# Patient Record
Sex: MALE | Race: WHITE | URBAN - METROPOLITAN AREA
[De-identification: names, ages, dates, MRNs, and addresses within clinical notes are randomized per-mention and may not be internally consistent; named-entity substitution may affect disease eponyms.]

---

## 2017-11-18 ENCOUNTER — OUTPATIENT (OUTPATIENT)
Dept: OUTPATIENT SERVICES | Facility: HOSPITAL | Age: 67
LOS: 1 days | Discharge: HOME | End: 2017-11-18

## 2017-11-18 DIAGNOSIS — R91.1 SOLITARY PULMONARY NODULE: ICD-10-CM

## 2017-12-20 ENCOUNTER — OUTPATIENT (OUTPATIENT)
Dept: OUTPATIENT SERVICES | Facility: HOSPITAL | Age: 67
LOS: 1 days | Discharge: HOME | End: 2017-12-20

## 2017-12-20 DIAGNOSIS — R05 COUGH: ICD-10-CM

## 2018-02-01 ENCOUNTER — OUTPATIENT (OUTPATIENT)
Dept: OUTPATIENT SERVICES | Facility: HOSPITAL | Age: 68
LOS: 1 days | Discharge: HOME | End: 2018-02-01

## 2018-02-01 DIAGNOSIS — R07.9 CHEST PAIN, UNSPECIFIED: ICD-10-CM

## 2018-02-27 ENCOUNTER — EMERGENCY (EMERGENCY)
Facility: HOSPITAL | Age: 68
LOS: 0 days | Discharge: HOME | End: 2018-02-28
Attending: EMERGENCY MEDICINE

## 2018-02-27 VITALS
DIASTOLIC BLOOD PRESSURE: 91 MMHG | SYSTOLIC BLOOD PRESSURE: 192 MMHG | TEMPERATURE: 98 F | RESPIRATION RATE: 20 BRPM | HEART RATE: 57 BPM | OXYGEN SATURATION: 100 %

## 2018-02-27 VITALS
DIASTOLIC BLOOD PRESSURE: 86 MMHG | RESPIRATION RATE: 18 BRPM | HEART RATE: 62 BPM | OXYGEN SATURATION: 98 % | SYSTOLIC BLOOD PRESSURE: 187 MMHG | TEMPERATURE: 99 F

## 2018-02-27 DIAGNOSIS — I10 ESSENTIAL (PRIMARY) HYPERTENSION: ICD-10-CM

## 2018-02-27 DIAGNOSIS — R10.9 UNSPECIFIED ABDOMINAL PAIN: ICD-10-CM

## 2018-02-27 DIAGNOSIS — N20.1 CALCULUS OF URETER: ICD-10-CM

## 2018-02-27 DIAGNOSIS — N13.2 HYDRONEPHROSIS WITH RENAL AND URETERAL CALCULOUS OBSTRUCTION: ICD-10-CM

## 2018-02-27 DIAGNOSIS — N20.0 CALCULUS OF KIDNEY: ICD-10-CM

## 2018-02-27 LAB
ALBUMIN SERPL ELPH-MCNC: 4.8 G/DL — SIGNIFICANT CHANGE UP (ref 3–5.5)
ALP SERPL-CCNC: 163 U/L — HIGH (ref 30–115)
ALT FLD-CCNC: 57 U/L — HIGH (ref 0–41)
ANION GAP SERPL CALC-SCNC: 12 MMOL/L — SIGNIFICANT CHANGE UP (ref 7–14)
APPEARANCE UR: CLEAR — SIGNIFICANT CHANGE UP
AST SERPL-CCNC: 79 U/L — HIGH (ref 0–41)
BASOPHILS # BLD AUTO: 0.02 K/UL — SIGNIFICANT CHANGE UP (ref 0–0.2)
BASOPHILS NFR BLD AUTO: 0.2 % — SIGNIFICANT CHANGE UP (ref 0–1)
BILIRUB SERPL-MCNC: 1.8 MG/DL — HIGH (ref 0.2–1.2)
BILIRUB UR-MCNC: NEGATIVE — SIGNIFICANT CHANGE UP
BUN SERPL-MCNC: 18 MG/DL — SIGNIFICANT CHANGE UP (ref 10–20)
CALCIUM SERPL-MCNC: 9.4 MG/DL — SIGNIFICANT CHANGE UP (ref 8.5–10.1)
CHLORIDE SERPL-SCNC: 100 MMOL/L — SIGNIFICANT CHANGE UP (ref 98–110)
CO2 SERPL-SCNC: 22 MMOL/L — SIGNIFICANT CHANGE UP (ref 17–32)
COLOR SPEC: YELLOW — SIGNIFICANT CHANGE UP
CREAT SERPL-MCNC: 1.4 MG/DL — SIGNIFICANT CHANGE UP (ref 0.7–1.5)
DIFF PNL FLD: (no result)
EOSINOPHIL # BLD AUTO: 0.01 K/UL — SIGNIFICANT CHANGE UP (ref 0–0.7)
EOSINOPHIL NFR BLD AUTO: 0.1 % — SIGNIFICANT CHANGE UP (ref 0–8)
GLUCOSE SERPL-MCNC: 175 MG/DL — HIGH (ref 70–110)
GLUCOSE UR QL: 250 MG/DL
HCT VFR BLD CALC: 47 % — SIGNIFICANT CHANGE UP (ref 42–52)
HGB BLD-MCNC: 15.3 G/DL — SIGNIFICANT CHANGE UP (ref 14–18)
IMM GRANULOCYTES NFR BLD AUTO: 0.2 % — SIGNIFICANT CHANGE UP (ref 0.1–0.3)
KETONES UR-MCNC: (no result)
LACTATE SERPL-SCNC: 2.7 MMOL/L — HIGH (ref 0.5–2.2)
LEUKOCYTE ESTERASE UR-ACNC: NEGATIVE — SIGNIFICANT CHANGE UP
LIDOCAIN IGE QN: <10 U/L — LOW (ref 7–60)
LYMPHOCYTES # BLD AUTO: 0.48 K/UL — LOW (ref 1.2–3.4)
LYMPHOCYTES # BLD AUTO: 5.5 % — LOW (ref 20.5–51.1)
MCHC RBC-ENTMCNC: 28.3 PG — SIGNIFICANT CHANGE UP (ref 27–31)
MCHC RBC-ENTMCNC: 32.6 G/DL — SIGNIFICANT CHANGE UP (ref 32–37)
MCV RBC AUTO: 86.9 FL — SIGNIFICANT CHANGE UP (ref 80–94)
MONOCYTES # BLD AUTO: 0.36 K/UL — SIGNIFICANT CHANGE UP (ref 0.1–0.6)
MONOCYTES NFR BLD AUTO: 4.1 % — SIGNIFICANT CHANGE UP (ref 1.7–9.3)
NEUTROPHILS # BLD AUTO: 7.84 K/UL — HIGH (ref 1.4–6.5)
NEUTROPHILS NFR BLD AUTO: 89.9 % — HIGH (ref 42.2–75.2)
NITRITE UR-MCNC: NEGATIVE — SIGNIFICANT CHANGE UP
NRBC # BLD: 0 /100 WBCS — SIGNIFICANT CHANGE UP (ref 0–0)
PH UR: 7.5 — SIGNIFICANT CHANGE UP (ref 5–8)
PLATELET # BLD AUTO: 164 K/UL — SIGNIFICANT CHANGE UP (ref 130–400)
POTASSIUM SERPL-MCNC: 4.5 MMOL/L — SIGNIFICANT CHANGE UP (ref 3.5–5)
POTASSIUM SERPL-SCNC: 4.5 MMOL/L — SIGNIFICANT CHANGE UP (ref 3.5–5)
PROT SERPL-MCNC: 8 G/DL — SIGNIFICANT CHANGE UP (ref 6–8)
PROT UR-MCNC: (no result) MG/DL
RBC # BLD: 5.41 M/UL — SIGNIFICANT CHANGE UP (ref 4.7–6.1)
RBC # FLD: 13.8 % — SIGNIFICANT CHANGE UP (ref 11.5–14.5)
SODIUM SERPL-SCNC: 134 MMOL/L — LOW (ref 135–146)
SP GR SPEC: 1.02 — SIGNIFICANT CHANGE UP (ref 1.01–1.03)
UROBILINOGEN FLD QL: 0.2 MG/DL — SIGNIFICANT CHANGE UP (ref 0.2–0.2)
WBC # BLD: 8.73 K/UL — SIGNIFICANT CHANGE UP (ref 4.8–10.8)
WBC # FLD AUTO: 8.73 K/UL — SIGNIFICANT CHANGE UP (ref 4.8–10.8)
WBC UR QL: SIGNIFICANT CHANGE UP /HPF

## 2018-02-27 RX ORDER — TAMSULOSIN HYDROCHLORIDE 0.4 MG/1
0.4 CAPSULE ORAL AT BEDTIME
Qty: 0 | Refills: 0 | Status: DISCONTINUED | OUTPATIENT
Start: 2018-02-27 | End: 2018-02-28

## 2018-02-27 RX ORDER — TAMSULOSIN HYDROCHLORIDE 0.4 MG/1
1 CAPSULE ORAL
Qty: 7 | Refills: 0 | OUTPATIENT
Start: 2018-02-27 | End: 2018-03-05

## 2018-02-27 RX ORDER — KETOROLAC TROMETHAMINE 30 MG/ML
30 SYRINGE (ML) INJECTION ONCE
Qty: 0 | Refills: 0 | Status: DISCONTINUED | OUTPATIENT
Start: 2018-02-27 | End: 2018-02-27

## 2018-02-27 RX ORDER — ONDANSETRON 8 MG/1
4 TABLET, FILM COATED ORAL ONCE
Qty: 0 | Refills: 0 | Status: COMPLETED | OUTPATIENT
Start: 2018-02-27 | End: 2018-02-27

## 2018-02-27 RX ORDER — MORPHINE SULFATE 50 MG/1
4 CAPSULE, EXTENDED RELEASE ORAL ONCE
Qty: 0 | Refills: 0 | Status: DISCONTINUED | OUTPATIENT
Start: 2018-02-27 | End: 2018-02-27

## 2018-02-27 RX ORDER — SODIUM CHLORIDE 9 MG/ML
1000 INJECTION INTRAMUSCULAR; INTRAVENOUS; SUBCUTANEOUS ONCE
Qty: 0 | Refills: 0 | Status: COMPLETED | OUTPATIENT
Start: 2018-02-27 | End: 2018-02-27

## 2018-02-27 RX ADMIN — ONDANSETRON 4 MILLIGRAM(S): 8 TABLET, FILM COATED ORAL at 20:41

## 2018-02-27 RX ADMIN — SODIUM CHLORIDE 1000 MILLILITER(S): 9 INJECTION INTRAMUSCULAR; INTRAVENOUS; SUBCUTANEOUS at 20:42

## 2018-02-27 RX ADMIN — Medication 30 MILLIGRAM(S): at 23:57

## 2018-02-27 RX ADMIN — TAMSULOSIN HYDROCHLORIDE 0.4 MILLIGRAM(S): 0.4 CAPSULE ORAL at 23:57

## 2018-02-27 RX ADMIN — MORPHINE SULFATE 4 MILLIGRAM(S): 50 CAPSULE, EXTENDED RELEASE ORAL at 20:41

## 2018-02-27 NOTE — ED PROVIDER NOTE - MEDICAL DECISION MAKING DETAILS
Pt with kidney stone.  pt was seen by urology team including Dr. Bullard urology attending.  pt cleared for dc by urology.  pt given rx for flomax.  pt given rx for percocet (my erx is not currently working for narcotics so pt given paper rx).  pt understands importance of urology follow up.  Patient feeling better.  Pt dc with outpatient follow up.  Pt understands importance of outpatient follow up.  Pt given strict return precautions.   Chart finished.

## 2018-02-27 NOTE — ED PROVIDER NOTE - PHYSICAL EXAMINATION
CONSTITUTIONAL: Well-developed; well-nourished; in no acute distress, nontoxic appearing  SKIN: skin exam is warm and dry,  HEAD: Normocephalic; atraumatic.  EYES: conjunctiva and sclera clear.  ENT: MMM, no nasal congestion  NECK: Supple; non tender.  ROM intact.  CARD: S1, S2 normal, no murmur  RESP: No wheezes, rales or rhonchi. Good air movement bilaterally  ABD: soft; right sided tenderness with guarding, no rebound  EXT: Normal ROM. No cyanosis   NEURO: awake, alert, following commands, oriented, grossly unremarkable. No Focal deficits. GCS 15.   PSYCH: Cooperative, appropriate.

## 2018-02-27 NOTE — ED PROVIDER NOTE - OBJECTIVE STATEMENT
66 yo m with pmh of esophapgeal CA s/p surgery presents with right sided abd pain, nausea, vomiting, diarrhe afor one day.  no fevers, no chills, no back pain, no urinary complaints.  + sick contact.  no headache, no back pain, no dizziness, no other complaints.

## 2018-02-27 NOTE — CONSULT NOTE ADULT - ATTENDING COMMENTS
pt seen labs reviewed cat scan reviewed and issues discussed with pt PA and ER doctor. We need to see his UA if negative given no further pain D/C home strain urine and f/u as OPD

## 2018-02-27 NOTE — ED PROVIDER NOTE - NS ED ROS FT
Constitutional:  no fevers, no chills, no malaise  ENMT: No neck pain or stiffness, no nasal congestion, no ear pain, no throat pain  Cardiac:  No chest pain  Respiratory:  No cough or sob  GI:  see hpi  :  No dysuria, frequency or burning.  MS:  No back pain, no joint pain.  Neuro:  No headache, no dizziness, no change in mental status  Except as documented in the HPI,  all other systems are negative

## 2018-02-27 NOTE — CONSULT NOTE ADULT - SUBJECTIVE AND OBJECTIVE BOX
Patient is a 67y old  Male who presents with a chief complaint of Right sided abdominal pain.    HPI:  Pt states he began having pain approximately 24 hrs ago and thought it may be related to his esophageal/gastric surgery 6 months ago. Pt states the pain started quickly yesterday, is sharp in nature and intermittent. Pt states he was nauseous and vomited once but feels better now. Pain is still present but not as intense. Pt denies any fever, chills or dysuria      PAST MEDICAL & SURGICAL HISTORY:  Hypertension, unspecified type  Esophageal CA s/p esophagogastrectomy with chemotherapy 6 months ago      REVIEW OF SYSTEMS:    CONSTITUTIONAL:  fevers or chills  HEENT: No visual changes  ENDO: No sweating  NECK: No pain or stiffness  MUSCULOSKELETAL: No back pain, no joint pain  RESPIRATORY: No shortness of breath  CARDIOVASCULAR: No chest pain  GASTROINTESTINAL: No abdominal or epigastric pain. No nausea, vomiting,  No diarrhea or constipation.   NEUROLOGICAL: No mental status changes  PSYCH: No depression, no mood changes  SKIN: No itching      MEDICATIONS  (STANDING):  ketorolac   Injectable 30 milliGRAM(s) IV Push Once    MEDICATIONS  (PRN):      Allergies    No Known Allergies    Intolerances        SOCIAL HISTORY: No illicit drug use    FAMILY HISTORY:      Vital Signs Last 24 Hrs  T(C): 36.9 (27 Feb 2018 18:09), Max: 36.9 (27 Feb 2018 18:09)  T(F): 98.5 (27 Feb 2018 18:09), Max: 98.5 (27 Feb 2018 18:09)  HR: 57 (27 Feb 2018 18:09) (57 - 57)  BP: 192/91 (27 Feb 2018 18:09) (192/91 - 192/91)  BP(mean): --  RR: 20 (27 Feb 2018 18:09) (20 - 20)  SpO2: 100% (27 Feb 2018 18:09) (100% - 100%)    PHYSICAL EXAM:    Constitutional: NAD, well-developed  HEENT: EOMI  Neck: no pain  Back: No CVA tenderness  Respiratory: No accessory respiratory muscle use  Abd: Soft, NT/ND  no organomegally  no hernia  : no testicular tenderness, no suprapubic fullness or tenderness. No scrotal edema, no hernias  Extremities: no edema  Neurological: A/O x 3  Psychiatric: Normal mood, normal affect  Skin: No rashes    I&O's Summary      LABS:                        15.3   8.73  )-----------( 164      ( 27 Feb 2018 20:41 )             47.0     02-27    134<L>  |  100  |  18  ----------------------------<  175<H>  4.5   |  22  |  1.4    Ca    9.4      27< from: CT Abdomen and Pelvis w/ IV Cont (02.27.18 @ 22:01) >      < end of copied text >   Feb 2018 20:41    TPro  8.0  /  Alb  4.8  /  TBili  1.8<H>  /  DBili  x   /  AST  79<H>  /  ALT  57<H>  /  AlkPhos  163<H>  02-27        Urine Culture:         RADIOLOGY & ADDITIONAL STUDIES:  < from: CT Abdomen and Pelvis w/ IV Cont (02.27.18 @ 22:01) >    Mild right hydroureteronephrosis with a 4 mm stone at the right   ureterovesicular junction, too small to measure Hounsfield units. There   is extensive right perinephric fluid extending down the retroperitoneum,   suspicious for calyceal rupture.    < end of copied text > Patient is a 67y old  Male who presents with a chief complaint of Right sided abdominal pain.    HPI:  Pt states he began having pain approximately 24 hrs ago and thought it may be related to his esophageal/gastric surgery 6 months ago. Pt states the pain started quickly yesterday, is sharp in nature and intermittent. Pt states he was nauseous and vomited once but feels better now. Pain is still present but not as intense. Pt denies any fever, chills or dysuria      PAST MEDICAL & SURGICAL HISTORY:  Hypertension, unspecified type  Esophageal CA s/p esophagogastrectomy with chemotherapy 6 months ago      REVIEW OF SYSTEMS:    CONSTITUTIONAL:  fevers or chills  HEENT: No visual changes  ENDO: No sweating  NECK: No pain or stiffness  MUSCULOSKELETAL: No back pain, no joint pain  RESPIRATORY: No shortness of breath  CARDIOVASCULAR: No chest pain  GASTROINTESTINAL: No abdominal or epigastric pain. No nausea, vomiting,  No diarrhea or constipation.   NEUROLOGICAL: No mental status changes  PSYCH: No depression, no mood changes  SKIN: No itching      MEDICATIONS  (STANDING):  ketorolac   Injectable 30 milliGRAM(s) IV Push Once    MEDICATIONS  (PRN):      Allergies    No Known Allergies    Intolerances        SOCIAL HISTORY: No illicit drug use    FAMILY HISTORY:      Vital Signs Last 24 Hrs  T(C): 36.9 (27 Feb 2018 18:09), Max: 36.9 (27 Feb 2018 18:09)  T(F): 98.5 (27 Feb 2018 18:09), Max: 98.5 (27 Feb 2018 18:09)  HR: 57 (27 Feb 2018 18:09) (57 - 57)  BP: 192/91 (27 Feb 2018 18:09) (192/91 - 192/91)  BP(mean): --  RR: 20 (27 Feb 2018 18:09) (20 - 20)  SpO2: 100% (27 Feb 2018 18:09) (100% - 100%)    PHYSICAL EXAM:    Constitutional: NAD, well-developed  HEENT: EOMI  Neck: no pain  Back: No CVA tenderness  Respiratory: No accessory respiratory muscle use  Abd: Soft, NT/ND  no organomegally  no hernia  : no testicular tenderness, no suprapubic fullness or tenderness. No scrotal edema, no hernias  Extremities: no edema  Neurological: A/O x 3  Psychiatric: Normal mood, normal affect  Skin: No rashes    I&O's Summary      LABS:                        15.3   8.73  )-----------( 164      ( 27 Feb 2018 20:41 )             47.0     02-27    134<L>  |  100  |  18  ----------------------------<  175<H>  4.5   |  22  |  1.4    Ca    9.4      27< from: CT Abdomen and Pelvis w/ IV Cont (02.27.18 @ 22:01) >      < end of copied text >   Feb 2018 20:41    TPro  8.0  /  Alb  4.8  /  TBili  1.8<H>  /  DBili  x   /  AST  79<H>  /  ALT  57<H>  /  AlkPhos  163<H>  02-27        Urinalysis negative for leuks and nitrite        RADIOLOGY & ADDITIONAL STUDIES:  < from: CT Abdomen and Pelvis w/ IV Cont (02.27.18 @ 22:01) >    Mild right hydroureteronephrosis with a 4 mm stone at the right   ureterovesicular junction, too small to measure Hounsfield units. There   is extensive right perinephric fluid extending down the retroperitoneum,   suspicious for calyceal rupture.    < end of copied text >

## 2018-02-27 NOTE — CONSULT NOTE ADULT - PROBLEM SELECTOR RECOMMENDATION 2
Drink plenty of fluids and strain all urine for stone. If stone is obtained save it for f/u with Urologist. Take Flomax 0.4mg daily along with pain medication as needed. F/U with Dr Bullard as outpt, call for appointment.

## 2018-02-27 NOTE — ED PROVIDER NOTE - PROGRESS NOTE DETAILS
a/p:  abd pain, nausea, vomiting.  pt with histoyr of surgery to distal eso[phagus.  pt unable to drink contrast.  p: ivf, pain control, ct, labs, reassess. urology consulted.  they will come to see pt. pt seen by urology at bedside.  pt seen by Dr. Bullard.  IF UA negstive for infection will dc with flomax and outpatient urology follow up as per Dr. Bullard.  pt nontoxic, well appearing.

## 2018-04-04 ENCOUNTER — APPOINTMENT (OUTPATIENT)
Dept: UROLOGY | Facility: CLINIC | Age: 68
End: 2018-04-04
Payer: MEDICARE

## 2018-04-04 DIAGNOSIS — N20.0 CALCULUS OF KIDNEY: ICD-10-CM

## 2018-04-04 DIAGNOSIS — F43.9 REACTION TO SEVERE STRESS, UNSPECIFIED: ICD-10-CM

## 2018-04-04 DIAGNOSIS — K21.9 GASTRO-ESOPHAGEAL REFLUX DISEASE W/OUT ESOPHAGITIS: ICD-10-CM

## 2018-04-04 DIAGNOSIS — I10 ESSENTIAL (PRIMARY) HYPERTENSION: ICD-10-CM

## 2018-04-04 DIAGNOSIS — Z85.01 PERSONAL HISTORY OF MALIGNANT NEOPLASM OF ESOPHAGUS: ICD-10-CM

## 2018-04-04 PROCEDURE — 99213 OFFICE O/P EST LOW 20 MIN: CPT

## 2018-04-04 NOTE — LETTER HEADER
[Sudheer Bullard MD] : Sudheer Bullard MD [Director of Urology] : Director of Urology [[address]] : [address] [[address]] : [address] [[place] [address]] : [place] [address] [Tel [phone]] : Tel [phone] [Fax [phone]] : Fax [phone]

## 2018-04-04 NOTE — REASON FOR VISIT
[Initial Visit ___] : [unfilled] is here today for an initial visit  for [unfilled] [Follow-up Visit ___] : a follow-up visit  for [unfilled]

## 2018-04-04 NOTE — LETTER HEADER
[Sudheer Bullrad MD] : Sudheer Bullard MD [Director of Urology] : Director of Urology [[address]] : [address] [[address]] : [address] [[place] [address]] : [place] [address] [Tel [phone]] : Tel [phone] [Fax [phone]] : Fax [phone]

## 2018-04-09 NOTE — LETTER BODY
[Consult Letter:] : I had the pleasure of evaluating your patient, [unfilled]. [Consult Closing:] : Thank you very much for allowing me to participate in the care of this patient.  If you have any questions, please do not hesitate to contact me. [Dear  ___] : Dear  [unfilled], [Please see my note below.] : Please see my note below. [Sincerely,] : Sincerely, [Courtesy Letter:] : I had the pleasure of seeing your patient, [unfilled], in my office today. [FreeTextEntry2] : Dr. Julianne Eisenberg\par [address] [place] [address]\[place] [address]

## 2018-04-09 NOTE — REVIEW OF SYSTEMS
[see HPI] : see HPI [Negative] : Heme/Lymph [Testicular Pain] : no testicular pain [Dysuria] : no dysuria [Incontinence] : no incontinence [Nocturia] : no nocturia

## 2018-04-09 NOTE — HISTORY OF PRESENT ILLNESS
[Currently Experiencing ___] :  [unfilled] [None] : None [FreeTextEntry1] : SERINA CORREA is a 67 year old male with a past medical history of esophageal cancer (Diagnosed 3/2017) and treatments of chemotherapy along with radiation (last treatment 6/2017) . Presents to the office today after been seen by Dr Bullard in ER for severe right flank pain radiating to lower back and diagnosed with right kidney stone.Patient had CT 2/27/2018 and revealed a 4 mm stone at the right ureterovesicular junction, too small to measure HU.Patient was discharged on Flomax 0.4 mg daily and patient has finished medication. Patient states that pain resolved on its own the same day of visit to ER. Patient states he saw small granules in his urine while he was straining his urine that same week. \par Denies urological issues, including dysuria, frequency, hematuria,and flank pain.  [Urinary Urgency] : no urinary urgency [Urinary Frequency] : no urinary frequency [Nocturia] : no nocturia [Straining] : no straining [Dysuria] : no dysuria [Hematuria - Gross] : no gross hematuria [Flank Pain] : no flank pain

## 2018-04-09 NOTE — ASSESSMENT
[FreeTextEntry1] : Upon assessment, patient has no tenderness upon percussion of bilateral flanks. No fever or chills. Patient states that he was given a strainer and was able to see small granules in his urine. Patient did not bring in stone with him, states he will bring next visit. Patient was instructed to increase his fluid intake as much as his body allows to decrease risk of kidney stone. Patient given pamphlets on kidney stone prevention and increasing fluid, to reinforce teaching. \par Patient states that he was placed on Pro[employer] and [place] Fulvic and Humic Complex Vitamins approx 4 months ago by his Nutritionist after peg tube removal.\par As per PSA, patient states Dr. Julianne Culp checks yearly, have been normal up to now. does not recall lab values, will bring or have PSA faxed to office.

## 2018-04-09 NOTE — ASSESSMENT
[FreeTextEntry1] : Upon assessment, patient has no tenderness upon percussion of bilateral flanks. No fever or chills. Patient states that he was given a strainer and was able to see small granules in his urine. Patient did not bring in stone with him, states he will bring next visit. Patient was instructed to increase his fluid intake as much as his body allows to decrease risk of kidney stone. Patient given pamphlets on kidney stone prevention and increasing fluid, to reinforce teaching. \par Patient states that he was placed on Pro[employer]otic [employer] and [place] Fulvic and Humic Complex Vitamins approx 4 months ago by his Nutritionist after peg tube removal.\par As per PSA, patient states Dr. Julianne Culp checks yearly, have been normal up to now. does not recall lab values, will bring or have PSA faxed to office.

## 2018-04-09 NOTE — LETTER BODY
[Consult Letter:] : I had the pleasure of evaluating your patient, [unfilled]. [Consult Closing:] : Thank you very much for allowing me to participate in the care of this patient.  If you have any questions, please do not hesitate to contact me. [Dear  ___] : Dear  [unfilled], [Please see my note below.] : Please see my note below. [Sincerely,] : Sincerely, [Courtesy Letter:] : I had the pleasure of seeing your patient, [unfilled], in my office today. [FreeTextEntry2] : Dr. Julianne Eisenberg\par [address] [place] [address]\[place] [address]

## 2018-04-18 ENCOUNTER — OUTPATIENT (OUTPATIENT)
Dept: OUTPATIENT SERVICES | Facility: HOSPITAL | Age: 68
LOS: 1 days | Discharge: HOME | End: 2018-04-18

## 2018-04-18 DIAGNOSIS — N20.0 CALCULUS OF KIDNEY: ICD-10-CM

## 2018-05-22 ENCOUNTER — OUTPATIENT (OUTPATIENT)
Dept: OUTPATIENT SERVICES | Facility: HOSPITAL | Age: 68
LOS: 1 days | Discharge: HOME | End: 2018-05-22

## 2018-05-22 VITALS
WEIGHT: 159.84 LBS | RESPIRATION RATE: 20 BRPM | DIASTOLIC BLOOD PRESSURE: 78 MMHG | OXYGEN SATURATION: 100 % | TEMPERATURE: 98 F | HEART RATE: 50 BPM | HEIGHT: 70 IN | SYSTOLIC BLOOD PRESSURE: 147 MMHG

## 2018-05-22 DIAGNOSIS — Z01.818 ENCOUNTER FOR OTHER PREPROCEDURAL EXAMINATION: ICD-10-CM

## 2018-05-22 DIAGNOSIS — C15.9 MALIGNANT NEOPLASM OF ESOPHAGUS, UNSPECIFIED: Chronic | ICD-10-CM

## 2018-05-22 LAB
ALBUMIN SERPL ELPH-MCNC: 4.3 G/DL — SIGNIFICANT CHANGE UP (ref 3.5–5.2)
ALP SERPL-CCNC: 134 U/L — HIGH (ref 30–115)
ALT FLD-CCNC: 28 U/L — SIGNIFICANT CHANGE UP (ref 0–41)
ANION GAP SERPL CALC-SCNC: 12 MMOL/L — SIGNIFICANT CHANGE UP (ref 7–14)
APTT BLD: 26.8 SEC — LOW (ref 27–39.2)
AST SERPL-CCNC: 27 U/L — SIGNIFICANT CHANGE UP (ref 0–41)
BASOPHILS # BLD AUTO: 0.02 K/UL — SIGNIFICANT CHANGE UP (ref 0–0.2)
BASOPHILS NFR BLD AUTO: 0.7 % — SIGNIFICANT CHANGE UP (ref 0–1)
BILIRUB SERPL-MCNC: 0.6 MG/DL — SIGNIFICANT CHANGE UP (ref 0.2–1.2)
BUN SERPL-MCNC: 15 MG/DL — SIGNIFICANT CHANGE UP (ref 10–20)
CALCIUM SERPL-MCNC: 9.5 MG/DL — SIGNIFICANT CHANGE UP (ref 8.5–10.1)
CHLORIDE SERPL-SCNC: 104 MMOL/L — SIGNIFICANT CHANGE UP (ref 98–110)
CO2 SERPL-SCNC: 29 MMOL/L — SIGNIFICANT CHANGE UP (ref 17–32)
CREAT SERPL-MCNC: 0.9 MG/DL — SIGNIFICANT CHANGE UP (ref 0.7–1.5)
EOSINOPHIL # BLD AUTO: 0.1 K/UL — SIGNIFICANT CHANGE UP (ref 0–0.7)
EOSINOPHIL NFR BLD AUTO: 3.4 % — SIGNIFICANT CHANGE UP (ref 0–8)
GLUCOSE SERPL-MCNC: 75 MG/DL — SIGNIFICANT CHANGE UP (ref 70–99)
HCT VFR BLD CALC: 42.9 % — SIGNIFICANT CHANGE UP (ref 42–52)
HGB BLD-MCNC: 14 G/DL — SIGNIFICANT CHANGE UP (ref 14–18)
IMM GRANULOCYTES NFR BLD AUTO: 0.3 % — SIGNIFICANT CHANGE UP (ref 0.1–0.3)
INR BLD: 1.02 RATIO — SIGNIFICANT CHANGE UP (ref 0.65–1.3)
LYMPHOCYTES # BLD AUTO: 0.76 K/UL — LOW (ref 1.2–3.4)
LYMPHOCYTES # BLD AUTO: 25.7 % — SIGNIFICANT CHANGE UP (ref 20.5–51.1)
MCHC RBC-ENTMCNC: 29 PG — SIGNIFICANT CHANGE UP (ref 27–31)
MCHC RBC-ENTMCNC: 32.6 G/DL — SIGNIFICANT CHANGE UP (ref 32–37)
MCV RBC AUTO: 89 FL — SIGNIFICANT CHANGE UP (ref 80–94)
MONOCYTES # BLD AUTO: 0.43 K/UL — SIGNIFICANT CHANGE UP (ref 0.1–0.6)
MONOCYTES NFR BLD AUTO: 14.5 % — HIGH (ref 1.7–9.3)
NEUTROPHILS # BLD AUTO: 1.64 K/UL — SIGNIFICANT CHANGE UP (ref 1.4–6.5)
NEUTROPHILS NFR BLD AUTO: 55.4 % — SIGNIFICANT CHANGE UP (ref 42.2–75.2)
NRBC # BLD: 0 /100 WBCS — SIGNIFICANT CHANGE UP (ref 0–0)
PLATELET # BLD AUTO: 102 K/UL — LOW (ref 130–400)
POTASSIUM SERPL-MCNC: 5.2 MMOL/L — HIGH (ref 3.5–5)
POTASSIUM SERPL-SCNC: 5.2 MMOL/L — HIGH (ref 3.5–5)
PROT SERPL-MCNC: 6.9 G/DL — SIGNIFICANT CHANGE UP (ref 6–8)
PROTHROM AB SERPL-ACNC: 11 SEC — SIGNIFICANT CHANGE UP (ref 9.95–12.87)
RBC # BLD: 4.82 M/UL — SIGNIFICANT CHANGE UP (ref 4.7–6.1)
RBC # FLD: 14.8 % — HIGH (ref 11.5–14.5)
SODIUM SERPL-SCNC: 145 MMOL/L — SIGNIFICANT CHANGE UP (ref 135–146)
WBC # BLD: 2.96 K/UL — LOW (ref 4.8–10.8)
WBC # FLD AUTO: 2.96 K/UL — LOW (ref 4.8–10.8)

## 2018-05-22 NOTE — H&P PST ADULT - HISTORY OF PRESENT ILLNESS
68 Y/O MALE SCHEDULED FOR LUNG BIOPSY REPORTS H/O ESOPHAGEAL CANCER. PT WAS SCHEDULED FOR ROUTINE F/U AND THERE WAS AN INCIDENTAL FINDING ON THE PET SCAN ADVISED TO HAVE LUNG BX  REPORTS NO CP,SOB,PALPITATIONS,COUGH OR DYSURIA  1-2 FOS WITHOUT SOB

## 2018-05-22 NOTE — H&P PST ADULT - PMH
Cancer  ESOPHAGEAL CANCER 2017 RECEIVED CHEMO AND RADIATION  Hypertension, unspecified type    Renal calculi

## 2018-05-23 ENCOUNTER — APPOINTMENT (OUTPATIENT)
Dept: UROLOGY | Facility: CLINIC | Age: 68
End: 2018-05-23
Payer: MEDICARE

## 2018-05-23 VITALS
WEIGHT: 153 LBS | HEIGHT: 70 IN | DIASTOLIC BLOOD PRESSURE: 62 MMHG | HEART RATE: 60 BPM | BODY MASS INDEX: 21.9 KG/M2 | SYSTOLIC BLOOD PRESSURE: 119 MMHG

## 2018-05-23 DIAGNOSIS — N20.0 CALCULUS OF KIDNEY: ICD-10-CM

## 2018-05-23 PROCEDURE — 99214 OFFICE O/P EST MOD 30 MIN: CPT

## 2018-05-23 NOTE — LETTER HEADER
[Sudheer Bullard MD] : Sudheer Bullard MD [Director of Urology] : Director of Urology [[address]] : [address]] : [address] [[place] [address]] : [place] [address] [Tel [phone]] : Tel [phone] [Fax [phone]] : Fax [phone]

## 2018-05-23 NOTE — LETTER BODY
[Dear  ___] : Dear  [unfilled], [Please see my note below.] : Please see my note below. [Referral Closing:] : Thank you very much for seeing this patient.  If you have any questions, please do not hesitate to contact me. [Sincerely,] : Sincerely, [FreeTextEntry2] : Dr. Julianne Eisenberg\par [address]\[place] [address] \par \par

## 2018-05-23 NOTE — ASSESSMENT
[FreeTextEntry1] : Patient currently has no complaints and  states that he has decreased his vitamin intake that was prescribed by nutritionist. Patient states that his fluid intake is improving as well as his eating habits from 9/2017 when he had partial of part of the stomach removed as well as the lower part of the esophagus. Day by day, able to tolerate more food and fluids.\par Patient states that he was just recently diagnosed 3 weeks ago with left lung cancer and is being followed in [facility]. Patient has a biopsy scheduled for 5/31/2018. \par \par Right now is primary issue is the lung cancer whether it’s metastatic or primary and when I look at the CAT scan from February and the ultrasound from now I can definitely see where the radiologist thought that there are some stones that were present then but it be a relatively short period for him to make two new stones. It could be because of his low fluid intake which is because of his inability to both eat and drink to the small size of his stomach but if we don’t get his volume up he’s going to make more stones and he makes more stones he is not to be happy.\par \par I’m recommending he see a nephrologist, to see who he or she is recommending. He will discuss that at with the nutritionist whether he has to take all those vitamins as if that is the proximate cause there must be something else they can give him, we hope, that would keep his nutrition up without giving them kidney stones.\par

## 2018-05-23 NOTE — HISTORY OF PRESENT ILLNESS
[None] : None [FreeTextEntry1] : SERINA CORREA is a 67 year old male with a past medical history of esophageal cancer (Diagnosed 3/2017) and treatments of chemotherapy along with radiation (last treatment 6/2017) . Presents to the office today for a follow up, last seen on 4/4/2018. Patient had CT 2/27/2018 and revealed a 4 mm stone at the right ureterovesicular junction, too small to measure HU.Patient currently denies any urinary symtoms , no flank pain. Patient states he is not straining urine but at the same time has not seen anything in the toilet after he urinates. Patient has had an US and 24 hour urine and is here to review results.

## 2018-05-30 DIAGNOSIS — R18.0 MALIGNANT ASCITES: ICD-10-CM

## 2018-08-13 ENCOUNTER — APPOINTMENT (OUTPATIENT)
Dept: UROLOGY | Facility: CLINIC | Age: 68
End: 2018-08-13

## 2018-09-04 NOTE — ED PROVIDER NOTE - DATE/TIME 3
27-Feb-2018 22:58 Repair Performed By Another Provider Text (Leave Blank If You Do Not Want): After obtaining clear surgical margins the defect was repaired by another provider.

## 2018-11-21 PROBLEM — I10 ESSENTIAL (PRIMARY) HYPERTENSION: Chronic | Status: ACTIVE | Noted: 2018-02-27

## 2018-11-29 ENCOUNTER — OUTPATIENT (OUTPATIENT)
Dept: OUTPATIENT SERVICES | Facility: HOSPITAL | Age: 68
LOS: 1 days | Discharge: HOME | End: 2018-11-29

## 2018-11-29 VITALS
RESPIRATION RATE: 16 BRPM | OXYGEN SATURATION: 100 % | WEIGHT: 130.07 LBS | TEMPERATURE: 98 F | HEART RATE: 60 BPM | DIASTOLIC BLOOD PRESSURE: 84 MMHG | SYSTOLIC BLOOD PRESSURE: 140 MMHG | HEIGHT: 70 IN

## 2018-11-29 DIAGNOSIS — Z01.818 ENCOUNTER FOR OTHER PREPROCEDURAL EXAMINATION: ICD-10-CM

## 2018-11-29 DIAGNOSIS — N13.2 HYDRONEPHROSIS WITH RENAL AND URETERAL CALCULOUS OBSTRUCTION: ICD-10-CM

## 2018-11-29 DIAGNOSIS — Z90.2 ACQUIRED ABSENCE OF LUNG [PART OF]: Chronic | ICD-10-CM

## 2018-11-29 DIAGNOSIS — C15.9 MALIGNANT NEOPLASM OF ESOPHAGUS, UNSPECIFIED: Chronic | ICD-10-CM

## 2018-11-29 LAB
APPEARANCE UR: ABNORMAL
BILIRUB UR-MCNC: NEGATIVE — SIGNIFICANT CHANGE UP
COLOR SPEC: YELLOW — SIGNIFICANT CHANGE UP
DIFF PNL FLD: NEGATIVE — SIGNIFICANT CHANGE UP
GLUCOSE UR QL: NEGATIVE MG/DL — SIGNIFICANT CHANGE UP
KETONES UR-MCNC: NEGATIVE — SIGNIFICANT CHANGE UP
LEUKOCYTE ESTERASE UR-ACNC: ABNORMAL
NITRITE UR-MCNC: NEGATIVE — SIGNIFICANT CHANGE UP
PH UR: 6 — SIGNIFICANT CHANGE UP (ref 5–8)
PROT UR-MCNC: NEGATIVE MG/DL — SIGNIFICANT CHANGE UP
SP GR SPEC: 1.01 — SIGNIFICANT CHANGE UP (ref 1.01–1.03)
UROBILINOGEN FLD QL: 1 MG/DL (ref 0.2–0.2)
WBC UR QL: SIGNIFICANT CHANGE UP /HPF

## 2018-11-29 RX ORDER — FOSINOPRIL SODIUM 10 MG/1
30 TABLET ORAL
Qty: 0 | Refills: 0 | COMMUNITY

## 2018-11-29 RX ORDER — PREGABALIN 225 MG/1
1 CAPSULE ORAL
Qty: 0 | Refills: 0 | COMMUNITY

## 2018-11-29 RX ORDER — CHOLECALCIFEROL (VITAMIN D3) 125 MCG
1 CAPSULE ORAL
Qty: 0 | Refills: 0 | COMMUNITY

## 2018-11-29 RX ORDER — OMEPRAZOLE 10 MG/1
1 CAPSULE, DELAYED RELEASE ORAL
Qty: 0 | Refills: 0 | COMMUNITY

## 2018-11-29 RX ORDER — PYRIDOXINE HCL (VITAMIN B6) 100 MG
16 TABLET ORAL
Qty: 0 | Refills: 0 | COMMUNITY

## 2018-11-29 NOTE — H&P PST ADULT - PSH
Cancer of esophagus  2017 RESECTION OF LOWER PORTION OF ESOPHAGUS  S/P lobectomy of lung  left ?JUNE 2018

## 2018-11-29 NOTE — H&P PST ADULT - HISTORY OF PRESENT ILLNESS
68yr old male states was found to have right kidney stone and hydronephrosis -presents for RIGHT ESWL. Denies c/o CP, PALP, SOB, URI, FEVER, RASH OR UTI SYMPTOMS. Exercise [place] 2 FOS AND ABLE TO WALK 8-10 CITY BLOCKS. LOST 70LBS IN 2018 AFTER OESOPHAGO -GASRECTOMY" GETTING BETTER, STARTING  TO EAT NOW. 68yr old male states was found to have right kidney stone and hydronephrosis -presents for RIGHT ESWL. Denies c/o CP, PALP, SOB, URI, FEVER, RASH OR UTI SYMPTOMS. Exercise [place] 2 FOS AND ABLE TO WALK 8-10 CITY BLOCKS. LOST 70LBS IN 2018 AFTER OESOPHAGO -GASRECTOMY" GETTING BETTER, STARTING  TO EAT NOW."

## 2018-11-29 NOTE — H&P PST ADULT - PMH
Cancer  left lung 2018, no chemo or rad rx  Cancer  ESOPHAGEAL CANCER 2017 RECEIVED CHEMO AND RADIATION  Hypertension, unspecified type    Renal calculi

## 2018-11-29 NOTE — H&P PST ADULT - BMI (KG/M2)
Was the patient seen in the last year in this department? Yes     Does patient have an active prescription for medications requested? No     Received Request Via: Pharmacy   Last seen 8/7/17  Last labs 4/26/17   18.7

## 2018-11-30 LAB
CULTURE RESULTS: NO GROWTH — SIGNIFICANT CHANGE UP
SPECIMEN SOURCE: SIGNIFICANT CHANGE UP

## 2018-12-03 NOTE — ASU PATIENT PROFILE, ADULT - PMH
Abnormal cholesterol test    Cancer  left lung 2018, no chemo or rad rx  Cancer  ESOPHAGEAL CANCER 2017 RECEIVED CHEMO AND RADIATION  Hypertension, unspecified type    Renal calculi

## 2018-12-04 ENCOUNTER — OUTPATIENT (OUTPATIENT)
Dept: OUTPATIENT SERVICES | Facility: HOSPITAL | Age: 68
LOS: 1 days | Discharge: HOME | End: 2018-12-04

## 2018-12-04 VITALS
OXYGEN SATURATION: 100 % | DIASTOLIC BLOOD PRESSURE: 79 MMHG | TEMPERATURE: 96 F | SYSTOLIC BLOOD PRESSURE: 134 MMHG | RESPIRATION RATE: 17 BRPM | HEIGHT: 70 IN | WEIGHT: 130.07 LBS | HEART RATE: 48 BPM

## 2018-12-04 VITALS — RESPIRATION RATE: 16 BRPM | DIASTOLIC BLOOD PRESSURE: 92 MMHG | HEART RATE: 60 BPM | SYSTOLIC BLOOD PRESSURE: 154 MMHG

## 2018-12-04 DIAGNOSIS — C15.9 MALIGNANT NEOPLASM OF ESOPHAGUS, UNSPECIFIED: Chronic | ICD-10-CM

## 2018-12-04 DIAGNOSIS — Z90.2 ACQUIRED ABSENCE OF LUNG [PART OF]: Chronic | ICD-10-CM

## 2018-12-04 RX ORDER — ONDANSETRON 8 MG/1
4 TABLET, FILM COATED ORAL ONCE
Qty: 0 | Refills: 0 | Status: DISCONTINUED | OUTPATIENT
Start: 2018-12-04 | End: 2018-12-05

## 2018-12-04 RX ORDER — DEXAMETHASONE 0.5 MG/5ML
8 ELIXIR ORAL ONCE
Qty: 0 | Refills: 0 | Status: DISCONTINUED | OUTPATIENT
Start: 2018-12-04 | End: 2018-12-05

## 2018-12-04 RX ORDER — SODIUM CHLORIDE 9 MG/ML
1000 INJECTION, SOLUTION INTRAVENOUS
Qty: 0 | Refills: 0 | Status: DISCONTINUED | OUTPATIENT
Start: 2018-12-04 | End: 2018-12-05

## 2018-12-04 RX ORDER — OXYCODONE AND ACETAMINOPHEN 5; 325 MG/1; MG/1
2 TABLET ORAL ONCE
Qty: 0 | Refills: 0 | Status: DISCONTINUED | OUTPATIENT
Start: 2018-12-04 | End: 2018-12-05

## 2018-12-04 NOTE — ASU DISCHARGE PLAN (ADULT/PEDIATRIC). - NOTIFY
Fever greater than 101/Persistent Nausea and Vomiting/Pain not relieved by Medications/Unable to Urinate

## 2018-12-10 DIAGNOSIS — N13.2 HYDRONEPHROSIS WITH RENAL AND URETERAL CALCULOUS OBSTRUCTION: ICD-10-CM

## 2018-12-10 DIAGNOSIS — E78.5 HYPERLIPIDEMIA, UNSPECIFIED: ICD-10-CM

## 2018-12-10 DIAGNOSIS — Z98.49 CATARACT EXTRACTION STATUS, UNSPECIFIED EYE: ICD-10-CM

## 2018-12-10 DIAGNOSIS — Z85.118 PERSONAL HISTORY OF OTHER MALIGNANT NEOPLASM OF BRONCHUS AND LUNG: ICD-10-CM

## 2018-12-10 DIAGNOSIS — I07.1 RHEUMATIC TRICUSPID INSUFFICIENCY: ICD-10-CM

## 2018-12-10 DIAGNOSIS — Z82.61 FAMILY HISTORY OF ARTHRITIS: ICD-10-CM

## 2018-12-10 DIAGNOSIS — J30.2 OTHER SEASONAL ALLERGIC RHINITIS: ICD-10-CM

## 2018-12-10 DIAGNOSIS — N20.0 CALCULUS OF KIDNEY: ICD-10-CM

## 2018-12-10 DIAGNOSIS — I10 ESSENTIAL (PRIMARY) HYPERTENSION: ICD-10-CM

## 2018-12-10 DIAGNOSIS — Z92.3 PERSONAL HISTORY OF IRRADIATION: ICD-10-CM

## 2018-12-10 DIAGNOSIS — Z92.21 PERSONAL HISTORY OF ANTINEOPLASTIC CHEMOTHERAPY: ICD-10-CM

## 2018-12-10 DIAGNOSIS — Z83.3 FAMILY HISTORY OF DIABETES MELLITUS: ICD-10-CM

## 2018-12-10 DIAGNOSIS — Z87.891 PERSONAL HISTORY OF NICOTINE DEPENDENCE: ICD-10-CM

## 2018-12-10 DIAGNOSIS — Z90.49 ACQUIRED ABSENCE OF OTHER SPECIFIED PARTS OF DIGESTIVE TRACT: ICD-10-CM

## 2018-12-10 DIAGNOSIS — Z85.01 PERSONAL HISTORY OF MALIGNANT NEOPLASM OF ESOPHAGUS: ICD-10-CM

## 2018-12-10 DIAGNOSIS — I49.3 VENTRICULAR PREMATURE DEPOLARIZATION: ICD-10-CM

## 2019-01-30 ENCOUNTER — APPOINTMENT (OUTPATIENT)
Dept: UROLOGY | Facility: CLINIC | Age: 69
End: 2019-01-30

## 2020-01-20 ENCOUNTER — EMERGENCY (EMERGENCY)
Facility: HOSPITAL | Age: 70
LOS: 0 days | Discharge: HOME | End: 2020-01-20
Attending: EMERGENCY MEDICINE | Admitting: EMERGENCY MEDICINE
Payer: MEDICARE

## 2020-01-20 VITALS
OXYGEN SATURATION: 98 % | TEMPERATURE: 97 F | SYSTOLIC BLOOD PRESSURE: 128 MMHG | DIASTOLIC BLOOD PRESSURE: 80 MMHG | HEART RATE: 66 BPM | RESPIRATION RATE: 18 BRPM

## 2020-01-20 VITALS
TEMPERATURE: 98 F | HEART RATE: 78 BPM | SYSTOLIC BLOOD PRESSURE: 138 MMHG | OXYGEN SATURATION: 100 % | RESPIRATION RATE: 19 BRPM | DIASTOLIC BLOOD PRESSURE: 84 MMHG | WEIGHT: 130.07 LBS

## 2020-01-20 DIAGNOSIS — N20.0 CALCULUS OF KIDNEY: ICD-10-CM

## 2020-01-20 DIAGNOSIS — Z98.890 OTHER SPECIFIED POSTPROCEDURAL STATES: ICD-10-CM

## 2020-01-20 DIAGNOSIS — C15.9 MALIGNANT NEOPLASM OF ESOPHAGUS, UNSPECIFIED: Chronic | ICD-10-CM

## 2020-01-20 DIAGNOSIS — Z90.2 ACQUIRED ABSENCE OF LUNG [PART OF]: Chronic | ICD-10-CM

## 2020-01-20 DIAGNOSIS — R10.9 UNSPECIFIED ABDOMINAL PAIN: ICD-10-CM

## 2020-01-20 DIAGNOSIS — Z87.891 PERSONAL HISTORY OF NICOTINE DEPENDENCE: ICD-10-CM

## 2020-01-20 PROBLEM — E78.9 DISORDER OF LIPOPROTEIN METABOLISM, UNSPECIFIED: Chronic | Status: ACTIVE | Noted: 2018-12-04

## 2020-01-20 LAB
ALBUMIN SERPL ELPH-MCNC: 4.6 G/DL — SIGNIFICANT CHANGE UP (ref 3.5–5.2)
ALP SERPL-CCNC: 148 U/L — HIGH (ref 30–115)
ALT FLD-CCNC: 18 U/L — SIGNIFICANT CHANGE UP (ref 0–41)
ANION GAP SERPL CALC-SCNC: 16 MMOL/L — HIGH (ref 7–14)
APPEARANCE UR: CLEAR — SIGNIFICANT CHANGE UP
AST SERPL-CCNC: 29 U/L — SIGNIFICANT CHANGE UP (ref 0–41)
BACTERIA # UR AUTO: ABNORMAL
BASOPHILS # BLD AUTO: 0.03 K/UL — SIGNIFICANT CHANGE UP (ref 0–0.2)
BASOPHILS NFR BLD AUTO: 0.5 % — SIGNIFICANT CHANGE UP (ref 0–1)
BILIRUB SERPL-MCNC: 0.7 MG/DL — SIGNIFICANT CHANGE UP (ref 0.2–1.2)
BILIRUB UR-MCNC: NEGATIVE — SIGNIFICANT CHANGE UP
BUN SERPL-MCNC: 18 MG/DL — SIGNIFICANT CHANGE UP (ref 10–20)
CALCIUM SERPL-MCNC: 9.9 MG/DL — SIGNIFICANT CHANGE UP (ref 8.5–10.1)
CHLORIDE SERPL-SCNC: 99 MMOL/L — SIGNIFICANT CHANGE UP (ref 98–110)
CO2 SERPL-SCNC: 26 MMOL/L — SIGNIFICANT CHANGE UP (ref 17–32)
COLOR SPEC: YELLOW — SIGNIFICANT CHANGE UP
CREAT SERPL-MCNC: 1 MG/DL — SIGNIFICANT CHANGE UP (ref 0.7–1.5)
DIFF PNL FLD: ABNORMAL
EOSINOPHIL # BLD AUTO: 0.04 K/UL — SIGNIFICANT CHANGE UP (ref 0–0.7)
EOSINOPHIL NFR BLD AUTO: 0.6 % — SIGNIFICANT CHANGE UP (ref 0–8)
EPI CELLS # UR: ABNORMAL /HPF
GLUCOSE SERPL-MCNC: 99 MG/DL — SIGNIFICANT CHANGE UP (ref 70–99)
GLUCOSE UR QL: NEGATIVE MG/DL — SIGNIFICANT CHANGE UP
HCT VFR BLD CALC: 45.2 % — SIGNIFICANT CHANGE UP (ref 42–52)
HGB BLD-MCNC: 13.9 G/DL — LOW (ref 14–18)
IMM GRANULOCYTES NFR BLD AUTO: 0.3 % — SIGNIFICANT CHANGE UP (ref 0.1–0.3)
KETONES UR-MCNC: NEGATIVE — SIGNIFICANT CHANGE UP
LEUKOCYTE ESTERASE UR-ACNC: ABNORMAL
LIDOCAIN IGE QN: 24 U/L — SIGNIFICANT CHANGE UP (ref 7–60)
LYMPHOCYTES # BLD AUTO: 0.65 K/UL — LOW (ref 1.2–3.4)
LYMPHOCYTES # BLD AUTO: 10.1 % — LOW (ref 20.5–51.1)
MCHC RBC-ENTMCNC: 25.9 PG — LOW (ref 27–31)
MCHC RBC-ENTMCNC: 30.8 G/DL — LOW (ref 32–37)
MCV RBC AUTO: 84.2 FL — SIGNIFICANT CHANGE UP (ref 80–94)
MONOCYTES # BLD AUTO: 0.77 K/UL — HIGH (ref 0.1–0.6)
MONOCYTES NFR BLD AUTO: 12 % — HIGH (ref 1.7–9.3)
NEUTROPHILS # BLD AUTO: 4.93 K/UL — SIGNIFICANT CHANGE UP (ref 1.4–6.5)
NEUTROPHILS NFR BLD AUTO: 76.5 % — HIGH (ref 42.2–75.2)
NITRITE UR-MCNC: NEGATIVE — SIGNIFICANT CHANGE UP
NRBC # BLD: 0 /100 WBCS — SIGNIFICANT CHANGE UP (ref 0–0)
PH UR: 7 — SIGNIFICANT CHANGE UP (ref 5–8)
PLATELET # BLD AUTO: 186 K/UL — SIGNIFICANT CHANGE UP (ref 130–400)
POTASSIUM SERPL-MCNC: 3.6 MMOL/L — SIGNIFICANT CHANGE UP (ref 3.5–5)
POTASSIUM SERPL-SCNC: 3.6 MMOL/L — SIGNIFICANT CHANGE UP (ref 3.5–5)
PROT SERPL-MCNC: 8.5 G/DL — HIGH (ref 6–8)
PROT UR-MCNC: NEGATIVE MG/DL — SIGNIFICANT CHANGE UP
RBC # BLD: 5.37 M/UL — SIGNIFICANT CHANGE UP (ref 4.7–6.1)
RBC # FLD: 15.9 % — HIGH (ref 11.5–14.5)
RBC CASTS # UR COMP ASSIST: ABNORMAL /HPF
SODIUM SERPL-SCNC: 141 MMOL/L — SIGNIFICANT CHANGE UP (ref 135–146)
SP GR SPEC: 1.01 — SIGNIFICANT CHANGE UP (ref 1.01–1.03)
UROBILINOGEN FLD QL: 0.2 MG/DL — SIGNIFICANT CHANGE UP (ref 0.2–0.2)
WBC # BLD: 6.44 K/UL — SIGNIFICANT CHANGE UP (ref 4.8–10.8)
WBC # FLD AUTO: 6.44 K/UL — SIGNIFICANT CHANGE UP (ref 4.8–10.8)
WBC UR QL: SIGNIFICANT CHANGE UP /HPF

## 2020-01-20 PROCEDURE — 99284 EMERGENCY DEPT VISIT MOD MDM: CPT

## 2020-01-20 PROCEDURE — 74177 CT ABD & PELVIS W/CONTRAST: CPT | Mod: 26

## 2020-01-20 RX ORDER — CIPROFLOXACIN LACTATE 400MG/40ML
1 VIAL (ML) INTRAVENOUS
Qty: 10 | Refills: 0
Start: 2020-01-20 | End: 2020-01-24

## 2020-01-20 RX ORDER — CIPROFLOXACIN LACTATE 400MG/40ML
1 VIAL (ML) INTRAVENOUS
Qty: 0 | Refills: 0 | DISCHARGE

## 2020-01-20 RX ORDER — TAMSULOSIN HYDROCHLORIDE 0.4 MG/1
1 CAPSULE ORAL
Qty: 5 | Refills: 0
Start: 2020-01-20 | End: 2020-01-24

## 2020-01-20 NOTE — ED PROVIDER NOTE - ATTENDING CONTRIBUTION TO CARE
I was present for and supervised the key and critical aspects of the procedures performed during the care of the patient. ATTENDING NOTE: I personally evaluated the patient. I reviewed the Resident’s or Physician Assistant’s note (as assigned above), and agree with the findings and plan except as documented in my note. 70 y/o M PMH esophageal CA on Carafate, Lung CA and Kidney stones with lithotripsy done 1 year ago p/w flank pain radiating to the ABD. Pt states that the pain was present PTA and has mostly resolved in the ED. Initially Pt thought that the pain was just gas, but it has been constant in nature. Pt's daughter notes Pt has not had a BM in about three days. Also per daughter, Pt has a irregular eating routine that she thinks is contributing to the pain. Pt has no other symptoms such as fevers, chills, vomiting, diarrhea, cough, CP or SOB. Pt notes no urinary symptoms. Plan: Imaging, pain control, nausea control.

## 2020-01-20 NOTE — ED PROVIDER NOTE - OBJECTIVE STATEMENT
69 year old male w hx of lung and esophageal CA s/p resection/chemo/radiation 3 years ago, htn, right nephrolithiasis s/p lithotripsy 2 years ago presents to the ED for evaluation of a 5 hour episode of constant, left-sided flank pain with radiation into his left abdomen this morning. Pain began around 3 AM, woke him up from sleep, and resolved w/o intervention prior to arrival. Pt now asymptomatic. Denies injury/trauma, fevers/chills, nausea, vomiting, diarrhea, black/bloody stools, constipation. Denies recent travel or sick contacts. No prior abd surgeries.

## 2020-01-20 NOTE — ED PROVIDER NOTE - CARE PROVIDER_API CALL
Emil Knight)  Urology  [address] [place] [address]  Phone: [phone]  Fax: [phone]  Follow Up Time: 1-3 Days

## 2020-01-20 NOTE — ED PROVIDER NOTE - PHYSICAL EXAMINATION
VITALS:  I have reviewed the initial vital signs.  GENERAL: Well-developed, thin male, in no acute distress. Nontoxic. Daughter at bedside.  HEENT: Sclera clear. EOMI, PERRLA. Mucous membranes moist.  CARDIO: RRR, nl S1 and S2. No murmurs, rubs, or gallops.  PULM: Normal effort. CTA b/l without wheezes, rales, or rhonchi.  MSK: No paraspinal muscle ttp. No midline thoracic or lumbar spinous tenderness, step offs, or deformity. Normal, steady gait.  GI: Normal bowel sounds. Abdomen soft and non-distended. Nontender in all four quadrants without rebound or guarding.  : +L CVA ttp. No R CVA ttp.  SKIN: Warm, dry.   NEURO: A&Ox3. Speech clear. No gross motor/sensory deficits.

## 2020-01-20 NOTE — ED PROVIDER NOTE - PATIENT PORTAL LINK FT
You can access the FollowMyHealth Patient Portal offered by [facility] by registering at the following website: [email or web address]. By joining [employer]’s FollowMyHealth portal, you will also be able to view your health information using other applications (apps) compatible with our system.

## 2020-01-20 NOTE — CONSULT NOTE ADULT - SUBJECTIVE AND OBJECTIVE BOX
HPI:  68 yo male presents to ER with c/o L flank pain that woke him from sleep at 3AM. Pt denies any n/v, fever/chills, dysuria or hematuria. Pt has h/o kidney stones on right s/p lithotripsy.     PAST MEDICAL & SURGICAL HISTORY:  Abnormal cholesterol test  Cancer: left lung 2018, no chemo or rad rx  Renal calculi  Cancer: ESOPHAGEAL CANCER 2017 RECEIVED CHEMO AND RADIATION  Hypertension, unspecified type  S/P lobectomy of lung: left ?2018  Cancer of esophagus: 2017 RESECTION OF LOWER PORTION OF ESOPHAGUS      MEDICATIONS  (STANDING):    MEDICATIONS  (PRN):      Allergies    No Known Allergies    Intolerances        SOCIAL HISTORY: No illicit drug use    FAMILY HISTORY:  Dementia (Mother)      REVIEW OF SYSTEMS:    CONSTITUTIONAL: No weakness, fevers or chills  EYES/ENT: No visual changes;  No vertigo or throat pain   NECK: No pain or stiffness  RESPIRATORY: No cough, wheezing, hemoptysis; No shortness of breath  CARDIOVASCULAR: No chest pain or palpitations  GASTROINTESTINAL: No abdominal or epigastric pain. No nausea, vomiting, or hematemesis; No diarrhea or constipation. No melena or hematochezia.  GENITOURINARY: No dysuria, frequency or hematuria +flank pain  NEUROLOGICAL: No numbness or weakness  SKIN: No itching, burning, rashes, or lesions   All other review of systems is negative unless indicated above.    Vital Signs Last 24 Hrs  T(C): 36.3 (2020 11:24), Max: 36.6 (2020 09:07)  T(F): 97.3 (2020 11:24), Max: 97.8 (2020 09:07)  HR: 66 (2020 11:24) (66 - 78)  BP: 128/80 (2020 11:24) (128/80 - 138/84)  BP(mean): --  RR: 18 (2020 11:24) (18 - 19)  SpO2: 98% (2020 11:24) (98% - 100%)    PHYSICAL EXAM:    Constitutional: NAD, well-developed  HEENT: EOMI  Neck: no pain  Back: No CVA tenderness  Respiratory: No accessory respiratory muscle use  Abd: Soft, NT/ND  no organomegally  no hernia, +mild LCVAT  Extremities: no edema  Neurological: A/O x 3  Psychiatric: Normal mood, normal affect  Skin: No rashes    I&O's Summary      LABS:                        13.9   6.44  )-----------( 186      ( 2020 09:57 )             45.2         141  |  99  |  18  ----------------------------<  99  3.6   |  26  |  1.0    Ca    9.9      2020 09:57    TPro  8.5<H>  /  Alb  4.6  /  TBili  0.7  /  DBili  x   /  AST  29  /  ALT  18  /  AlkPhos  148<H>        Urinalysis Basic - ( 2020 09:57 )    Color: Yellow / Appearance: Clear / S.015 / pH: x  Gluc: x / Ketone: Negative  / Bili: Negative / Urobili: 0.2 mg/dL   Blood: x / Protein: Negative mg/dL / Nitrite: Negative   Leuk Esterase: Small / RBC: 6-10 /HPF / WBC 3-5 /HPF   Sq Epi: x / Non Sq Epi: Occasional /HPF / Bacteria: Few      Urine Culture:         RADIOLOGY & ADDITIONAL STUDIES:  < from: CT Abdomen and Pelvis w/ IV Cont (20 @ 11:13) >    IMPRESSION:   1. 0.6 cm obstructive calculus in the distal left ureter with mild to moderate hydroureteronephrosis.  2. Additional nonobstructive calculus in the left renal pelvis.  3. Mild inflammation surrounding the bladder and diffuse left urothelial enhancement suspicious for superimposed ascending urinary tract infection, correlate with urinalysis.  4. Patchy groundglass opacity at the left base, likely infectious/inflammatory in etiology.                  KAROL WALLACE M.D., ATTENDING RADIOLOGIST  This document has been electronically signed. 2020 11:31AM                < end of copied text >

## 2020-01-20 NOTE — ED PROVIDER NOTE - CLINICAL SUMMARY MEDICAL DECISION MAKING FREE TEXT BOX
Patient evaluated for abdominal pain that is moderate in nature worse over the past several days he has a history of kidneys tones in the past and notes that this is similar, his pain is improved we obtained labs as well as ct of the abd which demonstrates a kidneys to we consulted urology who evaluated the pateint in the ed and advised starting po cipro, I will discharge patient with follow up to urology

## 2020-01-20 NOTE — ED PROVIDER NOTE - PROGRESS NOTE DETAILS
CHRISTIE: patient denies pain medication at this time. ATTENDING NOTE: I personally evaluated the patient. I reviewed the Resident’s or Physician Assistant’s note (as assigned above), and agree with the findings and plan except as documented in my note. 68 y/o M PMH esophageal CA on Carafate, Lung CA and Kidney stones with lithotripsy done 1 year ago p/w flank pain radiating to the ABD. Pt states that the pain was present PTA and has mostly resolved in the ED. Initially Pt thought that the pain was just gas, but it has been constant in nature. Pt's daughter notes Pt has not had a BM in about three days. Also per daughter, Pt has a irregular eating routine that she thinks is contributing to the pain. Pt has no other symptoms such as fevers, chills, vomiting, diarrhea, cough, CP or SOB. Pt notes no urinary symptoms. Plan: Imaging, pain control, nausea control. CHRISTIE: surgical PA aware of ct read showing obstructing stone with possible superimposed infection. will come evaluate patient in the ED. CHRISTIE: patient seen and evaluated by urology, recommending outpatient f/u with addi snyder and flomax. patient and family agreeable with plan. patient verbalizes understanding of return precautions.

## 2020-01-20 NOTE — ED PROVIDER NOTE - NS ED ROS FT
CONSTITUTIONAL: (-) fevers, (-) chills, (-) decreased appetite  CARDIO: (-) chest pain, (-) palpitations  PULM: (-) cough, (-) sputum, (-) shortness of breath  GI: see HPI  : see HPI, (-) dysuria, (-) hematuria, (-) frequency, (-) urgency, (-) incontinence, (-) difficulty urinating, (-) urinary retention  MSK: (-) back pain, (-) myalgias  SKIN: (-) rashes, (-) pallor  NEURO: (-) headache, (-) dizziness, (-) lightheadedness, (-) syncope, (-) weakness    *all other systems negative except as documented above and in the HPI*

## 2020-01-20 NOTE — CONSULT NOTE ADULT - ASSESSMENT
70yo male presents with L flank pain found to have 6mm L distal ureteral stone.  Pt currently asymptomatic.        Plan:  - Can DC home with Flomax, Cipro x 5 d and pain meds  - Copious PO fluids  - Obtain KUB prior to office visit 1/27  - FUP Dr Knight 1/27 in office.      All above d/w Dr Knight

## 2020-01-21 LAB
CULTURE RESULTS: NO GROWTH — SIGNIFICANT CHANGE UP
SPECIMEN SOURCE: SIGNIFICANT CHANGE UP

## 2020-01-24 ENCOUNTER — INPATIENT (INPATIENT)
Facility: HOSPITAL | Age: 70
LOS: 1 days | Discharge: HOME | End: 2020-01-26
Attending: INTERNAL MEDICINE | Admitting: INTERNAL MEDICINE
Payer: MEDICARE

## 2020-01-24 VITALS
RESPIRATION RATE: 20 BRPM | TEMPERATURE: 99 F | OXYGEN SATURATION: 99 % | SYSTOLIC BLOOD PRESSURE: 142 MMHG | HEART RATE: 57 BPM | DIASTOLIC BLOOD PRESSURE: 86 MMHG

## 2020-01-24 DIAGNOSIS — Z85.01 PERSONAL HISTORY OF MALIGNANT NEOPLASM OF ESOPHAGUS: ICD-10-CM

## 2020-01-24 DIAGNOSIS — I10 ESSENTIAL (PRIMARY) HYPERTENSION: ICD-10-CM

## 2020-01-24 DIAGNOSIS — C15.9 MALIGNANT NEOPLASM OF ESOPHAGUS, UNSPECIFIED: Chronic | ICD-10-CM

## 2020-01-24 DIAGNOSIS — Z90.2 ACQUIRED ABSENCE OF LUNG [PART OF]: Chronic | ICD-10-CM

## 2020-01-24 DIAGNOSIS — N20.1 CALCULUS OF URETER: ICD-10-CM

## 2020-01-24 LAB
ALBUMIN SERPL ELPH-MCNC: 3.7 G/DL — SIGNIFICANT CHANGE UP (ref 3.5–5.2)
ALP SERPL-CCNC: 112 U/L — SIGNIFICANT CHANGE UP (ref 30–115)
ALT FLD-CCNC: 22 U/L — SIGNIFICANT CHANGE UP (ref 0–41)
ANION GAP SERPL CALC-SCNC: 10 MMOL/L — SIGNIFICANT CHANGE UP (ref 7–14)
APPEARANCE UR: CLEAR — SIGNIFICANT CHANGE UP
AST SERPL-CCNC: 29 U/L — SIGNIFICANT CHANGE UP (ref 0–41)
BACTERIA # UR AUTO: ABNORMAL
BASOPHILS # BLD AUTO: 0.05 K/UL — SIGNIFICANT CHANGE UP (ref 0–0.2)
BASOPHILS NFR BLD AUTO: 0.9 % — SIGNIFICANT CHANGE UP (ref 0–1)
BILIRUB SERPL-MCNC: 0.4 MG/DL — SIGNIFICANT CHANGE UP (ref 0.2–1.2)
BILIRUB UR-MCNC: NEGATIVE — SIGNIFICANT CHANGE UP
BUN SERPL-MCNC: 20 MG/DL — SIGNIFICANT CHANGE UP (ref 10–20)
CALCIUM SERPL-MCNC: 9 MG/DL — SIGNIFICANT CHANGE UP (ref 8.5–10.1)
CHLORIDE SERPL-SCNC: 106 MMOL/L — SIGNIFICANT CHANGE UP (ref 98–110)
CO2 SERPL-SCNC: 26 MMOL/L — SIGNIFICANT CHANGE UP (ref 17–32)
COLOR SPEC: YELLOW — SIGNIFICANT CHANGE UP
CREAT SERPL-MCNC: 1.1 MG/DL — SIGNIFICANT CHANGE UP (ref 0.7–1.5)
DIFF PNL FLD: ABNORMAL
EOSINOPHIL # BLD AUTO: 0.07 K/UL — SIGNIFICANT CHANGE UP (ref 0–0.7)
EOSINOPHIL NFR BLD AUTO: 1.2 % — SIGNIFICANT CHANGE UP (ref 0–8)
GLUCOSE SERPL-MCNC: 106 MG/DL — HIGH (ref 70–99)
GLUCOSE UR QL: NEGATIVE MG/DL — SIGNIFICANT CHANGE UP
HCT VFR BLD CALC: 36.4 % — LOW (ref 42–52)
HGB BLD-MCNC: 11.2 G/DL — LOW (ref 14–18)
IMM GRANULOCYTES NFR BLD AUTO: 0.4 % — HIGH (ref 0.1–0.3)
KETONES UR-MCNC: NEGATIVE — SIGNIFICANT CHANGE UP
LACTATE SERPL-SCNC: 1.3 MMOL/L — SIGNIFICANT CHANGE UP (ref 0.7–2)
LEUKOCYTE ESTERASE UR-ACNC: NEGATIVE — SIGNIFICANT CHANGE UP
LIDOCAIN IGE QN: 17 U/L — SIGNIFICANT CHANGE UP (ref 7–60)
LYMPHOCYTES # BLD AUTO: 0.47 K/UL — LOW (ref 1.2–3.4)
LYMPHOCYTES # BLD AUTO: 8.4 % — LOW (ref 20.5–51.1)
MCHC RBC-ENTMCNC: 26 PG — LOW (ref 27–31)
MCHC RBC-ENTMCNC: 30.8 G/DL — LOW (ref 32–37)
MCV RBC AUTO: 84.5 FL — SIGNIFICANT CHANGE UP (ref 80–94)
MONOCYTES # BLD AUTO: 0.64 K/UL — HIGH (ref 0.1–0.6)
MONOCYTES NFR BLD AUTO: 11.4 % — HIGH (ref 1.7–9.3)
NEUTROPHILS # BLD AUTO: 4.37 K/UL — SIGNIFICANT CHANGE UP (ref 1.4–6.5)
NEUTROPHILS NFR BLD AUTO: 77.7 % — HIGH (ref 42.2–75.2)
NITRITE UR-MCNC: NEGATIVE — SIGNIFICANT CHANGE UP
NRBC # BLD: 0 /100 WBCS — SIGNIFICANT CHANGE UP (ref 0–0)
PH UR: 7.5 — SIGNIFICANT CHANGE UP (ref 5–8)
PLATELET # BLD AUTO: 140 K/UL — SIGNIFICANT CHANGE UP (ref 130–400)
POTASSIUM SERPL-MCNC: 3.7 MMOL/L — SIGNIFICANT CHANGE UP (ref 3.5–5)
POTASSIUM SERPL-SCNC: 3.7 MMOL/L — SIGNIFICANT CHANGE UP (ref 3.5–5)
PROT SERPL-MCNC: 6.8 G/DL — SIGNIFICANT CHANGE UP (ref 6–8)
PROT UR-MCNC: NEGATIVE MG/DL — SIGNIFICANT CHANGE UP
RBC # BLD: 4.31 M/UL — LOW (ref 4.7–6.1)
RBC # FLD: 16.3 % — HIGH (ref 11.5–14.5)
RBC CASTS # UR COMP ASSIST: ABNORMAL /HPF
SODIUM SERPL-SCNC: 142 MMOL/L — SIGNIFICANT CHANGE UP (ref 135–146)
SP GR SPEC: 1.02 — SIGNIFICANT CHANGE UP (ref 1.01–1.03)
UROBILINOGEN FLD QL: 0.2 MG/DL — SIGNIFICANT CHANGE UP (ref 0.2–0.2)
WBC # BLD: 5.62 K/UL — SIGNIFICANT CHANGE UP (ref 4.8–10.8)
WBC # FLD AUTO: 5.62 K/UL — SIGNIFICANT CHANGE UP (ref 4.8–10.8)
WBC UR QL: SIGNIFICANT CHANGE UP /HPF

## 2020-01-24 PROCEDURE — 99223 1ST HOSP IP/OBS HIGH 75: CPT | Mod: AI

## 2020-01-24 PROCEDURE — 99231 SBSQ HOSP IP/OBS SF/LOW 25: CPT

## 2020-01-24 PROCEDURE — 74018 RADEX ABDOMEN 1 VIEW: CPT | Mod: 26

## 2020-01-24 PROCEDURE — 99221 1ST HOSP IP/OBS SF/LOW 40: CPT

## 2020-01-24 PROCEDURE — 99285 EMERGENCY DEPT VISIT HI MDM: CPT

## 2020-01-24 RX ORDER — FAMOTIDINE 10 MG/ML
40 INJECTION INTRAVENOUS AT BEDTIME
Refills: 0 | Status: DISCONTINUED | OUTPATIENT
Start: 2020-01-24 | End: 2020-01-26

## 2020-01-24 RX ORDER — KETOROLAC TROMETHAMINE 30 MG/ML
15 SYRINGE (ML) INJECTION
Refills: 0 | Status: DISCONTINUED | OUTPATIENT
Start: 2020-01-24 | End: 2020-01-26

## 2020-01-24 RX ORDER — LISINOPRIL 2.5 MG/1
10 TABLET ORAL DAILY
Refills: 0 | Status: DISCONTINUED | OUTPATIENT
Start: 2020-01-24 | End: 2020-01-26

## 2020-01-24 RX ORDER — SODIUM CHLORIDE 9 MG/ML
1000 INJECTION INTRAMUSCULAR; INTRAVENOUS; SUBCUTANEOUS ONCE
Refills: 0 | Status: COMPLETED | OUTPATIENT
Start: 2020-01-24 | End: 2020-01-24

## 2020-01-24 RX ORDER — KETOROLAC TROMETHAMINE 30 MG/ML
15 SYRINGE (ML) INJECTION ONCE
Refills: 0 | Status: DISCONTINUED | OUTPATIENT
Start: 2020-01-24 | End: 2020-01-24

## 2020-01-24 RX ORDER — TAMSULOSIN HYDROCHLORIDE 0.4 MG/1
0.4 CAPSULE ORAL AT BEDTIME
Refills: 0 | Status: DISCONTINUED | OUTPATIENT
Start: 2020-01-24 | End: 2020-01-26

## 2020-01-24 RX ORDER — CIPROFLOXACIN LACTATE 400MG/40ML
500 VIAL (ML) INTRAVENOUS EVERY 12 HOURS
Refills: 0 | Status: DISCONTINUED | OUTPATIENT
Start: 2020-01-25 | End: 2020-01-26

## 2020-01-24 RX ORDER — SODIUM CHLORIDE 9 MG/ML
1000 INJECTION INTRAMUSCULAR; INTRAVENOUS; SUBCUTANEOUS
Refills: 0 | Status: DISCONTINUED | OUTPATIENT
Start: 2020-01-24 | End: 2020-01-25

## 2020-01-24 RX ORDER — SUCRALFATE 1 G
1 TABLET ORAL
Refills: 0 | Status: DISCONTINUED | OUTPATIENT
Start: 2020-01-24 | End: 2020-01-26

## 2020-01-24 RX ORDER — FLUTICASONE PROPIONATE 50 MCG
1 SPRAY, SUSPENSION NASAL
Refills: 0 | Status: DISCONTINUED | OUTPATIENT
Start: 2020-01-24 | End: 2020-01-26

## 2020-01-24 RX ORDER — OXYCODONE AND ACETAMINOPHEN 5; 325 MG/1; MG/1
1 TABLET ORAL EVERY 4 HOURS
Refills: 0 | Status: DISCONTINUED | OUTPATIENT
Start: 2020-01-24 | End: 2020-01-26

## 2020-01-24 RX ORDER — PANTOPRAZOLE SODIUM 20 MG/1
40 TABLET, DELAYED RELEASE ORAL
Refills: 0 | Status: DISCONTINUED | OUTPATIENT
Start: 2020-01-24 | End: 2020-01-26

## 2020-01-24 RX ADMIN — SODIUM CHLORIDE 1000 MILLILITER(S): 9 INJECTION INTRAMUSCULAR; INTRAVENOUS; SUBCUTANEOUS at 18:47

## 2020-01-24 RX ADMIN — Medication 15 MILLIGRAM(S): at 21:07

## 2020-01-24 NOTE — CONSULT NOTE ADULT - ASSESSMENT
69y M pmh as listed presents for eval of L flank pain. Pt was recently dx with 0.6cm L kidney stone x4days ago, presents now with intermittent severe sharp L side flank pain, relieved with percocet, no aggravating factors

## 2020-01-24 NOTE — CONSULT NOTE ADULT - ATTENDING COMMENTS
pt seen and examined at bedside on Jan 25 2020  agree with above  CT images visualized by me  has left ureteral and renal stones  will need decompression if stone not passing and pain not improving enough for discharge  agrees with plan  plan for likely surgery - stent placement

## 2020-01-24 NOTE — H&P ADULT - NSHPREVIEWOFSYSTEMS_GEN_ALL_CORE
At least 10 ROS discussed with patient and except for use of glasses and pertinent positives in HPI and PMH, are generally negative

## 2020-01-24 NOTE — H&P ADULT - NSICDXPASTMEDICALHX_GEN_ALL_CORE_FT
PAST MEDICAL HISTORY:  Abnormal cholesterol test     Cancer left lung 2018, no chemo or rad rx    Cancer ESOPHAGEAL CANCER 2017 RECEIVED CHEMO AND RADIATION    Hypertension, unspecified type     Renal calculi

## 2020-01-24 NOTE — ED PROVIDER NOTE - PHYSICAL EXAMINATION
CONST: Pt appears uncomfortable   EYES: Sclera and conjunctiva clear.   ENT: No nasal discharge. Oropharynx normal appearing, no erythema or exudates. No abscess or swelling. Uvula midline.   NECK: Non-tender, no meningeal signs. normal ROM. supple   CARD: S1 S2; No jvd  RESP: Equal BS B/L, No wheezes, rhonchi or rales. No distress  GI: L CVA tenderness. Soft, non-tender, non-distended. normal BS  MS: Normal ROM in all extremities. pulses 2 +. no calf tenderness or swelling  SKIN: Warm, dry, no acute rashes. Good turgor  NEURO: A&Ox3

## 2020-01-24 NOTE — ED PROVIDER NOTE - PROGRESS NOTE DETAILS
pt feeling better, seen by surgery pa report admission to medicine, will follow, hydration pain control, medical admitting team aware of pt, family and pt aware of plan and agree.

## 2020-01-24 NOTE — ED ADULT NURSE NOTE - NSIMPLEMENTINTERV_GEN_ALL_ED
Implemented All Universal Safety Interventions:  [place] to call system. Call bell, personal items and telephone within reach. Instruct patient to call for assistance. Room bathroom lighting operational. Non-slip footwear when patient is off stretcher. Physically safe environment: no spills, clutter or unnecessary equipment. Stretcher in lowest position, wheels locked, appropriate side rails in place.

## 2020-01-24 NOTE — CONSULT NOTE ADULT - SUBJECTIVE AND OBJECTIVE BOX
· HPI Objective Statement: 69y M pmh as listed presents for eval of L flank pain. Pt was recently dx with 0.6cm L kidney stone x4days ago, presents now with intermittent severe sharp L side flank pain, relieved with percocet, no aggravating factors. Denies fever, ha, cp, sob, weakness, numbness, hematuria, dysuria	    PAST MEDICAL/SURGICAL/FAMILY/SOCIAL HISTORY:    Past Medical History:  Abnormal cholesterol test    Cancer  left lung 2018, no chemo or rad rx  Cancer  ESOPHAGEAL CANCER 2017 RECEIVED CHEMO AND RADIATION  Hypertension, unspecified type    Renal calculi.     Past Surgical History:  Cancer of esophagus  2017 RESECTION OF LOWER PORTION OF ESOPHAGUS  S/P lobectomy of lung  left ?JUNE 2018.     Tobacco Usage:  · Tobacco Usage	Former smoker	    ALLERGIES AND HOME MEDICATIONS:   Allergies:        Allergies:  	No Known Allergies:     Home Medications:   * Patient Currently Takes Medications as of 20-Jan-2020 13:23 documented in Structured Notes  · 	Flomax 0.4 mg oral capsule: 1 cap(s) orally once a day   · 	ciprofloxacin 500 mg oral tablet: 1 tab(s) orally 2 times a day   · 	fluticasone nasal:   · 	famotidine 40 mg oral tablet: 1 tab(s) orally once a day (at bedtime)  · 	PARoxetine 20 mg oral tablet: 1 tab(s) orally once a day  · 	fosinopril 10 mg oral tablet: 1 tab(s) orally once a day  · 	omeprazole 40 mg oral delayed release capsule: 1 cap(s) orally once a day  · 	Carafate 1 g oral tab	let:      This is a 69y patient admitted for   Surgery called to evaluate pt ureter stone        Pmhx:as above  Psurghx  Allergy: No Known Allergies    Meds: ketorolac   Injectable 15 milliGRAM(s) IV Push four times a day PRN  oxycodone    5 mG/acetaminophen 325 mG 1 Tablet(s) Oral every 4 hours PRN  sodium chloride 0.9%. 1000 milliLiter(s) IV Continuous <Continuous>    SH:       Vital Signs Last 24 Hrs  T(C): 36.9 (24 Jan 2020 18:58), Max: 37.3 (24 Jan 2020 17:54)  T(F): 98.5 (24 Jan 2020 18:58), Max: 99.1 (24 Jan 2020 17:54)  HR: 57 (24 Jan 2020 17:54) (57 - 57)  BP: 142/86 (24 Jan 2020 17:54) (142/86 - 142/86)  BP(mean): --  RR: 20 (24 Jan 2020 17:54) (20 - 20)  SpO2: 99% (24 Jan 2020 17:54) (99% - 99%  )Ct shows distal left ureter  stone [id number] with mild hydronephrosis        PE:  GEN: pt in no acute distress  CV: [id number]  Lungs: + air entry bilat  abd: soft no rebound tendernesss  no guarding.    + cvat on leftside  ext: no calf tenderness  neuro: alert and oriented                            11.2   5.62  )-----------( 140      ( 24 Jan 2020 18:48 )             36.4       A/P :

## 2020-01-24 NOTE — CONSULT NOTE ADULT - PROBLEM SELECTOR RECOMMENDATION 9
distal left ureter  stone [id number]  ivf at 150ml /h  if no contraindication  flomax  strain urine  Pt may pass the stone .  Will reevaluate tomorrow with  team case D/W Dr Luna

## 2020-01-24 NOTE — H&P ADULT - NSICDXPASTSURGICALHX_GEN_ALL_CORE_FT
PAST SURGICAL HISTORY:  Cancer of esophagus 2017 RESECTION OF LOWER PORTION OF ESOPHAGUS    S/P lobectomy of lung left ?JUNE 2018

## 2020-01-24 NOTE — ED PROVIDER NOTE - CARE PLAN
Assessment and plan of treatment:	Plan: EKG, labs, urine, ivf, kub, urology consult, reassess. Principal Discharge DX:	Kidney stone  Assessment and plan of treatment:	Plan: EKG, labs, urine, ivf, kub, urology consult, reassess.

## 2020-01-24 NOTE — ED PROVIDER NOTE - OBJECTIVE STATEMENT
69y M pmh as listed presents for eval of L flank pain. Pt was recently dx with 0.6cm L kidney stone x4days ago, presents now with intermittent severe sharp L side flank pain, relieved with percocet, no aggravating factors. Denies fever, ha, cp, sob, weakness, numbness, hematuria, dysuria

## 2020-01-24 NOTE — H&P ADULT - NSHPLABSRESULTS_GEN_ALL_CORE
11.2   5.62  )-----------( 140      ( 2020 18:48 )             36.4         142  |  106  |  20  ----------------------------<  106<H>  3.7   |  26  |  1.1    Ca    9.0      2020 18:48    TPro  6.8  /  Alb  3.7  /  TBili  0.4  /  DBili  x   /  AST  29  /  ALT  22  /  AlkPhos  112            Urinalysis Basic - ( 2020 20:14 )    Color: Yellow / Appearance: Clear / S.025 / pH: x  Gluc: x / Ketone: Negative  / Bili: Negative / Urobili: 0.2 mg/dL   Blood: x / Protein: Negative mg/dL / Nitrite: Negative   Leuk Esterase: Negative / RBC: 6-10 /HPF / WBC 1-2 /HPF   Sq Epi: x / Non Sq Epi: x / Bacteria: Few        Lactate Trend   @ 18:48 Lactate:1.3         CAPILLARY BLOOD GLUCOSE        Culture Results:   No growth ( @ 09:57)

## 2020-01-24 NOTE — ED PROVIDER NOTE - ATTENDING CONTRIBUTION TO CARE
68 y/o m w/ pmhx of gerd, bph, htn, kidney stones, follows with dr. Knight, seen in ed on 1/20 for  0.6 cm obstructive calculus distal left ureter with mild to moderate hydroureteronephrosis with superimposed uti, seen by urology who reported pt can be discharged with outpt follow on Monday on cipro which pt has been taking, had ultrasound done the next day after being seen in ed to see if any advancement of stone, presents today for L flank pain similar to when he was here on 1/20 , took his Flomax and family called for pain medication as report he was not prescribed any, oxy was called in and pt took one today with improvement in pain, but still present so came back to ed.   denies fever, chills, n/v, cp, sob, pleuritic chest pain, palpitations, diaphoresis, cough, diarrhea, constipation, melena/brbpr, urinary symptoms, b penile pain/discharge, testicular pain/swelling/erythema, sick contacts, recent travel or rash. took oxy pta.      on exam: WDWN appearing male sitting on stretcher in nad, no rash, mmm, regular rate, radial pulses 2/4 b/l, no jvd, ctabl w/ breath sounds present b/l, no wheezing or crackles, good air exchange, good respiratory effort, no accessory muscle use, no tachypnea, no stridor, bs present throughout all 4 quadrants, abd soft, nd, nt , no rebound tenderness or guarding, (+) L cvat,  AAOx3. No focal deficits.

## 2020-01-24 NOTE — ED PROVIDER NOTE - NS ED ROS FT
Constitutional: (-) fever  Eyes/ENT: (-) blurry vision, (-) epistaxis  Cardiovascular: (-) chest pain, (-) syncope  Respiratory: (-) cough, (-) shortness of breath  Gastrointestinal: (-) vomiting, (-) diarrhea  : (+) flank pain, (-) dysuria, (-) hematuria  Musculoskeletal: (-) neck pain, (-) back pain, (-) joint pain  Integumentary: (-) rash, (-) edema  Neurological: (-) headache, (-) altered mental status  Allergic/Immunologic: (-) pruritus

## 2020-01-24 NOTE — H&P ADULT - PROBLEM SELECTOR PLAN 1
(CAT scan done 2 days ago shows left obstructing ureteral stone with hydroureteronephrosis and suggestive of UTI) For now continue oral Cipro, IV fluids, analgesics,  strain urine, flomax and  consult. Will make patient NPO at 0400 in case procedure has to be done tomorrow

## 2020-01-24 NOTE — ED PROVIDER NOTE - CLINICAL SUMMARY MEDICAL DECISION MAKING FREE TEXT BOX
pt and family aware of all labs and imaging, pain controlled at this time, surgery /urology pa reports medical admission, will follow, medical admitting team aware of pt and admission.

## 2020-01-24 NOTE — ED PROVIDER NOTE - CADM POA PRESS ULCER
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 I reviewed his PFT and I will be mailing his results.   Thank you    No

## 2020-01-24 NOTE — H&P ADULT - HISTORY OF PRESENT ILLNESS
[age]yo male with both history of renal stones on right but diagnosed with stone disease on left 4 days ago, presents to the ER due to left sided flank pain. 70yo male with both history of renal stones on right (last year) but diagnosed with stone disease on left 4 days ago, presents to the ER due to left sided flank pain. Rates up to 8/10 on pain scale and did respond to various analgesics (Toradol, oxycodone). Denies fevers but has had decreased urination over last few weeks. Patient notes that when he was in the ER 4 days ago, he was discharged on oral Cipro and Flomax. Today an oxycodone prescription was called in for him

## 2020-01-24 NOTE — PATIENT PROFILE ADULT - NSPROEDAREADYLEARN_GEN_A_NUR
A Catheter Bln Angiosculpt 3mm 15mm 137cm Radopq Rx was inflated in the right coronary artery.     The balloon was inflated at 18 jennifer for 22 seconds at 8/13/2017 1:00 PM.   none

## 2020-01-25 LAB
ALBUMIN SERPL ELPH-MCNC: 3.3 G/DL — LOW (ref 3.5–5.2)
ALBUMIN SERPL ELPH-MCNC: 3.3 G/DL — LOW (ref 3.5–5.2)
ALP SERPL-CCNC: 120 U/L — HIGH (ref 30–115)
ALP SERPL-CCNC: 121 U/L — HIGH (ref 30–115)
ALT FLD-CCNC: 19 U/L — SIGNIFICANT CHANGE UP (ref 0–41)
ALT FLD-CCNC: 22 U/L — SIGNIFICANT CHANGE UP (ref 0–41)
ANION GAP SERPL CALC-SCNC: 13 MMOL/L — SIGNIFICANT CHANGE UP (ref 7–14)
ANION GAP SERPL CALC-SCNC: 13 MMOL/L — SIGNIFICANT CHANGE UP (ref 7–14)
AST SERPL-CCNC: 27 U/L — SIGNIFICANT CHANGE UP (ref 0–41)
AST SERPL-CCNC: 30 U/L — SIGNIFICANT CHANGE UP (ref 0–41)
BILIRUB SERPL-MCNC: 0.6 MG/DL — SIGNIFICANT CHANGE UP (ref 0.2–1.2)
BILIRUB SERPL-MCNC: 0.6 MG/DL — SIGNIFICANT CHANGE UP (ref 0.2–1.2)
BUN SERPL-MCNC: 16 MG/DL — SIGNIFICANT CHANGE UP (ref 10–20)
BUN SERPL-MCNC: 16 MG/DL — SIGNIFICANT CHANGE UP (ref 10–20)
CALCIUM SERPL-MCNC: 8.3 MG/DL — LOW (ref 8.5–10.1)
CALCIUM SERPL-MCNC: 8.5 MG/DL — SIGNIFICANT CHANGE UP (ref 8.5–10.1)
CHLORIDE SERPL-SCNC: 105 MMOL/L — SIGNIFICANT CHANGE UP (ref 98–110)
CHLORIDE SERPL-SCNC: 106 MMOL/L — SIGNIFICANT CHANGE UP (ref 98–110)
CO2 SERPL-SCNC: 23 MMOL/L — SIGNIFICANT CHANGE UP (ref 17–32)
CO2 SERPL-SCNC: 23 MMOL/L — SIGNIFICANT CHANGE UP (ref 17–32)
CREAT SERPL-MCNC: 1.3 MG/DL — SIGNIFICANT CHANGE UP (ref 0.7–1.5)
CREAT SERPL-MCNC: 1.3 MG/DL — SIGNIFICANT CHANGE UP (ref 0.7–1.5)
GLUCOSE SERPL-MCNC: 103 MG/DL — HIGH (ref 70–99)
GLUCOSE SERPL-MCNC: 85 MG/DL — SIGNIFICANT CHANGE UP (ref 70–99)
HCT VFR BLD CALC: 37.3 % — LOW (ref 42–52)
HCT VFR BLD CALC: 38.4 % — LOW (ref 42–52)
HCV AB S/CO SERPL IA: 0.13 S/CO — SIGNIFICANT CHANGE UP (ref 0–0.99)
HCV AB SERPL-IMP: SIGNIFICANT CHANGE UP
HGB BLD-MCNC: 11.5 G/DL — LOW (ref 14–18)
HGB BLD-MCNC: 12 G/DL — LOW (ref 14–18)
MCHC RBC-ENTMCNC: 26.3 PG — LOW (ref 27–31)
MCHC RBC-ENTMCNC: 26.4 PG — LOW (ref 27–31)
MCHC RBC-ENTMCNC: 30.8 G/DL — LOW (ref 32–37)
MCHC RBC-ENTMCNC: 31.3 G/DL — LOW (ref 32–37)
MCV RBC AUTO: 84.4 FL — SIGNIFICANT CHANGE UP (ref 80–94)
MCV RBC AUTO: 85.4 FL — SIGNIFICANT CHANGE UP (ref 80–94)
NRBC # BLD: 0 /100 WBCS — SIGNIFICANT CHANGE UP (ref 0–0)
NRBC # BLD: 0 /100 WBCS — SIGNIFICANT CHANGE UP (ref 0–0)
PLATELET # BLD AUTO: 129 K/UL — LOW (ref 130–400)
PLATELET # BLD AUTO: 142 K/UL — SIGNIFICANT CHANGE UP (ref 130–400)
POTASSIUM SERPL-MCNC: 3.9 MMOL/L — SIGNIFICANT CHANGE UP (ref 3.5–5)
POTASSIUM SERPL-MCNC: 4.3 MMOL/L — SIGNIFICANT CHANGE UP (ref 3.5–5)
POTASSIUM SERPL-SCNC: 3.9 MMOL/L — SIGNIFICANT CHANGE UP (ref 3.5–5)
POTASSIUM SERPL-SCNC: 4.3 MMOL/L — SIGNIFICANT CHANGE UP (ref 3.5–5)
PROT SERPL-MCNC: 6 G/DL — SIGNIFICANT CHANGE UP (ref 6–8)
PROT SERPL-MCNC: 6 G/DL — SIGNIFICANT CHANGE UP (ref 6–8)
RBC # BLD: 4.37 M/UL — LOW (ref 4.7–6.1)
RBC # BLD: 4.55 M/UL — LOW (ref 4.7–6.1)
RBC # FLD: 16.4 % — HIGH (ref 11.5–14.5)
RBC # FLD: 16.4 % — HIGH (ref 11.5–14.5)
SODIUM SERPL-SCNC: 141 MMOL/L — SIGNIFICANT CHANGE UP (ref 135–146)
SODIUM SERPL-SCNC: 142 MMOL/L — SIGNIFICANT CHANGE UP (ref 135–146)
WBC # BLD: 4.27 K/UL — LOW (ref 4.8–10.8)
WBC # BLD: 5.11 K/UL — SIGNIFICANT CHANGE UP (ref 4.8–10.8)
WBC # FLD AUTO: 4.27 K/UL — LOW (ref 4.8–10.8)
WBC # FLD AUTO: 5.11 K/UL — SIGNIFICANT CHANGE UP (ref 4.8–10.8)

## 2020-01-25 PROCEDURE — 99233 SBSQ HOSP IP/OBS HIGH 50: CPT

## 2020-01-25 PROCEDURE — 99223 1ST HOSP IP/OBS HIGH 75: CPT

## 2020-01-25 RX ORDER — SODIUM CHLORIDE 9 MG/ML
1000 INJECTION, SOLUTION INTRAVENOUS
Refills: 0 | Status: DISCONTINUED | OUTPATIENT
Start: 2020-01-25 | End: 2020-01-26

## 2020-01-25 RX ADMIN — Medication 1 GRAM(S): at 11:35

## 2020-01-25 RX ADMIN — PANTOPRAZOLE SODIUM 40 MILLIGRAM(S): 20 TABLET, DELAYED RELEASE ORAL at 07:19

## 2020-01-25 RX ADMIN — Medication 1 GRAM(S): at 16:12

## 2020-01-25 RX ADMIN — TAMSULOSIN HYDROCHLORIDE 0.4 MILLIGRAM(S): 0.4 CAPSULE ORAL at 01:35

## 2020-01-25 RX ADMIN — OXYCODONE AND ACETAMINOPHEN 1 TABLET(S): 5; 325 TABLET ORAL at 16:39

## 2020-01-25 RX ADMIN — LISINOPRIL 10 MILLIGRAM(S): 2.5 TABLET ORAL at 05:41

## 2020-01-25 RX ADMIN — Medication 500 MILLIGRAM(S): at 18:09

## 2020-01-25 RX ADMIN — OXYCODONE AND ACETAMINOPHEN 1 TABLET(S): 5; 325 TABLET ORAL at 06:11

## 2020-01-25 RX ADMIN — Medication 20 MILLIGRAM(S): at 11:35

## 2020-01-25 RX ADMIN — OXYCODONE AND ACETAMINOPHEN 1 TABLET(S): 5; 325 TABLET ORAL at 16:09

## 2020-01-25 RX ADMIN — OXYCODONE AND ACETAMINOPHEN 1 TABLET(S): 5; 325 TABLET ORAL at 05:41

## 2020-01-25 RX ADMIN — FAMOTIDINE 40 MILLIGRAM(S): 10 INJECTION INTRAVENOUS at 01:35

## 2020-01-25 RX ADMIN — SODIUM CHLORIDE 75 MILLILITER(S): 9 INJECTION INTRAMUSCULAR; INTRAVENOUS; SUBCUTANEOUS at 05:41

## 2020-01-25 RX ADMIN — OXYCODONE AND ACETAMINOPHEN 1 TABLET(S): 5; 325 TABLET ORAL at 21:47

## 2020-01-25 RX ADMIN — Medication 1 GRAM(S): at 07:19

## 2020-01-25 RX ADMIN — FAMOTIDINE 40 MILLIGRAM(S): 10 INJECTION INTRAVENOUS at 21:47

## 2020-01-25 RX ADMIN — Medication 500 MILLIGRAM(S): at 05:41

## 2020-01-25 RX ADMIN — TAMSULOSIN HYDROCHLORIDE 0.4 MILLIGRAM(S): 0.4 CAPSULE ORAL at 21:47

## 2020-01-25 RX ADMIN — SODIUM CHLORIDE 100 MILLILITER(S): 9 INJECTION, SOLUTION INTRAVENOUS at 21:47

## 2020-01-25 NOTE — PROGRESS NOTE ADULT - SUBJECTIVE AND OBJECTIVE BOX
DAVYSERINA PHAN  69y  Male      Patient is a 69y old  Male who presents with a chief complaint of · 69y Male complaining of pain, flank. (2020 21:32)      INTERVAL HPI/OVERNIGHT EVENTS:  Patient seen and examined earlier this morning. Lying comfortably in bed.  Pain at left flank.  on IVF      REVIEW OF SYSTEMS:  CONSTITUTIONAL: No fever, weight loss, or fatigue  EYES: No eye pain, visual disturbances, or discharge  ENMT:  No difficulty hearing, tinnitus, vertigo; No sinus or throat pain  NECK: No pain or stiffness  RESPIRATORY: No cough, wheezing, chills or hemoptysis; No shortness of breath  CARDIOVASCULAR: No chest pain, palpitations, dizziness, or leg swelling  GASTROINTESTINAL: No abdominal or epigastric pain. No nausea, vomiting, or hematemesis; No diarrhea or constipation. No melena or hematochezia.  GENITOURINARY: No dysuria, frequency, hematuria, or incontinence  NEUROLOGICAL: No headaches, memory loss, loss of strength, numbness, or tremors  SKIN: No itching, burning, rashes, or lesions   LYMPH NODES: No enlarged glands  ENDOCRINE: No heat or cold intolerance; No hair loss  MUSCULOSKELETAL: No joint pain or swelling; No muscle, back, or extremity pain  PSYCHIATRIC: No depression, anxiety, mood swings, or difficulty sleeping  HEME/LYMPH: No easy bruising, or bleeding gums  ALLERY AND IMMUNOLOGIC: No hives or eczema    T(C): 36.8 (20 @ 13:45), Max: 37.3 (20 @ 17:54)  HR: 64 (20 @ 13:45) (54 - 66)  BP: 105/73 (20 @ 13:45) (105/73 - 156/84)  RR: 18 (20 @ 13:45) (18 - 20)  SpO2: 100% (20 @ 22:00) (99% - 100%)    PHYSICAL EXAM:  GENERAL: NAD, well-groomed, well-developed  HEAD:  Atraumatic, Normocephalic  EYES: EOMI, PERRLA, conjunctiva and sclera clear  ENMT: No tonsillar erythema, exudates, or enlargement; Moist mucous membranes, Good dentition, No lesions  NECK: Supple, No JVD, Normal thyroid  NERVOUS SYSTEM:  Alert & Oriented X3, Good concentration; Motor Strength 5/5 B/L upper and lower extremities; DTRs 2+ intact and symmetric  CHEST/LUNG: Clear to percussion bilaterally; No rales, rhonchi, wheezing, or rubs  HEART: Regular rate and rhythm; No murmurs, rubs, or gallops  ABDOMEN: Soft, Nontender, Nondistended; Bowel sounds present; left cva tenderness  EXTREMITIES:  2+ Peripheral Pulses, No clubbing, cyanosis, or edema  LYMPH: No lymphadenopathy noted  SKIN: No rashes or lesions    Consultant(s) Notes Reviewed:  [x ] YES  [ ] NO  Care Discussed with Consultants/Other Providers [ x] YES  [ ] NO    LAB:                        11.5   5.11  )-----------( 129      ( 2020 06:15 )             37.3           141  |  105  |  16  ----------------------------<  103<H>  3.9   |  23  |  1.3    Ca    8.3<L>      2020 06:15    TPro  6.0  /  Alb  3.3<L>  /  TBili  0.6  /  DBili  x   /  AST  30  /  ALT  22  /  AlkPhos  120<H>      LIVER FUNCTIONS - ( 2020 06:15 )  Alb: 3.3 g/dL / Pro: 6.0 g/dL / ALK PHOS: 120 U/L / ALT: 22 U/L / AST: 30 U/L / GGT: x               Drug Dosing Weight  Height (cm): 177.8 (2020 23:49)  Weight (kg): 61.4 (2020 23:49)  BMI (kg/m2): 19.4 (2020 23:49)  BSA (m2): 1.77 (2020 23:49)        Urinalysis Basic - ( 2020 20:14 )    Color: Yellow / Appearance: Clear / S.025 / pH: x  Gluc: x / Ketone: Negative  / Bili: Negative / Urobili: 0.2 mg/dL   Blood: x / Protein: Negative mg/dL / Nitrite: Negative   Leuk Esterase: Negative / RBC: 6-10 /HPF / WBC 1-2 /HPF   Sq Epi: x / Non Sq Epi: x / Bacteria: Few        RADIOLOGY & ADDITIONAL TESTS:  Imaging Personally Reviewed:  [x] YES  [ ] NO    HEALTH ISSUES - PROBLEM Dx:  History of esophageal cancer: History of esophageal cancer  Hypertension, unspecified type: Hypertension, unspecified type  Ureter, calculus: Ureter, calculus          MEDS:  ciprofloxacin     Tablet 500 milliGRAM(s) Oral every 12 hours  famotidine    Tablet 40 milliGRAM(s) Oral at bedtime  fluticasone propionate 50 MICROgram(s)/spray Nasal Spray 1 Spray(s) Both Nostrils <User Schedule>  ketorolac   Injectable 15 milliGRAM(s) IV Push four times a day PRN  lisinopril 10 milliGRAM(s) Oral daily  oxycodone    5 mG/acetaminophen 325 mG 1 Tablet(s) Oral every 4 hours PRN  pantoprazole    Tablet 40 milliGRAM(s) Oral before breakfast  PARoxetine 20 milliGRAM(s) Oral daily  sodium chloride 0.9%. 1000 milliLiter(s) IV Continuous <Continuous>  sucralfate suspension 1 Gram(s) Oral <User Schedule>  tamsulosin 0.4 milliGRAM(s) Oral at bedtime

## 2020-01-25 NOTE — PROGRESS NOTE ADULT - SUBJECTIVE AND OBJECTIVE BOX
The patient is a Patient is a 69y old  Male who presents with a chief complaint of · 69y Male complaining of pain, flank. (2020 21:32)    Patient seen & examined.  No acute events noted overnight.   Tmax=99.1 overnight.   Patient reports Left flank pain mild to moderate at times but well controlled with pain medications.  Patient denies dysuria, pyuria, hematuria, decreased flow, hesitancy, urgency, frequency, incontinence, fever, chills, tremors, CP or SOB.           Vital Signs Last 24 Hrs  T(C): 36.7 (2020 05:37), Max: 37.3 (2020 17:54)  T(F): 98 (2020 05:37), Max: 99.1 (2020 17:54)  HR: 60 (2020 05:37) (54 - 66)  BP: 156/84 (2020 05:37) (115/74 - 156/84)  RR: 18 (2020 05:37) (18 - 20)  SpO2: 100% (2020 22:00) (99% - 100%)          Physical exam  General: Wd, Wn, conversant in NAD.  Lungs:  Clear to auscultation, No wheezes, rale or rhonchi.  Cor:  S1 & S2, No murmurs, rubs or gallops.  Abdomen:  + BS, soft, no distention, non-tender, No rebound, guarding or peritoneal signs.  Genitourinary:  No suprapubic tenderness, No current flank tenderness.            LABS:                        11.5   5.11  )-----------( 129      ( 2020 06:15 )             37.3         141  |  105  |  16  ----------------------------<  103<H>  3.9   |  23  |  1.3    Ca    8.3<L>      2020 06:15    TPro  6.0  /  Alb  3.3<L>  /  TBili  0.6  /  DBili  x   /  AST  30  /  ALT  22  /  AlkPhos  120<H>  -25        Urinalysis Basic - ( 2020 20:14 )    Color: Yellow / Appearance: Clear / S.025 / pH: x  Gluc: x / Ketone: Negative  / Bili: Negative / Urobili: 0.2 mg/dL   Blood: x / Protein: Negative mg/dL / Nitrite: Negative   Leuk Esterase: Negative / RBC: 6-10 /HPF / WBC 1-2 /HPF   Sq Epi: x / Non Sq Epi: x / Bacteria: Few        Urine Culture:   Culture - Urine (20 @ 09:57)    Specimen Source: .Urine Clean Catch (Midstream)    Culture Results:   No growth The patient is a Patient is a 69y old  Male who presents with a chief complaint of · 69y Male complaining of pain, flank. (2020 21:32)    Patient seen & examined.  No acute events noted overnight.   Tmax=99.1 overnight.   Reports voiding freely.    Patient reports Left flank pain mild to moderate at times but well controlled with pain medications.  Patient denies dysuria, pyuria, hematuria, decreased flow, hesitancy, urgency, frequency, incontinence, fever, chills, tremors, CP or SOB.           Vital Signs Last 24 Hrs  T(C): 36.7 (2020 05:37), Max: 37.3 (2020 17:54)  T(F): 98 (2020 05:37), Max: 99.1 (2020 17:54)  HR: 60 (2020 05:37) (54 - 66)  BP: 156/84 (2020 05:37) (115/74 - 156/84)  RR: 18 (2020 05:37) (18 - 20)  SpO2: 100% (2020 22:00) (99% - 100%)          Physical exam  General: Wd, Wn, conversant in NAD.  Lungs:  Clear to auscultation, No wheezes, rale or rhonchi.  Cor:  S1 & S2, No murmurs, rubs or gallops.  Abdomen:  + BS, soft, no distention, non-tender, No rebound, guarding or peritoneal signs.  Genitourinary:  No suprapubic tenderness, No current flank tenderness.            LABS:                        11.5   5.11  )-----------( 129      ( 2020 06:15 )             37.3         141  |  105  |  16  ----------------------------<  103<H>  3.9   |  23  |  1.3    Ca    8.3<L>      2020 06:15    TPro  6.0  /  Alb  3.3<L>  /  TBili  0.6  /  DBili  x   /  AST  30  /  ALT  22  /  AlkPhos  120<H>  -25        Urinalysis Basic - ( 2020 20:14 )    Color: Yellow / Appearance: Clear / S.025 / pH: x  Gluc: x / Ketone: Negative  / Bili: Negative / Urobili: 0.2 mg/dL   Blood: x / Protein: Negative mg/dL / Nitrite: Negative   Leuk Esterase: Negative / RBC: 6-10 /HPF / WBC 1-2 /HPF   Sq Epi: x / Non Sq Epi: x / Bacteria: Few        Urine Culture:   Culture - Urine (20 @ 09:57)    Specimen Source: .Urine Clean Catch (Midstream)    Culture Results:   No growth

## 2020-01-25 NOTE — PROGRESS NOTE ADULT - ATTENDING COMMENTS
pt seen and examined at bedside this morning   ready for surgery today -- left ureteral stent placement  will need definitive management as outpatient with Dr Knight/Papa

## 2020-01-25 NOTE — PROGRESS NOTE ADULT - ASSESSMENT
Impression:  68 y/o male with Distal Left Ureteral Calculus.      Plan:  - Regular DASH diet today since no urological procedure planned for today.    - IV Hydration -- Continue IVF's and oral fluid intake.   - Encourged patient to be OOB and ambulate.    - Continue to strain urine per stone protocol and show RN any possible stone collected.    - ABX -- Currently on PO Cipro.   - Pain medications as needed for flank pain.   - Case discussed with Dr. Luna and states will continue conservative management today and monitor for trial of passage.  Will follow and if symptoms worsen or develops fever / Leukocytosis then he will discuss with patient about possible Cystoscopy with stent placement.    - Case also d/w Dr. Guerrero.  - Patient verbalizes understanding of plan & instructions and all questions answered to patient's satisfaction.

## 2020-01-25 NOTE — PROGRESS NOTE ADULT - ASSESSMENT
Ureter, calculus  -CTscan done 2 days ago shows left obstructing ureteral stone with hydroureteronephrosis and suggestive of UTI  -continue oral Cipro, IV fluids, analgesics, strain urine, flomax   - following    Hypertension  -monitor on current meds.     History of esophageal cancer  -along with ulcer disease. Continue current medication regimen.     Depression  -on paroxetine    Progress Note Handoff  Pending Consults: urology follow up  Pending Tests: blood work  Pending Results: cr  Family Discussion: discussed urology's plan with pt - in agreement  Disposition: Home__x___/SNF______/Other_____/Unknown at this time_____    Please call me with any questions at extension [phone]

## 2020-01-26 ENCOUNTER — TRANSCRIPTION ENCOUNTER (OUTPATIENT)
Age: 70
End: 2020-01-26

## 2020-01-26 VITALS
DIASTOLIC BLOOD PRESSURE: 65 MMHG | SYSTOLIC BLOOD PRESSURE: 115 MMHG | HEART RATE: 74 BPM | TEMPERATURE: 96 F | RESPIRATION RATE: 16 BRPM

## 2020-01-26 LAB
CULTURE RESULTS: NO GROWTH — SIGNIFICANT CHANGE UP
SPECIMEN SOURCE: SIGNIFICANT CHANGE UP

## 2020-01-26 PROCEDURE — 99239 HOSP IP/OBS DSCHRG MGMT >30: CPT

## 2020-01-26 PROCEDURE — 52332 CYSTOSCOPY AND TREATMENT: CPT | Mod: LT

## 2020-01-26 PROCEDURE — 99232 SBSQ HOSP IP/OBS MODERATE 35: CPT | Mod: 25

## 2020-01-26 RX ORDER — FLUTICASONE PROPIONATE 50 MCG
1 SPRAY, SUSPENSION NASAL
Refills: 0 | Status: DISCONTINUED | OUTPATIENT
Start: 2020-01-26 | End: 2020-01-26

## 2020-01-26 RX ORDER — FAMOTIDINE 10 MG/ML
40 INJECTION INTRAVENOUS AT BEDTIME
Refills: 0 | Status: DISCONTINUED | OUTPATIENT
Start: 2020-01-26 | End: 2020-01-26

## 2020-01-26 RX ORDER — HYDROMORPHONE HYDROCHLORIDE 2 MG/ML
1 INJECTION INTRAMUSCULAR; INTRAVENOUS; SUBCUTANEOUS
Refills: 0 | Status: DISCONTINUED | OUTPATIENT
Start: 2020-01-26 | End: 2020-01-26

## 2020-01-26 RX ORDER — CIPROFLOXACIN LACTATE 400MG/40ML
500 VIAL (ML) INTRAVENOUS EVERY 12 HOURS
Refills: 0 | Status: DISCONTINUED | OUTPATIENT
Start: 2020-01-26 | End: 2020-01-26

## 2020-01-26 RX ORDER — TAMSULOSIN HYDROCHLORIDE 0.4 MG/1
1 CAPSULE ORAL
Qty: 30 | Refills: 0
Start: 2020-01-26 | End: 2020-02-24

## 2020-01-26 RX ORDER — MOXIFLOXACIN HYDROCHLORIDE TABLETS, 400 MG 400 MG/1
1 TABLET, FILM COATED ORAL
Qty: 14 | Refills: 0
Start: 2020-01-26 | End: 2020-02-01

## 2020-01-26 RX ORDER — TAMSULOSIN HYDROCHLORIDE 0.4 MG/1
0.4 CAPSULE ORAL AT BEDTIME
Refills: 0 | Status: DISCONTINUED | OUTPATIENT
Start: 2020-01-26 | End: 2020-01-26

## 2020-01-26 RX ORDER — ONDANSETRON 8 MG/1
4 TABLET, FILM COATED ORAL ONCE
Refills: 0 | Status: DISCONTINUED | OUTPATIENT
Start: 2020-01-26 | End: 2020-01-26

## 2020-01-26 RX ORDER — PANTOPRAZOLE SODIUM 20 MG/1
40 TABLET, DELAYED RELEASE ORAL
Refills: 0 | Status: DISCONTINUED | OUTPATIENT
Start: 2020-01-26 | End: 2020-01-26

## 2020-01-26 RX ORDER — KETOROLAC TROMETHAMINE 30 MG/ML
15 SYRINGE (ML) INJECTION
Refills: 0 | Status: DISCONTINUED | OUTPATIENT
Start: 2020-01-26 | End: 2020-01-26

## 2020-01-26 RX ORDER — OXYCODONE AND ACETAMINOPHEN 5; 325 MG/1; MG/1
1 TABLET ORAL EVERY 4 HOURS
Refills: 0 | Status: DISCONTINUED | OUTPATIENT
Start: 2020-01-26 | End: 2020-01-26

## 2020-01-26 RX ORDER — HYDROMORPHONE HYDROCHLORIDE 2 MG/ML
0.5 INJECTION INTRAMUSCULAR; INTRAVENOUS; SUBCUTANEOUS
Refills: 0 | Status: DISCONTINUED | OUTPATIENT
Start: 2020-01-26 | End: 2020-01-26

## 2020-01-26 RX ORDER — SUCRALFATE 1 G
1 TABLET ORAL
Refills: 0 | Status: DISCONTINUED | OUTPATIENT
Start: 2020-01-26 | End: 2020-01-26

## 2020-01-26 RX ORDER — LISINOPRIL 2.5 MG/1
10 TABLET ORAL DAILY
Refills: 0 | Status: DISCONTINUED | OUTPATIENT
Start: 2020-01-26 | End: 2020-01-26

## 2020-01-26 RX ORDER — SODIUM CHLORIDE 9 MG/ML
1000 INJECTION, SOLUTION INTRAVENOUS
Refills: 0 | Status: DISCONTINUED | OUTPATIENT
Start: 2020-01-26 | End: 2020-01-26

## 2020-01-26 RX ADMIN — Medication 500 MILLIGRAM(S): at 05:44

## 2020-01-26 RX ADMIN — OXYCODONE AND ACETAMINOPHEN 1 TABLET(S): 5; 325 TABLET ORAL at 05:45

## 2020-01-26 RX ADMIN — SODIUM CHLORIDE 100 MILLILITER(S): 9 INJECTION, SOLUTION INTRAVENOUS at 10:33

## 2020-01-26 RX ADMIN — LISINOPRIL 10 MILLIGRAM(S): 2.5 TABLET ORAL at 05:45

## 2020-01-26 RX ADMIN — PANTOPRAZOLE SODIUM 40 MILLIGRAM(S): 20 TABLET, DELAYED RELEASE ORAL at 05:45

## 2020-01-26 RX ADMIN — Medication 20 MILLIGRAM(S): at 13:04

## 2020-01-26 RX ADMIN — SODIUM CHLORIDE 100 MILLILITER(S): 9 INJECTION, SOLUTION INTRAVENOUS at 05:45

## 2020-01-26 RX ADMIN — Medication 1 SPRAY(S): at 13:04

## 2020-01-26 RX ADMIN — TAMSULOSIN HYDROCHLORIDE 0.4 MILLIGRAM(S): 0.4 CAPSULE ORAL at 13:04

## 2020-01-26 RX ADMIN — Medication 1 GRAM(S): at 13:04

## 2020-01-26 NOTE — PROGRESS NOTE ADULT - SUBJECTIVE AND OBJECTIVE BOX
.  POST-OP NOTE:    s/p Cystoscopy with placement of Left Ureteral Stent today by Dr. Luna.       Patient reports feels well and urinated without difficulty.  Patient reports mild dysuria and reports hematuria.    Patient denies pyuria, decreased flow, hesitancy, urgency, frequency, incontinence, fever, chills, tremors, CP or SOB.         Vital Signs Last 24 Hrs  T(C): 35.4 (26 Jan 2020 11:10), Max: 36.5 (26 Jan 2020 05:40)  T(F): 95.7 (26 Jan 2020 11:10), Max: 97.7 (26 Jan 2020 05:40)  HR: 74 (26 Jan 2020 11:10) (48 - 89)  BP: 115/65 (26 Jan 2020 11:10) (111/74 - 129/69)  BP(mean): 85 (26 Jan 2020 10:23) (85 - 85)  RR: 16 (26 Jan 2020 11:10) (16 - 18)  SpO2: 98% (26 Jan 2020 10:53) (97% - 98%)          PHYSICAL EXAM:    General: Conversant in NAD.    Eyes: PERRLA, EOM intact.    Neck: No tenderness, no JVD.    Back:  No CVAT, no spinal tenderness.     Respiratory: CTA B/L.    Cardiovascular:  S1 & S2, RRR.    Gastrointestinal: + BS, soft, no distention, non-tender, no rebound or guarding or peritoneal signs.    Genitourinary:  No penile D/C or bleeding,  No suprapubic tenderness,  No CVAT.    Extremities: Free painless ROM, no C/C/E, no calf tenderness.     Neurological: No focal deficits.

## 2020-01-26 NOTE — PROGRESS NOTE ADULT - ASSESSMENT
Ureter, calculus  -CTscan done 2 days ago shows left obstructing ureteral stone with hydroureteronephrosis and suggestive of UTI  -continue oral Cipro, IV fluids, analgesics, strain urine, flomax   -scheduled for cysto and stent placement this morning    Hypertension  -monitor on current meds.     History of esophageal cancer  -along with ulcer disease. Continue current medication regimen.     Depression  -on paroxetine    Progress Note Handoff  Pending Consults: none  Pending Tests: none  Pending Results: none  Family Discussion: discussed urology's plan with pt - in agreement - possible d/c after cysto today  Disposition: Home__x___/SNF______/Other_____/Unknown at this time_____    Please call me with any questions at extension [phone]

## 2020-01-26 NOTE — BRIEF OPERATIVE NOTE - NSICDXBRIEFPREOP_GEN_ALL_CORE_FT
PRE-OP DIAGNOSIS:  Hydronephrosis of left kidney 26-Jan-2020 10:11:10  Eddie Luna  Renal stones 26-Jan-2020 10:10:43  Eddie Luna  Renal colic 26-Jan-2020 10:10:36  Eddie Luna  Left ureteral stone 26-Jan-2020 10:10:28  Eddie Luna

## 2020-01-26 NOTE — PROGRESS NOTE ADULT - SUBJECTIVE AND OBJECTIVE BOX
MADELINE SERINA  69y  Male      Patient is a 69y old  Male who presents complaining of pain flank. (2020 21:32)      INTERVAL HPI/OVERNIGHT EVENTS:  Patient seen and examined earlier this morning in pre-op.  Lying comfortably in bed.  Pain at left flank.  on IVF  Await cystoscopy with stent placement this morning with Dr. Luna      REVIEW OF SYSTEMS:  CONSTITUTIONAL: No fever, weight loss, or fatigue  EYES: No eye pain, visual disturbances, or discharge  ENMT:  No difficulty hearing, tinnitus, vertigo; No sinus or throat pain  NECK: No pain or stiffness  RESPIRATORY: No cough, wheezing, chills or hemoptysis; No shortness of breath  CARDIOVASCULAR: No chest pain, palpitations, dizziness, or leg swelling  GASTROINTESTINAL: No abdominal or epigastric pain. No nausea, vomiting, or hematemesis; No diarrhea or constipation. No melena or hematochezia.  GENITOURINARY: No dysuria, frequency, hematuria, or incontinence  NEUROLOGICAL: No headaches, memory loss, loss of strength, numbness, or tremors  SKIN: No itching, burning, rashes, or lesions   LYMPH NODES: No enlarged glands  ENDOCRINE: No heat or cold intolerance; No hair loss  MUSCULOSKELETAL: No joint pain or swelling; No muscle, back, or extremity pain  PSYCHIATRIC: No depression, anxiety, mood swings, or difficulty sleeping  HEME/LYMPH: No easy bruising, or bleeding gums  ALLERY AND IMMUNOLOGIC: No hives or eczema      Vital Signs Last 24 Hrs  T(C): 36.5 (2020 08:13), Max: 36.8 (2020 13:45)  T(F): 97.7 (2020 05:40), Max: 98.3 (2020 13:45)  HR: 52 (2020 08:13) (48 - 64)  BP: 115/71 (2020 08:13) (105/73 - 129/69)  BP(mean): --  RR: 17 (2020 08:13) (16 - 18)  SpO2: --        PHYSICAL EXAM:  GENERAL: NAD, well-groomed, well-developed  HEAD:  Atraumatic, Normocephalic  EYES: EOMI, PERRLA, conjunctiva and sclera clear  ENMT: No tonsillar erythema, exudates, or enlargement; Moist mucous membranes, Good dentition, No lesions  NECK: Supple, No JVD, Normal thyroid  NERVOUS SYSTEM:  Alert & Oriented X3, Good concentration; Motor Strength 5/5 B/L upper and lower extremities; DTRs 2+ intact and symmetric  CHEST/LUNG: Clear to percussion bilaterally; No rales, rhonchi, wheezing, or rubs  HEART: Regular rate and rhythm; No murmurs, rubs, or gallops  ABDOMEN: Soft, Nontender, Nondistended; Bowel sounds present; left cva tenderness  EXTREMITIES:  2+ Peripheral Pulses, No clubbing, cyanosis, or edema  LYMPH: No lymphadenopathy noted  SKIN: No rashes or lesions    Consultant(s) Notes Reviewed:  [x ] YES  [ ] NO  Care Discussed with Consultants/Other Providers [ x] YES  [ ] NO    LAB:                           12.0   4.27  )-----------( 142      ( 2020 21:30 )             38.4           142  |  106  |  16  ----------------------------<  85  4.3   |  23  |  1.3    Ca    8.5      2020 21:30    TPro  6.0  /  Alb  3.3<L>  /  TBili  0.6  /  DBili  x   /  AST  27  /  ALT  19  /  AlkPhos  121<H>          Drug Dosing Weight  Height (cm): 177.8 (2020 23:49)  Weight (kg): 61.4 (2020 23:49)  BMI (kg/m2): 19.4 (2020 23:49)  BSA (m2): 1.77 (2020 23:49)        Urinalysis Basic - ( 2020 20:14 )    Color: Yellow / Appearance: Clear / S.025 / pH: x  Gluc: x / Ketone: Negative  / Bili: Negative / Urobili: 0.2 mg/dL   Blood: x / Protein: Negative mg/dL / Nitrite: Negative   Leuk Esterase: Negative / RBC: 6-10 /HPF / WBC 1-2 /HPF   Sq Epi: x / Non Sq Epi: x / Bacteria: Few        RADIOLOGY & ADDITIONAL TESTS:  Imaging Personally Reviewed:  [x] YES  [ ] NO    HEALTH ISSUES - PROBLEM Dx:  History of esophageal cancer: History of esophageal cancer  Hypertension, unspecified type: Hypertension, unspecified type  Ureter, calculus: Ureter, calculus          MEDS:  ciprofloxacin     Tablet 500 milliGRAM(s) Oral every 12 hours  famotidine    Tablet 40 milliGRAM(s) Oral at bedtime  fluticasone propionate 50 MICROgram(s)/spray Nasal Spray 1 Spray(s) Both Nostrils <User Schedule>  ketorolac   Injectable 15 milliGRAM(s) IV Push four times a day PRN  lisinopril 10 milliGRAM(s) Oral daily  oxycodone    5 mG/acetaminophen 325 mG 1 Tablet(s) Oral every 4 hours PRN  pantoprazole    Tablet 40 milliGRAM(s) Oral before breakfast  PARoxetine 20 milliGRAM(s) Oral daily  sodium chloride 0.9%. 1000 milliLiter(s) IV Continuous <Continuous>  sucralfate suspension 1 Gram(s) Oral <User Schedule>  tamsulosin 0.4 milliGRAM(s) Oral at bedtime

## 2020-01-26 NOTE — BRIEF OPERATIVE NOTE - NSICDXBRIEFPOSTOP_GEN_ALL_CORE_FT
POST-OP DIAGNOSIS:  Renal colic on left side 26-Jan-2020 10:11:50  Eddie Luna A  Hydronephrosis with ureteral calculus 26-Jan-2020 10:11:42  Eddie Luna  Left ureteral stone 26-Jan-2020 10:11:32  Eddie Luna

## 2020-01-26 NOTE — DISCHARGE NOTE PROVIDER - NSDCCPCAREPLAN_GEN_ALL_CORE_FT
PRINCIPAL DISCHARGE DIAGNOSIS  Diagnosis: Kidney stone  Assessment and Plan of Treatment: you underwent a cystoscopy with Dr. Luna  please drink plenty of water  strain your urine and follow up with urology

## 2020-01-26 NOTE — DISCHARGE NOTE PROVIDER - NSDCFUADDINST_GEN_ALL_CORE_FT
- Return to Emergency room if develop any persistent fever > 101, chills, tremors, persistent nausea/vomiting, severe pain not relieved by pain medication, inability to urinate, chest pains, difficulty breathing or any bleeding.

## 2020-01-26 NOTE — DISCHARGE NOTE PROVIDER - PROVIDER TOKENS
FREE:[LAST:[Dr. Julianne Eisenberg - PMD],PHONE:[(   )    -],FAX:[(   )    -],FOLLOWUP:[1 week]],PROVIDER:[TOKEN:[[id number]],FOLLOWUP:[1 week]] PROVIDER:[TOKEN:[[id number]],FOLLOWUP:[1 week]],FREE:[LAST:[Dr. Julianne Eisenberg - PMD],PHONE:[(   )    -],FAX:[(   )    -],FOLLOWUP:[1 week]],PROVIDER:[TOKEN:[[id number]]]

## 2020-01-26 NOTE — DISCHARGE NOTE PROVIDER - NSDCMRMEDTOKEN_GEN_ALL_CORE_FT
Carafate 1 g oral tablet: 1 tab(s) orally 3 times a day (with meals)  ciprofloxacin 500 mg oral tablet: 1 tab(s) orally 2 times a day   famotidine 40 mg oral tablet: 1 tab(s) orally once a day (at bedtime)  Flomax 0.4 mg oral capsule: 1 cap(s) orally once a day   fluticasone nasal:   fosinopril 10 mg oral tablet: 1 tab(s) orally once a day  omeprazole 40 mg oral delayed release capsule: 1 cap(s) orally once a day  PARoxetine 20 mg oral tablet: 1 tab(s) orally once a day

## 2020-01-26 NOTE — DISCHARGE NOTE PROVIDER - CARE PROVIDERS DIRECT ADDRESSES
,DirectAddress_Unknown,[email or web address],DirectAddress_Unknown,DirectAddress_Unknown

## 2020-01-26 NOTE — PROGRESS NOTE ADULT - PROBLEM SELECTOR PLAN 1
.  * s/p Cystoscopy with placement of Left Ureteral Stent today by Dr. Luna.     1. Continue Cipro 500 mg PO BID x 7 days. (Rx sent to his pharmacy).  2. Continue Flomax 0.4 mg PO daily. (Rx sent to his pharmacy).  3. Patient already has an appointment scheduled with Dr. Knight for tomorrow and Dr. Luna told patient to keep the appointment and Dr. Knight will tell him when he wants to remove the stent.   4. Dr. Luna states will let Dr. Knight know about the procedure he did today.   5. Case also d/w Dr. Guerrero and states Okay to D/C patient today with above Rx's.   6. Discharge home today and F/U with Dr. Knight tomorrow as scheduled.

## 2020-01-26 NOTE — DISCHARGE NOTE NURSING/CASE MANAGEMENT/SOCIAL WORK - PATIENT PORTAL LINK FT
yes
You can access the FollowMyHealth Patient Portal offered by [facility] by registering at the following website: [email or web address]. By joining [facility]’s FollowMyHealth portal, you will also be able to view your health information using other applications (apps) compatible with our system.

## 2020-01-26 NOTE — DISCHARGE NOTE PROVIDER - HOSPITAL COURSE
70yo male with both history of renal stones on right (last year) but diagnosed with stone disease on left 4 days ago, presents to the ER due to left sided flank pain. Rates up to 8/10 on pain scale and did respond to various analgesics (Toradol, oxycodone). Denies fevers but has had decreased urination over last few weeks. Patient notes that when he was in the ER 4 days ago, he was discharged on oral Cipro and Flomax. Today an oxycodone prescription was called in for him.        Patient given pain control, IVF and cipro po.    He underwent cystoscopy with stent placement with Dr. Luna.        Medically stable for d/c today    d/c planning took over 55 min    d/c papers done by me

## 2020-01-26 NOTE — DISCHARGE NOTE PROVIDER - NSDCACTIVITY_GEN_ALL_CORE
No heavy lifting/straining/Bathing allowed/Stairs allowed/Walking - Indoors allowed/Showering allowed/Walking - Outdoors allowed/Driving allowed

## 2020-01-26 NOTE — DISCHARGE NOTE PROVIDER - CARE PROVIDER_API CALL
Dr. Julianne Eisenberg - PMD,   Phone: (   )    -  Fax: (   )    -  Follow Up Time: 1 week    Eddie Luna)  Urology  [address] [place] [address], [place] [address]  Phone: [phone]  Fax: [phone]  Follow Up Time: 1 week Eddie Luna)  Urology  [address], [address]  [place] [address]  Phone: [phone]  Fax: [phone]  Follow Up Time: 1 week    Dr. Julianne Eisenberg - PMD,   Phone: (   )    -  Fax: (   )    -  Follow Up Time: 1 week    Emil Knight)  Urology  [address] [place] [id number]  Phone: [phone]  Fax: [phone]  Follow Up Time:

## 2020-01-27 ENCOUNTER — OUTPATIENT (OUTPATIENT)
Dept: OUTPATIENT SERVICES | Facility: HOSPITAL | Age: 70
LOS: 1 days | Discharge: HOME | End: 2020-01-27
Payer: MEDICARE

## 2020-01-27 DIAGNOSIS — N13.2 HYDRONEPHROSIS WITH RENAL AND URETERAL CALCULOUS OBSTRUCTION: ICD-10-CM

## 2020-01-27 DIAGNOSIS — Z90.2 ACQUIRED ABSENCE OF LUNG [PART OF]: Chronic | ICD-10-CM

## 2020-01-27 DIAGNOSIS — C15.9 MALIGNANT NEOPLASM OF ESOPHAGUS, UNSPECIFIED: Chronic | ICD-10-CM

## 2020-01-27 PROCEDURE — 74019 RADEX ABDOMEN 2 VIEWS: CPT | Mod: 26

## 2020-01-30 DIAGNOSIS — N20.0 CALCULUS OF KIDNEY: ICD-10-CM

## 2020-01-30 DIAGNOSIS — N20.2 CALCULUS OF KIDNEY WITH CALCULUS OF URETER: ICD-10-CM

## 2020-01-30 DIAGNOSIS — Z85.01 PERSONAL HISTORY OF MALIGNANT NEOPLASM OF ESOPHAGUS: ICD-10-CM

## 2020-01-30 DIAGNOSIS — Z87.891 PERSONAL HISTORY OF NICOTINE DEPENDENCE: ICD-10-CM

## 2020-01-30 DIAGNOSIS — I10 ESSENTIAL (PRIMARY) HYPERTENSION: ICD-10-CM

## 2020-01-30 DIAGNOSIS — N13.30 UNSPECIFIED HYDRONEPHROSIS: ICD-10-CM

## 2020-01-30 DIAGNOSIS — F32.9 MAJOR DEPRESSIVE DISORDER, SINGLE EPISODE, UNSPECIFIED: ICD-10-CM

## 2020-02-06 ENCOUNTER — APPOINTMENT (OUTPATIENT)
Dept: UROLOGY | Facility: CLINIC | Age: 70
End: 2020-02-06

## 2020-02-11 ENCOUNTER — OUTPATIENT (OUTPATIENT)
Dept: OUTPATIENT SERVICES | Facility: HOSPITAL | Age: 70
LOS: 1 days | Discharge: HOME | End: 2020-02-11

## 2020-02-11 VITALS
HEIGHT: 70 IN | TEMPERATURE: 97 F | OXYGEN SATURATION: 98 % | HEART RATE: 55 BPM | WEIGHT: 130.07 LBS | SYSTOLIC BLOOD PRESSURE: 133 MMHG | DIASTOLIC BLOOD PRESSURE: 68 MMHG | RESPIRATION RATE: 17 BRPM

## 2020-02-11 VITALS — DIASTOLIC BLOOD PRESSURE: 78 MMHG | RESPIRATION RATE: 18 BRPM | SYSTOLIC BLOOD PRESSURE: 128 MMHG

## 2020-02-11 DIAGNOSIS — C15.9 MALIGNANT NEOPLASM OF ESOPHAGUS, UNSPECIFIED: Chronic | ICD-10-CM

## 2020-02-11 DIAGNOSIS — N20.1 CALCULUS OF URETER: ICD-10-CM

## 2020-02-11 DIAGNOSIS — Z90.2 ACQUIRED ABSENCE OF LUNG [PART OF]: Chronic | ICD-10-CM

## 2020-02-11 RX ORDER — SODIUM CHLORIDE 9 MG/ML
1000 INJECTION, SOLUTION INTRAVENOUS
Refills: 0 | Status: DISCONTINUED | OUTPATIENT
Start: 2020-02-11 | End: 2020-03-03

## 2020-02-11 RX ORDER — HYDROMORPHONE HYDROCHLORIDE 2 MG/ML
0.5 INJECTION INTRAMUSCULAR; INTRAVENOUS; SUBCUTANEOUS
Refills: 0 | Status: DISCONTINUED | OUTPATIENT
Start: 2020-02-11 | End: 2020-02-11

## 2020-02-11 RX ORDER — OXYCODONE AND ACETAMINOPHEN 5; 325 MG/1; MG/1
1 TABLET ORAL EVERY 4 HOURS
Refills: 0 | Status: DISCONTINUED | OUTPATIENT
Start: 2020-02-11 | End: 2020-02-11

## 2020-02-11 RX ORDER — ONDANSETRON 8 MG/1
4 TABLET, FILM COATED ORAL ONCE
Refills: 0 | Status: DISCONTINUED | OUTPATIENT
Start: 2020-02-11 | End: 2020-03-03

## 2020-02-11 RX ORDER — ACETAMINOPHEN 500 MG
650 TABLET ORAL ONCE
Refills: 0 | Status: DISCONTINUED | OUTPATIENT
Start: 2020-02-11 | End: 2020-03-03

## 2020-02-11 RX ADMIN — SODIUM CHLORIDE 100 MILLILITER(S): 9 INJECTION, SOLUTION INTRAVENOUS at 15:36

## 2020-02-11 NOTE — BRIEF OPERATIVE NOTE - NSICDXBRIEFPROCEDURE_GEN_ALL_CORE_FT
PROCEDURES:  Cystoscopy with ureteroscopy and laser treatment 11-Feb-2020 13:32:45 5-6 mm Left Obstructing ureteral stone with stent in place Emil Knight

## 2020-02-14 DIAGNOSIS — N20.1 CALCULUS OF URETER: ICD-10-CM

## 2020-02-14 NOTE — ASU PATIENT PROFILE, ADULT - PSH
Cancer of esophagus  2017 RESECTION OF LOWER PORTION OF ESOPHAGUS  H/O colonoscopy  2018  History of esophagogastroduodenoscopy (EGD)  2019  Inguinal hernia  AS AN INFANT, RIGHT SIDE  S/P cystoscopy with ureteral stent placement  LEFT  S/P lobectomy of lung  left ?JUNE 2018

## 2020-02-18 ENCOUNTER — OUTPATIENT (OUTPATIENT)
Dept: OUTPATIENT SERVICES | Facility: HOSPITAL | Age: 70
LOS: 1 days | Discharge: HOME | End: 2020-02-18
Payer: MEDICARE

## 2020-02-18 VITALS
SYSTOLIC BLOOD PRESSURE: 133 MMHG | OXYGEN SATURATION: 100 % | HEART RATE: 52 BPM | WEIGHT: 130.07 LBS | RESPIRATION RATE: 17 BRPM | HEIGHT: 70 IN | DIASTOLIC BLOOD PRESSURE: 81 MMHG | TEMPERATURE: 97 F

## 2020-02-18 VITALS — RESPIRATION RATE: 17 BRPM | DIASTOLIC BLOOD PRESSURE: 80 MMHG | SYSTOLIC BLOOD PRESSURE: 157 MMHG | HEART RATE: 64 BPM

## 2020-02-18 DIAGNOSIS — N20.0 CALCULUS OF KIDNEY: ICD-10-CM

## 2020-02-18 DIAGNOSIS — Z98.890 OTHER SPECIFIED POSTPROCEDURAL STATES: Chronic | ICD-10-CM

## 2020-02-18 DIAGNOSIS — Z96.0 PRESENCE OF UROGENITAL IMPLANTS: Chronic | ICD-10-CM

## 2020-02-18 DIAGNOSIS — C15.9 MALIGNANT NEOPLASM OF ESOPHAGUS, UNSPECIFIED: Chronic | ICD-10-CM

## 2020-02-18 DIAGNOSIS — Z90.2 ACQUIRED ABSENCE OF LUNG [PART OF]: Chronic | ICD-10-CM

## 2020-02-18 DIAGNOSIS — K40.90 UNILATERAL INGUINAL HERNIA, WITHOUT OBSTRUCTION OR GANGRENE, NOT SPECIFIED AS RECURRENT: Chronic | ICD-10-CM

## 2020-02-18 PROCEDURE — 74018 RADEX ABDOMEN 1 VIEW: CPT | Mod: 26

## 2020-02-18 RX ORDER — FAMOTIDINE 10 MG/ML
1 INJECTION INTRAVENOUS
Qty: 0 | Refills: 0 | DISCHARGE

## 2020-02-18 RX ORDER — ONDANSETRON 8 MG/1
4 TABLET, FILM COATED ORAL ONCE
Refills: 0 | Status: DISCONTINUED | OUTPATIENT
Start: 2020-02-18 | End: 2020-03-04

## 2020-02-18 RX ORDER — SODIUM CHLORIDE 9 MG/ML
1000 INJECTION, SOLUTION INTRAVENOUS
Refills: 0 | Status: DISCONTINUED | OUTPATIENT
Start: 2020-02-18 | End: 2020-03-04

## 2020-02-18 RX ORDER — SUCRALFATE 1 G
1 TABLET ORAL
Qty: 0 | Refills: 0 | DISCHARGE

## 2020-02-18 RX ORDER — MORPHINE SULFATE 50 MG/1
2 CAPSULE, EXTENDED RELEASE ORAL ONCE
Refills: 0 | Status: DISCONTINUED | OUTPATIENT
Start: 2020-02-18 | End: 2020-02-18

## 2020-02-18 RX ADMIN — SODIUM CHLORIDE 100 MILLILITER(S): 9 INJECTION, SOLUTION INTRAVENOUS at 15:34

## 2020-02-18 NOTE — PACU DISCHARGE NOTE - COMMENTS
69 y o male S/P Left Extracorporeal Shock Wave Lithotripsy, GA/LMA without complications. VS /83 HR 70 RR 16 T 97.6 SaO2 98%.

## 2020-02-21 DIAGNOSIS — N20.0 CALCULUS OF KIDNEY: ICD-10-CM

## 2020-05-19 NOTE — ED ADULT NURSE NOTE - HARM RISK FACTORS
Last visit- 2/17/2020  Next visit- 7/6/2020WITH DR MEDRANO    Requested Prescriptions     Pending Prescriptions Disp Refills    atorvastatin (LIPITOR) 20 MG tablet [Pharmacy Med Name: ATORVASTATIN TABS 20MG] 90 tablet 3     Sig: TAKE 1 TABLET DAILY       PLEASE APPROVE OR DENY
no

## 2020-10-09 NOTE — INPATIENT CERTIFICATION FOR MEDICARE PATIENTS - THE STATUS OF COMORBIDITIES.
Care Management Follow Up Note    Length of Stay (days) 0    Patient plan of care discussed at Interdisciplinary Rounds: Yes.  Expected Discharge Date: 10/10/20  Concerns to be Addressed:  COVID test resulted, COVID+. SW spoke with daughter Camille via phone to discuss SNF placement for 10 days until pt can return to [facility]. Dtr agreeable and has been in contact with ROSANNA Dominguez at . [facility] a [facility]    Anticipated Discharge Disposition:  [facility], Monday. PAS needed.      Plan:  [facility], Monday. Facility not able to accommodate weekend admissions.     Reviewed out of pocket cost for [facility] stretcher transport, $1042.75 for base rate and $25 per mile to the destination. Pt/family expressed understanding and are agreeable to this.      Patient requires stretcher transportation due to COVID+ status.    Stretcher transportation has been arranged for Monday, 10/12 at 10am . Patient and bedside nurse notified of transportation time. Updated daughter Camille via phone.       LISA Serrano         2. The status of comorbities. (See ED/admit documents)

## 2020-12-27 ENCOUNTER — EMERGENCY (EMERGENCY)
Facility: HOSPITAL | Age: 70
LOS: 0 days | Discharge: HOME | End: 2020-12-27
Attending: EMERGENCY MEDICINE
Payer: MEDICARE

## 2020-12-27 VITALS
RESPIRATION RATE: 20 BRPM | HEART RATE: 84 BPM | OXYGEN SATURATION: 100 % | DIASTOLIC BLOOD PRESSURE: 102 MMHG | SYSTOLIC BLOOD PRESSURE: 171 MMHG | TEMPERATURE: 98 F

## 2020-12-27 VITALS
RESPIRATION RATE: 18 BRPM | OXYGEN SATURATION: 99 % | DIASTOLIC BLOOD PRESSURE: 72 MMHG | SYSTOLIC BLOOD PRESSURE: 154 MMHG | HEART RATE: 62 BPM

## 2020-12-27 DIAGNOSIS — Z98.890 OTHER SPECIFIED POSTPROCEDURAL STATES: Chronic | ICD-10-CM

## 2020-12-27 DIAGNOSIS — Z90.2 ACQUIRED ABSENCE OF LUNG [PART OF]: Chronic | ICD-10-CM

## 2020-12-27 DIAGNOSIS — Z02.9 ENCOUNTER FOR ADMINISTRATIVE EXAMINATIONS, UNSPECIFIED: ICD-10-CM

## 2020-12-27 DIAGNOSIS — C15.9 MALIGNANT NEOPLASM OF ESOPHAGUS, UNSPECIFIED: Chronic | ICD-10-CM

## 2020-12-27 DIAGNOSIS — K40.90 UNILATERAL INGUINAL HERNIA, WITHOUT OBSTRUCTION OR GANGRENE, NOT SPECIFIED AS RECURRENT: Chronic | ICD-10-CM

## 2020-12-27 DIAGNOSIS — Z96.0 PRESENCE OF UROGENITAL IMPLANTS: Chronic | ICD-10-CM

## 2020-12-27 LAB
ALBUMIN SERPL ELPH-MCNC: 4.2 G/DL — SIGNIFICANT CHANGE UP (ref 3.5–5.2)
ALP SERPL-CCNC: 113 U/L — SIGNIFICANT CHANGE UP (ref 30–115)
ALT FLD-CCNC: 12 U/L — SIGNIFICANT CHANGE UP (ref 0–41)
ANION GAP SERPL CALC-SCNC: 12 MMOL/L — SIGNIFICANT CHANGE UP (ref 7–14)
APPEARANCE UR: CLEAR — SIGNIFICANT CHANGE UP
AST SERPL-CCNC: 22 U/L — SIGNIFICANT CHANGE UP (ref 0–41)
BACTERIA # UR AUTO: NEGATIVE — SIGNIFICANT CHANGE UP
BASOPHILS # BLD AUTO: 0.02 K/UL — SIGNIFICANT CHANGE UP (ref 0–0.2)
BASOPHILS NFR BLD AUTO: 0.4 % — SIGNIFICANT CHANGE UP (ref 0–1)
BILIRUB DIRECT SERPL-MCNC: <0.2 MG/DL — SIGNIFICANT CHANGE UP (ref 0–0.2)
BILIRUB INDIRECT FLD-MCNC: >0.5 MG/DL — SIGNIFICANT CHANGE UP (ref 0.2–1.2)
BILIRUB SERPL-MCNC: 0.7 MG/DL — SIGNIFICANT CHANGE UP (ref 0.2–1.2)
BILIRUB UR-MCNC: NEGATIVE — SIGNIFICANT CHANGE UP
BUN SERPL-MCNC: 16 MG/DL — SIGNIFICANT CHANGE UP (ref 10–20)
CALCIUM SERPL-MCNC: 9.9 MG/DL — SIGNIFICANT CHANGE UP (ref 8.5–10.1)
CHLORIDE SERPL-SCNC: 102 MMOL/L — SIGNIFICANT CHANGE UP (ref 98–110)
CO2 SERPL-SCNC: 26 MMOL/L — SIGNIFICANT CHANGE UP (ref 17–32)
COLOR SPEC: SIGNIFICANT CHANGE UP
CREAT SERPL-MCNC: 1.1 MG/DL — SIGNIFICANT CHANGE UP (ref 0.7–1.5)
DIFF PNL FLD: ABNORMAL
EOSINOPHIL # BLD AUTO: 0.04 K/UL — SIGNIFICANT CHANGE UP (ref 0–0.7)
EOSINOPHIL NFR BLD AUTO: 0.9 % — SIGNIFICANT CHANGE UP (ref 0–8)
EPI CELLS # UR: 0 /HPF — SIGNIFICANT CHANGE UP (ref 0–5)
GLUCOSE SERPL-MCNC: 114 MG/DL — HIGH (ref 70–99)
GLUCOSE UR QL: NEGATIVE — SIGNIFICANT CHANGE UP
HCT VFR BLD CALC: 49.4 % — SIGNIFICANT CHANGE UP (ref 42–52)
HGB BLD-MCNC: 15.6 G/DL — SIGNIFICANT CHANGE UP (ref 14–18)
HYALINE CASTS # UR AUTO: 3 /LPF — SIGNIFICANT CHANGE UP (ref 0–7)
IMM GRANULOCYTES NFR BLD AUTO: 0.2 % — SIGNIFICANT CHANGE UP (ref 0.1–0.3)
KETONES UR-MCNC: NEGATIVE — SIGNIFICANT CHANGE UP
LACTATE SERPL-SCNC: 1.6 MMOL/L — SIGNIFICANT CHANGE UP (ref 0.7–2)
LEUKOCYTE ESTERASE UR-ACNC: NEGATIVE — SIGNIFICANT CHANGE UP
LIDOCAIN IGE QN: 32 U/L — SIGNIFICANT CHANGE UP (ref 7–60)
LYMPHOCYTES # BLD AUTO: 0.68 K/UL — LOW (ref 1.2–3.4)
LYMPHOCYTES # BLD AUTO: 14.6 % — LOW (ref 20.5–51.1)
MCHC RBC-ENTMCNC: 26.9 PG — LOW (ref 27–31)
MCHC RBC-ENTMCNC: 31.6 G/DL — LOW (ref 32–37)
MCV RBC AUTO: 85 FL — SIGNIFICANT CHANGE UP (ref 80–94)
MONOCYTES # BLD AUTO: 0.46 K/UL — SIGNIFICANT CHANGE UP (ref 0.1–0.6)
MONOCYTES NFR BLD AUTO: 9.9 % — HIGH (ref 1.7–9.3)
NEUTROPHILS # BLD AUTO: 3.45 K/UL — SIGNIFICANT CHANGE UP (ref 1.4–6.5)
NEUTROPHILS NFR BLD AUTO: 74 % — SIGNIFICANT CHANGE UP (ref 42.2–75.2)
NITRITE UR-MCNC: NEGATIVE — SIGNIFICANT CHANGE UP
NRBC # BLD: 0 /100 WBCS — SIGNIFICANT CHANGE UP (ref 0–0)
PH UR: 7.5 — SIGNIFICANT CHANGE UP (ref 5–8)
PLATELET # BLD AUTO: 137 K/UL — SIGNIFICANT CHANGE UP (ref 130–400)
POTASSIUM SERPL-MCNC: 4.4 MMOL/L — SIGNIFICANT CHANGE UP (ref 3.5–5)
POTASSIUM SERPL-SCNC: 4.4 MMOL/L — SIGNIFICANT CHANGE UP (ref 3.5–5)
PROT SERPL-MCNC: 7.9 G/DL — SIGNIFICANT CHANGE UP (ref 6–8)
PROT UR-MCNC: SIGNIFICANT CHANGE UP
RBC # BLD: 5.81 M/UL — SIGNIFICANT CHANGE UP (ref 4.7–6.1)
RBC # FLD: 16.8 % — HIGH (ref 11.5–14.5)
RBC CASTS # UR COMP ASSIST: 1 /HPF — SIGNIFICANT CHANGE UP (ref 0–4)
SARS-COV-2 RNA SPEC QL NAA+PROBE: SIGNIFICANT CHANGE UP
SODIUM SERPL-SCNC: 140 MMOL/L — SIGNIFICANT CHANGE UP (ref 135–146)
SP GR SPEC: 1.01 — SIGNIFICANT CHANGE UP (ref 1.01–1.03)
UROBILINOGEN FLD QL: SIGNIFICANT CHANGE UP
WBC # BLD: 4.66 K/UL — LOW (ref 4.8–10.8)
WBC # FLD AUTO: 4.66 K/UL — LOW (ref 4.8–10.8)
WBC UR QL: 1 /HPF — SIGNIFICANT CHANGE UP (ref 0–5)

## 2020-12-27 PROCEDURE — 99284 EMERGENCY DEPT VISIT MOD MDM: CPT | Mod: CS

## 2020-12-27 PROCEDURE — 93010 ELECTROCARDIOGRAM REPORT: CPT

## 2020-12-27 PROCEDURE — 71045 X-RAY EXAM CHEST 1 VIEW: CPT | Mod: 26

## 2020-12-27 RX ORDER — SODIUM CHLORIDE 9 MG/ML
1000 INJECTION INTRAMUSCULAR; INTRAVENOUS; SUBCUTANEOUS ONCE
Refills: 0 | Status: COMPLETED | OUTPATIENT
Start: 2020-12-27 | End: 2020-12-27

## 2020-12-27 RX ORDER — METOCLOPRAMIDE HCL 10 MG
10 TABLET ORAL ONCE
Refills: 0 | Status: COMPLETED | OUTPATIENT
Start: 2020-12-27 | End: 2020-12-27

## 2020-12-27 RX ORDER — ONDANSETRON 8 MG/1
4 TABLET, FILM COATED ORAL ONCE
Refills: 0 | Status: COMPLETED | OUTPATIENT
Start: 2020-12-27 | End: 2020-12-27

## 2020-12-27 RX ORDER — BACLOFEN 100 %
10 POWDER (GRAM) MISCELLANEOUS ONCE
Refills: 0 | Status: COMPLETED | OUTPATIENT
Start: 2020-12-27 | End: 2020-12-27

## 2020-12-27 RX ADMIN — Medication 10 MILLIGRAM(S): at 16:44

## 2020-12-27 RX ADMIN — SODIUM CHLORIDE 1000 MILLILITER(S): 9 INJECTION INTRAMUSCULAR; INTRAVENOUS; SUBCUTANEOUS at 13:28

## 2020-12-27 RX ADMIN — ONDANSETRON 4 MILLIGRAM(S): 8 TABLET, FILM COATED ORAL at 13:28

## 2020-12-27 RX ADMIN — SODIUM CHLORIDE 1000 MILLILITER(S): 9 INJECTION INTRAMUSCULAR; INTRAVENOUS; SUBCUTANEOUS at 13:27

## 2020-12-27 NOTE — ED PROVIDER NOTE - CLINICAL SUMMARY MEDICAL DECISION MAKING FREE TEXT BOX
Patient presented with N/V, generalized weakness s/p prostate seed procedure. Otherwise afebrile, HD stable, abd completely non-tender, and patient very well appearing. Obtained labs which were grossly unremarkable including no significant leukocytosis, anemia, signs of dehydration/MARITZA, transaminitis or significant electrolyte abnormalities. UA negative for infection. Given IVF and medications in ED with significant improvement of symptoms after which time patient able to tolerate PO, serial abdominal exams benign. Given the above, will discharge home with outpatient follow up and return precautions. Patient agreeable with plan. Agrees to return to ED for any new or worsening symptoms.

## 2020-12-27 NOTE — ED ADULT NURSE NOTE - CHIEF COMPLAINT QUOTE
pt has prostate CA, c/o nausea, weakness, no appetite x 4 days. tested negative 7 days ago,    daughter ( lance julio) [phone].

## 2020-12-27 NOTE — ED PROVIDER NOTE - NS ED ROS FT
Constitutional: + decreased po intake. no fever, chills  Eyes: no redness/discharge/pain/vision changes  ENT: + sore throat. no rhinorrhea/ear pain  Cardiac: No chest pain, SOB or edema.  Respiratory: No cough or respiratory distress  GI: + n/v.  No diarrhea or abdominal pain.  : No dysuria, frequency, urgency or hematuria  MS: no pain to back or extremities, no loss of ROM, no weakness  Neuro: No headache or weakness. No LOC.  Skin: No skin rash.  Endocrine: No history of thyroid disease or diabetes.  Except as documented in the HPI, all other systems are negative.

## 2020-12-27 NOTE — ED PROVIDER NOTE - CARE PLAN
Principal Discharge DX:	Loss of appetite  Secondary Diagnosis:	Nausea  Secondary Diagnosis:	Vomiting

## 2020-12-27 NOTE — ED PROVIDER NOTE - NSFOLLOWUPINSTRUCTIONS_ED_ALL_ED_FT
**Follow up with your Primary Care Doctor within 1 week**        Nausea / Vomiting    Nausea is the feeling that you have to vomit. As nausea gets worse, it can lead to vomiting. Vomiting puts you at an increased risk for dehydration. Older adults and people with other diseases or a weak immune system are at higher risk for dehydration. Drink clear fluids in small but frequent amounts as tolerated. Eat bland, easy-to-digest foods in small amounts as tolerated.    SEEK IMMEDIATE MEDICAL CARE IF YOU HAVE ANY OF THE FOLLOWING SYMPTOMS: fever, inability to keep sufficient fluids down, black or bloody vomitus, black or bloody stools, lightheadedness/dizziness, chest pain, severe headache, rash, shortness of breath, cold or clammy skin, confusion, pain with urination, or any signs of dehydration.

## 2020-12-27 NOTE — ED PROVIDER NOTE - ATTENDING CONTRIBUTION TO CARE
70 year old male, pmhx as documented, presenting with generalized weakness, N/V x 2-3 days. Patient recently had prostate seed implantation done last week which he believes is the likely cause of his symptoms. States he has been able to tolerate PO despite symptoms but has been eating less than usual. Endorses LUQ abdominal pain described as burning, non-radiating, worse with vomiting, relieved with rest, mild severity. Otherwise denies fevers, chest pain, dyspnea, palpitations, diarrhea, blood in stool, urinary symptoms or leg swelling.    Vital Signs: I have reviewed the initial vital signs.  Constitutional: NAD, well-nourished, appears stated age, no acute distress.  HEENT: Airway patent, moist MM, no erythema/swelling/deformity of oral structures. EOMI, PERRLA.  CV: regular rate, regular rhythm, well-perfused extremities, 2+ b/l DP and radial pulses equal.  Lungs: BCTA, no increased WOB.  ABD: NTND, no guarding or rebound, no pulsatile mass, no hernias.   MSK: Neck supple, nontender, nl ROM, no stepoff. Chest nontender. Back nontender in TLS spine or to b/l bony structures or flanks. Ext nontender, nl rom, no deformity.   INTEG: Skin warm, dry, no rash.  NEURO: A&Ox3, normal strength, nl sensation throughout, normal speech.   PSYCH: Calm, cooperative, normal affect and interaction.    Abd completely non-tender. Will obtain labs, symptomatic control, re-eval.

## 2020-12-27 NOTE — ED ADULT TRIAGE NOTE - CHIEF COMPLAINT QUOTE
pt has prostate CA, c/o nausea, weakness, no appetite x 4 days. tested negative 7 days ago,    daughter ( lance julio) [phone].

## 2020-12-27 NOTE — ED PROVIDER NOTE - OBJECTIVE STATEMENT
70 year old M with hx of lung ca s/p lobectomy, esophageal ca s/p radiation, HTN, nephrolithiasis, prostate ca c/o weakness/n/v x last few days. Pt sts recently had prostate seed implantation done x last week. Sts for the past few days has had sore throat, post nasal drip and decreased appetite. Pt now having nausea and several episodes of vomiting. + decreased po intake. Pt otherwise denies any fever/chills, chest pain, sob, cough, urinary symptoms, abdominal pain, sick contacts, hematemesis.

## 2020-12-27 NOTE — ED PROVIDER NOTE - PATIENT PORTAL LINK FT
You can access the FollowMyHealth Patient Portal offered by [facility] by registering at the following website: [email or web address]. By joining [employer]’s FollowMyHealth portal, you will also be able to view your health information using other applications (apps) compatible with our system.

## 2020-12-27 NOTE — ED ADULT NURSE NOTE - OBJECTIVE STATEMENT
Pt presented to ED c/o med eval. Pt c/o weakness and nausea x4 days. Pt also c/o lack of appetite. Airway intact. Denies fevers/chills. Pt A&Ox4, ambulatory.

## 2020-12-28 ENCOUNTER — INPATIENT (INPATIENT)
Facility: HOSPITAL | Age: 70
LOS: 1 days | Discharge: HOME | End: 2020-12-30
Attending: HOSPITALIST | Admitting: HOSPITALIST
Payer: MEDICARE

## 2020-12-28 VITALS
DIASTOLIC BLOOD PRESSURE: 103 MMHG | HEIGHT: 70 IN | HEART RATE: 82 BPM | SYSTOLIC BLOOD PRESSURE: 154 MMHG | RESPIRATION RATE: 20 BRPM | OXYGEN SATURATION: 98 % | TEMPERATURE: 98 F

## 2020-12-28 DIAGNOSIS — K40.90 UNILATERAL INGUINAL HERNIA, WITHOUT OBSTRUCTION OR GANGRENE, NOT SPECIFIED AS RECURRENT: Chronic | ICD-10-CM

## 2020-12-28 DIAGNOSIS — C15.9 MALIGNANT NEOPLASM OF ESOPHAGUS, UNSPECIFIED: Chronic | ICD-10-CM

## 2020-12-28 DIAGNOSIS — Z90.2 ACQUIRED ABSENCE OF LUNG [PART OF]: Chronic | ICD-10-CM

## 2020-12-28 DIAGNOSIS — Z98.890 OTHER SPECIFIED POSTPROCEDURAL STATES: Chronic | ICD-10-CM

## 2020-12-28 DIAGNOSIS — Z96.0 PRESENCE OF UROGENITAL IMPLANTS: Chronic | ICD-10-CM

## 2020-12-28 LAB
ALBUMIN SERPL ELPH-MCNC: 4.2 G/DL — SIGNIFICANT CHANGE UP (ref 3.5–5.2)
ALP SERPL-CCNC: 108 U/L — SIGNIFICANT CHANGE UP (ref 30–115)
ALT FLD-CCNC: 13 U/L — SIGNIFICANT CHANGE UP (ref 0–41)
ANION GAP SERPL CALC-SCNC: 12 MMOL/L — SIGNIFICANT CHANGE UP (ref 7–14)
APPEARANCE UR: CLEAR — SIGNIFICANT CHANGE UP
APTT BLD: 25.6 SEC — LOW (ref 27–39.2)
AST SERPL-CCNC: 32 U/L — SIGNIFICANT CHANGE UP (ref 0–41)
BASOPHILS # BLD AUTO: 0.02 K/UL — SIGNIFICANT CHANGE UP (ref 0–0.2)
BASOPHILS NFR BLD AUTO: 0.4 % — SIGNIFICANT CHANGE UP (ref 0–1)
BILIRUB SERPL-MCNC: 0.8 MG/DL — SIGNIFICANT CHANGE UP (ref 0.2–1.2)
BILIRUB UR-MCNC: NEGATIVE — SIGNIFICANT CHANGE UP
BUN SERPL-MCNC: 21 MG/DL — HIGH (ref 10–20)
CALCIUM SERPL-MCNC: 9.8 MG/DL — SIGNIFICANT CHANGE UP (ref 8.5–10.1)
CHLORIDE SERPL-SCNC: 100 MMOL/L — SIGNIFICANT CHANGE UP (ref 98–110)
CO2 SERPL-SCNC: 25 MMOL/L — SIGNIFICANT CHANGE UP (ref 17–32)
COLOR SPEC: SIGNIFICANT CHANGE UP
CREAT SERPL-MCNC: 1.2 MG/DL — SIGNIFICANT CHANGE UP (ref 0.7–1.5)
DIFF PNL FLD: SIGNIFICANT CHANGE UP
EOSINOPHIL # BLD AUTO: 0.03 K/UL — SIGNIFICANT CHANGE UP (ref 0–0.7)
EOSINOPHIL NFR BLD AUTO: 0.6 % — SIGNIFICANT CHANGE UP (ref 0–8)
GLUCOSE SERPL-MCNC: 131 MG/DL — HIGH (ref 70–99)
GLUCOSE UR QL: NEGATIVE — SIGNIFICANT CHANGE UP
HCT VFR BLD CALC: 49 % — SIGNIFICANT CHANGE UP (ref 42–52)
HGB BLD-MCNC: 15.7 G/DL — SIGNIFICANT CHANGE UP (ref 14–18)
IMM GRANULOCYTES NFR BLD AUTO: 0.4 % — HIGH (ref 0.1–0.3)
INR BLD: 1.07 RATIO — SIGNIFICANT CHANGE UP (ref 0.65–1.3)
KETONES UR-MCNC: NEGATIVE — SIGNIFICANT CHANGE UP
LACTATE SERPL-SCNC: 1.8 MMOL/L — SIGNIFICANT CHANGE UP (ref 0.7–2)
LEUKOCYTE ESTERASE UR-ACNC: NEGATIVE — SIGNIFICANT CHANGE UP
LYMPHOCYTES # BLD AUTO: 0.58 K/UL — LOW (ref 1.2–3.4)
LYMPHOCYTES # BLD AUTO: 11.3 % — LOW (ref 20.5–51.1)
MCHC RBC-ENTMCNC: 27.4 PG — SIGNIFICANT CHANGE UP (ref 27–31)
MCHC RBC-ENTMCNC: 32 G/DL — SIGNIFICANT CHANGE UP (ref 32–37)
MCV RBC AUTO: 85.4 FL — SIGNIFICANT CHANGE UP (ref 80–94)
MONOCYTES # BLD AUTO: 0.49 K/UL — SIGNIFICANT CHANGE UP (ref 0.1–0.6)
MONOCYTES NFR BLD AUTO: 9.5 % — HIGH (ref 1.7–9.3)
NEUTROPHILS # BLD AUTO: 4 K/UL — SIGNIFICANT CHANGE UP (ref 1.4–6.5)
NEUTROPHILS NFR BLD AUTO: 77.8 % — HIGH (ref 42.2–75.2)
NITRITE UR-MCNC: NEGATIVE — SIGNIFICANT CHANGE UP
NRBC # BLD: 0 /100 WBCS — SIGNIFICANT CHANGE UP (ref 0–0)
PH UR: 7 — SIGNIFICANT CHANGE UP (ref 5–8)
PLATELET # BLD AUTO: 181 K/UL — SIGNIFICANT CHANGE UP (ref 130–400)
POTASSIUM SERPL-MCNC: 5.7 MMOL/L — HIGH (ref 3.5–5)
POTASSIUM SERPL-SCNC: 5.7 MMOL/L — HIGH (ref 3.5–5)
PROT SERPL-MCNC: 7.6 G/DL — SIGNIFICANT CHANGE UP (ref 6–8)
PROT UR-MCNC: NEGATIVE — SIGNIFICANT CHANGE UP
PROTHROM AB SERPL-ACNC: 12.3 SEC — SIGNIFICANT CHANGE UP (ref 9.95–12.87)
RBC # BLD: 5.74 M/UL — SIGNIFICANT CHANGE UP (ref 4.7–6.1)
RBC # FLD: 17.3 % — HIGH (ref 11.5–14.5)
SODIUM SERPL-SCNC: 137 MMOL/L — SIGNIFICANT CHANGE UP (ref 135–146)
SP GR SPEC: 1.01 — SIGNIFICANT CHANGE UP (ref 1.01–1.03)
UROBILINOGEN FLD QL: SIGNIFICANT CHANGE UP
WBC # BLD: 5.14 K/UL — SIGNIFICANT CHANGE UP (ref 4.8–10.8)
WBC # FLD AUTO: 5.14 K/UL — SIGNIFICANT CHANGE UP (ref 4.8–10.8)

## 2020-12-28 PROCEDURE — 71045 X-RAY EXAM CHEST 1 VIEW: CPT | Mod: 26

## 2020-12-28 PROCEDURE — 99285 EMERGENCY DEPT VISIT HI MDM: CPT | Mod: CS,GC

## 2020-12-28 PROCEDURE — 93010 ELECTROCARDIOGRAM REPORT: CPT

## 2020-12-28 RX ORDER — SODIUM CHLORIDE 9 MG/ML
2300 INJECTION, SOLUTION INTRAVENOUS ONCE
Refills: 0 | Status: COMPLETED | OUTPATIENT
Start: 2020-12-28 | End: 2020-12-28

## 2020-12-28 RX ORDER — ONDANSETRON 8 MG/1
4 TABLET, FILM COATED ORAL ONCE
Refills: 0 | Status: COMPLETED | OUTPATIENT
Start: 2020-12-28 | End: 2020-12-28

## 2020-12-28 RX ADMIN — SODIUM CHLORIDE 2300 MILLILITER(S): 9 INJECTION, SOLUTION INTRAVENOUS at 20:50

## 2020-12-28 RX ADMIN — ONDANSETRON 4 MILLIGRAM(S): 8 TABLET, FILM COATED ORAL at 21:29

## 2020-12-28 NOTE — ED PROVIDER NOTE - ATTENDING CONTRIBUTION TO CARE
69 yo M pmh of lung ca sp lobectomy and chemo in remission, htn, esophageal ca in remission and prostate ca with recent seeds placd 5 days ago by Dr. Knight. Patent presents with n/v and inability to tolerate po for the last five days. no abdominal pain. no diarrhea but states he has not had a BM in 4 days. no fevers. no chest pain, no shortness of breath, no palpitations. Was in the ED for similar complaint yesterday. Testing was normal at that time. CXR was read today and found to have apical pneumothorax, patient received call for follow up ct scan. Patient reports it is a known finding due to his lobectomy does not want a ct scan at this time. no shortness of breath, no chest pain. Returning today for difficulty tolerating po.     CONSTITUTIONAL: Well-developed; well-nourished; in no acute distress.   SKIN: warm, dry  HEAD: Normocephalic; atraumatic.  EYES: PERRL, EOMI, no conjunctival erythema  ENT: No nasal discharge; airway clear.  NECK: Supple; non tender.  CARD: S1, S2 normal;  Regular rate and rhythm.   RESP: No wheezes, rales or rhonchi.  ABD: soft non tender, non distended, no rebound or guarding  EXT: Normal ROM.  5/5 strength in all 4 extremities   LYMPH: No acute cervical adenopathy.  NEURO: Alert, oriented, grossly unremarkable. neurovascularly intact  PSYCH: Cooperative, appropriate.

## 2020-12-28 NOTE — ED PROVIDER NOTE - NS ED ROS FT
Constitutional: See HPI.  Eyes: No visual changes, eye pain or discharge. No Photophobia  ENMT:  No neck pain or stiffness. No limited ROM  Cardiac: No SOB or edema. No chest pain with exertion.  Respiratory: No cough or respiratory distress.   GI: No nausea, vomiting, diarrhea or abdominal pain.  : No dysuria, frequency or burning. No Discharge  MS: No myalgia, muscle weakness, joint pain or back pain.  Neuro: No headache or weakness. No LOC.  Skin: No skin rash.  Except as documented in the HPI, all other systems are negative.

## 2020-12-28 NOTE — ED PROVIDER NOTE - PHYSICAL EXAMINATION
VITAL SIGNS: I have reviewed nursing notes and confirm.  CONSTITUTIONAL: well-appearing, non-toxic,  SKIN: Warm dry, normal skin turgor  HEAD: NCAT  EYES: EOMI, PERRLA, no scleral icterus  ENT: Moist mucous membranes, normal pharynx   NECK: Supple; non tender. Full ROM.   CARD: RRR, no murmurs, rubs or gallops  RESP: clear to ausculation b/l.  No rales, rhonchi, or wheezing.  ABD: soft, + BS, non-tender, non-distended, no rebound or guarding. No CVA tenderness  EXT: Full ROM, no bony tenderness, no pedal edema, no calf tenderness  NEURO: normal motor. normal sensory. Normal gait.  PSYCH: Cooperative, appropriate.

## 2020-12-28 NOTE — ED POST DISCHARGE NOTE - RESULT SUMMARY
CXR- CANNOT R/O LEFT APICAL PNEUMOTHORAX. PATIENT IS RETURNING FOR CHEST CT ASAP TODAY. DR ROBBY BARTLETT IN OBS NOTIFIIED.

## 2020-12-28 NOTE — ED PROVIDER NOTE - CLINICAL SUMMARY MEDICAL DECISION MAKING FREE TEXT BOX
Patient presents with N/V for 5 days and inability to tolerate po. labs, ekg, cxr, ua done. no acute findings. Feeling better with fluids and meds. Patient admitted for further management.

## 2020-12-28 NOTE — ED ADULT NURSE NOTE - NS ED NURSE LEVEL OF CONSCIOUSNESS MENTAL STATUS
Celena Schilder is calling for a new glucose meter One touch ultra. Pt check the sugar 3 times. Needs the glucose meter, lancets and test strips. [facility] [address] # [phone]    Celena Schilder [phone]    Pt is been using [employer] glucose machine and test strips. Awake/Alert/Cooperative

## 2020-12-28 NOTE — ED ADULT NURSE NOTE - OBJECTIVE STATEMENT
pt is here with c/o hiccough and nausea S/P prostate surgery 5days ago. Pt said he not being able to drink due to the n/v. He was put on clear diet before and after the surgery, but has noticed increasing of nausea when he try to eat.. Fever at ER of 101. Pt said he feels dehydrated.

## 2020-12-29 LAB
BASE EXCESS BLDV CALC-SCNC: 4.3 MMOL/L — HIGH (ref -2–2)
CA-I SERPL-SCNC: 1.19 MMOL/L — SIGNIFICANT CHANGE UP (ref 1.12–1.3)
CULTURE RESULTS: SIGNIFICANT CHANGE UP
GAS PNL BLDV: 139 MMOL/L — SIGNIFICANT CHANGE UP (ref 136–145)
GAS PNL BLDV: SIGNIFICANT CHANGE UP
HCO3 BLDV-SCNC: 30 MMOL/L — HIGH (ref 22–29)
HCT VFR BLDA CALC: 44.9 % — HIGH (ref 34–44)
HGB BLD CALC-MCNC: 14.6 G/DL — SIGNIFICANT CHANGE UP (ref 14–18)
LACTATE BLDV-MCNC: 2.7 MMOL/L — HIGH (ref 0.5–1.6)
PCO2 BLDV: 47 MMHG — SIGNIFICANT CHANGE UP (ref 41–51)
PH BLDV: 7.41 — SIGNIFICANT CHANGE UP (ref 7.26–7.43)
PO2 BLDV: 42 MMHG — HIGH (ref 20–40)
POTASSIUM BLDV-SCNC: 4.9 MMOL/L — SIGNIFICANT CHANGE UP (ref 3.3–5.6)
SAO2 % BLDV: 76 % — SIGNIFICANT CHANGE UP
SARS-COV-2 RNA SPEC QL NAA+PROBE: SIGNIFICANT CHANGE UP
SPECIMEN SOURCE: SIGNIFICANT CHANGE UP

## 2020-12-29 PROCEDURE — 99222 1ST HOSP IP/OBS MODERATE 55: CPT | Mod: AI

## 2020-12-29 RX ORDER — FLUTICASONE PROPIONATE 50 MCG
0 SPRAY, SUSPENSION NASAL
Qty: 0 | Refills: 0 | DISCHARGE

## 2020-12-29 RX ORDER — PANTOPRAZOLE SODIUM 20 MG/1
40 TABLET, DELAYED RELEASE ORAL
Refills: 0 | Status: DISCONTINUED | OUTPATIENT
Start: 2020-12-29 | End: 2020-12-30

## 2020-12-29 RX ORDER — LISINOPRIL 2.5 MG/1
10 TABLET ORAL DAILY
Refills: 0 | Status: DISCONTINUED | OUTPATIENT
Start: 2020-12-29 | End: 2020-12-30

## 2020-12-29 RX ORDER — FAMOTIDINE 10 MG/ML
1 INJECTION INTRAVENOUS
Qty: 0 | Refills: 0 | DISCHARGE

## 2020-12-29 RX ORDER — TAMSULOSIN HYDROCHLORIDE 0.4 MG/1
0.4 CAPSULE ORAL AT BEDTIME
Refills: 0 | Status: DISCONTINUED | OUTPATIENT
Start: 2020-12-29 | End: 2020-12-30

## 2020-12-29 RX ORDER — ENOXAPARIN SODIUM 100 MG/ML
40 INJECTION SUBCUTANEOUS DAILY
Refills: 0 | Status: DISCONTINUED | OUTPATIENT
Start: 2020-12-29 | End: 2020-12-30

## 2020-12-29 RX ORDER — CHLORHEXIDINE GLUCONATE 213 G/1000ML
1 SOLUTION TOPICAL
Refills: 0 | Status: DISCONTINUED | OUTPATIENT
Start: 2020-12-29 | End: 2020-12-30

## 2020-12-29 RX ORDER — FAMOTIDINE 10 MG/ML
40 INJECTION INTRAVENOUS AT BEDTIME
Refills: 0 | Status: DISCONTINUED | OUTPATIENT
Start: 2020-12-29 | End: 2020-12-30

## 2020-12-29 RX ADMIN — TAMSULOSIN HYDROCHLORIDE 0.4 MILLIGRAM(S): 0.4 CAPSULE ORAL at 22:08

## 2020-12-29 RX ADMIN — LISINOPRIL 10 MILLIGRAM(S): 2.5 TABLET ORAL at 06:05

## 2020-12-29 RX ADMIN — PANTOPRAZOLE SODIUM 40 MILLIGRAM(S): 20 TABLET, DELAYED RELEASE ORAL at 06:05

## 2020-12-29 RX ADMIN — FAMOTIDINE 40 MILLIGRAM(S): 10 INJECTION INTRAVENOUS at 22:08

## 2020-12-29 RX ADMIN — Medication 30 MILLILITER(S): at 22:06

## 2020-12-29 RX ADMIN — ENOXAPARIN SODIUM 40 MILLIGRAM(S): 100 INJECTION SUBCUTANEOUS at 11:51

## 2020-12-29 RX ADMIN — Medication 20 MILLIGRAM(S): at 12:00

## 2020-12-29 NOTE — H&P ADULT - NSHPPHYSICALEXAM_GEN_ALL_CORE
GENERAL: NAD, well-developed, AAOx3  HEENT:  Atraumatic, Normocephalic. EOMI, PERRLA, conjunctiva and sclera clear, No JVD  PULMONARY: Clear to auscultation bilaterally; No wheeze  CARDIOVASCULAR: Regular rate and rhythm; No murmurs, rubs, or gallops  GASTROINTESTINAL: Soft, Nontender, Nondistended; Bowel sounds present  MUSCULOSKELETAL:  2+ Peripheral Pulses, No clubbing, cyanosis, or edema  NEUROLOGY: non-focal  SKIN: No rashes or lesions GENERAL: NAD, well-developed, AAOx3  HEENT:  Atraumatic, Normocephalic. EOMI, conjunctiva and sclera clear, No JVD  PULMONARY: Clear to auscultation bilaterally; No wheeze  CARDIOVASCULAR: Regular rate and rhythm; No murmurs, rubs, or gallops  GASTROINTESTINAL: Soft, Nontender, Nondistended; Bowel sounds present  MUSCULOSKELETAL:  no cyanosis or edema  NEUROLOGY: non-focal  SKIN: No rashes or lesions

## 2020-12-29 NOTE — H&P ADULT - ATTENDING COMMENTS
71 yo male with PMH of HTN, lung cancer s/p left lower lobectomy, esophageal ca s/p chemo and surgery in remission, and prostate ca with recent seeds placed ~10 days ago came to ED for nausea, vomiting and hiccups. Symptoms started 6 days ago, unable to tolerate oral intake, he also reports sore throat, no abdominal pain, he has constipation, no urinary symptoms. In the ED today, pt endorsed as febrile ~ 100.5. CBC, CMP, UA wnl. Covid pending, CXR showed left pleural effusion, left >right apical opacities.     PHYSICAL EXAM:  GENERAL: cathectic, well-developed.  HEAD:  Atraumatic, Normocephalic.  EYES: EOMI, PERRLA, conjunctiva and sclera clear.  NECK: Supple, No JVD.  CHEST/LUNG: Clear to auscultation bilaterally; No wheeze.  HEART: Regular rate and rhythm; S1 S2.   ABDOMEN: Soft, Nontender, Nondistended; Bowel sounds present.  EXTREMITIES:  2+ Peripheral Pulses, No clubbing, cyanosis, or edema.  PSYCH: AAOx3.  NEUROLOGY: non-focal.  SKIN: No rashes or lesions.    A/P:   Nausea and vomiting: likely from chemo/radiation therapy side effect.   History of esophageal cancer s/p chemo and resection.   Advance diet as tolerated.   Zofran prn. If symptoms persist will call GI for possible EGD.     Fever: per ED, no fever documented in the chart.   COVID-19 negative. UA is negative.   Monitor off antibiotics.     Lung cancer s/p chemotherapy and lobectomy  CXR showed small left pleural effusion stable, biapical opacities.   Outpatient follow up

## 2020-12-29 NOTE — H&P ADULT - HISTORY OF PRESENT ILLNESS
Pt is a 69 yo male with PMHx of HTN, lung ca sp lobectomy in remission, esophageal ca s/p chemo and surgery in remission, and prostate ca with recent seeds placed ~10 days ago by Dr. Knight presenting for 5-6 days of nausea and vomiting, sometimes associated with episodes of hiccups and belching, and inability to tolerate po diet. Pt reports some chills at home but no fever recorded. Pt also reports rhinorrhea in the morning with sore throat and no BM for past 4 days which he attributes to lack of PO intake. Pt denies fever, abdominal pain, diarrhea, urinary complaints, chest pain, shortness of breath, or palpitations. He was seen in the ED yesterday for similar complaint and was discharged when symptoms resolved. CBC, CMP, lactate, lipase, hepatic panel, and UA were wnl at that time. COVID neg. CXR was read earlier today and found to have apical pneumothorax andpatient received call for follow up ct scan. Patient reports it is a known finding due to his lobectomy and does not want a ct scan at this time. Denies any shortness of breath, chest pain, or palpitations. Pt came back to ED again today because the symptoms have still not resolved.    In the ED today, pt endorsed as febrile ~ 100.5. CBC, CMP, UA wnl. Covid pending, CXR appears stable.

## 2020-12-29 NOTE — H&P ADULT - NSICDXPASTSURGICALHX_GEN_ALL_CORE_FT
PAST SURGICAL HISTORY:  Cancer of esophagus 2017 RESECTION OF LOWER PORTION OF ESOPHAGUS    H/O colonoscopy 2018    History of esophagogastroduodenoscopy (EGD) 2019    Inguinal hernia AS AN INFANT, RIGHT SIDE    S/P cystoscopy with ureteral stent placement LEFT    S/P lobectomy of lung left ?JUNE 2018

## 2020-12-29 NOTE — H&P ADULT - ASSESSMENT
Pt is a 69 yo male with PMHx of HTN, lung ca sp lobectomy in remission, esophageal ca s/p chemo and surgery in remission, and prostate ca with recent seeds placed ~10 days ago by Dr. Knight presenting for 5-6 days of nausea and vomiting, sometimes associated with episodes of hiccups and belching, and inability to tolerate po diet.    # Fever x1, suspected recent prostate seed placement as possible source of infection  - WBC, UA wnl  - f/u blood and urine Cx  - will hold off abx for now    # Nausea/vomiting and decreased PO intake past five days- possibly related to constipation vs upper GI in origin (Hx of lower esophageal and stomach surgery) vs viral URI?  - started on senna + miralax, f/u response  - if symptoms dont resolve, may benefit from EGD to assess esophagus  - already on PPI BID from home    # Hx Lung ca sp lobectomy in remission  # Hx esophageal ca s/p chemo and surgery in remission  - stable known small pneumo on CXR      DVT ppx: lovenox  GI ppx: on PPI from home  Diet: DASH, clear liquid for now  Code Status: Full Code

## 2020-12-29 NOTE — H&P ADULT - NSHPSOCIALHISTORY_GEN_ALL_CORE
Lives at home with daughter and son in law in separate apartment.   Former smoker.  Denies alcohol or illicit drug use.

## 2020-12-29 NOTE — H&P ADULT - NSHPREVIEWOFSYSTEMS_GEN_ALL_CORE
CONSTITUTIONAL: No weakness, fevers or chills, no weight loss   EYES/ENT: No visual changes;  No vertigo or throat pain   NECK: No pain or stiffness  RESPIRATORY: No cough, wheezing, hemoptysis; No shortness of breath  CARDIOVASCULAR: No chest pain or palpitations  GASTROINTESTINAL: No abdominal or epigastric pain. No nausea, vomiting, or hematemesis; No diarrhea or constipation. No melena or hematochezia.  GENITOURINARY: No dysuria, frequency or hematuria  NEUROLOGICAL: No numbness or weakness  All other review of systems is negative unless indicated above. CONSTITUTIONAL: + chills. No weakness or fevers. no weight loss   EYES/ENT: No visual changes;  No vertigo or throat pain   NECK: No pain or stiffness  RESPIRATORY: No cough, wheezing, hemoptysis; No shortness of breath  CARDIOVASCULAR: No chest pain or palpitations  GASTROINTESTINAL: + nausea, vomiting. + constipation. No abdominal or epigastric pain. No hematemesis; No diarrhea. No melena or hematochezia.  GENITOURINARY: No dysuria, frequency or hematuria  NEUROLOGICAL: No numbness or weakness  All other review of systems is negative unless indicated above.

## 2020-12-29 NOTE — H&P ADULT - NSHPLABSRESULTS_GEN_ALL_CORE
Complete Blood Count + Automated Diff (12.28.20 @ 21:19)   WBC Count: 5.14 K/uL   RBC Count: 5.74 M/uL   Hemoglobin: 15.7 g/dL   Hematocrit: 49.0 %   Mean Cell Volume: 85.4 fL   Mean Cell Hemoglobin: 27.4 pg   Mean Cell Hemoglobin Conc: 32.0 g/dL   Red Cell Distrib Width: 17.3 %   Platelet Count - Automated: 181 K/uL   Auto Neutrophil #: 4.00 K/uL   Auto Lymphocyte #: 0.58 K/uL   Auto Monocyte #: 0.49 K/uL   Auto Eosinophil #: 0.03 K/uL   Auto Basophil #: 0.02 K/uL   Auto Neutrophil %: 77.8: Differential percentages must be correlated with absolute numbers for   clinical significance. %   Auto Lymphocyte %: 11.3 %   Auto Monocyte %: 9.5 %   Auto Eosinophil %: 0.6 %   Auto Basophil %: 0.4 %   Auto Immature Granulocyte %: 0.4 %   Nucleated RBC: 0 /100 WBCs     Comprehensive Metabolic Panel (12.28.20 @ 21:19)   Sodium, Serum: 137 mmol/L   Potassium, Serum: 5.7: Hemolyzed. Interpret with caution mmol/L   Chloride, Serum: 100 mmol/L   Carbon Dioxide, Serum: 25 mmol/L   Anion Gap, Serum: 12 mmol/L   Blood Urea Nitrogen, Serum: 21 mg/dL   Creatinine, Serum: 1.2 mg/dL   Glucose, Serum: 131 mg/dL   Calcium, Total Serum: 9.8 mg/dL   Protein Total, Serum: 7.6 g/dL   Albumin, Serum: 4.2 g/dL   Bilirubin Total, Serum: 0.8 mg/dL   Alkaline Phosphatase, Serum: 108 U/L   Aspartate Aminotransferase (AST/SGOT): 32: Hemolyzed. Interpret with caution U/L   Alanine Aminotransferase (ALT/SGPT): 13: Hemolyzed. Interpret with caution U/L   eGFR if Non : 61: Interpretative comment   The units for eGFR are mL/min/1.73M2 (normalized body surface area). The   eGFR is calculated from a serum creatinine using the CKD-EPI equation.   Other variables required for calculation are race, age and sex. Among   patients with chronic kidney disease (CKD), the eGFR is useful in   determining the stage of disease according to KDOQI CKD classification.   All eGFR results are reported numerically with the following   interpretation.   GFR With Without   (ml/min/1.73 m2) Kidney Damage Kidney Damage   >= 90 Stage 1 Normal   60-89 Stage 2 Decreased GFR   30-59 Stage 3 Stage 3   15-29 Stage 4 Stage 4   < 15 Stage 5 Stage 5   Each stage of CKD assumes that the associated GFR level has been in   effect for at least 3 months. Determination of stages one and two (with   eGFR > 59 ml/min/m2) requires estimation of kidney damage for at least 3   months as defined by structural or functional abnormalities.   Limitations: All estimates of GFR will be less accurate for patients at   extremes of muscle mass (including but not limited to frail elderly,   critically ill, or cancer patients), those with unusual diets, and those   with conditions associated with reduced secretion or extrarenal   elimination of creatinine. The eGFR equation is not recommended for use   in patients with unstable creatinine levels. mL/min/1.73M2   eGFR if African American: 71 mL/min/1.73M2     Blood Gas Venous - Potassium (12.29.20 @ 00:00)   Blood Gas Venous - Potassium: 4.9 mmoL/L     Urinalysis (12.28.20 @ 22:27)   Blood, Urine: Trace   pH Urine: 7.0   Glucose Qualitative, Urine: Negative   Color: Light Yellow   Urine Appearance: Clear   Bilirubin: Negative   Ketone - Urine: Negative   Specific Gravity: 1.012   Protein, Urine: Negative   Urobilinogen: <2 mg/dL   Nitrite: Negative   Leukocyte Esterase Concentration: Negative

## 2020-12-30 ENCOUNTER — TRANSCRIPTION ENCOUNTER (OUTPATIENT)
Age: 70
End: 2020-12-30

## 2020-12-30 VITALS
HEART RATE: 63 BPM | TEMPERATURE: 98 F | DIASTOLIC BLOOD PRESSURE: 65 MMHG | RESPIRATION RATE: 18 BRPM | SYSTOLIC BLOOD PRESSURE: 112 MMHG

## 2020-12-30 PROCEDURE — 99239 HOSP IP/OBS DSCHRG MGMT >30: CPT

## 2020-12-30 RX ORDER — POLYETHYLENE GLYCOL 3350 17 G/17G
1 POWDER, FOR SOLUTION ORAL
Qty: 30 | Refills: 0
Start: 2020-12-30 | End: 2021-01-28

## 2020-12-30 RX ORDER — PANTOPRAZOLE SODIUM 20 MG/1
40 TABLET, DELAYED RELEASE ORAL
Refills: 0 | Status: DISCONTINUED | OUTPATIENT
Start: 2020-12-30 | End: 2020-12-30

## 2020-12-30 RX ORDER — OMEPRAZOLE 10 MG/1
1 CAPSULE, DELAYED RELEASE ORAL
Qty: 0 | Refills: 0 | DISCHARGE

## 2020-12-30 RX ORDER — PANTOPRAZOLE SODIUM 20 MG/1
1 TABLET, DELAYED RELEASE ORAL
Qty: 60 | Refills: 0
Start: 2020-12-30 | End: 2021-01-28

## 2020-12-30 RX ADMIN — ENOXAPARIN SODIUM 40 MILLIGRAM(S): 100 INJECTION SUBCUTANEOUS at 11:28

## 2020-12-30 RX ADMIN — Medication 20 MILLIGRAM(S): at 11:28

## 2020-12-30 RX ADMIN — PANTOPRAZOLE SODIUM 40 MILLIGRAM(S): 20 TABLET, DELAYED RELEASE ORAL at 05:36

## 2020-12-30 NOTE — DISCHARGE NOTE PROVIDER - CARE PROVIDER_API CALL
Julianne Eisenberg  [id number]  [address] [place] [address]  Phone: [phone]  Fax: ()-  Follow Up Time: 1 week

## 2020-12-30 NOTE — DISCHARGE NOTE PROVIDER - NSDCMRMEDTOKEN_GEN_ALL_CORE_FT
famotidine 40 mg oral tablet: 1 tab(s) orally once a day (at bedtime)  ferrous sulfate (as elemental iron) 45 mg oral tablet, extended release: 1 tab(s) orally once a day  Flomax 0.4 mg oral capsule: 1 cap(s) orally once a day   fosinopril 10 mg oral tablet: 1 tab(s) orally once a day  PARoxetine 20 mg oral tablet: 1 tab(s) orally once a day   famotidine 40 mg oral tablet: 1 tab(s) orally once a day (at bedtime)  ferrous sulfate (as elemental iron) 45 mg oral tablet, extended release: 1 tab(s) orally once a day  Flomax 0.4 mg oral capsule: 1 cap(s) orally once a day   fosinopril 10 mg oral tablet: 1 tab(s) orally once a day  MiraLax oral powder for reconstitution: 1 packet(s) orally once a day, As Needed -for constipation   MiraLax oral powder for reconstitution: 1 packet(s) orally once a day, As Needed -for constipation   pantoprazole 40 mg oral delayed release tablet: 1 tab(s) orally 2 times a day  PARoxetine 20 mg oral tablet: 1 tab(s) orally once a day

## 2020-12-30 NOTE — DISCHARGE NOTE NURSING/CASE MANAGEMENT/SOCIAL WORK - PATIENT PORTAL LINK FT
You can access the FollowMyHealth Patient Portal offered by [facility] by registering at the following website: [email or web address]. By joining [employer]’s FollowMyHealth portal, you will also be able to view your health information using other applications (apps) compatible with our system.

## 2020-12-30 NOTE — DISCHARGE NOTE PROVIDER - HOSPITAL COURSE
71 yo male, CC: 5-6 days of nausea and vomiting  PMHx of HTN, lung ca sp lobectomy in remission, esophageal ca s/p chemo and surgery in remission, and prostate ca with recent seeds placed ~10 days ago by Dr. Knight presenting    HPI: Additionally has inability to tolerate po diet for 5 days 2/2 dysphagia. Also reports chills, rhinorrhea, sore throat, constipation x4d. Denies fever, abdominal pain, diarrhea, urinary complaints, chest pain, shortness of breath, or palpitations.   Recent ED visit for similar complaint w/ resolution of symptoms. Labs at the time unremarkable. CXR on this admission shows apical pneumothorax but patient has had this since lobectomy and is refusing f/u CT scan. Denies SOB, CP, or palps.   Returned to the ED b/c symptoms are happening again. In ED found to be febrile to 100.5 rectally. Labs again unremarkable. COVID negative, CXR stable.     12/30 patient again has resolution of symptoms currently stating he had a BM on bowel regimen, nausea improved, no vomiting since admission, appetite is improved. States he occasionally has throat pain which may be 2/2 acid reflux (hx of esophageal cancer) currently on protonix qd and famotidine qd as inpatient. At home he only takes famotidine qd. Today he has resolution/improvement of all symptoms and wants to go home.  We will change his regimen to protonix BID and he will follow up with his PCP. He is medically stable for discharge today.    69 yo male, CC: 5-6 days of nausea and vomiting  PMHx of HTN, lung ca sp lobectomy in remission, esophageal ca s/p chemo and surgery in remission, and prostate ca with recent seeds placed ~10 days ago by Dr. Knight presenting    HPI: Additionally has inability to tolerate po diet for 5 days 2/2 dysphagia. Also reports chills, rhinorrhea, sore throat, constipation x4d. Denies fever, abdominal pain, diarrhea, urinary complaints, chest pain, shortness of breath, or palpitations.   Recent ED visit for similar complaint w/ resolution of symptoms. Labs at the time unremarkable. CXR on this admission shows apical pneumothorax but patient has had this since lobectomy and is refusing f/u CT scan. Denies SOB, CP, or palps.   Returned to the ED b/c symptoms are happening again. In ED found to be febrile to 100.5 rectally. Labs again unremarkable. COVID negative, CXR stable.     12/30 patient again has resolution of symptoms currently stating he had a BM on bowel regimen, nausea improved, no vomiting since admission, appetite is improved. States he occasionally has throat pain which may be 2/2 acid reflux (hx of esophageal cancer) currently on protonix qd and famotidine qd as inpatient. At home he only takes famotidine qd. Today he has resolution/improvement of all symptoms and wants to go home.  We will change his regimen to protonix BID and he will follow up with his PCP. He is medically stable for discharge today.       Attending addendum: Patient seen and examined. Patient feels better, tolerated regular diet, had BM yesterday with relief in symptoms. Abd is soft, nondistended. CXR findings are chronic as per patient.   Stable for discharge. Outpatient f/u with medical oncology and radiation oncology. Med rec reviewed with resident.

## 2020-12-30 NOTE — DISCHARGE NOTE PROVIDER - NSDCCPCAREPLAN_GEN_ALL_CORE_FT
PRINCIPAL DISCHARGE DIAGNOSIS  Diagnosis: Nausea & vomiting  Assessment and Plan of Treatment: Possibly 2/2 dyspepsia/gastritis.   A/w decreased PO intake and constipation.  Sypmtoms improved on famotodine and protonix.   Your nausea may be due to dyspepsia (indigestion)  Indigestion, or dyspepsia, is stomach discomfort, feeling full quickly, or pain or burning in your esophagus or stomach. The cause may not be known.       PRINCIPAL DISCHARGE DIAGNOSIS  Diagnosis: Nausea & vomiting  Assessment and Plan of Treatment: Possibly 2/2 dyspepsia/gastritis.   A/w decreased PO intake and constipation.  Sypmtoms improved on famotodine and protonix.   Your nausea may be due to dyspepsia (indigestion)  Indigestion, or dyspepsia, is stomach discomfort, feeling full quickly, or pain or burning in your esophagus or stomach. The cause may not be known.         PRINCIPAL DISCHARGE DIAGNOSIS  Diagnosis: Nausea & vomiting  Assessment and Plan of Treatment: Possibly 2/2 dyspepsia/gastritis.   A/w decreased PO intake and constipation.  Sypmtoms improved on famotodine and protonix.   Your nausea may be due to dyspepsia (indigestion)  Indigestion, or dyspepsia, is stomach discomfort, feeling full quickly, or pain or burning in your esophagus or stomach.         SECONDARY DISCHARGE DIAGNOSES  Diagnosis: Constipation  Assessment and Plan of Treatment: Continue taking medications as prescribed.

## 2021-01-03 LAB
CULTURE RESULTS: SIGNIFICANT CHANGE UP
CULTURE RESULTS: SIGNIFICANT CHANGE UP
SPECIMEN SOURCE: SIGNIFICANT CHANGE UP
SPECIMEN SOURCE: SIGNIFICANT CHANGE UP

## 2021-01-04 DIAGNOSIS — Z90.49 ACQUIRED ABSENCE OF OTHER SPECIFIED PARTS OF DIGESTIVE TRACT: ICD-10-CM

## 2021-01-04 DIAGNOSIS — K29.70 GASTRITIS, UNSPECIFIED, WITHOUT BLEEDING: ICD-10-CM

## 2021-01-04 DIAGNOSIS — C61 MALIGNANT NEOPLASM OF PROSTATE: ICD-10-CM

## 2021-01-04 DIAGNOSIS — J95.811 POSTPROCEDURAL PNEUMOTHORAX: ICD-10-CM

## 2021-01-04 DIAGNOSIS — Z87.442 PERSONAL HISTORY OF URINARY CALCULI: ICD-10-CM

## 2021-01-04 DIAGNOSIS — I10 ESSENTIAL (PRIMARY) HYPERTENSION: ICD-10-CM

## 2021-01-04 DIAGNOSIS — K30 FUNCTIONAL DYSPEPSIA: ICD-10-CM

## 2021-01-04 DIAGNOSIS — Z87.891 PERSONAL HISTORY OF NICOTINE DEPENDENCE: ICD-10-CM

## 2021-01-04 DIAGNOSIS — D50.9 IRON DEFICIENCY ANEMIA, UNSPECIFIED: ICD-10-CM

## 2021-01-04 DIAGNOSIS — Z90.2 ACQUIRED ABSENCE OF LUNG [PART OF]: ICD-10-CM

## 2021-01-04 DIAGNOSIS — E78.00 PURE HYPERCHOLESTEROLEMIA, UNSPECIFIED: ICD-10-CM

## 2021-01-04 DIAGNOSIS — K29.00 ACUTE GASTRITIS WITHOUT BLEEDING: ICD-10-CM

## 2021-01-04 DIAGNOSIS — R13.10 DYSPHAGIA, UNSPECIFIED: ICD-10-CM
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
## 2021-01-04 DIAGNOSIS — Z85.118 PERSONAL HISTORY OF OTHER MALIGNANT NEOPLASM OF BRONCHUS AND LUNG: ICD-10-CM

## 2021-01-04 DIAGNOSIS — Z85.01 PERSONAL HISTORY OF MALIGNANT NEOPLASM OF ESOPHAGUS: ICD-10-CM

## 2021-01-04 DIAGNOSIS — F32.9 MAJOR DEPRESSIVE DISORDER, SINGLE EPISODE, UNSPECIFIED: ICD-10-CM

## 2021-01-04 DIAGNOSIS — J90 PLEURAL EFFUSION, NOT ELSEWHERE CLASSIFIED: ICD-10-CM

## 2021-01-04 DIAGNOSIS — R06.6 HICCOUGH: ICD-10-CM

## 2021-01-04 DIAGNOSIS — K21.9 GASTRO-ESOPHAGEAL REFLUX DISEASE WITHOUT ESOPHAGITIS: ICD-10-CM

## 2021-01-04 DIAGNOSIS — K59.00 CONSTIPATION, UNSPECIFIED: ICD-10-CM

## 2021-01-04 DIAGNOSIS — Z92.21 PERSONAL HISTORY OF ANTINEOPLASTIC CHEMOTHERAPY: ICD-10-CM

## 2021-01-04 DIAGNOSIS — R11.2 NAUSEA WITH VOMITING, UNSPECIFIED: ICD-10-CM

## 2021-01-04 DIAGNOSIS — Z92.3 PERSONAL HISTORY OF IRRADIATION: ICD-10-CM

## 2021-01-05 ENCOUNTER — APPOINTMENT (OUTPATIENT)
Dept: CARDIOLOGY | Facility: CLINIC | Age: 71
End: 2021-01-05
Payer: MEDICARE

## 2021-04-20 ENCOUNTER — RESULT CHARGE (OUTPATIENT)
Age: 71
End: 2021-04-20

## 2021-04-20 ENCOUNTER — APPOINTMENT (OUTPATIENT)
Dept: CARDIOLOGY | Facility: CLINIC | Age: 71
End: 2021-04-20
Payer: MEDICARE

## 2021-04-20 VITALS
DIASTOLIC BLOOD PRESSURE: 82 MMHG | SYSTOLIC BLOOD PRESSURE: 110 MMHG | WEIGHT: 147 LBS | HEART RATE: 59 BPM | HEIGHT: 70 IN | TEMPERATURE: 98 F | BODY MASS INDEX: 21.05 KG/M2

## 2021-04-20 DIAGNOSIS — R94.31 ABNORMAL ELECTROCARDIOGRAM [ECG] [EKG]: ICD-10-CM

## 2021-04-20 DIAGNOSIS — Z87.891 PERSONAL HISTORY OF NICOTINE DEPENDENCE: ICD-10-CM

## 2021-04-20 DIAGNOSIS — Z78.9 OTHER SPECIFIED HEALTH STATUS: ICD-10-CM

## 2021-04-20 DIAGNOSIS — R06.02 SHORTNESS OF BREATH: ICD-10-CM

## 2021-04-20 DIAGNOSIS — Z00.00 ENCOUNTER FOR GENERAL ADULT MEDICAL EXAMINATION W/OUT ABNORMAL FINDINGS: ICD-10-CM

## 2021-04-20 PROCEDURE — 99214 OFFICE O/P EST MOD 30 MIN: CPT

## 2021-04-20 PROCEDURE — 93000 ELECTROCARDIOGRAM COMPLETE: CPT

## 2021-04-20 RX ORDER — OMEPRAZOLE 40 MG/1
40 CAPSULE, DELAYED RELEASE ORAL
Qty: 90 | Refills: 0 | Status: DISCONTINUED | COMMUNITY
Start: 2017-12-27 | End: 2021-04-20

## 2021-04-21 PROBLEM — R06.02 SHORTNESS OF BREATH ON EXERTION: Status: ACTIVE | Noted: 2021-04-21

## 2021-04-21 PROBLEM — Z78.9 NON-SMOKER: Status: RESOLVED | Noted: 2018-04-04 | Resolved: 2021-04-21

## 2021-04-21 PROBLEM — R94.31 ABNORMAL ECG: Status: ACTIVE | Noted: 2021-04-21

## 2021-04-21 PROBLEM — Z87.891 FORMER SMOKER: Status: ACTIVE | Noted: 2021-04-21

## 2021-04-21 RX ORDER — IRON/IRON ASP GLY/FA/MV-MIN 38 125-25-1MG
TABLET ORAL DAILY
Refills: 0 | Status: ACTIVE | COMMUNITY

## 2021-04-21 RX ORDER — PANTOPRAZOLE 40 MG/1
40 TABLET, DELAYED RELEASE ORAL TWICE DAILY
Refills: 0 | Status: ACTIVE | COMMUNITY

## 2021-04-21 RX ORDER — FAMOTIDINE 40 MG/1
40 TABLET, FILM COATED ORAL DAILY
Refills: 0 | Status: ACTIVE | COMMUNITY

## 2021-04-21 NOTE — PHYSICAL EXAM
[General Appearance - Well Developed] : well developed [Normal Appearance] : normal appearance [Well Groomed] : well groomed [General Appearance - Well Nourished] : well nourished [No Deformities] : no deformities [General Appearance - In No Acute Distress] : no acute distress [Normal Conjunctiva] : the conjunctiva exhibited no abnormalities [Respiration, Rhythm And Depth] : normal respiratory rhythm and effort [Exaggerated Use Of Accessory Muscles For Inspiration] : no accessory muscle use [Auscultation Breath Sounds / Voice Sounds] : lungs were clear to auscultation bilaterally [Heart Rate And Rhythm] : heart rate and rhythm were normal [Heart Sounds] : normal S1 and S2 [Murmurs] : no murmurs present [Arterial Pulses Normal] : the arterial pulses were normal [Edema] : no peripheral edema present [FreeTextEntry1] : S4 present [Bowel Sounds] : normal bowel sounds [Abdomen Soft] : soft [Abdomen Tenderness] : non-tender [Nail Clubbing] : no clubbing of the fingernails [Skin Color & Pigmentation] : normal skin color and pigmentation [] : no rash [Oriented To Time, Place, And Person] : oriented to person, place, and time

## 2021-04-21 NOTE — ASSESSMENT
[FreeTextEntry1] : 70-yo male\par Esophageal cancer, s/p resection, RT/chemo\par Lung cancer, s/p KENZIE lobectomy\par Prostate cancer, s/p radiosurgery.\par HTN, hyperlipidemia have resolved with weight loss.\par Fatigue, REEVES due to all of the above.\par Iron deficiency anemia.\par \par Plan:\par Hem/Onc f/u.\par No additional cardiac evaluation at this time.\par Patient encouraged to increase daily walking.\par BW and f/u in 1 year.\par \par Vincent Huertas MD\par

## 2021-04-21 NOTE — HISTORY OF PRESENT ILLNESS
[FreeTextEntry1] : 70-yo male with h/o HTN (BP went down and all medications stopped with weight loss due to esophageal cancer, resection, chemo and RT in 2018). Lung cancer and KENZIE lobectomy since then. Recent finding of prostate cancer, s/p CiberKnife radiosurgery. Patient denies CP. He is able to eat better now, gaining weight.\par \par He still c/o fatigue and mild REEVES.\par \par Stress ECHO 04/23/2018: \par Stress ECHO:\par 7 METs, no ischemia\par Normal LVEF and diastolic function\par Mild MR, TR .\par \par GFR 84\par Hb A1c 5.8\par \par HDL 71\par Triglycerides 80\par TSH 1.57\par Hb 12.6.\par

## 2021-06-10 ENCOUNTER — EMERGENCY (EMERGENCY)
Facility: HOSPITAL | Age: 71
LOS: 0 days | Discharge: HOME | End: 2021-06-11
Attending: STUDENT IN AN ORGANIZED HEALTH CARE EDUCATION/TRAINING PROGRAM | Admitting: STUDENT IN AN ORGANIZED HEALTH CARE EDUCATION/TRAINING PROGRAM
Payer: MEDICARE

## 2021-06-10 VITALS
WEIGHT: 138.01 LBS | TEMPERATURE: 98 F | DIASTOLIC BLOOD PRESSURE: 79 MMHG | RESPIRATION RATE: 19 BRPM | SYSTOLIC BLOOD PRESSURE: 150 MMHG | OXYGEN SATURATION: 97 % | HEART RATE: 62 BPM | HEIGHT: 70 IN

## 2021-06-10 DIAGNOSIS — Z96.0 PRESENCE OF UROGENITAL IMPLANTS: Chronic | ICD-10-CM

## 2021-06-10 DIAGNOSIS — Z98.890 OTHER SPECIFIED POSTPROCEDURAL STATES: Chronic | ICD-10-CM

## 2021-06-10 DIAGNOSIS — C15.9 MALIGNANT NEOPLASM OF ESOPHAGUS, UNSPECIFIED: Chronic | ICD-10-CM

## 2021-06-10 DIAGNOSIS — R11.2 NAUSEA WITH VOMITING, UNSPECIFIED: ICD-10-CM

## 2021-06-10 DIAGNOSIS — Z90.2 ACQUIRED ABSENCE OF LUNG [PART OF]: Chronic | ICD-10-CM

## 2021-06-10 DIAGNOSIS — Z85.118 PERSONAL HISTORY OF OTHER MALIGNANT NEOPLASM OF BRONCHUS AND LUNG: ICD-10-CM

## 2021-06-10 DIAGNOSIS — C15.9 MALIGNANT NEOPLASM OF ESOPHAGUS, UNSPECIFIED: ICD-10-CM

## 2021-06-10 DIAGNOSIS — Z87.442 PERSONAL HISTORY OF URINARY CALCULI: ICD-10-CM

## 2021-06-10 DIAGNOSIS — K40.90 UNILATERAL INGUINAL HERNIA, WITHOUT OBSTRUCTION OR GANGRENE, NOT SPECIFIED AS RECURRENT: Chronic | ICD-10-CM

## 2021-06-10 DIAGNOSIS — N20.2 CALCULUS OF KIDNEY WITH CALCULUS OF URETER: ICD-10-CM

## 2021-06-10 DIAGNOSIS — I10 ESSENTIAL (PRIMARY) HYPERTENSION: ICD-10-CM

## 2021-06-10 LAB
HCT VFR BLD CALC: 43.2 % — SIGNIFICANT CHANGE UP (ref 42–52)
HGB BLD-MCNC: 14.2 G/DL — SIGNIFICANT CHANGE UP (ref 14–18)
MCHC RBC-ENTMCNC: 30 PG — SIGNIFICANT CHANGE UP (ref 27–31)
MCHC RBC-ENTMCNC: 32.9 G/DL — SIGNIFICANT CHANGE UP (ref 32–37)
MCV RBC AUTO: 91.3 FL — SIGNIFICANT CHANGE UP (ref 80–94)
PLATELET # BLD AUTO: 145 K/UL — SIGNIFICANT CHANGE UP (ref 130–400)
RBC # BLD: 4.73 M/UL — SIGNIFICANT CHANGE UP (ref 4.7–6.1)
RBC # FLD: 13.9 % — SIGNIFICANT CHANGE UP (ref 11.5–14.5)
WBC # BLD: 3.36 K/UL — LOW (ref 4.8–10.8)
WBC # FLD AUTO: 3.36 K/UL — LOW (ref 4.8–10.8)

## 2021-06-10 PROCEDURE — 99284 EMERGENCY DEPT VISIT MOD MDM: CPT

## 2021-06-10 RX ORDER — ONDANSETRON 8 MG/1
4 TABLET, FILM COATED ORAL ONCE
Refills: 0 | Status: COMPLETED | OUTPATIENT
Start: 2021-06-10 | End: 2021-06-10

## 2021-06-10 RX ORDER — SODIUM CHLORIDE 9 MG/ML
1000 INJECTION INTRAMUSCULAR; INTRAVENOUS; SUBCUTANEOUS ONCE
Refills: 0 | Status: COMPLETED | OUTPATIENT
Start: 2021-06-10 | End: 2021-06-10

## 2021-06-11 LAB
ALBUMIN SERPL ELPH-MCNC: 4 G/DL — SIGNIFICANT CHANGE UP (ref 3.5–5.2)
ALP SERPL-CCNC: 101 U/L — SIGNIFICANT CHANGE UP (ref 30–115)
ALT FLD-CCNC: 23 U/L — SIGNIFICANT CHANGE UP (ref 0–41)
ANION GAP SERPL CALC-SCNC: 11 MMOL/L — SIGNIFICANT CHANGE UP (ref 7–14)
APPEARANCE UR: CLEAR — SIGNIFICANT CHANGE UP
AST SERPL-CCNC: 46 U/L — HIGH (ref 0–41)
BACTERIA # UR AUTO: NEGATIVE — SIGNIFICANT CHANGE UP
BASOPHILS # BLD AUTO: 0.02 K/UL — SIGNIFICANT CHANGE UP (ref 0–0.2)
BASOPHILS NFR BLD AUTO: 0.6 % — SIGNIFICANT CHANGE UP (ref 0–1)
BILIRUB SERPL-MCNC: 0.7 MG/DL — SIGNIFICANT CHANGE UP (ref 0.2–1.2)
BILIRUB UR-MCNC: NEGATIVE — SIGNIFICANT CHANGE UP
BUN SERPL-MCNC: 13 MG/DL — SIGNIFICANT CHANGE UP (ref 10–20)
CALCIUM SERPL-MCNC: 9.7 MG/DL — SIGNIFICANT CHANGE UP (ref 8.5–10.1)
CHLORIDE SERPL-SCNC: 101 MMOL/L — SIGNIFICANT CHANGE UP (ref 98–110)
CO2 SERPL-SCNC: 23 MMOL/L — SIGNIFICANT CHANGE UP (ref 17–32)
COLOR SPEC: COLORLESS — SIGNIFICANT CHANGE UP
CREAT SERPL-MCNC: 0.9 MG/DL — SIGNIFICANT CHANGE UP (ref 0.7–1.5)
DIFF PNL FLD: ABNORMAL
EOSINOPHIL # BLD AUTO: 0.08 K/UL — SIGNIFICANT CHANGE UP (ref 0–0.7)
EOSINOPHIL NFR BLD AUTO: 2.4 % — SIGNIFICANT CHANGE UP (ref 0–8)
EPI CELLS # UR: 0 /HPF — SIGNIFICANT CHANGE UP (ref 0–5)
GLUCOSE SERPL-MCNC: 115 MG/DL — HIGH (ref 70–99)
GLUCOSE UR QL: NEGATIVE — SIGNIFICANT CHANGE UP
HYALINE CASTS # UR AUTO: 0 /LPF — SIGNIFICANT CHANGE UP (ref 0–7)
IMM GRANULOCYTES NFR BLD AUTO: 0.3 % — SIGNIFICANT CHANGE UP (ref 0.1–0.3)
KETONES UR-MCNC: SIGNIFICANT CHANGE UP
LACTATE SERPL-SCNC: 1.3 MMOL/L — SIGNIFICANT CHANGE UP (ref 0.7–2)
LEUKOCYTE ESTERASE UR-ACNC: NEGATIVE — SIGNIFICANT CHANGE UP
LIDOCAIN IGE QN: 17 U/L — SIGNIFICANT CHANGE UP (ref 7–60)
LYMPHOCYTES # BLD AUTO: 0.48 K/UL — LOW (ref 1.2–3.4)
LYMPHOCYTES # BLD AUTO: 14.3 % — LOW (ref 20.5–51.1)
MONOCYTES # BLD AUTO: 0.41 K/UL — SIGNIFICANT CHANGE UP (ref 0.1–0.6)
MONOCYTES NFR BLD AUTO: 12.2 % — HIGH (ref 1.7–9.3)
NEUTROPHILS # BLD AUTO: 2.36 K/UL — SIGNIFICANT CHANGE UP (ref 1.4–6.5)
NEUTROPHILS NFR BLD AUTO: 70.2 % — SIGNIFICANT CHANGE UP (ref 42.2–75.2)
NITRITE UR-MCNC: NEGATIVE — SIGNIFICANT CHANGE UP
NRBC # BLD: 0 /100 WBCS — SIGNIFICANT CHANGE UP (ref 0–0)
PH UR: 7.5 — SIGNIFICANT CHANGE UP (ref 5–8)
POTASSIUM SERPL-MCNC: 5.3 MMOL/L — HIGH (ref 3.5–5)
POTASSIUM SERPL-SCNC: 5.3 MMOL/L — HIGH (ref 3.5–5)
PROT SERPL-MCNC: 7 G/DL — SIGNIFICANT CHANGE UP (ref 6–8)
PROT UR-MCNC: SIGNIFICANT CHANGE UP
RBC CASTS # UR COMP ASSIST: 15 /HPF — HIGH (ref 0–4)
SODIUM SERPL-SCNC: 135 MMOL/L — SIGNIFICANT CHANGE UP (ref 135–146)
SP GR SPEC: 1.03 — HIGH (ref 1.01–1.03)
TROPONIN T SERPL-MCNC: <0.01 NG/ML — SIGNIFICANT CHANGE UP
UROBILINOGEN FLD QL: SIGNIFICANT CHANGE UP
WBC UR QL: 0 /HPF — SIGNIFICANT CHANGE UP (ref 0–5)

## 2021-06-11 PROCEDURE — 71046 X-RAY EXAM CHEST 2 VIEWS: CPT | Mod: 26

## 2021-06-11 PROCEDURE — 74177 CT ABD & PELVIS W/CONTRAST: CPT | Mod: 26,MG

## 2021-06-11 PROCEDURE — G1004: CPT

## 2021-06-11 PROCEDURE — 93010 ELECTROCARDIOGRAM REPORT: CPT

## 2021-06-11 RX ORDER — TAMSULOSIN HYDROCHLORIDE 0.4 MG/1
1 CAPSULE ORAL
Qty: 7 | Refills: 0
Start: 2021-06-11 | End: 2021-06-17

## 2021-06-11 RX ORDER — TAMSULOSIN HYDROCHLORIDE 0.4 MG/1
0.4 CAPSULE ORAL ONCE
Refills: 0 | Status: DISCONTINUED | OUTPATIENT
Start: 2021-06-11 | End: 2021-06-11

## 2021-06-11 RX ORDER — ONDANSETRON 8 MG/1
1 TABLET, FILM COATED ORAL
Qty: 9 | Refills: 0
Start: 2021-06-11 | End: 2021-06-13

## 2021-06-11 RX ADMIN — SODIUM CHLORIDE 1000 MILLILITER(S): 9 INJECTION INTRAMUSCULAR; INTRAVENOUS; SUBCUTANEOUS at 00:16

## 2021-06-11 RX ADMIN — ONDANSETRON 4 MILLIGRAM(S): 8 TABLET, FILM COATED ORAL at 00:16

## 2021-06-11 NOTE — ED ADULT NURSE NOTE - OBJECTIVE STATEMENT
pt aox4 complaining of vomiting / nausea.  denies chest pain / discomfort. iv placed / labs sent. VSS Will continue to monitor /assess

## 2021-06-11 NOTE — ED PROVIDER NOTE - ATTENDING CONTRIBUTION TO CARE
70 yo M pmh as stated in chart pw nausea. Nausea for the past few days associated with several episodes nbnb vomiting. No abd pain, no fever/chills, no urinary sx, no cp, no sob, no back pain, no flank pain.     CONSTITUTIONAL: Well-developed; well-nourished; in no acute distress. Sitting up and providing appropriate history and physical examination  SKIN: skin exam is warm and dry, no acute rash.  HEAD: Normocephalic; atraumatic.  EYES: PERRL, 3 mm bilateral, no nystagmus, EOM intact; conjunctiva and sclera clear.  ENT: No nasal discharge; airway clear.  NECK: Supple; non tender. + full passive ROM in all directions. No JVD  CARD: S1, S2 normal; no murmurs, gallops, or rubs. Regular rate and rhythm. + Symmetric Strong Pulses  RESP: No wheezes, rales or rhonchi. Good air movement bilaterally  ABD: soft; non-distended; non-tender. No Rebound, No Guarding, No signs of peritonitis, No CVA tenderness. No pulsatile abdominal mass. + Strong and Symmetric Pulses  EXT: Normal ROM. No clubbing, cyanosis or edema. Dp and Pt Pulses intact. Cap refill less than 3 seconds  NEURO: CN 2-12 intact, normal finger to nose, normal romberg, stable gait, no sensory or motor deficits, Alert, oriented, grossly unremarkable. No Focal deficits. GCS 15. NIH 0  PSYCH: Cooperative, appropriate.

## 2021-06-11 NOTE — ED PROVIDER NOTE - PATIENT PORTAL LINK FT
You can access the FollowMyHealth Patient Portal offered by [facility] by registering at the following website: [email or web address]. By joining [email or web address] FollowMyHealth portal, you will also be able to view your health information using other applications (apps) compatible with our system.

## 2021-06-11 NOTE — ED PROVIDER NOTE - NS ED ROS FT
Review of Systems:  	•	CONSTITUTIONAL - no fever, no diaphoresis, no chills  	•	SKIN - no rash  	•	HEMATOLOGIC - no bleeding, no bruising  	•	EYES - no eye pain, no blurry vision  	•	ENT - no change in hearing, no sore throat, no ear pain or tinnitus  	•	RESPIRATORY - no shortness of breath, no cough  	•	CARDIAC - no chest pain, no palpitations  	•	GI - no abd pain, + nausea, + vomiting, no diarrhea, no constipation  	•	GENITO-URINARY - no discharge, no dysuria; no hematuria, no increased urinary frequency  	•	MUSCULOSKELETAL - no joint paint, no swelling, no redness  	•	NEUROLOGIC - no weakness, no headache, no paresthesias, no LOC

## 2021-06-11 NOTE — ED PROVIDER NOTE - OBJECTIVE STATEMENT
71 year old male with pmhx of Lung CA, s/p left lobectomy, esophageal CA (s/p removal of mass), in remission presents to ed with N/V x 2 days. Symptoms worse with meals. pt denies abd pain, fever, chills, diarrhea, chest pain or sob. no urinary symptoms. no recent travel.

## 2021-06-11 NOTE — ED PROVIDER NOTE - DISCHARGE DATE
[General Appearance - Well Developed] : well developed [Normal Appearance] : normal appearance [Well Groomed] : well groomed [General Appearance - Well Nourished] : well nourished [No Deformities] : no deformities [General Appearance - In No Acute Distress] : no acute distress [Normal Conjunctiva] : the conjunctiva exhibited no abnormalities [Eyelids - No Xanthelasma] : the eyelids demonstrated no xanthelasmas [Normal Oral Mucosa] : normal oral mucosa [No Oral Pallor] : no oral pallor [No Oral Cyanosis] : no oral cyanosis [Normal Jugular Venous A Waves Present] : normal jugular venous A waves present [Normal Jugular Venous V Waves Present] : normal jugular venous V waves present [No Jugular Venous Wagner A Waves] : no jugular venous wagner A waves [Normal S1] : normal S1 [Normal S2] : normal S2 [II] : a grade 2 [Respiration, Rhythm And Depth] : normal respiratory rhythm and effort [Exaggerated Use Of Accessory Muscles For Inspiration] : no accessory muscle use [Auscultation Breath Sounds / Voice Sounds] : lungs were clear to auscultation bilaterally [Abdomen Soft] : soft [Abdomen Tenderness] : non-tender [Abdomen Mass (___ Cm)] : no abdominal mass palpated [Abnormal Walk] : normal gait [Gait - Sufficient For Exercise Testing] : the gait was sufficient for exercise testing [Nail Clubbing] : no clubbing of the fingernails [Cyanosis, Localized] : no localized cyanosis [Petechial Hemorrhages (___cm)] : no petechial hemorrhages [] : no ischemic changes 11-Jun-2021

## 2021-06-11 NOTE — ED PROVIDER NOTE - PHYSICAL EXAMINATION

## 2021-06-11 NOTE — ED PROVIDER NOTE - CLINICAL SUMMARY MEDICAL DECISION MAKING FREE TEXT BOX
I personally evaluated the patient. I reviewed the Resident’s or Physician Assistant’s note (as assigned above), and agree with the findings and plan except as documented in my note. Patient evaluated for nausea and vomiting. Labs, ekg, CT abd/pelvis performed in ED. Given flomax and zofran. NO episodes of vomiting in ED, abd soft nontender, nondistended. Given  urology follow up. I have fully discussed the medical management and delivery of care with the patient. I have discussed any available labs, imaging and treatment options with the patient. Patient confirms understanding and has been given detailed return precautions. Patient instructed to return to the ED should symptoms persist or worsen. Patient has demonstrated capacity and has verbalized understanding. Patient is well appearing upon discharge.

## 2021-06-11 NOTE — ED PROVIDER NOTE - CARE PROVIDER_API CALL
Emil Knight  UROLOGY  [address] [facility] [address]  Phone: [phone]  Fax: [phone]  Follow Up Time:

## 2021-06-13 ENCOUNTER — EMERGENCY (EMERGENCY)
Facility: HOSPITAL | Age: 71
LOS: 0 days | Discharge: HOME | End: 2021-06-14
Attending: EMERGENCY MEDICINE | Admitting: EMERGENCY MEDICINE
Payer: MEDICARE

## 2021-06-13 VITALS
HEART RATE: 60 BPM | SYSTOLIC BLOOD PRESSURE: 142 MMHG | RESPIRATION RATE: 20 BRPM | TEMPERATURE: 98 F | DIASTOLIC BLOOD PRESSURE: 75 MMHG | OXYGEN SATURATION: 98 %

## 2021-06-13 VITALS
TEMPERATURE: 98 F | OXYGEN SATURATION: 98 % | DIASTOLIC BLOOD PRESSURE: 83 MMHG | HEART RATE: 74 BPM | SYSTOLIC BLOOD PRESSURE: 161 MMHG | RESPIRATION RATE: 20 BRPM

## 2021-06-13 DIAGNOSIS — Z02.9 ENCOUNTER FOR ADMINISTRATIVE EXAMINATIONS, UNSPECIFIED: ICD-10-CM

## 2021-06-13 DIAGNOSIS — Z85.46 PERSONAL HISTORY OF MALIGNANT NEOPLASM OF PROSTATE: ICD-10-CM

## 2021-06-13 DIAGNOSIS — Z96.0 PRESENCE OF UROGENITAL IMPLANTS: Chronic | ICD-10-CM

## 2021-06-13 DIAGNOSIS — R11.0 NAUSEA: ICD-10-CM

## 2021-06-13 DIAGNOSIS — Z98.890 OTHER SPECIFIED POSTPROCEDURAL STATES: Chronic | ICD-10-CM

## 2021-06-13 DIAGNOSIS — N13.2 HYDRONEPHROSIS WITH RENAL AND URETERAL CALCULOUS OBSTRUCTION: ICD-10-CM

## 2021-06-13 DIAGNOSIS — C15.9 MALIGNANT NEOPLASM OF ESOPHAGUS, UNSPECIFIED: Chronic | ICD-10-CM

## 2021-06-13 DIAGNOSIS — R10.9 UNSPECIFIED ABDOMINAL PAIN: ICD-10-CM

## 2021-06-13 DIAGNOSIS — Z87.442 PERSONAL HISTORY OF URINARY CALCULI: ICD-10-CM

## 2021-06-13 DIAGNOSIS — Z90.2 ACQUIRED ABSENCE OF LUNG [PART OF]: Chronic | ICD-10-CM

## 2021-06-13 DIAGNOSIS — Z85.01 PERSONAL HISTORY OF MALIGNANT NEOPLASM OF ESOPHAGUS: ICD-10-CM

## 2021-06-13 DIAGNOSIS — K40.90 UNILATERAL INGUINAL HERNIA, WITHOUT OBSTRUCTION OR GANGRENE, NOT SPECIFIED AS RECURRENT: Chronic | ICD-10-CM

## 2021-06-13 DIAGNOSIS — Z85.118 PERSONAL HISTORY OF OTHER MALIGNANT NEOPLASM OF BRONCHUS AND LUNG: ICD-10-CM

## 2021-06-13 DIAGNOSIS — I10 ESSENTIAL (PRIMARY) HYPERTENSION: ICD-10-CM

## 2021-06-13 LAB
ALBUMIN SERPL ELPH-MCNC: 4.1 G/DL — SIGNIFICANT CHANGE UP (ref 3.5–5.2)
ALP SERPL-CCNC: 108 U/L — SIGNIFICANT CHANGE UP (ref 30–115)
ALT FLD-CCNC: 15 U/L — SIGNIFICANT CHANGE UP (ref 0–41)
ANION GAP SERPL CALC-SCNC: 14 MMOL/L — SIGNIFICANT CHANGE UP (ref 7–14)
APPEARANCE UR: CLEAR — SIGNIFICANT CHANGE UP
AST SERPL-CCNC: 24 U/L — SIGNIFICANT CHANGE UP (ref 0–41)
BASOPHILS # BLD AUTO: 0.02 K/UL — SIGNIFICANT CHANGE UP (ref 0–0.2)
BASOPHILS NFR BLD AUTO: 0.3 % — SIGNIFICANT CHANGE UP (ref 0–1)
BILIRUB SERPL-MCNC: 0.9 MG/DL — SIGNIFICANT CHANGE UP (ref 0.2–1.2)
BILIRUB UR-MCNC: NEGATIVE — SIGNIFICANT CHANGE UP
BUN SERPL-MCNC: 20 MG/DL — SIGNIFICANT CHANGE UP (ref 10–20)
CALCIUM SERPL-MCNC: 9.6 MG/DL — SIGNIFICANT CHANGE UP (ref 8.5–10.1)
CHLORIDE SERPL-SCNC: 103 MMOL/L — SIGNIFICANT CHANGE UP (ref 98–110)
CO2 SERPL-SCNC: 24 MMOL/L — SIGNIFICANT CHANGE UP (ref 17–32)
COLOR SPEC: SIGNIFICANT CHANGE UP
CREAT SERPL-MCNC: 1.1 MG/DL — SIGNIFICANT CHANGE UP (ref 0.7–1.5)
DIFF PNL FLD: SIGNIFICANT CHANGE UP
EOSINOPHIL # BLD AUTO: 0.06 K/UL — SIGNIFICANT CHANGE UP (ref 0–0.7)
EOSINOPHIL NFR BLD AUTO: 0.8 % — SIGNIFICANT CHANGE UP (ref 0–8)
GLUCOSE SERPL-MCNC: 135 MG/DL — HIGH (ref 70–99)
GLUCOSE UR QL: SIGNIFICANT CHANGE UP
HCT VFR BLD CALC: 46.7 % — SIGNIFICANT CHANGE UP (ref 42–52)
HGB BLD-MCNC: 15.7 G/DL — SIGNIFICANT CHANGE UP (ref 14–18)
IMM GRANULOCYTES NFR BLD AUTO: 0.3 % — SIGNIFICANT CHANGE UP (ref 0.1–0.3)
KETONES UR-MCNC: ABNORMAL
LEUKOCYTE ESTERASE UR-ACNC: NEGATIVE — SIGNIFICANT CHANGE UP
LYMPHOCYTES # BLD AUTO: 0.67 K/UL — LOW (ref 1.2–3.4)
LYMPHOCYTES # BLD AUTO: 8.8 % — LOW (ref 20.5–51.1)
MCHC RBC-ENTMCNC: 30.1 PG — SIGNIFICANT CHANGE UP (ref 27–31)
MCHC RBC-ENTMCNC: 33.6 G/DL — SIGNIFICANT CHANGE UP (ref 32–37)
MCV RBC AUTO: 89.5 FL — SIGNIFICANT CHANGE UP (ref 80–94)
MONOCYTES # BLD AUTO: 1 K/UL — HIGH (ref 0.1–0.6)
MONOCYTES NFR BLD AUTO: 13.2 % — HIGH (ref 1.7–9.3)
NEUTROPHILS # BLD AUTO: 5.83 K/UL — SIGNIFICANT CHANGE UP (ref 1.4–6.5)
NEUTROPHILS NFR BLD AUTO: 76.6 % — HIGH (ref 42.2–75.2)
NITRITE UR-MCNC: NEGATIVE — SIGNIFICANT CHANGE UP
NRBC # BLD: 0 /100 WBCS — SIGNIFICANT CHANGE UP (ref 0–0)
PH UR: 6.5 — SIGNIFICANT CHANGE UP (ref 5–8)
PLATELET # BLD AUTO: 180 K/UL — SIGNIFICANT CHANGE UP (ref 130–400)
POTASSIUM SERPL-MCNC: 4.5 MMOL/L — SIGNIFICANT CHANGE UP (ref 3.5–5)
POTASSIUM SERPL-SCNC: 4.5 MMOL/L — SIGNIFICANT CHANGE UP (ref 3.5–5)
PROT SERPL-MCNC: 7 G/DL — SIGNIFICANT CHANGE UP (ref 6–8)
PROT UR-MCNC: SIGNIFICANT CHANGE UP
RBC # BLD: 5.22 M/UL — SIGNIFICANT CHANGE UP (ref 4.7–6.1)
RBC # FLD: 13.9 % — SIGNIFICANT CHANGE UP (ref 11.5–14.5)
SODIUM SERPL-SCNC: 141 MMOL/L — SIGNIFICANT CHANGE UP (ref 135–146)
SP GR SPEC: 1.02 — SIGNIFICANT CHANGE UP (ref 1.01–1.03)
UROBILINOGEN FLD QL: SIGNIFICANT CHANGE UP
WBC # BLD: 7.6 K/UL — SIGNIFICANT CHANGE UP (ref 4.8–10.8)
WBC # FLD AUTO: 7.6 K/UL — SIGNIFICANT CHANGE UP (ref 4.8–10.8)

## 2021-06-13 PROCEDURE — 99284 EMERGENCY DEPT VISIT MOD MDM: CPT | Mod: GC

## 2021-06-13 RX ORDER — SODIUM CHLORIDE 9 MG/ML
1000 INJECTION, SOLUTION INTRAVENOUS ONCE
Refills: 0 | Status: COMPLETED | OUTPATIENT
Start: 2021-06-13 | End: 2021-06-13

## 2021-06-13 RX ORDER — KETOROLAC TROMETHAMINE 30 MG/ML
15 SYRINGE (ML) INJECTION ONCE
Refills: 0 | Status: DISCONTINUED | OUTPATIENT
Start: 2021-06-13 | End: 2021-06-13

## 2021-06-13 RX ORDER — MORPHINE SULFATE 50 MG/1
4 CAPSULE, EXTENDED RELEASE ORAL ONCE
Refills: 0 | Status: DISCONTINUED | OUTPATIENT
Start: 2021-06-13 | End: 2021-06-13

## 2021-06-13 RX ORDER — KETOROLAC TROMETHAMINE 30 MG/ML
1 SYRINGE (ML) INJECTION
Qty: 28 | Refills: 0
Start: 2021-06-13 | End: 2021-06-19

## 2021-06-13 RX ADMIN — MORPHINE SULFATE 4 MILLIGRAM(S): 50 CAPSULE, EXTENDED RELEASE ORAL at 02:54

## 2021-06-13 RX ADMIN — SODIUM CHLORIDE 1000 MILLILITER(S): 9 INJECTION, SOLUTION INTRAVENOUS at 02:54

## 2021-06-13 NOTE — ED ADULT NURSE NOTE - OBJECTIVE STATEMENT
Pt c/o right flank pain, Pt was in ED yesterday , blood work , CT scan and xray was done. Pt stated he has a stone, didn`t pass it yet. No acute distress noted

## 2021-06-13 NOTE — ED PROVIDER NOTE - ATTENDING CONTRIBUTION TO CARE
70 yo M pmh of esophageal ca sp resection presents with right flank pain. Patient had pain a few weeks ago but worsened about 5 days ago. Came to the ED and found to have a 0.7mm proximal renal stone. Discharged with urology follow up. Patient was unable to get an appointment and state that the pain acutely worsened yesterday. no urinary symptoms. no fevers. no n/v/d. urologist is Dr. Knight.     CONSTITUTIONAL: Well-developed; well-nourished; in no acute distress.   SKIN: warm, dry  HEAD: Normocephalic; atraumatic.  EYES: PERRL, EOMI, no conjunctival erythema  ENT: No nasal discharge; airway clear.  NECK: Supple; non tender.  CARD: S1, S2 normal;  Regular rate and rhythm.   RESP: No wheezes, rales or rhonchi.  ABD: soft non tender, non distended, no rebound or guarding, + right cva tenderness.   EXT: Normal ROM.  5/5 strength in all 4 extremities   LYMPH: No acute cervical adenopathy.  NEURO: Alert, oriented, grossly unremarkable. neurovascularly intact  PSYCH: Cooperative, appropriate.

## 2021-06-13 NOTE — ED PROVIDER NOTE - PHYSICAL EXAMINATION
PHYSICAL EXAM: I have reviewed current vital signs.  GENERAL: NAD, well-nourished; well-developed.  HEAD:  Normocephalic, atraumatic.  EYES: EOMI, PERRL, conjunctiva and sclera clear.  ENT: MMM, no erythema/exudates.  NECK: Supple, no JVD.  CHEST/LUNG: Clear to auscultation bilaterally; no wheezes, rales, or rhonchi.  HEART: Regular rate and rhythm, normal S1 and S2; no murmurs, rubs, or gallops.  ABDOMEN: Soft, nontender, nondistended.  : right CVA tenderness.  EXTREMITIES:  2+ peripheral pulses; no clubbing, cyanosis, or edema.  PSYCH: Cooperative, appropriate, normal mood and affect.  NEUROLOGY: A&O x 3. Motor 5/5. Sensory intact. No focal neurological deficits. CN II - XII intact. (-) dysmetria, facial droop, pronator drift.  SKIN: Warm and dry.

## 2021-06-13 NOTE — ED PROVIDER NOTE - CLINICAL SUMMARY MEDICAL DECISION MAKING FREE TEXT BOX
Patient presents with right flank pain. Known right kidney stone, having worsening pain. labs, ua done. Seen by urology who clears patient for discharge and follow up for stent placement. Patient agreeable. Return precautions discussed.

## 2021-06-13 NOTE — ED PROVIDER NOTE - NS ED ROS FT
Constitutional:  No fevers or chills.  Eyes:  No visual changes, eye pain, or discharge.  ENT:  No hearing changes. No sore throat.  Neck:  No neck pain or stiffness.  Cardiac:  No CP or edema.  Resp:  No cough or SOB. No hemoptysis.   GI:  No nausea, vomiting, diarrhea, or abdominal pain.  : (+) flank pain. No dysuria, frequency, or hematuria.  MSK:  No myalgias or joint pain/swelling.  Neuro:  No headache, dizziness, or weakness.  Skin:  No skin rash.

## 2021-06-13 NOTE — ED PROVIDER NOTE - CARE PROVIDER_API CALL
Emil Knight  UROLOGY  [address] [facility] [place] [address]  Phone: [phone]  Fax: [phone]  Follow Up Time: 1-3 Days

## 2021-06-13 NOTE — ED PROVIDER NOTE - OBJECTIVE STATEMENT
71 y male with PMH of esophageal and lung CA s/p resection presents with right flank pain.  diagnosed with right renal calculus 71 y male with PMH of esophageal and lung CA s/p resection presents with right flank pain.  diagnosed with right renal calculus .5x.7x.6 on June 11 patient d/c with flomax and follow up with dr. ashby.  patient presents today and states right flank pain is increasing.  Denies HA, dizziness, weakness, fever, cough, CP, SOB, palpitations, Abd pain, Leg swelling, dysuria, hematuria, dark or bloody stool.

## 2021-06-13 NOTE — CONSULT NOTE ADULT - ASSESSMENT
Pt is a 72yo M with a PMHx of lung ca, esophageal ca, prostate ca s/p CyberKnife tx, hx of nephrolithiasis presenting to the ED c/o R flank pain; CTAP from 06/11/21 showing mild R hydroureter 2/2 proximal R ureteral stone measuring 0.5 x 0.7 x 0.6 cm.     ·	f/u BMP, UA, UCx (pending collection)   ·	Cont with IVF   ·	d/w resident, AVSS  Pt is a 70yo M with a PMHx of lung ca, esophageal ca, prostate ca s/p CyberKnife tx, hx of nephrolithiasis presenting to the ED c/o R flank pain; CTAP from 21 showing mild R hydroureter 2/2 proximal R ureteral stone measuring 0.5 x 0.7 x 0.6 cm.     ·	f/u BMP, UA, UCx (pending collection)   ·	Cont with IVF   ·	d/w resident, AVSS     ADDENDUM:  Vital Signs Last 24 Hrs  T(C): 36.8 (2021 06:00), Max: 36.8 (2021 06:00)  T(F): 98.3 (2021 06:00), Max: 98.3 (2021 06:00)  HR: 60 (2021 06:00) (60 - 74)  BP: 142/75 (2021 06:00) (142/75 - 161/83)  RR: 20 (2021 06:00) (20 - 20)  SpO2: 98% (2021 06:00) (98% - 98%)        141  |  103  |  20  ----------------------------<  135<H>  4.5   |  24  |  1.1    Ca    9.6      2021 03:50    Urinalysis Basic - ( 2021 04:40 )  Color: Light Yellow / Appearance: Clear / S.017 / pH: x  Gluc: x / Ketone: Small  / Bili: Negative / Urobili: <2 mg/dL   Blood: x / Protein: Trace / Nitrite: Negative   Leuk Esterase: Negative / RBC: x / WBC x   Sq Epi: x / Non Sq Epi: x / Bacteria: x      Cont with hydration  Pain control  Cont with flomax  Pt will need to f/u in the office with Dr. Knight  Return to ED immediately if experiencing fevers/chills, intractable pain.

## 2021-06-13 NOTE — CONSULT NOTE ADULT - SUBJECTIVE AND OBJECTIVE BOX
UROLOGY CONSULT:  Pt is a 72yo M with a PMHx of lung ca, esophageal ca, prostate ca s/p CyberKnife tx, hx of nephrolithiasis presenting to the ED c/o R flank pain. Pt initially presented to the ED on 6/10/21 and CT A/P showed mild R hydroureter 2/2 proximal R 0.5 x 0.7 x 0.6cm ureteral stone; pt was d/c'd home with flomax and zofran for nausea. Pt returns to the ED today with similar pain and nausea. d/w resident, pt's VSS; WBC 7.60. Pending BMP, UA, UCx. Pt reports he follows with Dr. Knight for hx of stones and prostate ca, and was last seen in the office approx 2 weeks ago, reports he gave stone fragments for analysis. Pt denies dysuria, hematuria     PAST MEDICAL & SURGICAL HISTORY:  Hypertension, unspecified type  Cancer  ESOPHAGEAL CANCER 2017 RECEIVED CHEMO AND RADIATION  Renal calculi  Cancerleft lung 2018, no chemo or rad rx  Abnormal cholesterol test  Cancer of esophagus,2017 RESECTION OF LOWER PORTION OF ESOPHAGUS  S/P lobectomy of lung,left ?JUNE 2018  S/P cystoscopy with ureteral stent placement, LEFT  Inguinal hernia, AS AN INFANT, RIGHT SIDE  History of esophagogastroduodenoscopy (EGD), 2019  H/O colonoscopy, 2018    MEDICATIONS  (STANDING):    MEDICATIONS  (PRN):    Allergies  No Known Allergies    SOCIAL HISTORY: No illicit drug use    FAMILY HISTORY:  Dementia (Mother)    Review of Systems:  [X] A ten point review of systems was otherwise negative except as noted.    Vital Signs Last 24 Hrs  T(C): --  T(F): --  HR: --  BP: --  BP(mean): --  RR: --  SpO2: --    PHYSICAL EXAM:  GEN: NAD  SKIN: Good color, non diaphoretic  HEENT: NC/AT  RESP: Non-labored breathing  CARDIO: +S1/S2  ABDO: Soft, NT/ND  BACK: No CVAT B/L; +R flank ttp  EXT: CHUA x 4    LABS:                        15.7   7.60  )-----------( 180      ( 13 Jun 2021 02:50 )             46.7     RADIOLOGY & ADDITIONAL STUDIES:  < from: CT Abdomen and Pelvis w/ IV Cont (06.11.21 @ 01:49) >  EXAM:  CT ABDOMEN AND PELVIS IC        PROCEDURE DATE:  06/11/2021      INTERPRETATION:  CLINICAL STATEMENT: Nausea, vomiting    TECHNIQUE: Contiguous axial CT images were obtained from the lower chest to the pubic symphysis following administration of 100cc Omnipaque 350 intravenous contrast.  Oral contrast was not administered.  Reformatted images in the coronal and sagittal planes were acquired.    COMPARISON: CT abdomen and pelvis 1/20/2020    FINDINGS:    LOWER CHEST: Stable right middle lobe 2 mm nodule (5-27). Left lower lobe calcific granuloma. Left lower lobe scarring/atelectasis. Status post partial esophageal resection/gastric pull-through with associated sutures.    HEPATOBILIARY: Cholelithiasis.. Liver unremarkable.No biliary dilation.    SPLEEN: Unremarkable.    PANCREAS: Unremarkable.    ADRENAL GLANDS: Unremarkable.    KIDNEYS: Mild right hydroureter secondary with a proximal right ureteral calculus measuring 0.5 x 0.7 x 0.6 cm (average Hounsfield units 1400). No significant right hydronephrosis. No left hydronephrosis. Additional bilateral nonobstructing renal calculi measuring up to 0.3 cm. Symmetric renal enhancement. No hydronephrosis.    ABDOMINOPELVIC NODES: No enlarged lymph nodes.    PELVIC ORGANS: Prostate brachytherapy seeds. Urinary bladder unremarkable.    PERITONEUM/MESENTERY/BOWEL: No bowel obstruction, intra-abdominal free air, or ascites. Normal appendix..    BONES/SOFT TISSUES: Diffuse osteopenia. No acute osseous abnormality. Trace L2-3 retrolisthesis. Anterior abdominal wall subcutaneous patchy stranding, correlate for possible injection.    OTHER: Vascular calcifications    IMPRESSION:  1. Mild right hydroureter secondary with a proximal right ureteral calculus measuring 0.5x 0.7 x 0.6 cm . No right hydronephrosis.  2. Additional bilateral nonobstructing renal calculi measuring up to 0.3 cm.    PEARL HILTON M.D., RESIDENT RADIOLOGIST  This document has been electronically signed.  CHING SOLOMON MD; Attending Radiologist  This document has been electronically signed. Jun 11 2021  2:24AM    < end of copied text >   UROLOGY CONSULT:  Pt is a 70yo M with a PMHx of lung ca, esophageal ca, prostate ca s/p CyberKnife tx, hx of nephrolithiasis with previous lithotripsy, stent placement presenting to the ED c/o R flank pain. Pt initially presented to the ED on 6/10/21 and CT A/P showed mild R hydroureter 2/2 proximal R 0.5 x 0.7 x 0.6cm ureteral stone; pt was d/c'd home with flomax and zofran for nausea. Pt returns to the ED today with similar pain and nausea. d/w resident, pt's VSS; WBC 7.60. Pending BMP, UA, UCx. Pt reports he follows with Dr. Knight for hx of stones and prostate ca, and was last seen in the office approx 2 weeks ago, reports he gave stone fragments for analysis. Pt denies dysuria, hematuria     PAST MEDICAL & SURGICAL HISTORY:  Hypertension, unspecified type  Cancer  ESOPHAGEAL CANCER 2017 RECEIVED CHEMO AND RADIATION  Renal calculi  Cancerleft lung 2018, no chemo or rad rx  Abnormal cholesterol test  Cancer of esophagus,2017 RESECTION OF LOWER PORTION OF ESOPHAGUS  S/P lobectomy of lung,left ?JUNE 2018  S/P cystoscopy with ureteral stent placement, LEFT  Inguinal hernia, AS AN INFANT, RIGHT SIDE  History of esophagogastroduodenoscopy (EGD), 2019  H/O colonoscopy, 2018    MEDICATIONS  (STANDING):    MEDICATIONS  (PRN):    Allergies  No Known Allergies    SOCIAL HISTORY: No illicit drug use    FAMILY HISTORY:  Dementia (Mother)    Review of Systems:  [X] A ten point review of systems was otherwise negative except as noted.    Vital Signs Last 24 Hrs  T(C): --  T(F): --  HR: --  BP: --  BP(mean): --  RR: --  SpO2: --    PHYSICAL EXAM:  GEN: NAD  SKIN: Good color, non diaphoretic  HEENT: NC/AT  RESP: Non-labored breathing  CARDIO: +S1/S2  ABDO: Soft, NT/ND  BACK: No CVAT B/L; +R flank ttp  EXT: CHUA x 4    LABS:                        15.7   7.60  )-----------( 180      ( 13 Jun 2021 02:50 )             46.7     RADIOLOGY & ADDITIONAL STUDIES:  < from: CT Abdomen and Pelvis w/ IV Cont (06.11.21 @ 01:49) >  EXAM:  CT ABDOMEN AND PELVIS IC        PROCEDURE DATE:  06/11/2021      INTERPRETATION:  CLINICAL STATEMENT: Nausea, vomiting    TECHNIQUE: Contiguous axial CT images were obtained from the lower chest to the pubic symphysis following administration of 100cc Omnipaque 350 intravenous contrast.  Oral contrast was not administered.  Reformatted images in the coronal and sagittal planes were acquired.    COMPARISON: CT abdomen and pelvis 1/20/2020    FINDINGS:    LOWER CHEST: Stable right middle lobe 2 mm nodule (5-27). Left lower lobe calcific granuloma. Left lower lobe scarring/atelectasis. Status post partial esophageal resection/gastric pull-through with associated sutures.    HEPATOBILIARY: Cholelithiasis.. Liver unremarkable.No biliary dilation.    SPLEEN: Unremarkable.    PANCREAS: Unremarkable.    ADRENAL GLANDS: Unremarkable.    KIDNEYS: Mild right hydroureter secondary with a proximal right ureteral calculus measuring 0.5 x 0.7 x 0.6 cm (average Hounsfield units 1400). No significant right hydronephrosis. No left hydronephrosis. Additional bilateral nonobstructing renal calculi measuring up to 0.3 cm. Symmetric renal enhancement. No hydronephrosis.    ABDOMINOPELVIC NODES: No enlarged lymph nodes.    PELVIC ORGANS: Prostate brachytherapy seeds. Urinary bladder unremarkable.    PERITONEUM/MESENTERY/BOWEL: No bowel obstruction, intra-abdominal free air, or ascites. Normal appendix..    BONES/SOFT TISSUES: Diffuse osteopenia. No acute osseous abnormality. Trace L2-3 retrolisthesis. Anterior abdominal wall subcutaneous patchy stranding, correlate for possible injection.    OTHER: Vascular calcifications    IMPRESSION:  1. Mild right hydroureter secondary with a proximal right ureteral calculus measuring 0.5x 0.7 x 0.6 cm . No right hydronephrosis.  2. Additional bilateral nonobstructing renal calculi measuring up to 0.3 cm.    PEARL HILTON M.D., RESIDENT RADIOLOGIST  This document has been electronically signed.  CHING SOLOMON MD; Attending Radiologist  This document has been electronically signed. Jun 11 2021  2:24AM    < end of copied text >

## 2021-06-13 NOTE — ED PROVIDER NOTE - PROGRESS NOTE DETAILS
pete- urology consulted on teams.  will see patient bedside. pete- after discussion with urology patient is cleared for d/c with follow  up with dr. ashby.  patient will require a stent to relieve stone.  patient agreeable to plan.

## 2021-06-14 ENCOUNTER — OUTPATIENT (OUTPATIENT)
Dept: OUTPATIENT SERVICES | Facility: HOSPITAL | Age: 71
LOS: 1 days | Discharge: HOME | End: 2021-06-14
Payer: MEDICARE

## 2021-06-14 DIAGNOSIS — Z96.0 PRESENCE OF UROGENITAL IMPLANTS: Chronic | ICD-10-CM

## 2021-06-14 DIAGNOSIS — K40.90 UNILATERAL INGUINAL HERNIA, WITHOUT OBSTRUCTION OR GANGRENE, NOT SPECIFIED AS RECURRENT: Chronic | ICD-10-CM

## 2021-06-14 DIAGNOSIS — Z98.890 OTHER SPECIFIED POSTPROCEDURAL STATES: Chronic | ICD-10-CM

## 2021-06-14 DIAGNOSIS — Z90.2 ACQUIRED ABSENCE OF LUNG [PART OF]: Chronic | ICD-10-CM

## 2021-06-14 DIAGNOSIS — C15.9 MALIGNANT NEOPLASM OF ESOPHAGUS, UNSPECIFIED: Chronic | ICD-10-CM

## 2021-06-14 LAB
APTT BLD: 22.1 SEC — CRITICAL LOW (ref 27–39.2)
CULTURE RESULTS: SIGNIFICANT CHANGE UP
D DIMER BLD IA.RAPID-MCNC: 3243 NG/ML DDU — HIGH (ref 0–230)
FIBRINOGEN PPP-MCNC: 591 MG/DL — HIGH (ref 204.4–570.6)
INR BLD: 1.05 RATIO — SIGNIFICANT CHANGE UP (ref 0.65–1.3)
PROTHROM AB SERPL-ACNC: 12.1 SEC — SIGNIFICANT CHANGE UP (ref 9.95–12.87)
SPECIMEN SOURCE: SIGNIFICANT CHANGE UP

## 2021-06-14 PROCEDURE — 50433 PLMT NEPHROURETERAL CATHETER: CPT | Mod: RT

## 2021-06-14 NOTE — CONSULT NOTE ADULT - SUBJECTIVE AND OBJECTIVE BOX
INTERVENTIONAL RADIOLOGY H&P:    HPI:  Mr. Jansen is a 71-year-old gentleman with a PMHx of lung ca, esophageal ca, prostate ca s/p CyberKnife tx, hx of nephrolithiasis with previous lithotripsy and stent placement who underwent urologic intervention for right-sided urolithiasis. Passage of a stent was difficult and there was possibility of ureteral injury. Following discussion with urology, he presents at this time for percutaneous nephrostomy.      PAST MEDICAL & SURGICAL HISTORY:  Hypertension, unspecified type    Cancer  ESOPHAGEAL CANCER 2017 RECEIVED CHEMO AND RADIATION    Renal calculi    Cancer  left lung 2018, no chemo or rad rx    Abnormal cholesterol test    Cancer of esophagus  2017 RESECTION OF LOWER PORTION OF ESOPHAGUS    S/P lobectomy of lung  left ?JUNE 2018    S/P cystoscopy with ureteral stent placement  LEFT    Inguinal hernia  AS AN INFANT, RIGHT SIDE    History of esophagogastroduodenoscopy (EGD)  2019    H/O colonoscopy  2018        MEDICATIONS  (STANDING):    MEDICATIONS  (PRN):      Allergies    No Known Allergies    Intolerances        FAMILY HISTORY:  Dementia (Mother)        Physical Exam:     GENERAL: Resting comfortably in bed. NAD.  NEURO: Alert and oriented x3.  CARDIAC: Normal heart rate.  RESPIRATORY: Breathing comfortably on room air.  ABDOMEN: Soft, nontender.  EXTREMITIES: No peripheral edema.      Labs:                         15.7   7.60  )-----------( 180      ( 13 Jun 2021 02:50 )             46.7     06-13    141  |  103  |  20  ----------------------------<  135<H>  4.5   |  24  |  1.1    Ca    9.6      13 Jun 2021 03:50    TPro  7.0  /  Alb  4.1  /  TBili  0.9  /  DBili  x   /  AST  24  /  ALT  15  /  AlkPhos  108  06-13    PT/INR - ( 14 Jun 2021 14:57 )   PT: 12.10 sec;   INR: 1.05 ratio         PTT - ( 14 Jun 2021 14:57 )  PTT:22.1 sec    Pertinent labs:                      15.7   7.60  )-----------( 180      ( 13 Jun 2021 02:50 )             46.7       06-13    141  |  103  |  20  ----------------------------<  135<H>  4.5   |  24  |  1.1    Ca    9.6      13 Jun 2021 03:50    TPro  7.0  /  Alb  4.1  /  TBili  0.9  /  DBili  x   /  AST  24  /  ALT  15  /  AlkPhos  108  06-13      PT/INR - ( 14 Jun 2021 14:57 )   PT: 12.10 sec;   INR: 1.05 ratio         PTT - ( 14 Jun 2021 14:57 )  PTT:22.1 sec    Radiology & Additional Studies:     Radiology imaging reviewed.       ASSESSMENT AND PLAN:  71M with obstructive right proximal ureteral stone presents for percutaneous nephrostomy. Clinically well-appearing and hemodynamically stable. Labs and imaging reviewed. Procedure details, risks, and alternatives were discussed at length and he is amenable to proceed. All questions were answered to his satisfaction.    Thank you for the courtesy of this consult, please call [phone] with any further questions.

## 2021-06-16 ENCOUNTER — INPATIENT (INPATIENT)
Facility: HOSPITAL | Age: 71
LOS: 4 days | Discharge: HOME | End: 2021-06-21
Attending: FAMILY MEDICINE | Admitting: FAMILY MEDICINE
Payer: MEDICARE

## 2021-06-16 VITALS
DIASTOLIC BLOOD PRESSURE: 96 MMHG | RESPIRATION RATE: 18 BRPM | WEIGHT: 160.94 LBS | SYSTOLIC BLOOD PRESSURE: 131 MMHG | HEART RATE: 89 BPM | OXYGEN SATURATION: 98 % | TEMPERATURE: 98 F | HEIGHT: 70 IN

## 2021-06-16 DIAGNOSIS — C15.9 MALIGNANT NEOPLASM OF ESOPHAGUS, UNSPECIFIED: Chronic | ICD-10-CM

## 2021-06-16 DIAGNOSIS — I10 ESSENTIAL (PRIMARY) HYPERTENSION: ICD-10-CM

## 2021-06-16 DIAGNOSIS — K40.90 UNILATERAL INGUINAL HERNIA, WITHOUT OBSTRUCTION OR GANGRENE, NOT SPECIFIED AS RECURRENT: Chronic | ICD-10-CM

## 2021-06-16 DIAGNOSIS — Z98.890 OTHER SPECIFIED POSTPROCEDURAL STATES: Chronic | ICD-10-CM

## 2021-06-16 DIAGNOSIS — R13.14 DYSPHAGIA, PHARYNGOESOPHAGEAL PHASE: ICD-10-CM

## 2021-06-16 DIAGNOSIS — N20.0 CALCULUS OF KIDNEY: ICD-10-CM

## 2021-06-16 DIAGNOSIS — Z96.0 PRESENCE OF UROGENITAL IMPLANTS: Chronic | ICD-10-CM

## 2021-06-16 DIAGNOSIS — E86.0 DEHYDRATION: ICD-10-CM

## 2021-06-16 DIAGNOSIS — R49.0 DYSPHONIA: ICD-10-CM

## 2021-06-16 DIAGNOSIS — N39.0 URINARY TRACT INFECTION, SITE NOT SPECIFIED: ICD-10-CM

## 2021-06-16 DIAGNOSIS — Z90.2 ACQUIRED ABSENCE OF LUNG [PART OF]: Chronic | ICD-10-CM

## 2021-06-16 LAB
ALBUMIN SERPL ELPH-MCNC: 3.9 G/DL — SIGNIFICANT CHANGE UP (ref 3.5–5.2)
ALP SERPL-CCNC: 101 U/L — SIGNIFICANT CHANGE UP (ref 30–115)
ALT FLD-CCNC: 16 U/L — SIGNIFICANT CHANGE UP (ref 0–41)
ANION GAP SERPL CALC-SCNC: 11 MMOL/L — SIGNIFICANT CHANGE UP (ref 7–14)
APPEARANCE UR: ABNORMAL
AST SERPL-CCNC: 22 U/L — SIGNIFICANT CHANGE UP (ref 0–41)
BASOPHILS # BLD AUTO: 0 K/UL — SIGNIFICANT CHANGE UP (ref 0–0.2)
BASOPHILS NFR BLD AUTO: 0 % — SIGNIFICANT CHANGE UP (ref 0–1)
BILIRUB SERPL-MCNC: 1 MG/DL — SIGNIFICANT CHANGE UP (ref 0.2–1.2)
BILIRUB UR-MCNC: ABNORMAL
BUN SERPL-MCNC: 33 MG/DL — HIGH (ref 10–20)
CALCIUM SERPL-MCNC: 9.9 MG/DL — SIGNIFICANT CHANGE UP (ref 8.5–10.1)
CHLORIDE SERPL-SCNC: 98 MMOL/L — SIGNIFICANT CHANGE UP (ref 98–110)
CO2 SERPL-SCNC: 29 MMOL/L — SIGNIFICANT CHANGE UP (ref 17–32)
COLOR SPEC: ABNORMAL
CREAT SERPL-MCNC: 1 MG/DL — SIGNIFICANT CHANGE UP (ref 0.7–1.5)
D DIMER BLD IA.RAPID-MCNC: 1263 NG/ML DDU — HIGH (ref 0–230)
DIFF PNL FLD: ABNORMAL
EOSINOPHIL # BLD AUTO: 0.13 K/UL — SIGNIFICANT CHANGE UP (ref 0–0.7)
EOSINOPHIL NFR BLD AUTO: 2 % — SIGNIFICANT CHANGE UP (ref 0–8)
FLUAV AG NPH QL: NEGATIVE COUNTS — SIGNIFICANT CHANGE UP
FLUBV AG NPH QL: NEGATIVE COUNTS — SIGNIFICANT CHANGE UP
GLUCOSE SERPL-MCNC: 116 MG/DL — HIGH (ref 70–99)
GLUCOSE UR QL: NEGATIVE MG/DL — SIGNIFICANT CHANGE UP
HCT VFR BLD CALC: 45.9 % — SIGNIFICANT CHANGE UP (ref 42–52)
HGB BLD-MCNC: 15.1 G/DL — SIGNIFICANT CHANGE UP (ref 14–18)
KETONES UR-MCNC: 15
LEUKOCYTE ESTERASE UR-ACNC: ABNORMAL
LYMPHOCYTES # BLD AUTO: 0.46 K/UL — LOW (ref 1.2–3.4)
LYMPHOCYTES # BLD AUTO: 7 % — LOW (ref 20.5–51.1)
MAGNESIUM SERPL-MCNC: 1.9 MG/DL — SIGNIFICANT CHANGE UP (ref 1.8–2.4)
MANUAL SMEAR VERIFICATION: SIGNIFICANT CHANGE UP
MCHC RBC-ENTMCNC: 30 PG — SIGNIFICANT CHANGE UP (ref 27–31)
MCHC RBC-ENTMCNC: 32.9 G/DL — SIGNIFICANT CHANGE UP (ref 32–37)
MCV RBC AUTO: 91.3 FL — SIGNIFICANT CHANGE UP (ref 80–94)
MONOCYTES # BLD AUTO: 0.46 K/UL — SIGNIFICANT CHANGE UP (ref 0.1–0.6)
MONOCYTES NFR BLD AUTO: 7 % — SIGNIFICANT CHANGE UP (ref 1.7–9.3)
NEUTROPHILS # BLD AUTO: 5.49 K/UL — SIGNIFICANT CHANGE UP (ref 1.4–6.5)
NEUTROPHILS NFR BLD AUTO: 84 % — HIGH (ref 42.2–75.2)
NITRITE UR-MCNC: POSITIVE
NRBC # BLD: 0 /100 — SIGNIFICANT CHANGE UP (ref 0–0)
NRBC # BLD: SIGNIFICANT CHANGE UP /100 WBCS (ref 0–0)
PH UR: 5.5 — SIGNIFICANT CHANGE UP (ref 5–8)
PLAT MORPH BLD: NORMAL — SIGNIFICANT CHANGE UP
PLATELET # BLD AUTO: 161 K/UL — SIGNIFICANT CHANGE UP (ref 130–400)
POTASSIUM SERPL-MCNC: 3.9 MMOL/L — SIGNIFICANT CHANGE UP (ref 3.5–5)
POTASSIUM SERPL-SCNC: 3.9 MMOL/L — SIGNIFICANT CHANGE UP (ref 3.5–5)
PROT SERPL-MCNC: 7.4 G/DL — SIGNIFICANT CHANGE UP (ref 6–8)
PROT UR-MCNC: >=300 MG/DL
RBC # BLD: 5.03 M/UL — SIGNIFICANT CHANGE UP (ref 4.7–6.1)
RBC # FLD: 14 % — SIGNIFICANT CHANGE UP (ref 11.5–14.5)
RBC BLD AUTO: NORMAL — SIGNIFICANT CHANGE UP
RBC CASTS # UR COMP ASSIST: SIGNIFICANT CHANGE UP /HPF
RSV RNA NPH QL NAA+NON-PROBE: NEGATIVE COUNTS — SIGNIFICANT CHANGE UP
SARS-COV-2 RNA SPEC QL NAA+PROBE: NEGATIVE COUNTS — SIGNIFICANT CHANGE UP
SODIUM SERPL-SCNC: 138 MMOL/L — SIGNIFICANT CHANGE UP (ref 135–146)
SP GR SPEC: >=1.03 (ref 1.01–1.03)
UROBILINOGEN FLD QL: 2 MG/DL (ref 0.2–0.2)
WBC # BLD: 6.53 K/UL — SIGNIFICANT CHANGE UP (ref 4.8–10.8)
WBC # FLD AUTO: 6.53 K/UL — SIGNIFICANT CHANGE UP (ref 4.8–10.8)

## 2021-06-16 PROCEDURE — 76775 US EXAM ABDO BACK WALL LIM: CPT | Mod: 26

## 2021-06-16 PROCEDURE — 93010 ELECTROCARDIOGRAM REPORT: CPT

## 2021-06-16 PROCEDURE — 99223 1ST HOSP IP/OBS HIGH 75: CPT

## 2021-06-16 PROCEDURE — 99285 EMERGENCY DEPT VISIT HI MDM: CPT

## 2021-06-16 RX ORDER — SODIUM CHLORIDE 9 MG/ML
1000 INJECTION INTRAMUSCULAR; INTRAVENOUS; SUBCUTANEOUS
Refills: 0 | Status: DISCONTINUED | OUTPATIENT
Start: 2021-06-16 | End: 2021-06-17

## 2021-06-16 RX ORDER — PHENAZOPYRIDINE HCL 100 MG
200 TABLET ORAL EVERY 8 HOURS
Refills: 0 | Status: DISCONTINUED | OUTPATIENT
Start: 2021-06-16 | End: 2021-06-21

## 2021-06-16 RX ORDER — CEFTRIAXONE 500 MG/1
INJECTION, POWDER, FOR SOLUTION INTRAMUSCULAR; INTRAVENOUS
Refills: 0 | Status: DISCONTINUED | OUTPATIENT
Start: 2021-06-16 | End: 2021-06-19

## 2021-06-16 RX ORDER — ENOXAPARIN SODIUM 100 MG/ML
70 INJECTION SUBCUTANEOUS
Refills: 0 | Status: DISCONTINUED | OUTPATIENT
Start: 2021-06-16 | End: 2021-06-19

## 2021-06-16 RX ORDER — LISINOPRIL 2.5 MG/1
10 TABLET ORAL DAILY
Refills: 0 | Status: DISCONTINUED | OUTPATIENT
Start: 2021-06-16 | End: 2021-06-21

## 2021-06-16 RX ORDER — CEFTRIAXONE 500 MG/1
1000 INJECTION, POWDER, FOR SOLUTION INTRAMUSCULAR; INTRAVENOUS EVERY 24 HOURS
Refills: 0 | Status: DISCONTINUED | OUTPATIENT
Start: 2021-06-17 | End: 2021-06-19

## 2021-06-16 RX ORDER — ENOXAPARIN SODIUM 100 MG/ML
40 INJECTION SUBCUTANEOUS DAILY
Refills: 0 | Status: DISCONTINUED | OUTPATIENT
Start: 2021-06-16 | End: 2021-06-16

## 2021-06-16 RX ORDER — SODIUM CHLORIDE 9 MG/ML
1000 INJECTION INTRAMUSCULAR; INTRAVENOUS; SUBCUTANEOUS ONCE
Refills: 0 | Status: COMPLETED | OUTPATIENT
Start: 2021-06-16 | End: 2021-06-16

## 2021-06-16 RX ORDER — SODIUM CHLORIDE 9 MG/ML
1000 INJECTION, SOLUTION INTRAVENOUS ONCE
Refills: 0 | Status: COMPLETED | OUTPATIENT
Start: 2021-06-16 | End: 2021-06-16

## 2021-06-16 RX ORDER — FERROUS SULFATE 325(65) MG
325 TABLET ORAL DAILY
Refills: 0 | Status: DISCONTINUED | OUTPATIENT
Start: 2021-06-16 | End: 2021-06-21

## 2021-06-16 RX ORDER — FAMOTIDINE 10 MG/ML
40 INJECTION INTRAVENOUS DAILY
Refills: 0 | Status: DISCONTINUED | OUTPATIENT
Start: 2021-06-16 | End: 2021-06-17

## 2021-06-16 RX ORDER — CEFTRIAXONE 500 MG/1
1000 INJECTION, POWDER, FOR SOLUTION INTRAMUSCULAR; INTRAVENOUS ONCE
Refills: 0 | Status: COMPLETED | OUTPATIENT
Start: 2021-06-16 | End: 2021-06-16

## 2021-06-16 RX ORDER — SODIUM CHLORIDE 9 MG/ML
1000 INJECTION INTRAMUSCULAR; INTRAVENOUS; SUBCUTANEOUS
Refills: 0 | Status: DISCONTINUED | OUTPATIENT
Start: 2021-06-16 | End: 2021-06-16

## 2021-06-16 RX ORDER — TAMSULOSIN HYDROCHLORIDE 0.4 MG/1
0.4 CAPSULE ORAL AT BEDTIME
Refills: 0 | Status: DISCONTINUED | OUTPATIENT
Start: 2021-06-16 | End: 2021-06-21

## 2021-06-16 RX ADMIN — TAMSULOSIN HYDROCHLORIDE 0.4 MILLIGRAM(S): 0.4 CAPSULE ORAL at 23:43

## 2021-06-16 RX ADMIN — Medication 200 MILLIGRAM(S): at 23:42

## 2021-06-16 RX ADMIN — SODIUM CHLORIDE 125 MILLILITER(S): 9 INJECTION INTRAMUSCULAR; INTRAVENOUS; SUBCUTANEOUS at 23:58

## 2021-06-16 RX ADMIN — CEFTRIAXONE 100 MILLIGRAM(S): 500 INJECTION, POWDER, FOR SOLUTION INTRAMUSCULAR; INTRAVENOUS at 21:35

## 2021-06-16 RX ADMIN — SODIUM CHLORIDE 1000 MILLILITER(S): 9 INJECTION INTRAMUSCULAR; INTRAVENOUS; SUBCUTANEOUS at 15:08

## 2021-06-16 RX ADMIN — SODIUM CHLORIDE 1000 MILLILITER(S): 9 INJECTION, SOLUTION INTRAVENOUS at 15:08

## 2021-06-16 RX ADMIN — SODIUM CHLORIDE 1000 MILLILITER(S): 9 INJECTION, SOLUTION INTRAVENOUS at 13:52

## 2021-06-16 RX ADMIN — SODIUM CHLORIDE 2000 MILLILITER(S): 9 INJECTION INTRAMUSCULAR; INTRAVENOUS; SUBCUTANEOUS at 13:30

## 2021-06-16 NOTE — ED ADULT NURSE NOTE - NSFALLRSKASSESSDT_ED_ALL_ED
16-Jun-2021 10:28 Home Suture Removal Text: Patient was provided a home suture removal kit and will remove their sutures at home.  If they have any questions or difficulties they will call the office.

## 2021-06-16 NOTE — H&P ADULT - NSHPREVIEWOFSYSTEMS_GEN_ALL_CORE
REVIEW OF SYSTEMS:    CONSTITUTIONAL: No weakness, fevers or chills  EYES/ENT: see HPI  NECK: No pain or stiffness  RESPIRATORY: No cough, wheezing, hemoptysis; No shortness of breath  CARDIOVASCULAR: No chest pain or palpitations  GASTROINTESTINAL: see HPI  GENITOURINARY: No dysuria, frequency or hematuria  NEUROLOGICAL: No numbness or weakness  SKIN: No itching, rashes

## 2021-06-16 NOTE — H&P ADULT - PROBLEM SELECTOR PLAN 5
Percutaneous Nephrostomy in place  Renal U/S r/o hydro  Monitor Nephrostomy output  I/O's  Continue Flomax Percutaneous Nephrostomy in place  Renal U/S r/o hydro  Monitor Nephrostomy output  I/O's  Continue Flomax  D/W Dr Knight Urology via phone - NO UROLOGY Consult NEEDED, patient was seen already today in office,  continue with Hydration.

## 2021-06-16 NOTE — ED PROVIDER NOTE - CLINICAL SUMMARY MEDICAL DECISION MAKING FREE TEXT BOX
Patient presents for evaluation sent in by dr ashby who was concerned for potential dehydration and possible uti based on decreased po intake over the past 7 days he is afebrile with a normal wbc, patient taking po cipro, we obtained labs based on his inability to tolerate po (eat) I will admit for further workup at this time

## 2021-06-16 NOTE — ED PROVIDER NOTE - OBJECTIVE STATEMENT
Patient sent by Dr Knight, See today for f/u of nephrostomy tube placement 3 days ago, Patient not eating to drinking for more than 1 week. States he had no appetite, Denies pain, fever, SOB. No vomiting,

## 2021-06-16 NOTE — H&P ADULT - NSHPLABSRESULTS_GEN_ALL_CORE
15.1   6.53  )-----------( 161      ( 16 Jun 2021 10:31 )             45.9       06-16    138  |  98  |  33<H>  ----------------------------<  116<H>  3.9   |  29  |  1.0    Ca    9.9      16 Jun 2021 10:31  Mg     1.9     06-16    TPro  7.4  /  Alb  3.9  /  TBili  1.0  /  DBili  x   /  AST  22  /  ALT  16  /  AlkPhos  101  06-16              Urinalysis Basic - ( 16 Jun 2021 10:31 )    Color: Red / Appearance: Cloudy / SG: >=1.030 / pH: x  Gluc: x / Ketone: 15  / Bili: Large / Urobili: 2.0 mg/dL   Blood: x / Protein: >=300 mg/dL / Nitrite: Positive   Leuk Esterase: Large / RBC: Loaded /HPF / WBC x   Sq Epi: x / Non Sq Epi: x / Bacteria: x        Culture Results:   <10,000 CFU/mL Normal Urogenital Fany (06-13 @ 04:40)

## 2021-06-16 NOTE — H&P ADULT - NSICDXPASTMEDICALHX_GEN_ALL_CORE_FT
PAST MEDICAL HISTORY:  Abnormal cholesterol test     Cancer left lung 2018, no chemo or rad rx    Cancer ESOPHAGEAL CANCER 2017 RECEIVED CHEMO AND RADIATION    Hypertension, unspecified type     Renal calculi obstructing right renal calculi 8-10mm

## 2021-06-16 NOTE — H&P ADULT - PROBLEM SELECTOR PLAN 3
Speech and Swallow Consult  Eval for Barium Swallow r/o mechanical obst   GI Consult - Pt was pending endoscopy outpatient this week  Dysphagia diet 1

## 2021-06-16 NOTE — H&P ADULT - ASSESSMENT
70 y/o male with PMHx of Lung CA, Esophageal CA, and Prostate CA, HTN, Obstructing Right kidney dz, Patient sent by Dr Knight, seen at office today for f/u of nephrostomy tube placement 3 days ago secondary to 8-10mm obstructing proximal stone.    UTI  Dysphagia associated w/ Odynophagia  Mildly Dysphonia - w/ stressed speech  Rt Obstructing renal calculi w/ perc nephrostomy in place  Decreased Oral Intake  Elevated D-Dimer

## 2021-06-16 NOTE — ED PROVIDER NOTE - ATTENDING CONTRIBUTION TO CARE
I was present for and supervised the key and critical aspects of the procedures performed during the care of the patient. I personally evaluated the patient. I reviewed the Resident’s or Physician Assistant’s note (as assigned above), and agree with the findings and plan except as documented in my note.  70 y/o M with PMHx HTN and right sided 7mm obstructing kidney stone, s/p right sided nephroureterostomy tube presents sent in by Dr. Knight for evaluation for weakness and lethargy x5 days. Pt was seen by Dr. Knight 5 days ago after the tube was placed and at that time noted to not be tolerating PO food or fluids, while also feeling weak. Dr. Knight referred him to come to the ED for further eval. Pt denies fever, chills, CP, SOB, abdominal pain, n/v/d, back pain, numbness and tingling. On exam: NCAT. PERRLA, EOMI. OP clear. Lungs CTAB. RRR, S1S2 noted. Radial pulses 2+ and equal, pedal pulses 2+ and equal. Abdomen soft, NT/ND, no rebound or guarding. +Nephrostomy site with no signs of infection. Pt with TTP of the area, but notes it is not worse from baseline. FROM x4 extremities. No focal neuro deficits. A/P: will get IVF, labs and urology consult.

## 2021-06-16 NOTE — H&P ADULT - HISTORY OF PRESENT ILLNESS
70 y/o male with PMHx of Lung CA, Esophageal CA, and Prostate CA, HTN, Obstructing Right kidney dz, Patient sent by Dr Knight, seen at office today for f/u of nephrostomy tube placement 3 days ago secondary to 8-10mm obstructing proximal stone.    Patient not eating to drinking for more than 1 week, significant decreased appetite due to complaints of DIFFICULTY swallowing. Pt states both food and water are getting "stuck" in throat and causing choking and vomiting episodes.  Pt described condition as intermittent and resolves intermittently.  Pt denies any chest pain , fever, dyspnea, cough, cold , fever or chills.  Pt denies any  complaints. RT nephrostomy in place not infectious.

## 2021-06-16 NOTE — ED PROVIDER NOTE - CARE PLAN
Principal Discharge DX:	Dehydration  Secondary Diagnosis:	Failure to thrive in adult  Secondary Diagnosis:	Renal stone

## 2021-06-16 NOTE — ED PROVIDER NOTE - PMH
Abnormal cholesterol test    Cancer  left lung 2018, no chemo or rad rx  Cancer  ESOPHAGEAL CANCER 2017 RECEIVED CHEMO AND RADIATION  Hypertension, unspecified type    Renal calculi     Abnormal cholesterol test    Cancer  left lung 2018, no chemo or rad rx  Cancer  ESOPHAGEAL CANCER 2017 RECEIVED CHEMO AND RADIATION  Hypertension, unspecified type    Renal calculi  obstructing right renal calculi 8-10mm

## 2021-06-16 NOTE — H&P ADULT - NSHPPHYSICALEXAM_GEN_ALL_CORE
GENERAL:  70y/o Male NAD, resting comfortably.  HEAD:  Atraumatic, Normocephalic  EYES: EOMI, PERRLA, conjunctiva and sclera clear  NECK: Supple, No JVD, no cervical lymphadenopathy, non-tender  CHEST/LUNG: Clear to auscultation bilaterally; No wheeze, rhonchi, or rales  HEART: Regular rate and rhythm; S1&S2  ABDOMEN: Soft, Nontender, Nondistended x 4 quadrants; Bowel sounds present; Perc nephrostomy placed right flank- no redness or drainage at incision site- clean.  EXTREMITIES:   Peripheral Pulses Present, No clubbing, no cyanosis, or no edema, no calf tenderness  PSYCH: AAOx3, cooperative, appropriate  NEUROLOGY: WNL  SKIN: WNL

## 2021-06-17 DIAGNOSIS — Z02.9 ENCOUNTER FOR ADMINISTRATIVE EXAMINATIONS, UNSPECIFIED: ICD-10-CM

## 2021-06-17 DIAGNOSIS — Z20.9 CONTACT WITH AND (SUSPECTED) EXPOSURE TO UNSPECIFIED COMMUNICABLE DISEASE: ICD-10-CM

## 2021-06-17 LAB
ANION GAP SERPL CALC-SCNC: 10 MMOL/L — SIGNIFICANT CHANGE UP (ref 7–14)
BASOPHILS # BLD AUTO: 0.01 K/UL — SIGNIFICANT CHANGE UP (ref 0–0.2)
BASOPHILS NFR BLD AUTO: 0.2 % — SIGNIFICANT CHANGE UP (ref 0–1)
BUN SERPL-MCNC: 19 MG/DL — SIGNIFICANT CHANGE UP (ref 10–20)
CALCIUM SERPL-MCNC: 8.6 MG/DL — SIGNIFICANT CHANGE UP (ref 8.5–10.1)
CHLORIDE SERPL-SCNC: 105 MMOL/L — SIGNIFICANT CHANGE UP (ref 98–110)
CO2 SERPL-SCNC: 24 MMOL/L — SIGNIFICANT CHANGE UP (ref 17–32)
CREAT SERPL-MCNC: 0.6 MG/DL — LOW (ref 0.7–1.5)
CULTURE RESULTS: NO GROWTH — SIGNIFICANT CHANGE UP
EOSINOPHIL # BLD AUTO: 0.08 K/UL — SIGNIFICANT CHANGE UP (ref 0–0.7)
EOSINOPHIL NFR BLD AUTO: 1.2 % — SIGNIFICANT CHANGE UP (ref 0–8)
GLUCOSE SERPL-MCNC: 97 MG/DL — SIGNIFICANT CHANGE UP (ref 70–99)
HCT VFR BLD CALC: 43 % — SIGNIFICANT CHANGE UP (ref 42–52)
HGB BLD-MCNC: 14.4 G/DL — SIGNIFICANT CHANGE UP (ref 14–18)
IMM GRANULOCYTES NFR BLD AUTO: 0.5 % — HIGH (ref 0.1–0.3)
LACTATE SERPL-SCNC: 0.9 MMOL/L — SIGNIFICANT CHANGE UP (ref 0.7–2)
LYMPHOCYTES # BLD AUTO: 0.45 K/UL — LOW (ref 1.2–3.4)
LYMPHOCYTES # BLD AUTO: 6.9 % — LOW (ref 20.5–51.1)
MAGNESIUM SERPL-MCNC: 1.7 MG/DL — LOW (ref 1.8–2.4)
MCHC RBC-ENTMCNC: 30.1 PG — SIGNIFICANT CHANGE UP (ref 27–31)
MCHC RBC-ENTMCNC: 33.5 G/DL — SIGNIFICANT CHANGE UP (ref 32–37)
MCV RBC AUTO: 90 FL — SIGNIFICANT CHANGE UP (ref 80–94)
MONOCYTES # BLD AUTO: 0.59 K/UL — SIGNIFICANT CHANGE UP (ref 0.1–0.6)
MONOCYTES NFR BLD AUTO: 9.1 % — SIGNIFICANT CHANGE UP (ref 1.7–9.3)
NEUTROPHILS # BLD AUTO: 5.34 K/UL — SIGNIFICANT CHANGE UP (ref 1.4–6.5)
NEUTROPHILS NFR BLD AUTO: 82.1 % — HIGH (ref 42.2–75.2)
NRBC # BLD: 0 /100 WBCS — SIGNIFICANT CHANGE UP (ref 0–0)
PLATELET # BLD AUTO: 138 K/UL — SIGNIFICANT CHANGE UP (ref 130–400)
POTASSIUM SERPL-MCNC: 3.1 MMOL/L — LOW (ref 3.5–5)
POTASSIUM SERPL-SCNC: 3.1 MMOL/L — LOW (ref 3.5–5)
PREALB SERPL-MCNC: 11 MG/DL — LOW (ref 20–40)
PROT SERPL-MCNC: 6.2 G/DL — SIGNIFICANT CHANGE UP (ref 6–8)
RBC # BLD: 4.78 M/UL — SIGNIFICANT CHANGE UP (ref 4.7–6.1)
RBC # FLD: 13.5 % — SIGNIFICANT CHANGE UP (ref 11.5–14.5)
SODIUM SERPL-SCNC: 139 MMOL/L — SIGNIFICANT CHANGE UP (ref 135–146)
SPECIMEN SOURCE: SIGNIFICANT CHANGE UP
WBC # BLD: 6.5 K/UL — SIGNIFICANT CHANGE UP (ref 4.8–10.8)
WBC # FLD AUTO: 6.5 K/UL — SIGNIFICANT CHANGE UP (ref 4.8–10.8)

## 2021-06-17 PROCEDURE — 99233 SBSQ HOSP IP/OBS HIGH 50: CPT

## 2021-06-17 PROCEDURE — 99223 1ST HOSP IP/OBS HIGH 75: CPT

## 2021-06-17 PROCEDURE — 99497 ADVNCD CARE PLAN 30 MIN: CPT | Mod: 25

## 2021-06-17 RX ORDER — POTASSIUM CHLORIDE 20 MEQ
20 PACKET (EA) ORAL
Refills: 0 | Status: COMPLETED | OUTPATIENT
Start: 2021-06-17 | End: 2021-06-17

## 2021-06-17 RX ORDER — SODIUM CHLORIDE 9 MG/ML
1000 INJECTION, SOLUTION INTRAVENOUS
Refills: 0 | Status: DISCONTINUED | OUTPATIENT
Start: 2021-06-17 | End: 2021-06-19

## 2021-06-17 RX ORDER — PANTOPRAZOLE SODIUM 20 MG/1
40 TABLET, DELAYED RELEASE ORAL DAILY
Refills: 0 | Status: DISCONTINUED | OUTPATIENT
Start: 2021-06-17 | End: 2021-06-21

## 2021-06-17 RX ORDER — POTASSIUM CHLORIDE 20 MEQ
20 PACKET (EA) ORAL
Refills: 0 | Status: DISCONTINUED | OUTPATIENT
Start: 2021-06-17 | End: 2021-06-17

## 2021-06-17 RX ORDER — MAGNESIUM SULFATE 500 MG/ML
2 VIAL (ML) INJECTION ONCE
Refills: 0 | Status: COMPLETED | OUTPATIENT
Start: 2021-06-17 | End: 2021-06-17

## 2021-06-17 RX ORDER — NITROGLYCERIN 6.5 MG
1 CAPSULE, EXTENDED RELEASE ORAL DAILY
Refills: 0 | Status: DISCONTINUED | OUTPATIENT
Start: 2021-06-17 | End: 2021-06-18

## 2021-06-17 RX ADMIN — Medication 200 MILLIGRAM(S): at 21:46

## 2021-06-17 RX ADMIN — Medication 200 MILLIGRAM(S): at 05:45

## 2021-06-17 RX ADMIN — SODIUM CHLORIDE 75 MILLILITER(S): 9 INJECTION, SOLUTION INTRAVENOUS at 11:47

## 2021-06-17 RX ADMIN — PANTOPRAZOLE SODIUM 40 MILLIGRAM(S): 20 TABLET, DELAYED RELEASE ORAL at 11:47

## 2021-06-17 RX ADMIN — Medication 325 MILLIGRAM(S): at 12:25

## 2021-06-17 RX ADMIN — Medication 200 MILLIGRAM(S): at 13:32

## 2021-06-17 RX ADMIN — Medication 20 MILLIGRAM(S): at 13:31

## 2021-06-17 RX ADMIN — TAMSULOSIN HYDROCHLORIDE 0.4 MILLIGRAM(S): 0.4 CAPSULE ORAL at 21:46

## 2021-06-17 RX ADMIN — Medication 50 MILLIEQUIVALENT(S): at 13:55

## 2021-06-17 RX ADMIN — Medication 50 MILLIEQUIVALENT(S): at 16:35

## 2021-06-17 RX ADMIN — ENOXAPARIN SODIUM 70 MILLIGRAM(S): 100 INJECTION SUBCUTANEOUS at 05:45

## 2021-06-17 RX ADMIN — CEFTRIAXONE 100 MILLIGRAM(S): 500 INJECTION, POWDER, FOR SOLUTION INTRAMUSCULAR; INTRAVENOUS at 18:27

## 2021-06-17 RX ADMIN — Medication 50 GRAM(S): at 12:24

## 2021-06-17 RX ADMIN — ENOXAPARIN SODIUM 70 MILLIGRAM(S): 100 INJECTION SUBCUTANEOUS at 17:35

## 2021-06-17 RX ADMIN — LISINOPRIL 10 MILLIGRAM(S): 2.5 TABLET ORAL at 05:45

## 2021-06-17 RX ADMIN — SODIUM CHLORIDE 125 MILLILITER(S): 9 INJECTION INTRAMUSCULAR; INTRAVENOUS; SUBCUTANEOUS at 06:40

## 2021-06-17 NOTE — CONSULT NOTE ADULT - PROBLEM SELECTOR RECOMMENDATION 2
No current role for further Palliative Care discussion as etiology of dysphagia unclear at this time.  Pt with extensive cancer hx, reports to be cancer free at present, follows at [facility], considering returning to [facility]  No pain or other symptoms to address    Pt provided with our contact info for future reference  No further Palliative Care intervention deemed necessary at this time  We will sign off  Please reconsult PRN

## 2021-06-17 NOTE — PROGRESS NOTE ADULT - ASSESSMENT
· Assessment	  70 y/o male with PMHx of Lung CA, Esophageal CA, and Prostate CA, HTN, Obstructing Right kidney dz, Patient sent by Dr Knight, seen at office today for f/u of nephrostomy tube placement 3 days ago secondary to 8-10mm obstructing proximal stone.      Dysphagia associated w/ Odynophagia with Hx esophageal ca  f/u GI (Pt was pending endoscopy outpatient this week)  f/u Speech eval    UTI  cont Rocephin      Dehydration.    cont IV hydration NS      Rt Obstructing renal calculi w/ perc nephrostomy in place  Decreased Oral Intake  Elevated D-Dimer    Renal calculi, s/p Percutaneous Nephrostomy   neg outflow obstruction or hydro    Hypertension, unspecified type.  Continue Fosinopril.      Elevated D-Dimer  cont Lovenox    Discussed with pt about care plan                 · Assessment	  72 y/o male with PMHx of Lung CA, Esophageal CA, and Prostate CA, HTN, Obstructing Right kidney dz, Patient sent by Dr Knight, seen at office today for f/u of nephrostomy tube placement 3 days ago secondary to 8-10mm obstructing proximal stone.      Dysphagia associated w/ Odynophagia with Hx esophageal ca  f/u GI (Pt was pending endoscopy outpatient this week)  f/u Speech eval    UTI  cont Rocephin      Dehydration.    cont IV hydration NS      Rt Obstructing renal calculi w/ perc nephrostomy in place  Decreased Oral Intake  Elevated D-Dimer    Renal calculi, s/p Percutaneous Nephrostomy   neg outflow obstruction or hydro    Hypertension, unspecified type.  Continue Fosinopril.      Elevated D-Dimer  cont Lovenox    Discussed with pt, daughter Ric [phone] about care plan

## 2021-06-17 NOTE — CONSULT NOTE ADULT - SUBJECTIVE AND OBJECTIVE BOX
THIS NOTE IS INCOMPLETE SERINA CORREA          [id number]              HPI:  72 y/o male with PMHx of Lung CA, Esophageal CA, and Prostate CA, HTN, Obstructing Right kidney dz, Patient sent by Dr Knight, seen at office today for f/u of nephrostomy tube placement 3 days ago secondary to 8-10mm obstructing proximal stone.    Patient not eating to drinking for more than 1 week, significant decreased appetite due to complaints of DIFFICULTY swallowing. Pt states both food and water are getting "stuck" in throat and causing choking and vomiting episodes.  Pt described condition as intermittent and resolves intermittently.  Pt denies any chest pain , fever, dyspnea, cough, cold , fever or chills.  Pt denies any  complaints. RT nephrostomy in place not infectious. (2021 17:38)      PAST MEDICAL & SURGICAL HISTORY:  Hypertension, unspecified type    Cancer  ESOPHAGEAL CANCER 2017 RECEIVED CHEMO AND RADIATION    Renal calculi  obstructing right renal calculi 8-10mm    Cancer  left lung 2018, no chemo or rad rx    Abnormal cholesterol test    Cancer of esophagus  2017 RESECTION OF LOWER PORTION OF ESOPHAGUS    S/P lobectomy of lung  left ?2018    S/P cystoscopy with ureteral stent placement  LEFT    Inguinal hernia  AS AN INFANT, RIGHT SIDE    History of esophagogastroduodenoscopy (EGD)  2019    H/O colonoscopy  2018        FAMILY HISTORY:  Dementia (Mother)     Reviewed and found non contributory in mother or father    SOCIAL HISTORY:   Tobacco/etoh/illicit drug use use reported. Yes [ ]  _________  No [x ]  Pt resides at: home [x ]  facility [ ]  other [ ] _______        ROS:	    neg unless otherwise stated                    Dyspnea (Martha 0-10): 0                       N/V (Y/N): No                             Secretions (Y/N) : No                                          Agitation(Y/N): No                              Pain (Y/N): No                                 -Provocation/Palliation: N/A  -Quality/Quantity: N/A  -Radiating: N/A  -Severity: No pain  -Timing/Frequency: N/A  -Impact on ADLs: N/A    General:  Denied  HEENT:    Denied  Neck:  Denied  CVS:  Denied  Resp:  Denied  GI:  ++dysphagia in upper esophagus  :  Denied  Musc:  Denied  Neuro:  Denied  Psych:  Denied  Skin:  Denied  Lymph:  Denied    Last BM:      Allergies    No Known Allergies    Intolerances      Opiate Naive (Y/N):   -iStop reviewed (Y/N):   Ref#:                  Labs:	    CBC:                        14.4   6.50  )-----------( 138      ( 2021 08:54 )             43.0     CMP:        139  |  105  |  19  ----------------------------<  97  3.1<L>   |  24  |  0.6<L>    Ca    8.6      2021 08:54  Mg     1.7         TPro  6.2  /  Alb  x   /  TBili  x   /  DBili  x   /  AST  x   /  ALT  x   /  AlkPhos  x        Prealbumin, Serum: 11 mg/dL (21 @ 08:54)         Urinalysis Basic - ( 2021 10:31 )    Color: Red / Appearance: Cloudy / SG: >=1.030 / pH: x  Gluc: x / Ketone: 15  / Bili: Large / Urobili: 2.0 mg/dL   Blood: x / Protein: >=300 mg/dL / Nitrite: Positive   Leuk Esterase: Large / RBC: Loaded /HPF / WBC x   Sq Epi: x / Non Sq Epi: x / Bacteria: x          Radiology:	   < from: Xray Chest 2 Views PA/Lat (21 @ 02:03) >  Impression:    Left basilar opacity/effusion, unchanged.    Biapical opacities, left greater than right, unchanged.          EK Lead ECG:   Ventricular Rate 70 BPM    Atrial Rate 70 BPM    P-R Interval 162 ms    QRS Duration 92 ms    Q-T Interval 414 ms    QTC Calculation(Bazett) 447 ms    P Axis 63 degrees    R Axis 43 degrees    T Axis 30 degrees    Diagnosis Line Sinus rhythm with Premature atrial complexes  Poor R wave progression    Confirmed by YOHAN WEBBER MD ([id number]) on 2021 11:11:18 AM (21 @ 10:44)      Imaging Personally Reviewed:  [x ] YES  [ ] NO    Consultant(s) Notes Reviewed:  [x ] YES  [ ] NO  Care Discussed with Consultants/Other Providers [x ] YES  [ ] NO    PEx:	  T(C): 37.2 (21 @ 05:00), Max: 37.3 (21 @ 10:58)  HR: 77 (21 @ 05:00) (67 - 77)  BP: 127/71 (21 @ 05:00) (127/71 - 163/90)  RR: 16 (21 @ 05:00) (16 - 18)  SpO2: 98% (21 @ 15:23) (98% - 98%)  Wt(kg): --  Daily Height in cm: 177.8 (2021 18:54)    Daily     General: adult man found in bed, thin,  in NAD  Eyes:  PERRL EOMI Non icteric MOM  ENMT: no external oral ulcers, MMM, no thrush   CVS: RR S1S2 No M/G/R  Resp: Unlabored Non tachypneic No increased WOB  GI:  Soft NT ND BS+  :  Voiding , R nephrostomy bag draining   Musc: No C/C/E    Neuro: Follows commands No focal deficits  Psych: Calm Pleasant, AAOx3    Skin: Non jaundiced , no rash   Lymph: no adenopathy     Preadmit Karnofsky:  %           Current Karnofsky:  80   %  http://www.npcrc.org/files/news/karnofsky_performance_scale.pdf   http://www.npcrc.org/files/news/palliative_performance_scale_PPSv2.pdf  Cachexia (Y/N):   BMI:      Medications:	      MEDICATIONS  (STANDING):  cefTRIAXone   IVPB 1000 milliGRAM(s) IV Intermittent every 24 hours  cefTRIAXone   IVPB      dextrose 5% + sodium chloride 0.45%. 1000 milliLiter(s) (75 mL/Hr) IV Continuous <Continuous>  enoxaparin Injectable 70 milliGRAM(s) SubCutaneous two times a day  ferrous    sulfate 325 milliGRAM(s) Oral daily  lisinopril 10 milliGRAM(s) Oral daily  nitroglycerin    Patch 0.1 mG/Hr(s) 1 patch Transdermal daily  pantoprazole  Injectable 40 milliGRAM(s) IV Push daily  PARoxetine 20 milliGRAM(s) Oral daily  phenazopyridine 200 milliGRAM(s) Oral every 8 hours  tamsulosin 0.4 milliGRAM(s) Oral at bedtime    MEDICATIONS  (PRN):        Advanced Directives:	     Full Code      Decision maker: The patient is able to participate in complex medical decision making conversations.   Legal surrogate:    GOALS OF CARE DISCUSSION	       Palliative care info/counseling provided	          PSYCHOSOCIAL-SPIRITUAL ASSESSMENT:       Reviewed       See Palliative Care SW/ documentation        	    REFERRALS     NONE

## 2021-06-17 NOTE — SWALLOW BEDSIDE ASSESSMENT ADULT - SLP GENERAL OBSERVATIONS
pt awake alert without c/o pain. slightly dysphonic vocal quality. pt c/o right side globus sensation at the level of the neck extending to proximal esophagus. pt also reports regurgitation of food consistencies and sensation of liquids moving slowly through the esophagus

## 2021-06-17 NOTE — SWALLOW BEDSIDE ASSESSMENT ADULT - SWALLOW EVAL: MANDIBULAR STRENGTH AND MOBILITY
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 Plan: Irritation and Discharge due to build up on the prosthesis and mechanical irritation. Cleaned and polished prosthesis today. Use Artificial Tears PRN. [place] BID OU PRN. Return to Clinic in 3 months for PRFU and further evaluation and management. intact

## 2021-06-17 NOTE — SWALLOW BEDSIDE ASSESSMENT ADULT - SLP PERTINENT HISTORY OF CURRENT PROBLEM
72 y/o male with PMHx of Lung CA, Esophageal CA, and Prostate CA, HTN, Obstructing Right kidney dz, Patient sent by Dr Knight, seen at office today for f/u of nephrostomy tube placement 3 days ago secondary to 8-10mm obstructing proximal stone .Dysphagia associated w/ Odynophagia with Hx esophageal ca. h/o chemo/radiation. pt denies h/o dysphagia during CRT

## 2021-06-17 NOTE — PATIENT PROFILE ADULT - DO YOU LACK THE NECESSARY SUPPORT TO HELP YOU COPE WITH LIFE CHALLENGES?
-- Message is from the [facility]--    Reason for Call: Patient would like a call back for an appointment today.     Caller Information       Type Contact Phone    11/12/2019 07:11 AM Phone (Incoming) MAINE BOLES (Emergency Contact) [phone]          Alternative phone number: n/a    Turnaround time given to caller:   \"This message will be sent to [state Provider's name]. The clinical team will fulfill your request as soon as they review your message.\"    
Called pt, pt was seen in [facility], no appt with [facility] needed per patient.  
yes

## 2021-06-17 NOTE — CONSULT NOTE ADULT - ASSESSMENT
71yMale being evaluated for ?GOC/symptom management/support.    Pt awaiting w/u of dysphagia. Reports he has been cancer free at most recent visits to his Oncologist at [facility]. Pt is otherwise coping well.  Denies pain.       See Recs below.    Please call [phone] with questions or concerns 24/7.

## 2021-06-17 NOTE — SWALLOW BEDSIDE ASSESSMENT ADULT - SWALLOW EVAL: DIAGNOSIS
no overt symptoms of penetration aspiration noted for thin liquids. suspected pharyngoesophageal dysphagia.

## 2021-06-17 NOTE — PATIENT PROFILE ADULT - PATIENT REPRESENTATIVE: ( YOU CAN CHOOSE ANY PERSON THAT CAN ASSIST YOU WITH YOUR HEALTH CARE PREFERENCES, DOES NOT HAVE TO BE A SPOUSE, IMMEDIATE FAMILY OR SIGNIFICANT OTHER/PARTNER)
COVID-19 PCR test completed. Patient handout For Patients Who Have Been Tested for Covid-19 (Coronavirus) was given to the patient, which includes test result notification process.   declines

## 2021-06-17 NOTE — PROGRESS NOTE ADULT - SUBJECTIVE AND OBJECTIVE BOX
SERINA CORREA  71y, Male  Allergy: No Known Allergies      OVERNIGHT EVENTS:      NAEO    Admitted for Dysphagia associated w/ Odynophagia  Mildly Dysphonia - w/ stressed speech  Rt Obstructing renal calculi w/ perc nephrostomy in place  Decreased Oral Intake  Elevated D-Dimer  UTI on Empiric Abx   Pending: GI, Speech eval      Physical Exam:   GENERAL:  72y/o Male NAD, resting comfortably.  HEAD:  Atraumatic, Normocephalic  EYES: EOMI, PERRLA, conjunctiva and sclera clear  NECK: Supple, No JVD, no cervical lymphadenopathy, non-tender  CHEST/LUNG: Clear to auscultation bilaterally; No wheeze, rhonchi, or rales  HEART: Regular rate and rhythm; S1&S2  ABDOMEN: Soft, Nontender, Nondistended x 4 quadrants; Bowel sounds present; Perc nephrostomy placed right flank- no redness or drainage at incision site- clean.  EXTREMITIES:   Peripheral Pulses Present, No clubbing, no cyanosis, or no edema, no calf tenderness  PSYCH: AAOx3, cooperative, appropriate  NEUROLOGY: non focal  SKIN: no rash        VITALS:  T(F): 99.1 (06-17-21 @ 10:58), Max: 99.1 (06-17-21 @ 10:58)  HR: 67 (06-17-21 @ 10:58)  BP: 143/81 (06-17-21 @ 10:58) (141/83 - 165/100)  RR: 18 (06-17-21 @ 10:58)  SpO2: 98% (06-17-21 @ 10:58)    TESTS & MEASUREMENTS:  Height (cm): 177.8 (06-16-21 @ 10:10)  Weight (kg): 73 (06-16-21 @ 10:10)  BMI (kg/m2): 23.1 (06-16-21 @ 10:10)    06-16-21 @ 07:01  -  06-17-21 @ 07:00  --------------------------------------------------------  IN: 0 mL / OUT: 600 mL / NET: -600 mL    06-17-21 @ 07:01  -  06-17-21 @ 13:39  --------------------------------------------------------  IN: 0 mL / OUT: 300 mL / NET: -300 mL                            14.4   6.50  )-----------( 138      ( 17 Jun 2021 08:54 )             43.0       06-17    139  |  105  |  19  ----------------------------<  97  3.1<L>   |  24  |  0.6<L>    Ca    8.6      17 Jun 2021 08:54  Mg     1.7     06-17    TPro  6.2  /  Alb  x   /  TBili  x   /  DBili  x   /  AST  x   /  ALT  x   /  AlkPhos  x   06-17    LIVER FUNCTIONS - ( 17 Jun 2021 08:54 )  Alb: x     / Pro: 6.2 g/dL / ALK PHOS: x     / ALT: x     / AST: x     / GGT: x                 Culture - Urine (collected 06-13-21 @ 04:40)  Source: .Urine Clean Catch (Midstream)  Final Report (06-14-21 @ 11:06):    <10,000 CFU/mL Normal Urogenital Fany      Urinalysis Basic - ( 16 Jun 2021 10:31 )    Color: Red / Appearance: Cloudy / SG: >=1.030 / pH: x  Gluc: x / Ketone: 15  / Bili: Large / Urobili: 2.0 mg/dL   Blood: x / Protein: >=300 mg/dL / Nitrite: Positive   Leuk Esterase: Large / RBC: Loaded /HPF / WBC x   Sq Epi: x / Non Sq Epi: x / Bacteria: x            MEDICATIONS:  MEDICATIONS  (STANDING):  cefTRIAXone   IVPB 1000 milliGRAM(s) IV Intermittent every 24 hours  cefTRIAXone   IVPB      dextrose 5% + sodium chloride 0.45%. 1000 milliLiter(s) (75 mL/Hr) IV Continuous <Continuous>  enoxaparin Injectable 70 milliGRAM(s) SubCutaneous two times a day  ferrous    sulfate 325 milliGRAM(s) Oral daily  lisinopril 10 milliGRAM(s) Oral daily  nitroglycerin    Patch 0.1 mG/Hr(s) 1 patch Transdermal daily  pantoprazole  Injectable 40 milliGRAM(s) IV Push daily  PARoxetine 20 milliGRAM(s) Oral daily  phenazopyridine 200 milliGRAM(s) Oral every 8 hours  potassium chloride  20 mEq/100 mL IVPB 20 milliEquivalent(s) IV Intermittent every 2 hours  tamsulosin 0.4 milliGRAM(s) Oral at bedtime    MEDICATIONS  (PRN):              HEALTH ISSUES - PROBLEM Dx:  Urinary tract infection with hematuria, site unspecified  Urinary tract infection with hematuria, site unspecified    Dehydration  Dehydration    Pharyngoesophageal dysphagia  Pharyngoesophageal dysphagia    Dysphonia  Dysphonia    Renal calculi  Renal calculi    Hypertension, unspecified type  Hypertension, unspecified type                       SERINA CORREA  71y, Male  Allergy: No Known Allergies      OVERNIGHT EVENTS:      NAEO    Admitted for Dysphagia associated w/ Odynophagia  Mildly Dysphonia - w/ stressed speech  Rt Obstructing renal calculi w/ perc nephrostomy in place  Decreased Oral Intake  Elevated D-Dimer  UTI on Empiric Abx   Pending: GI,   Per Speech eval: clear diet      Physical Exam:   GENERAL:  70y/o Male NAD, resting comfortably.  HEAD:  Atraumatic, Normocephalic  EYES: EOMI, PERRLA, conjunctiva and sclera clear  NECK: Supple, No JVD, no cervical lymphadenopathy, non-tender  CHEST/LUNG: Clear to auscultation bilaterally; No wheeze, rhonchi, or rales  HEART: Regular rate and rhythm; S1&S2  ABDOMEN: Soft, Nontender, Nondistended x 4 quadrants; Bowel sounds present; Perc nephrostomy placed right flank- no redness or drainage at incision site- clean.  EXTREMITIES:   Peripheral Pulses Present, No clubbing, no cyanosis, or no edema, no calf tenderness  PSYCH: AAOx3, cooperative, appropriate  NEUROLOGY: non focal  SKIN: no rash        VITALS:  T(F): 99.1 (06-17-21 @ 10:58), Max: 99.1 (06-17-21 @ 10:58)  HR: 67 (06-17-21 @ 10:58)  BP: 143/81 (06-17-21 @ 10:58) (141/83 - 165/100)  RR: 18 (06-17-21 @ 10:58)  SpO2: 98% (06-17-21 @ 10:58)    TESTS & MEASUREMENTS:  Height (cm): 177.8 (06-16-21 @ 10:10)  Weight (kg): 73 (06-16-21 @ 10:10)  BMI (kg/m2): 23.1 (06-16-21 @ 10:10)    06-16-21 @ 07:01  -  06-17-21 @ 07:00  --------------------------------------------------------  IN: 0 mL / OUT: 600 mL / NET: -600 mL    06-17-21 @ 07:01  -  06-17-21 @ 13:39  --------------------------------------------------------  IN: 0 mL / OUT: 300 mL / NET: -300 mL                            14.4   6.50  )-----------( 138      ( 17 Jun 2021 08:54 )             43.0       06-17    139  |  105  |  19  ----------------------------<  97  3.1<L>   |  24  |  0.6<L>    Ca    8.6      17 Jun 2021 08:54  Mg     1.7     06-17    TPro  6.2  /  Alb  x   /  TBili  x   /  DBili  x   /  AST  x   /  ALT  x   /  AlkPhos  x   06-17    LIVER FUNCTIONS - ( 17 Jun 2021 08:54 )  Alb: x     / Pro: 6.2 g/dL / ALK PHOS: x     / ALT: x     / AST: x     / GGT: x                 Culture - Urine (collected 06-13-21 @ 04:40)  Source: .Urine Clean Catch (Midstream)  Final Report (06-14-21 @ 11:06):    <10,000 CFU/mL Normal Urogenital Fany      Urinalysis Basic - ( 16 Jun 2021 10:31 )    Color: Red / Appearance: Cloudy / SG: >=1.030 / pH: x  Gluc: x / Ketone: 15  / Bili: Large / Urobili: 2.0 mg/dL   Blood: x / Protein: >=300 mg/dL / Nitrite: Positive   Leuk Esterase: Large / RBC: Loaded /HPF / WBC x   Sq Epi: x / Non Sq Epi: x / Bacteria: x            MEDICATIONS:  MEDICATIONS  (STANDING):  cefTRIAXone   IVPB 1000 milliGRAM(s) IV Intermittent every 24 hours  cefTRIAXone   IVPB      dextrose 5% + sodium chloride 0.45%. 1000 milliLiter(s) (75 mL/Hr) IV Continuous <Continuous>  enoxaparin Injectable 70 milliGRAM(s) SubCutaneous two times a day  ferrous    sulfate 325 milliGRAM(s) Oral daily  lisinopril 10 milliGRAM(s) Oral daily  nitroglycerin    Patch 0.1 mG/Hr(s) 1 patch Transdermal daily  pantoprazole  Injectable 40 milliGRAM(s) IV Push daily  PARoxetine 20 milliGRAM(s) Oral daily  phenazopyridine 200 milliGRAM(s) Oral every 8 hours  potassium chloride  20 mEq/100 mL IVPB 20 milliEquivalent(s) IV Intermittent every 2 hours  tamsulosin 0.4 milliGRAM(s) Oral at bedtime    MEDICATIONS  (PRN):              HEALTH ISSUES - PROBLEM Dx:  Urinary tract infection with hematuria, site unspecified  Urinary tract infection with hematuria, site unspecified    Dehydration  Dehydration    Pharyngoesophageal dysphagia  Pharyngoesophageal dysphagia    Dysphonia  Dysphonia    Renal calculi  Renal calculi    Hypertension, unspecified type  Hypertension, unspecified type

## 2021-06-18 DIAGNOSIS — Z51.5 ENCOUNTER FOR PALLIATIVE CARE: ICD-10-CM

## 2021-06-18 LAB
ANION GAP SERPL CALC-SCNC: 9 MMOL/L — SIGNIFICANT CHANGE UP (ref 7–14)
BUN SERPL-MCNC: 12 MG/DL — SIGNIFICANT CHANGE UP (ref 10–20)
CALCIUM SERPL-MCNC: 9 MG/DL — SIGNIFICANT CHANGE UP (ref 8.5–10.1)
CHLORIDE SERPL-SCNC: 100 MMOL/L — SIGNIFICANT CHANGE UP (ref 98–110)
CO2 SERPL-SCNC: 28 MMOL/L — SIGNIFICANT CHANGE UP (ref 17–32)
COVID-19 SPIKE DOMAIN AB INTERP: POSITIVE
COVID-19 SPIKE DOMAIN ANTIBODY RESULT: 211 U/ML — HIGH
CREAT SERPL-MCNC: 0.6 MG/DL — LOW (ref 0.7–1.5)
GLUCOSE SERPL-MCNC: 114 MG/DL — HIGH (ref 70–99)
HCT VFR BLD CALC: 44.5 % — SIGNIFICANT CHANGE UP (ref 42–52)
HGB BLD-MCNC: 14.6 G/DL — SIGNIFICANT CHANGE UP (ref 14–18)
MAGNESIUM SERPL-MCNC: 1.9 MG/DL — SIGNIFICANT CHANGE UP (ref 1.8–2.4)
MCHC RBC-ENTMCNC: 30.1 PG — SIGNIFICANT CHANGE UP (ref 27–31)
MCHC RBC-ENTMCNC: 32.8 G/DL — SIGNIFICANT CHANGE UP (ref 32–37)
MCV RBC AUTO: 91.8 FL — SIGNIFICANT CHANGE UP (ref 80–94)
NRBC # BLD: 0 /100 WBCS — SIGNIFICANT CHANGE UP (ref 0–0)
PLATELET # BLD AUTO: 146 K/UL — SIGNIFICANT CHANGE UP (ref 130–400)
POTASSIUM SERPL-MCNC: 4.1 MMOL/L — SIGNIFICANT CHANGE UP (ref 3.5–5)
POTASSIUM SERPL-SCNC: 4.1 MMOL/L — SIGNIFICANT CHANGE UP (ref 3.5–5)
RBC # BLD: 4.85 M/UL — SIGNIFICANT CHANGE UP (ref 4.7–6.1)
RBC # FLD: 13.3 % — SIGNIFICANT CHANGE UP (ref 11.5–14.5)
SARS-COV-2 IGG+IGM SERPL QL IA: 211 U/ML — HIGH
SARS-COV-2 IGG+IGM SERPL QL IA: POSITIVE
SODIUM SERPL-SCNC: 137 MMOL/L — SIGNIFICANT CHANGE UP (ref 135–146)
TRIGL SERPL-MCNC: 82 MG/DL — SIGNIFICANT CHANGE UP
WBC # BLD: 5.35 K/UL — SIGNIFICANT CHANGE UP (ref 4.8–10.8)
WBC # FLD AUTO: 5.35 K/UL — SIGNIFICANT CHANGE UP (ref 4.8–10.8)

## 2021-06-18 PROCEDURE — 99233 SBSQ HOSP IP/OBS HIGH 50: CPT

## 2021-06-18 RX ORDER — ELECTROLYTE SOLUTION,INJ
1 VIAL (ML) INTRAVENOUS
Refills: 0 | Status: DISCONTINUED | OUTPATIENT
Start: 2021-06-18 | End: 2021-06-18

## 2021-06-18 RX ORDER — ISOSORBIDE MONONITRATE 60 MG/1
30 TABLET, EXTENDED RELEASE ORAL DAILY
Refills: 0 | Status: DISCONTINUED | OUTPATIENT
Start: 2021-06-18 | End: 2021-06-21

## 2021-06-18 RX ORDER — I.V. FAT EMULSION 20 G/100ML
1.62 EMULSION INTRAVENOUS
Qty: 100.05 | Refills: 0 | Status: DISCONTINUED | OUTPATIENT
Start: 2021-06-18 | End: 2021-06-18

## 2021-06-18 RX ADMIN — LISINOPRIL 10 MILLIGRAM(S): 2.5 TABLET ORAL at 05:18

## 2021-06-18 RX ADMIN — ENOXAPARIN SODIUM 70 MILLIGRAM(S): 100 INJECTION SUBCUTANEOUS at 05:18

## 2021-06-18 RX ADMIN — Medication 200 MILLIGRAM(S): at 13:45

## 2021-06-18 RX ADMIN — Medication 200 MILLIGRAM(S): at 21:21

## 2021-06-18 RX ADMIN — I.V. FAT EMULSION 31.3 GM/KG/DAY: 20 EMULSION INTRAVENOUS at 21:19

## 2021-06-18 RX ADMIN — Medication 325 MILLIGRAM(S): at 12:49

## 2021-06-18 RX ADMIN — TAMSULOSIN HYDROCHLORIDE 0.4 MILLIGRAM(S): 0.4 CAPSULE ORAL at 21:21

## 2021-06-18 RX ADMIN — ISOSORBIDE MONONITRATE 30 MILLIGRAM(S): 60 TABLET, EXTENDED RELEASE ORAL at 12:49

## 2021-06-18 RX ADMIN — Medication 200 MILLIGRAM(S): at 05:18

## 2021-06-18 RX ADMIN — ENOXAPARIN SODIUM 70 MILLIGRAM(S): 100 INJECTION SUBCUTANEOUS at 18:26

## 2021-06-18 RX ADMIN — CEFTRIAXONE 100 MILLIGRAM(S): 500 INJECTION, POWDER, FOR SOLUTION INTRAMUSCULAR; INTRAVENOUS at 18:25

## 2021-06-18 RX ADMIN — Medication 50 MILLIEQUIVALENT(S): at 00:51

## 2021-06-18 RX ADMIN — Medication 1 EACH: at 21:18

## 2021-06-18 RX ADMIN — PANTOPRAZOLE SODIUM 40 MILLIGRAM(S): 20 TABLET, DELAYED RELEASE ORAL at 12:49

## 2021-06-18 RX ADMIN — Medication 20 MILLIGRAM(S): at 12:49

## 2021-06-18 NOTE — PROGRESS NOTE ADULT - ASSESSMENT
· Assessment	  70 y/o male with PMHx of Lung CA, Esophageal CA, and Prostate CA, HTN, Obstructing Right kidney dz, Patient sent by Dr Knight, seen at office today for f/u of nephrostomy tube placement 3 days ago secondary to 8-10mm obstructing proximal stone.      Dysphagia associated w/ Odynophagia with Hx esophageal ca  f/u GI (Pt was pending endoscopy outpatient this week)  f/u Speech eval    UTI  cont Rocephin      Dehydration.    cont IV hydration NS      Rt Obstructing renal calculi w/ perc nephrostomy in place  Decreased Oral Intake  Elevated D-Dimer    Renal calculi, s/p Percutaneous Nephrostomy   neg outflow obstruction or hydro    Hypertension, unspecified type.  Continue Fosinopril.      Elevated D-Dimer  cont Lovenox    Discussed with pt, daughter Ric [phone] on 6.16.21 about care plan  Pending: GI, [place]  Per Speech eval: clear diet  Discussed with Son Ba, [phone] on 6.17.21, updated care plan

## 2021-06-18 NOTE — CONSULT NOTE ADULT - ASSESSMENT
IMP:  - dysphagia and odynophagia  - h/o esophageal cancer s/p chemo, RT, and resection with gastric pull-up  - h/o L lung and prostate cancer  - + nephrolithiasis s/p nephrostomy tube  - underweight    PLAN:  - await GI - for esophagram and EGD, possible GT once anatomy known  - baseline triglyceride level  - PPN until the above are done, which will likely take several days, and pt already NPO 3 days

## 2021-06-18 NOTE — CONSULT NOTE ADULT - SUBJECTIVE AND OBJECTIVE BOX
70 y/o male with PMHx of Lung CA, Esophageal CA, and Prostate CA, HTN, Obstructing Right kidney dz, Patient sent by Dr Knight, seen at office today for f/u of nephrostomy tube placement 3 days ago secondary to 8-10mm obstructing proximal stone.  pt reportedly "cancer free" on recent visits with Oncologist.  Patient not eating to drinking for more than 1 week, significant decreased appetite due to complaints of difficulty and pain with swallowing. Pt states both food and water are getting "stuck" in throat and causing choking and vomiting episodes.  Pt described condition as intermittent and resolves intermittently.  Pt denies any chest pain , fever, dyspnea, cough, cold , fever or chills.  Pt denies any  complaints. RT nephrostomy in place not infectious.  Please see Speech Therapist evaluation : rec radiological studies and pt to remain NPO except sips of clears.    Vital Signs Last 24 Hrs  T(C): 35.8 (18 Jun 2021 14:14), Max: 37.2 (18 Jun 2021 05:00)  T(F): 96.4 (18 Jun 2021 14:14), Max: 98.9 (18 Jun 2021 05:00)  HR: 72 (18 Jun 2021 14:14) (57 - 77)  BP: 106/69 (18 Jun 2021 14:14) (106/69 - 163/90)  BP(mean): --  RR: 16 (18 Jun 2021 14:14) (16 - 16)  SpO2: 98% (17 Jun 2021 15:23) (98% - 98%)  Drug Dosing Weight  Height (cm): 177.8 (17 Jun 2021 18:54)  Weight (kg): 61.8 (17 Jun 2021 18:54)  BMI (kg/m2): 19.5 (17 Jun 2021 18:54)  BSA (m2): 1.77 (17 Jun 2021 18:54)    MEDICATIONS  (STANDING):  cefTRIAXone   IVPB 1000 milliGRAM(s) IV Intermittent every 24 hours  dextrose 5% + sodium chloride 0.45%. 1000 milliLiter(s) (75 mL/Hr) IV Continuous <Continuous>  enoxaparin Injectable 70 milliGRAM(s) SubCutaneous two times a day  ferrous    sulfate 325 milliGRAM(s) Oral daily  isosorbide   mononitrate ER Tablet (IMDUR) 30 milliGRAM(s) Oral daily  lisinopril 10 milliGRAM(s) Oral daily  pantoprazole  Injectable 40 milliGRAM(s) IV Push daily  PARoxetine 20 milliGRAM(s) Oral daily  phenazopyridine 200 milliGRAM(s) Oral every 8 hours  tamsulosin 0.4 milliGRAM(s) Oral at bedtime                        14.6   5.35  )-----------( 146      ( 18 Jun 2021 09:37 )             44.5   06-18    137  |  100  |  12  ----------------------------<  114<H>  4.1   |  28  |  0.6<L>    Ca    9.0      18 Jun 2021 09:37  Mg     1.9     06-18    TPro  6.2  /  Alb  x   /  TBili  x   /  DBili  x   /  AST  x   /  ALT  x   /  AlkPhos  x   06-17    no phos level, no triglyceride level    < from: CT Abdomen and Pelvis w/ IV Cont (06.11.21 @ 01:49) >  LOWER CHEST: Stable right middle lobe 2 mm nodule (5-27). Left lower lobe calcific granuloma. Left lower lobe scarring/atelectasis. Status post partial esophageal resection/gastric pull-through with associated sutures.    < end of copied text >

## 2021-06-18 NOTE — PROGRESS NOTE ADULT - SUBJECTIVE AND OBJECTIVE BOX
MADELINESERINA  71y, Male  Allergy: No Known Allergies      OVERNIGHT EVENT  NAEO  Admitted for Dysphagia associated w/ Odynophagia  Mildly Dysphonia - w/ stressed speech  Rt Obstructing renal calculi w/ perc nephrostomy in place  Decreased Oral Intake  Elevated D-Dimer  UTI on Empiric Abx   Pending: GI, [place]  Per Speech eval: clear diet  Discussed with Son Ba, [phone], updated care plan      Physical Exam:   GENERAL:  72y/o Male NAD, resting comfortably.  HEAD:  Atraumatic, Normocephalic  EYES: EOMI, PERRLA, conjunctiva and sclera clear  NECK: Supple, No JVD, no cervical lymphadenopathy, non-tender  CHEST/LUNG: Clear to auscultation bilaterally; No wheeze, rhonchi, or rales  HEART: Regular rate and rhythm; S1&S2  ABDOMEN: Soft, Nontender, Nondistended x 4 quadrants; Bowel sounds present; Perc nephrostomy placed right flank- no redness or drainage at incision site- clean.  EXTREMITIES:   Peripheral Pulses Present, No clubbing, no cyanosis, or no edema, no calf tenderness  PSYCH: AAOx3, cooperative, appropriate  NEUROLOGY: non focal  SKIN: no rash        VITALS:  T(F): 96.4 (06-18-21 @ 14:14), Max: 98.9 (06-18-21 @ 05:00)  HR: 72 (06-18-21 @ 14:14)  BP: 106/69 (06-18-21 @ 14:14) (106/69 - 163/90)  RR: 16 (06-18-21 @ 14:14)  SpO2: --    TESTS & MEASUREMENTS:  Height (cm): 177.8 (06-17-21 @ 18:54)  Weight (kg): 61.8 (06-17-21 @ 18:54)  BMI (kg/m2): 19.5 (06-17-21 @ 18:54)    06-16-21 @ 07:01  -  06-17-21 @ 07:00  --------------------------------------------------------  IN: 0 mL / OUT: 600 mL / NET: -600 mL    06-17-21 @ 07:01  -  06-18-21 @ 07:00  --------------------------------------------------------  IN: 0 mL / OUT: 1300 mL / NET: -1300 mL    06-18-21 @ 07:01  -  06-18-21 @ 15:55  --------------------------------------------------------  IN: 0 mL / OUT: 500 mL / NET: -500 mL                            14.6   5.35  )-----------( 146      ( 18 Jun 2021 09:37 )             44.5       06-18    137  |  100  |  12  ----------------------------<  114<H>  4.1   |  28  |  0.6<L>    Ca    9.0      18 Jun 2021 09:37  Mg     1.9     06-18    TPro  6.2  /  Alb  x   /  TBili  x   /  DBili  x   /  AST  x   /  ALT  x   /  AlkPhos  x   06-17    LIVER FUNCTIONS - ( 17 Jun 2021 08:54 )  Alb: x     / Pro: 6.2 g/dL / ALK PHOS: x     / ALT: x     / AST: x     / GGT: x                 Culture - Urine (collected 06-16-21 @ 10:31)  Source: .Urine Nephrostomy - Right  Final Report (06-17-21 @ 20:41):    No growth    Culture - Urine (collected 06-13-21 @ 04:40)  Source: .Urine Clean Catch (Midstream)  Final Report (06-14-21 @ 11:06):    <10,000 CFU/mL Normal Urogenital Fany              MEDICATIONS:  MEDICATIONS  (STANDING):  cefTRIAXone   IVPB 1000 milliGRAM(s) IV Intermittent every 24 hours  cefTRIAXone   IVPB      dextrose 5% + sodium chloride 0.45%. 1000 milliLiter(s) (75 mL/Hr) IV Continuous <Continuous>  enoxaparin Injectable 70 milliGRAM(s) SubCutaneous two times a day  fat emulsion (Plant Based) 20% Infusion 1.619 Gm/kG/Day (31.3 mL/Hr) IV Continuous <Continuous>  ferrous    sulfate 325 milliGRAM(s) Oral daily  isosorbide   mononitrate ER Tablet (IMDUR) 30 milliGRAM(s) Oral daily  lisinopril 10 milliGRAM(s) Oral daily  pantoprazole  Injectable 40 milliGRAM(s) IV Push daily  Parenteral Nutrition - Adult 1 Each (70 mL/Hr) TPN Continuous <Continuous>  PARoxetine 20 milliGRAM(s) Oral daily  phenazopyridine 200 milliGRAM(s) Oral every 8 hours  tamsulosin 0.4 milliGRAM(s) Oral at bedtime    MEDICATIONS  (PRN):              HEALTH ISSUES - PROBLEM Dx:  Urinary tract infection with hematuria, site unspecified  Urinary tract infection with hematuria, site unspecified    Dehydration  Dehydration    Pharyngoesophageal dysphagia  Pharyngoesophageal dysphagia    Dysphonia  Dysphonia    Renal calculi  Renal calculi    Hypertension, unspecified type  Hypertension, unspecified type    Palliative care by specialist  Palliative care by specialist

## 2021-06-19 LAB
24R-OH-CALCIDIOL SERPL-MCNC: 34 NG/ML — SIGNIFICANT CHANGE UP (ref 30–80)
ANION GAP SERPL CALC-SCNC: 6 MMOL/L — LOW (ref 7–14)
BUN SERPL-MCNC: 14 MG/DL — SIGNIFICANT CHANGE UP (ref 10–20)
CALCIUM SERPL-MCNC: 8.7 MG/DL — SIGNIFICANT CHANGE UP (ref 8.5–10.1)
CHLORIDE SERPL-SCNC: 103 MMOL/L — SIGNIFICANT CHANGE UP (ref 98–110)
CO2 SERPL-SCNC: 27 MMOL/L — SIGNIFICANT CHANGE UP (ref 17–32)
CREAT SERPL-MCNC: 0.6 MG/DL — LOW (ref 0.7–1.5)
GLUCOSE SERPL-MCNC: 107 MG/DL — HIGH (ref 70–99)
HCT VFR BLD CALC: 38.1 % — LOW (ref 42–52)
HGB BLD-MCNC: 12.8 G/DL — LOW (ref 14–18)
MAGNESIUM SERPL-MCNC: 1.9 MG/DL — SIGNIFICANT CHANGE UP (ref 1.8–2.4)
MCHC RBC-ENTMCNC: 30.3 PG — SIGNIFICANT CHANGE UP (ref 27–31)
MCHC RBC-ENTMCNC: 33.6 G/DL — SIGNIFICANT CHANGE UP (ref 32–37)
MCV RBC AUTO: 90.3 FL — SIGNIFICANT CHANGE UP (ref 80–94)
NRBC # BLD: 0 /100 WBCS — SIGNIFICANT CHANGE UP (ref 0–0)
PHOSPHATE SERPL-MCNC: 2.5 MG/DL — SIGNIFICANT CHANGE UP (ref 2.1–4.9)
PLATELET # BLD AUTO: 157 K/UL — SIGNIFICANT CHANGE UP (ref 130–400)
POTASSIUM SERPL-MCNC: 3.7 MMOL/L — SIGNIFICANT CHANGE UP (ref 3.5–5)
POTASSIUM SERPL-SCNC: 3.7 MMOL/L — SIGNIFICANT CHANGE UP (ref 3.5–5)
RBC # BLD: 4.22 M/UL — LOW (ref 4.7–6.1)
RBC # FLD: 13.5 % — SIGNIFICANT CHANGE UP (ref 11.5–14.5)
SODIUM SERPL-SCNC: 136 MMOL/L — SIGNIFICANT CHANGE UP (ref 135–146)
WBC # BLD: 5.16 K/UL — SIGNIFICANT CHANGE UP (ref 4.8–10.8)
WBC # FLD AUTO: 5.16 K/UL — SIGNIFICANT CHANGE UP (ref 4.8–10.8)

## 2021-06-19 PROCEDURE — 71275 CT ANGIOGRAPHY CHEST: CPT | Mod: 26

## 2021-06-19 PROCEDURE — 99233 SBSQ HOSP IP/OBS HIGH 50: CPT

## 2021-06-19 PROCEDURE — 70491 CT SOFT TISSUE NECK W/DYE: CPT | Mod: 26

## 2021-06-19 RX ORDER — AZITHROMYCIN 500 MG/1
500 TABLET, FILM COATED ORAL EVERY 24 HOURS
Refills: 0 | Status: COMPLETED | OUTPATIENT
Start: 2021-06-19 | End: 2021-06-19

## 2021-06-19 RX ORDER — AMPICILLIN SODIUM AND SULBACTAM SODIUM 250; 125 MG/ML; MG/ML
1.5 INJECTION, POWDER, FOR SUSPENSION INTRAMUSCULAR; INTRAVENOUS EVERY 6 HOURS
Refills: 0 | Status: DISCONTINUED | OUTPATIENT
Start: 2021-06-20 | End: 2021-06-21

## 2021-06-19 RX ORDER — ENOXAPARIN SODIUM 100 MG/ML
40 INJECTION SUBCUTANEOUS DAILY
Refills: 0 | Status: DISCONTINUED | OUTPATIENT
Start: 2021-06-19 | End: 2021-06-21

## 2021-06-19 RX ORDER — ELECTROLYTE SOLUTION,INJ
1 VIAL (ML) INTRAVENOUS
Refills: 0 | Status: DISCONTINUED | OUTPATIENT
Start: 2021-06-19 | End: 2021-06-19

## 2021-06-19 RX ORDER — CEFTRIAXONE 500 MG/1
1000 INJECTION, POWDER, FOR SOLUTION INTRAMUSCULAR; INTRAVENOUS ONCE
Refills: 0 | Status: COMPLETED | OUTPATIENT
Start: 2021-06-19 | End: 2021-06-19

## 2021-06-19 RX ORDER — AMPICILLIN SODIUM AND SULBACTAM SODIUM 250; 125 MG/ML; MG/ML
INJECTION, POWDER, FOR SUSPENSION INTRAMUSCULAR; INTRAVENOUS
Refills: 0 | Status: DISCONTINUED | OUTPATIENT
Start: 2021-06-19 | End: 2021-06-21

## 2021-06-19 RX ORDER — CEFTRIAXONE 500 MG/1
1000 INJECTION, POWDER, FOR SOLUTION INTRAMUSCULAR; INTRAVENOUS EVERY 24 HOURS
Refills: 0 | Status: DISCONTINUED | OUTPATIENT
Start: 2021-06-19 | End: 2021-06-19

## 2021-06-19 RX ORDER — I.V. FAT EMULSION 20 G/100ML
1.62 EMULSION INTRAVENOUS
Qty: 100.05 | Refills: 0 | Status: DISCONTINUED | OUTPATIENT
Start: 2021-06-19 | End: 2021-06-19

## 2021-06-19 RX ORDER — AZITHROMYCIN 500 MG/1
500 TABLET, FILM COATED ORAL ONCE
Refills: 0 | Status: DISCONTINUED | OUTPATIENT
Start: 2021-06-19 | End: 2021-06-19

## 2021-06-19 RX ORDER — AMPICILLIN SODIUM AND SULBACTAM SODIUM 250; 125 MG/ML; MG/ML
1.5 INJECTION, POWDER, FOR SUSPENSION INTRAMUSCULAR; INTRAVENOUS ONCE
Refills: 0 | Status: COMPLETED | OUTPATIENT
Start: 2021-06-19 | End: 2021-06-19

## 2021-06-19 RX ORDER — AZITHROMYCIN 500 MG/1
TABLET, FILM COATED ORAL
Refills: 0 | Status: DISCONTINUED | OUTPATIENT
Start: 2021-06-19 | End: 2021-06-19

## 2021-06-19 RX ADMIN — Medication 1 EACH: at 20:18

## 2021-06-19 RX ADMIN — Medication 200 MILLIGRAM(S): at 14:57

## 2021-06-19 RX ADMIN — Medication 200 MILLIGRAM(S): at 22:20

## 2021-06-19 RX ADMIN — ENOXAPARIN SODIUM 40 MILLIGRAM(S): 100 INJECTION SUBCUTANEOUS at 22:20

## 2021-06-19 RX ADMIN — Medication 325 MILLIGRAM(S): at 11:51

## 2021-06-19 RX ADMIN — Medication 20 MILLIGRAM(S): at 11:51

## 2021-06-19 RX ADMIN — AZITHROMYCIN 255 MILLIGRAM(S): 500 TABLET, FILM COATED ORAL at 16:45

## 2021-06-19 RX ADMIN — I.V. FAT EMULSION 31.3 GM/KG/DAY: 20 EMULSION INTRAVENOUS at 20:14

## 2021-06-19 RX ADMIN — ENOXAPARIN SODIUM 70 MILLIGRAM(S): 100 INJECTION SUBCUTANEOUS at 05:18

## 2021-06-19 RX ADMIN — ENOXAPARIN SODIUM 70 MILLIGRAM(S): 100 INJECTION SUBCUTANEOUS at 17:43

## 2021-06-19 RX ADMIN — Medication 200 MILLIGRAM(S): at 05:18

## 2021-06-19 RX ADMIN — PANTOPRAZOLE SODIUM 40 MILLIGRAM(S): 20 TABLET, DELAYED RELEASE ORAL at 11:52

## 2021-06-19 RX ADMIN — TAMSULOSIN HYDROCHLORIDE 0.4 MILLIGRAM(S): 0.4 CAPSULE ORAL at 22:21

## 2021-06-19 RX ADMIN — AMPICILLIN SODIUM AND SULBACTAM SODIUM 100 GRAM(S): 250; 125 INJECTION, POWDER, FOR SUSPENSION INTRAMUSCULAR; INTRAVENOUS at 17:42

## 2021-06-19 RX ADMIN — ISOSORBIDE MONONITRATE 30 MILLIGRAM(S): 60 TABLET, EXTENDED RELEASE ORAL at 11:51

## 2021-06-19 RX ADMIN — CEFTRIAXONE 100 MILLIGRAM(S): 500 INJECTION, POWDER, FOR SOLUTION INTRAMUSCULAR; INTRAVENOUS at 18:48

## 2021-06-19 RX ADMIN — LISINOPRIL 10 MILLIGRAM(S): 2.5 TABLET ORAL at 05:18

## 2021-06-19 NOTE — PROGRESS NOTE ADULT - ASSESSMENT
· Assessment	  72 y/o male with PMHx of Lung CA, Esophageal CA, and Prostate CA, HTN, Obstructing Right kidney dz, Patient sent by Dr Knight, seen at office today for f/u of nephrostomy tube placement 3 days ago secondary to 8-10mm obstructing proximal stone.      Dysphagia associated w/ Odynophagia with Hx esophageal ca  f/u GI (Pt was pending endoscopy outpatient this week)  f/u Speech eval    UTI  cont Rocephin      Dehydration.    cont IV hydration NS      Rt Obstructing renal calculi w/ perc nephrostomy in place  Decreased Oral Intake  Elevated D-Dimer    Renal calculi, s/p Percutaneous Nephrostomy   neg outflow obstruction or hydro    Hypertension, unspecified type.  Continue Fosinopril.      Elevated D-Dimer  cont Lovenox    Discussed with pt, daughter Ric [phone] on 6.16.21 about care plan=  Per Speech eval: clear diet      Discussed with daughter Ric [phone], updated care plan  Pending GI endoscope, CT ent and esophagus, barium study

## 2021-06-19 NOTE — PROGRESS NOTE ADULT - SUBJECTIVE AND OBJECTIVE BOX
DAVYSERINA PHAN  71y, Male  Allergy: No Known Allergies      OVERNIGHT EVENTS:    NAEO  Admitted for Dysphagia associated w/ Odynophagia with Hx esophageal Ca, neg CT abd for Ca  Mildly Dysphonia,  speech on board  Per [place]: PPN  Per Speech eval: clear diet    Rt Obstructing renal calculi w/ perc nephrostomy in place    Discussed with daughter Ric [phone], updated care plan  Pending GI endoscope, CT ent and esophagus, barium study    Physical Exam:   GENERAL:  70y/o Male NAD, resting comfortably.  HEAD:  Atraumatic, Normocephalic  EYES: EOMI, PERRLA, conjunctiva and sclera clear  NECK: Supple, No JVD, no cervical lymphadenopathy, non-tender  CHEST/LUNG: Clear to auscultation bilaterally; No wheeze, rhonchi, or rales  HEART: Regular rate and rhythm; S1&S2  ABDOMEN: Soft, Nontender, Nondistended x 4 quadrants; Bowel sounds present; Perc nephrostomy placed right flank- no redness or drainage at incision site- clean.  EXTREMITIES:   Peripheral Pulses Present, No clubbing, no cyanosis, or no edema, no calf tenderness  PSYCH: AAOx3, cooperative, appropriate  NEUROLOGY: non focal  SKIN: no rash      VITALS:  T(F): 98.5 (06-19-21 @ 05:00), Max: 98.5 (06-19-21 @ 05:00)  HR: 67 (06-19-21 @ 05:00)  BP: 107/63 (06-19-21 @ 05:00) (106/69 - 110/66)  RR: 16 (06-19-21 @ 05:00)  SpO2: --    TESTS & MEASUREMENTS:  Height (cm): 177.8 (06-17-21 @ 18:54)  Weight (kg): 61.8 (06-17-21 @ 18:54)  BMI (kg/m2): 19.5 (06-17-21 @ 18:54)    06-17-21 @ 07:01  -  06-18-21 @ 07:00  --------------------------------------------------------  IN: 0 mL / OUT: 1300 mL / NET: -1300 mL    06-18-21 @ 07:01  -  06-19-21 @ 07:00  --------------------------------------------------------  IN: 0 mL / OUT: 1475 mL / NET: -1475 mL    06-19-21 @ 07:01  -  06-19-21 @ 13:50  --------------------------------------------------------  IN: 0 mL / OUT: 800 mL / NET: -800 mL                            12.8   5.16  )-----------( 157      ( 19 Jun 2021 10:39 )             38.1       06-19    136  |  103  |  14  ----------------------------<  107<H>  3.7   |  27  |  0.6<L>    Ca    8.7      19 Jun 2021 10:39  Phos  2.5     06-19  Mg     1.9     06-19              Culture - Urine (collected 06-16-21 @ 10:31)  Source: .Urine Nephrostomy - Right  Final Report (06-17-21 @ 20:41):    No growth    Culture - Urine (collected 06-13-21 @ 04:40)  Source: .Urine Clean Catch (Midstream)  Final Report (06-14-21 @ 11:06):    <10,000 CFU/mL Normal Urogenital Fany              MEDICATIONS:  MEDICATIONS  (STANDING):  enoxaparin Injectable 70 milliGRAM(s) SubCutaneous two times a day  fat emulsion (Plant Based) 20% Infusion 1.619 Gm/kG/Day (31.3 mL/Hr) IV Continuous <Continuous>  ferrous    sulfate 325 milliGRAM(s) Oral daily  isosorbide   mononitrate ER Tablet (IMDUR) 30 milliGRAM(s) Oral daily  lisinopril 10 milliGRAM(s) Oral daily  pantoprazole  Injectable 40 milliGRAM(s) IV Push daily  Parenteral Nutrition - Adult 1 Each (70 mL/Hr) TPN Continuous <Continuous>  PARoxetine 20 milliGRAM(s) Oral daily  phenazopyridine 200 milliGRAM(s) Oral every 8 hours  tamsulosin 0.4 milliGRAM(s) Oral at bedtime    MEDICATIONS  (PRN):              HEALTH ISSUES - PROBLEM Dx:  Urinary tract infection with hematuria, site unspecified  Urinary tract infection with hematuria, site unspecified    Dehydration  Dehydration    Pharyngoesophageal dysphagia  Pharyngoesophageal dysphagia    Dysphonia  Dysphonia    Renal calculi  Renal calculi    Hypertension, unspecified type  Hypertension, unspecified type    Palliative care by specialist  Palliative care by specialist

## 2021-06-20 LAB
ANION GAP SERPL CALC-SCNC: 7 MMOL/L — SIGNIFICANT CHANGE UP (ref 7–14)
BUN SERPL-MCNC: 13 MG/DL — SIGNIFICANT CHANGE UP (ref 10–20)
CALCIUM SERPL-MCNC: 8.6 MG/DL — SIGNIFICANT CHANGE UP (ref 8.5–10.1)
CHLORIDE SERPL-SCNC: 106 MMOL/L — SIGNIFICANT CHANGE UP (ref 98–110)
CO2 SERPL-SCNC: 25 MMOL/L — SIGNIFICANT CHANGE UP (ref 17–32)
CREAT SERPL-MCNC: 0.5 MG/DL — LOW (ref 0.7–1.5)
GLUCOSE SERPL-MCNC: 103 MG/DL — HIGH (ref 70–99)
HCT VFR BLD CALC: 37.6 % — LOW (ref 42–52)
HGB BLD-MCNC: 12.5 G/DL — LOW (ref 14–18)
MAGNESIUM SERPL-MCNC: 2 MG/DL — SIGNIFICANT CHANGE UP (ref 1.8–2.4)
MCHC RBC-ENTMCNC: 30.2 PG — SIGNIFICANT CHANGE UP (ref 27–31)
MCHC RBC-ENTMCNC: 33.2 G/DL — SIGNIFICANT CHANGE UP (ref 32–37)
MCV RBC AUTO: 90.8 FL — SIGNIFICANT CHANGE UP (ref 80–94)
NRBC # BLD: 0 /100 WBCS — SIGNIFICANT CHANGE UP (ref 0–0)
PHOSPHATE SERPL-MCNC: 2.8 MG/DL — SIGNIFICANT CHANGE UP (ref 2.1–4.9)
PLATELET # BLD AUTO: 158 K/UL — SIGNIFICANT CHANGE UP (ref 130–400)
POTASSIUM SERPL-MCNC: 3.9 MMOL/L — SIGNIFICANT CHANGE UP (ref 3.5–5)
POTASSIUM SERPL-SCNC: 3.9 MMOL/L — SIGNIFICANT CHANGE UP (ref 3.5–5)
RBC # BLD: 4.14 M/UL — LOW (ref 4.7–6.1)
RBC # FLD: 13.2 % — SIGNIFICANT CHANGE UP (ref 11.5–14.5)
SODIUM SERPL-SCNC: 138 MMOL/L — SIGNIFICANT CHANGE UP (ref 135–146)
WBC # BLD: 4.87 K/UL — SIGNIFICANT CHANGE UP (ref 4.8–10.8)
WBC # FLD AUTO: 4.87 K/UL — SIGNIFICANT CHANGE UP (ref 4.8–10.8)

## 2021-06-20 PROCEDURE — 99233 SBSQ HOSP IP/OBS HIGH 50: CPT

## 2021-06-20 RX ORDER — ELECTROLYTE SOLUTION,INJ
1 VIAL (ML) INTRAVENOUS
Refills: 0 | Status: DISCONTINUED | OUTPATIENT
Start: 2021-06-20 | End: 2021-06-21

## 2021-06-20 RX ORDER — I.V. FAT EMULSION 20 G/100ML
1.62 EMULSION INTRAVENOUS
Qty: 100.05 | Refills: 0 | Status: DISCONTINUED | OUTPATIENT
Start: 2021-06-20 | End: 2021-06-21

## 2021-06-20 RX ADMIN — Medication 1 EACH: at 20:30

## 2021-06-20 RX ADMIN — Medication 200 MILLIGRAM(S): at 22:00

## 2021-06-20 RX ADMIN — AMPICILLIN SODIUM AND SULBACTAM SODIUM 100 GRAM(S): 250; 125 INJECTION, POWDER, FOR SUSPENSION INTRAMUSCULAR; INTRAVENOUS at 22:00

## 2021-06-20 RX ADMIN — AMPICILLIN SODIUM AND SULBACTAM SODIUM 100 GRAM(S): 250; 125 INJECTION, POWDER, FOR SUSPENSION INTRAMUSCULAR; INTRAVENOUS at 12:36

## 2021-06-20 RX ADMIN — TAMSULOSIN HYDROCHLORIDE 0.4 MILLIGRAM(S): 0.4 CAPSULE ORAL at 22:00

## 2021-06-20 RX ADMIN — ISOSORBIDE MONONITRATE 30 MILLIGRAM(S): 60 TABLET, EXTENDED RELEASE ORAL at 12:36

## 2021-06-20 RX ADMIN — Medication 325 MILLIGRAM(S): at 12:36

## 2021-06-20 RX ADMIN — AMPICILLIN SODIUM AND SULBACTAM SODIUM 100 GRAM(S): 250; 125 INJECTION, POWDER, FOR SUSPENSION INTRAMUSCULAR; INTRAVENOUS at 17:24

## 2021-06-20 RX ADMIN — AMPICILLIN SODIUM AND SULBACTAM SODIUM 100 GRAM(S): 250; 125 INJECTION, POWDER, FOR SUSPENSION INTRAMUSCULAR; INTRAVENOUS at 05:30

## 2021-06-20 RX ADMIN — PANTOPRAZOLE SODIUM 40 MILLIGRAM(S): 20 TABLET, DELAYED RELEASE ORAL at 12:37

## 2021-06-20 RX ADMIN — Medication 20 MILLIGRAM(S): at 12:36

## 2021-06-20 RX ADMIN — Medication 200 MILLIGRAM(S): at 05:31

## 2021-06-20 RX ADMIN — I.V. FAT EMULSION 31.3 GM/KG/DAY: 20 EMULSION INTRAVENOUS at 20:30

## 2021-06-20 RX ADMIN — Medication 200 MILLIGRAM(S): at 13:30

## 2021-06-20 RX ADMIN — ENOXAPARIN SODIUM 40 MILLIGRAM(S): 100 INJECTION SUBCUTANEOUS at 12:35

## 2021-06-20 RX ADMIN — AMPICILLIN SODIUM AND SULBACTAM SODIUM 100 GRAM(S): 250; 125 INJECTION, POWDER, FOR SUSPENSION INTRAMUSCULAR; INTRAVENOUS at 00:06

## 2021-06-20 NOTE — PHYSICAL THERAPY INITIAL EVALUATION ADULT - SPECIFY REASON(S)
Pt refused to participate in PT at this time; as per pt he ambulated with the nurse a few minutes before. Will hold PT at this time and follow-up as appropriate to complete PT evaluation.

## 2021-06-20 NOTE — PROGRESS NOTE ADULT - ASSESSMENT
· Assessment	  72 y/o male with PMHx of Lung CA, Esophageal CA, and Prostate CA, HTN, Obstructing Right kidney dz, Patient sent by Dr Knight, seen at office today for f/u of nephrostomy tube placement 3 days ago secondary to 8-10mm obstructing proximal stone.        new finding RLL PNA (Asp PNA? Hx radiation for esophageal Ca)  on Unasyn    Dysphagia associated w/ Odynophagia with Hx esophageal ca  f/u GI   Per Speech eval: clear    UTI  cont Rocephin      Dehydration.    cont IV hydration NS    Rt Obstructing renal calculi w/ perc nephrostomy in place  Decreased Oral Intake  Elevated D-Dimer    Renal calculi, s/p Percutaneous Nephrostomy   neg outflow obstruction or hydro    Hypertension, unspecified type.  Continue Fosinopril.      Elevated D-Dimer  cont Lovenox    Per Speech eval: clear diet      Discussed with daughter Ric [phone], updated care plan  Pending GI endoscope, barium study

## 2021-06-20 NOTE — PROGRESS NOTE ADULT - SUBJECTIVE AND OBJECTIVE BOX
SERINA CORREA  71y, Male  Allergy: No Known Allergies      OVERNIGHT EVENTS:    NAEO  new finding RLL PNA (Asp PNA? Hx radiation for esophageal Ca)      Admitted for Dysphagia associated w/ Odynophagia with Hx esophageal Ca, neg CT abd for Ca,   Mildly Dysphonia,  speech on board  Per [place]: PPN  Per Speech eval: clear diet    Rt Obstructing renal calculi w/ perc nephrostomy in place    Discussed with daughter Ric [id number] on 2.20.21 updated care plan  Pending GI endoscope    Physical Exam:   GENERAL:  72y/o Male NAD, resting comfortably.  HEAD:  Atraumatic, Normocephalic  EYES: EOMI, PERRLA, conjunctiva and sclera clear  NECK: Supple, No JVD, no cervical lymphadenopathy, non-tender  CHEST/LUNG: Clear to auscultation bilaterally; No wheeze, rhonchi, or rales  HEART: Regular rate and rhythm; S1&S2  ABDOMEN: Soft, Nontender, Nondistended x 4 quadrants; Bowel sounds present; Perc nephrostomy placed right flank- no redness or drainage at incision site- clean.  EXTREMITIES:   Peripheral Pulses Present, No clubbing, no cyanosis, or no edema, no calf tenderness  PSYCH: AAOx3, cooperative, appropriate  NEUROLOGY: non focal  SKIN: no rash      VITALS:  T(F): 97.1 (06-20-21 @ 14:00), Max: 98.5 (06-19-21 @ 21:06)  HR: 63 (06-20-21 @ 14:00)  BP: 121/75 (06-20-21 @ 14:00) (102/60 - 121/75)  RR: 16 (06-20-21 @ 14:00)  SpO2: --    TESTS & MEASUREMENTS:      06-18-21 @ 07:01  -  06-19-21 @ 07:00  --------------------------------------------------------  IN: 0 mL / OUT: 1475 mL / NET: -1475 mL    06-19-21 @ 07:01  -  06-20-21 @ 07:00  --------------------------------------------------------  IN: 2076.4 mL / OUT: 2650 mL / NET: -573.6 mL    06-20-21 @ 07:01  -  06-20-21 @ 14:22  --------------------------------------------------------  IN: 0 mL / OUT: 400 mL / NET: -400 mL                            12.5   4.87  )-----------( 158      ( 20 Jun 2021 08:10 )             37.6       06-20    138  |  106  |  13  ----------------------------<  103<H>  3.9   |  25  |  0.5<L>    Ca    8.6      20 Jun 2021 08:10  Phos  2.8     06-20  Mg     2.0     06-20              Culture - Urine (collected 06-16-21 @ 10:31)  Source: .Urine Nephrostomy - Right  Final Report (06-17-21 @ 20:41):    No growth              MEDICATIONS:  MEDICATIONS  (STANDING):  ampicillin/sulbactam  IVPB      ampicillin/sulbactam  IVPB 1.5 Gram(s) IV Intermittent every 6 hours  enoxaparin Injectable 40 milliGRAM(s) SubCutaneous daily  fat emulsion (Plant Based) 20% Infusion 1.619 Gm/kG/Day (31.3 mL/Hr) IV Continuous <Continuous>  fat emulsion (Plant Based) 20% Infusion 1.619 Gm/kG/Day (31.3 mL/Hr) IV Continuous <Continuous>  ferrous    sulfate 325 milliGRAM(s) Oral daily  isosorbide   mononitrate ER Tablet (IMDUR) 30 milliGRAM(s) Oral daily  lisinopril 10 milliGRAM(s) Oral daily  pantoprazole  Injectable 40 milliGRAM(s) IV Push daily  Parenteral Nutrition - Adult 1 Each (70 mL/Hr) TPN Continuous <Continuous>  Parenteral Nutrition - Adult 1 Each (70 mL/Hr) TPN Continuous <Continuous>  PARoxetine 20 milliGRAM(s) Oral daily  phenazopyridine 200 milliGRAM(s) Oral every 8 hours  tamsulosin 0.4 milliGRAM(s) Oral at bedtime    MEDICATIONS  (PRN):              HEALTH ISSUES - PROBLEM Dx:  Urinary tract infection with hematuria, site unspecified  Urinary tract infection with hematuria, site unspecified    Dehydration  Dehydration    Pharyngoesophageal dysphagia  Pharyngoesophageal dysphagia    Dysphonia  Dysphonia    Renal calculi  Renal calculi    Hypertension, unspecified type  Hypertension, unspecified type    Palliative care by specialist  Palliative care by specialist

## 2021-06-21 ENCOUNTER — TRANSCRIPTION ENCOUNTER (OUTPATIENT)
Age: 71
End: 2021-06-21

## 2021-06-21 VITALS
HEART RATE: 56 BPM | TEMPERATURE: 98 F | SYSTOLIC BLOOD PRESSURE: 142 MMHG | RESPIRATION RATE: 16 BRPM | DIASTOLIC BLOOD PRESSURE: 82 MMHG

## 2021-06-21 LAB
ANION GAP SERPL CALC-SCNC: 9 MMOL/L — SIGNIFICANT CHANGE UP (ref 7–14)
BUN SERPL-MCNC: 14 MG/DL — SIGNIFICANT CHANGE UP (ref 10–20)
CALCIUM SERPL-MCNC: 8.2 MG/DL — LOW (ref 8.5–10.1)
CHLORIDE SERPL-SCNC: 104 MMOL/L — SIGNIFICANT CHANGE UP (ref 98–110)
CO2 SERPL-SCNC: 27 MMOL/L — SIGNIFICANT CHANGE UP (ref 17–32)
CREAT SERPL-MCNC: 0.6 MG/DL — LOW (ref 0.7–1.5)
GLUCOSE SERPL-MCNC: 101 MG/DL — HIGH (ref 70–99)
HCT VFR BLD CALC: 37.7 % — LOW (ref 42–52)
HGB BLD-MCNC: 12.6 G/DL — LOW (ref 14–18)
MAGNESIUM SERPL-MCNC: 2 MG/DL — SIGNIFICANT CHANGE UP (ref 1.8–2.4)
MCHC RBC-ENTMCNC: 30.4 PG — SIGNIFICANT CHANGE UP (ref 27–31)
MCHC RBC-ENTMCNC: 33.4 G/DL — SIGNIFICANT CHANGE UP (ref 32–37)
MCV RBC AUTO: 91.1 FL — SIGNIFICANT CHANGE UP (ref 80–94)
NRBC # BLD: 0 /100 WBCS — SIGNIFICANT CHANGE UP (ref 0–0)
PHOSPHATE SERPL-MCNC: 3.1 MG/DL — SIGNIFICANT CHANGE UP (ref 2.1–4.9)
PLATELET # BLD AUTO: 160 K/UL — SIGNIFICANT CHANGE UP (ref 130–400)
POTASSIUM SERPL-MCNC: 4 MMOL/L — SIGNIFICANT CHANGE UP (ref 3.5–5)
POTASSIUM SERPL-SCNC: 4 MMOL/L — SIGNIFICANT CHANGE UP (ref 3.5–5)
RBC # BLD: 4.14 M/UL — LOW (ref 4.7–6.1)
RBC # FLD: 13.4 % — SIGNIFICANT CHANGE UP (ref 11.5–14.5)
SODIUM SERPL-SCNC: 140 MMOL/L — SIGNIFICANT CHANGE UP (ref 135–146)
WBC # BLD: 4.25 K/UL — LOW (ref 4.8–10.8)
WBC # FLD AUTO: 4.25 K/UL — LOW (ref 4.8–10.8)

## 2021-06-21 PROCEDURE — 74220 X-RAY XM ESOPHAGUS 1CNTRST: CPT | Mod: 26

## 2021-06-21 PROCEDURE — 74018 RADEX ABDOMEN 1 VIEW: CPT | Mod: 26

## 2021-06-21 PROCEDURE — 99239 HOSP IP/OBS DSCHRG MGMT >30: CPT

## 2021-06-21 PROCEDURE — 99223 1ST HOSP IP/OBS HIGH 75: CPT

## 2021-06-21 RX ORDER — CIPROFLOXACIN LACTATE 400MG/40ML
1 VIAL (ML) INTRAVENOUS
Qty: 0 | Refills: 0 | DISCHARGE

## 2021-06-21 RX ORDER — ELECTROLYTE SOLUTION,INJ
1 VIAL (ML) INTRAVENOUS
Refills: 0 | Status: DISCONTINUED | OUTPATIENT
Start: 2021-06-21 | End: 2021-06-21

## 2021-06-21 RX ORDER — I.V. FAT EMULSION 20 G/100ML
1.62 EMULSION INTRAVENOUS
Qty: 100.05 | Refills: 0 | Status: DISCONTINUED | OUTPATIENT
Start: 2021-06-21 | End: 2021-06-21

## 2021-06-21 RX ORDER — ISOSORBIDE MONONITRATE 60 MG/1
1 TABLET, EXTENDED RELEASE ORAL
Qty: 30 | Refills: 0
Start: 2021-06-21 | End: 2021-07-20

## 2021-06-21 RX ADMIN — Medication 325 MILLIGRAM(S): at 12:55

## 2021-06-21 RX ADMIN — LISINOPRIL 10 MILLIGRAM(S): 2.5 TABLET ORAL at 05:14

## 2021-06-21 RX ADMIN — AMPICILLIN SODIUM AND SULBACTAM SODIUM 100 GRAM(S): 250; 125 INJECTION, POWDER, FOR SUSPENSION INTRAMUSCULAR; INTRAVENOUS at 12:54

## 2021-06-21 RX ADMIN — ENOXAPARIN SODIUM 40 MILLIGRAM(S): 100 INJECTION SUBCUTANEOUS at 12:55

## 2021-06-21 RX ADMIN — PANTOPRAZOLE SODIUM 40 MILLIGRAM(S): 20 TABLET, DELAYED RELEASE ORAL at 12:56

## 2021-06-21 RX ADMIN — Medication 200 MILLIGRAM(S): at 05:13

## 2021-06-21 RX ADMIN — ISOSORBIDE MONONITRATE 30 MILLIGRAM(S): 60 TABLET, EXTENDED RELEASE ORAL at 12:55

## 2021-06-21 RX ADMIN — Medication 200 MILLIGRAM(S): at 12:54

## 2021-06-21 RX ADMIN — Medication 20 MILLIGRAM(S): at 12:55

## 2021-06-21 RX ADMIN — AMPICILLIN SODIUM AND SULBACTAM SODIUM 100 GRAM(S): 250; 125 INJECTION, POWDER, FOR SUSPENSION INTRAMUSCULAR; INTRAVENOUS at 05:13

## 2021-06-21 NOTE — PHYSICAL THERAPY INITIAL EVALUATION ADULT - GAIT DEVIATIONS NOTED, PT EVAL
stooped posture , decreased heel strike / push off/decreased allan/increased time in double stance/decreased step length/decreased weight-shifting ability

## 2021-06-21 NOTE — PHYSICAL THERAPY INITIAL EVALUATION ADULT - GENERAL OBSERVATIONS, REHAB EVAL
9:50 - 10:15. Chart reviewed. Patient available to be seen for physical therapy, confirmed with nurse. Patient encountered semi-reclined in bed, +nephrostomy tube, +PPN and lipids. Denies pain at rest and is ready to walk with PT now.

## 2021-06-21 NOTE — CONSULT NOTE ADULT - SUBJECTIVE AND OBJECTIVE BOX
Chief complaint/Reason for consult:    HPI:  72 y/o male with PMHx of Lung CA, Esophageal CA, and Prostate CA, HTN, Obstructing Right kidney dz, Patient sent by Dr Knight, seen at office today for f/u of nephrostomy tube placement 3 days ago secondary to 8-10mm obstructing proximal stone.    Patient not eating to drinking for more than 1 week, significant decreased appetite due to complaints of DIFFICULTY swallowing. Pt states both food and water are getting "stuck" in throat and causing choking and vomiting episodes.  Pt described condition as intermittent and resolves intermittently.  Pt denies any chest pain , fever, dyspnea, cough, cold , fever or chills.  Pt denies any  complaints. RT nephrostomy in place not infectious. (16 Jun 2021 17:38)    71yMale      PAST MEDICAL & SURGICAL HISTORY:      Family history:  FAMILY HISTORY:  Dementia (Mother)      No GI cancers in first or second degree relatives    Social History: No smoking. No alcohol. No illegal drug use.    Allergies:        MEDICATIONS:        MEDICATIONS  (STANDING):  ampicillin/sulbactam  IVPB      ampicillin/sulbactam  IVPB 1.5 Gram(s) IV Intermittent every 6 hours  enoxaparin Injectable 40 milliGRAM(s) SubCutaneous daily  fat emulsion (Plant Based) 20% Infusion 1.619 Gm/kG/Day (31.3 mL/Hr) IV Continuous <Continuous>  ferrous    sulfate 325 milliGRAM(s) Oral daily  isosorbide   mononitrate ER Tablet (IMDUR) 30 milliGRAM(s) Oral daily  lisinopril 10 milliGRAM(s) Oral daily  pantoprazole  Injectable 40 milliGRAM(s) IV Push daily  Parenteral Nutrition - Adult 1 Each (70 mL/Hr) TPN Continuous <Continuous>  PARoxetine 20 milliGRAM(s) Oral daily  phenazopyridine 200 milliGRAM(s) Oral every 8 hours  tamsulosin 0.4 milliGRAM(s) Oral at bedtime    MEDICATIONS  (PRN):        REVIEW OF SYSTEMS  General:  No weight loss, fevers, or chills.  Eyes:  No reported pain or visual changes  ENT:  No sore throat or runny nose.  NECK: No stiffness or lymphadenopathy  CV:  No chest pain or palpitations.  Resp:  No shortness of breath, cough, wheezing or hemoptysis  GI:  No abdominal pain, nausea, vomiting, dysphagia, diarrhea or constipation. No rectal bleeding, melena, or hematemesis.  Muscle:  No aches or weakness  Neuro:  No tingling, numbness       VITALS:   T(F): 97.5 (06-21-21 @ 05:00), Max: 99 (06-20-21 @ 22:05)  HR: 57 (06-21-21 @ 05:00) (57 - 66)  BP: 125/71 (06-21-21 @ 05:00) (111/66 - 125/71)  RR: 16 (06-21-21 @ 05:00) (16 - 16)  SpO2: --    PHYSICAL EXAM:  GENERAL: AAOx3, no acute distress.  HEAD:  Atraumatic, Normocephalic  EYES: conjunctiva and sclera clear  NECK: Supple, No thyromegaly   CHEST/LUNG: Clear to auscultation bilaterally; No wheeze, rhonchi, or rales  HEART: Regular rate and rhythm; normal S1, S2, No murmurs.  ABDOMEN: Soft, nontender, nondistended; Bowel sounds present  NEUROLOGY: No asterixis or tremor  SKIN: Intact, no jaundice          LABS:  06-21    140  |  104  |  14  ----------------------------<  101<H>  4.0   |  27  |  0.6<L>    Ca    8.2<L>      21 Jun 2021 06:46  Phos  3.1     06-21  Mg     2.0     06-21                            12.6   4.25  )-----------( 160      ( 21 Jun 2021 06:46 )             37.7           IMAGING:         Chief complaint/Reason for consult: discomfort swallowing    HPI:  70 y/o male with PMHx of Lung CA, Esophageal CA, and Prostate CA, HTN, Obstructing Right kidney dz, Patient sent by Dr Knight, seen at office today for f/u of nephrostomy tube placement 3 days ago secondary to 8-10mm obstructing proximal stone.    Patient not eating to drinking for more than 1 week, significant decreased appetite due to complaints of DIFFICULTY swallowing. Pt states both food and water are getting "stuck" in throat and causing choking and vomiting episodes.  Pt described condition as intermittent and resolves intermittently.  Pt denies any chest pain , fever, dyspnea, cough, cold , fever or chills.  Pt denies any  complaints. RT nephrostomy in place not infectious. (16 Jun 2021 17:38)    GI Updates: 71yMale [facility]h lung cancer, esophageal cancer s/p esophageal resection and gastric pull through states he was having episodes where he would feel when he eats too quickly he would get a little pain in his throat that resolves spontaneously. Patient states he was eating normally prior to admission and is here primarily for renal stones.      PAST MEDICAL & SURGICAL HISTORY:   Hypertension, unspecified type    Cancer  ESOPHAGEAL CANCER 2017 RECEIVED CHEMO AND RADIATION    Renal calculi  obstructing right renal calculi 8-10mm    Cancer  left lung 2018, no chemo or rad rx    Abnormal cholesterol test    Cancer of esophagus  2017 RESECTION OF LOWER PORTION OF ESOPHAGUS    S/P lobectomy of lung  left ?JUNE 2018    S/P cystoscopy with ureteral stent placement  LEFT    Inguinal hernia  AS AN INFANT, RIGHT SIDE    History of esophagogastroduodenoscopy (EGD)  2019    H/O colonoscopy  2018          Family history:  FAMILY HISTORY:  Dementia (Mother)      No GI cancers in first or second degree relatives    Social History: No smoking. No alcohol. No illegal drug use.    Allergies:    No Known Allergies            MEDICATIONS: Home Medications:  ciprofloxacin 500 mg oral tablet: 1 tab(s) orally every 12 hours (16 Jun 2021 21:38)  famotidine 40 mg oral tablet: 1 tab(s) orally once a day (at bedtime) (16 Jun 2021 21:38)  ferrous sulfate (as elemental iron) 45 mg oral tablet, extended release: 1 tab(s) orally once a day (16 Jun 2021 21:38)  fosinopril 10 mg oral tablet: 1 tab(s) orally once a day (16 Jun 2021 21:38)  Paxil:  (16 Jun 2021 21:38)  Paxil 20 mg oral tablet: 1 tab(s) orally once a day (17 Jun 2021 11:28)  phenazopyridine 100 mg oral tablet: 2 tab(s) orally 3 times a day (after meals) (16 Jun 2021 21:38)      MEDICATIONS  (STANDING):  ampicillin/sulbactam  IVPB      ampicillin/sulbactam  IVPB 1.5 Gram(s) IV Intermittent every 6 hours  enoxaparin Injectable 40 milliGRAM(s) SubCutaneous daily  fat emulsion (Plant Based) 20% Infusion 1.619 Gm/kG/Day (31.3 mL/Hr) IV Continuous <Continuous>  ferrous    sulfate 325 milliGRAM(s) Oral daily  isosorbide   mononitrate ER Tablet (IMDUR) 30 milliGRAM(s) Oral daily  lisinopril 10 milliGRAM(s) Oral daily  pantoprazole  Injectable 40 milliGRAM(s) IV Push daily  Parenteral Nutrition - Adult 1 Each (70 mL/Hr) TPN Continuous <Continuous>  PARoxetine 20 milliGRAM(s) Oral daily  phenazopyridine 200 milliGRAM(s) Oral every 8 hours  tamsulosin 0.4 milliGRAM(s) Oral at bedtime            REVIEW OF SYSTEMS  General:  No weight loss, fevers, or chills.  Eyes:  No reported pain or visual changes  ENT:  No sore throat or runny nose.  NECK: No stiffness or lymphadenopathy  CV:  No chest pain or palpitations.  Resp:  No shortness of breath, cough, wheezing or hemoptysis  GI:  No abdominal pain, nausea, vomiting, dysphagia, diarrhea or constipation. No rectal bleeding, melena, or hematemesis.  Neuro:  No tingling, numbness       VITALS:   T(F): 97.5 (06-21-21 @ 05:00), Max: 99 (06-20-21 @ 22:05)  HR: 57 (06-21-21 @ 05:00) (57 - 66)  BP: 125/71 (06-21-21 @ 05:00) (111/66 - 125/71)  RR: 16 (06-21-21 @ 05:00) (16 - 16)  SpO2: --    PHYSICAL EXAM:  GENERAL: AAOx3, no acute distress.  HEAD:  Atraumatic, Normocephalic  EYES: conjunctiva and sclera clear  NECK: Supple, No thyromegaly   CHEST/LUNG: Clear to auscultation bilaterally; No wheeze, rhonchi, or rales  HEART: Regular rate and rhythm; normal S1, S2, No murmurs.  ABDOMEN: Soft, nontender, nondistended; Bowel sounds present  NEUROLOGY: No asterixis or tremor  SKIN: Intact, no jaundice          LABS:  06-21    140  |  104  |  14  ----------------------------<  101<H>  4.0   |  27  |  0.6<L>    Ca    8.2<L>      21 Jun 2021 06:46  Phos  3.1     06-21  Mg     2.0     06-21                            12.6   4.25  )-----------( 160      ( 21 Jun 2021 06:46 )             37.7           IMAGING:  < from: CT Angio Chest PE Protocol w/ IV Cont (06.19.21 @ 13:44) >    EXAM:  CT ANGIO CHEST PULM ART WAWIC            PROCEDURE DATE:  06/19/2021            INTERPRETATION:  CLINICAL HISTORY: Shortness of breath. Esophageal cancer.    TECHNIQUE: Multislice helical sections were obtained from the thoracic inlet to thelung bases during rapid administration of intravenous contrast utilizing pulmonary embolism protocol. Thin sections were reconstructed through the pulmonary vasculature. Coronal and sagittal reformatted images as well as MIP images are also submitted.    COMPARISON: CT chest dated 2/1/2018.    FINDINGS:    PULMONARY EMBOLUS: No pulmonary embolism. No CTA evidence of right heart strain.    LUNGS, PLEURA, AIRWAYS: Central tracheobronchial tree is patent. Small left pneumothorax. Right lower lobe groundglass opacity. Biapical bleb disease. Stable reticular opacities/fibrosis at the left lung base. Bilateral calcified granulomas.    THORACIC NODES: No axillary, mediastinal, or hilar lymphadenopathy.    MEDIASTINUM/GREAT VESSELS: No pericardial effusion. The thoracic aorta and main pulmonary trunk are normal in caliber. Aortic calcific atherosclerosis.    PARTIALLY IMAGED ABDOMEN: Postsurgical changes of the esophagus and stomach. Partially imaged right ureteral stent.    BONES/SOFT TISSUES: Degenerative changes of the spine.      IMPRESSION:    1.  Small left pneumothorax.  2.  Right lower lobe groundglass opacity.  3.  No pulmonary embolism or evidence of right heart strain.  4.  Since 2018, stable left basilar reticular opacities and biapical bleb disease.      Dr. Agustin Camacho discussed preliminary findings with QUYNH BELCHER on 6/19/2021 2:19 PM with readback.            AGUSTIN CAMACHO M.D., RESIDENT RADIOLOGIST  This document has been electronically signed.  MARQUEZ LUND MD; Attending Radiologist  This document has been electronically signed. Jun 19 2021  3:33PM    < end of copied text >      < from: CT Neck Soft Tissue w/ IV Cont (06.19.21 @ 13:44) >    EXAM:  CT NECK SOFT TISSUE IC            PROCEDURE DATE:  06/19/2021            INTERPRETATION:  INDICATIONS:  Dysphagia. History of distal esophageal cancer, status post resection, radiotherapy, and chemotherapy.    TECHNIQUE: Axial images were obtained following the intravenous administration of contrast material. Sagittal and coronal reformatted images were obtained. 95 cc of Optiray 320 were administered. 5 cc were discarded.    COMPARISON: PET/CT dated 5/2/2018    FINDINGS:    Mildly dilated proximal esophagus with thin peripheral enhancement of the mucosa with layering fluid noted. No discrete enhancing lesion is noted along the aerodigestive tract.    There is no cervical lymphadenopathy.    The parotid, submandibular, and thyroid glands are normal.    The carotid and vertebral arteries are patent.    The visualized intracranial and intraorbital compartments are unremarkable.    Degenerative change of the cervical spine.    Small left pneumothorax and partially imaged right lowerlobe groundglass opacity. Bilateral upper lobe paraseptal emphysema and bullous disease. Please refer to concurrently performed CT chest for detailed findings.      IMPRESSION:  .  Mildly dilated esophagus with thin peripheral mucosal enhancement which may represent sequela of radiation therapy versus esophagitis in the appropriate clinical setting. No discrete enhancing lesion identified along the aerodigestive tract.    Layering fluid is noted within the esophagus, recommend aspiration precaution.    Small left pneumothorax and partially imaged right lower lobe groundglass opacity. Bilateral upper lobe paraseptal emphysema and bullous disease. Please refer to concurrently performed CT chest for detailed findings.      Dr. Hernesto Barrientos discussed preliminary findings with QUYNH BELCHER on 6/19/2021 3:17 PM with readback.            HERNESTO BARRIENTOS M.D., RESIDENT RADIOLOGIST  This document has been electronically signed.  HERMILA PARADA M.D., ATTENDING RADIOLOGIST  This document has been electronically signed. Jun 19 2021  5:06PM    < end of copied text >      < from: Xray Esophagram Single Contrast (06.21.21 @ 11:04) >    EXAM:  XR ESOPH SNGL CON STUDY            PROCEDURE DATE:  06/21/2021            INTERPRETATION:  Clinical History / Reason for exam: Status post partial esophageal resection/gastric pull-through.    Double contrast barium esophagram.    EZ gas is given orally.    Following the oral ingestion of barium rapid sequence filming of the esophagus is performed under fluoroscopic control. The swallowing mechanism is intact. There is vallecular minimal residual that is cleared on successive swallows.. There is filling of the esophagus/gastric pull-through with no leak, mass or ulceration. A 13 mm barium tablet traverses the esophagus/gastric pull-through with no obstruction. The esophagus/gastric pull-through remains filled... There is filling of the stomach, duodenum and jejunum.. Right abdominal catheter. Thoracic and lumbar spine degenerative changes. Right renal calculus.    IMPRESSION: Status post partial esophageal resection/gastric pull-through with no obstruction or mass. Persistent filling of the esophagus/gastric pull-through in the recumbent and upright positions.. Vallecular minimal residual cleared on successive swallows.              ELEN GALEAS MD; Attending Radiologist  This document has been electronically signed. Jun 21 2021 11:34AM    < end of copied text >

## 2021-06-21 NOTE — CONSULT NOTE ADULT - ASSESSMENT
71yMale [facility] lung cancer, esophageal cancer s/p esophageal resection and gastric pull through states he was having episodes where he would feel when he eats too quickly he would get a little pain in his throat that resolves spontaneously.     Problem 1-Mildly dilated esophagus with thin peripheral mucosal enhancement which may represent sequela of radiation therapy versus esophagitis in the appropriate clinical setting. No discrete enhancing lesion identified along the aerodigestive tract.  Layering fluid is noted within the esophagus, recommend aspiration precaution.   Status post partial esophageal resection/gastric pull-through with no obstruction or mass. Persistent filling of the esophagus/gastric pull-through in the recumbent and upright positions.. Vallecular minimal residual cleared on successive swallows. on barium  Rec  -To discuss with Dr. Cortés  -likely plan to advance diet and follow-up with patient's team at [facility] or Follow up with our [facility] located at [address] [place] [address]. Phone Number: [phone] considr outpatient EGD    Problem 2-anemia no gross GI bleeding  Rec  - Follow up with our [facility] located at [address] [place] [address]. Phone Number: [phone] to schedule  colonoscopy    Problem 3-Small left pneumothorax and partially imaged right lower lobe groundglass opacity. Bilateral upper lobe paraseptal emphysema and bullous disease. Please refer to concurrently performed CT chest for detailed findings.  Rec  - Care as per primary team  71yMale [facility] lung cancer, esophageal cancer s/p esophageal resection and gastric pull through states he was having episodes where he would feel when he eats too quickly he would get a little pain in his throat that resolves spontaneously.     Problem 1-Mildly dilated esophagus with thin peripheral mucosal enhancement which may represent sequela of radiation therapy versus esophagitis in the appropriate clinical setting. No discrete enhancing lesion identified along the aerodigestive tract.  Layering fluid is noted within the esophagus, recommend aspiration precaution.   Status post partial esophageal resection/gastric pull-through with no obstruction or mass. Persistent filling of the esophagus/gastric pull-through in the recumbent and upright positions.. Vallecular minimal residual cleared on successive swallows. on barium  Rec  -likely plan to advance diet and follow-up with patient's team at [facility] or Follow up with our [facility] located at [address] [place] [address]. Phone Number: [phone] consider outpatient EGD  -Outpatient work-up    Problem 2-anemia no gross GI bleeding  Rec  - Follow up with our [facility] located at [address] [place] [address]. Phone Number: [phone] to schedule  colonoscopy    Problem 3-Small left pneumothorax and partially imaged right lower lobe groundglass opacity. Bilateral upper lobe paraseptal emphysema and bullous disease. Please refer to concurrently performed CT chest for detailed findings.  Rec  - Care as per primary team  71yMale [facility] lung cancer, esophageal cancer s/p esophageal resection and gastric pull through states he was having episodes where he would feel when he eats too quickly he would get a little pain in his throat that resolves spontaneously.     Problem 1-Mildly dilated esophagus with thin peripheral mucosal enhancement which may represent sequela of radiation therapy versus esophagitis in the appropriate clinical setting. No discrete enhancing lesion identified along the aerodigestive tract.  Layering fluid is noted within the esophagus, recommend aspiration precaution.  Status post partial esophageal resection/gastric pull-through with no obstruction or mass. Persistent filling of the esophagus/gastric pull-through in the recumbent and upright positions.. Vallecular minimal residual cleared on successive swallows. on barium  Rec  -advance diet and follow-up with patient's team at [facility] or Follow up with our [facility] located at [address] [place] [address]. Phone Number: [phone] consider outpatient EGD  -Outpatient work-up    Problem 2-Anemia no gross GI bleeding  Rec  - Follow up with our [facility] located at [address] [place] [address]. Phone Number: [phone] to schedule  colonoscopy    Problem 3-Small left pneumothorax and partially imaged right lower lobe groundglass opacity. Bilateral upper lobe paraseptal emphysema and bullous disease. Please refer to concurrently performed CT chest for detailed findings.  Rec  - Care as per primary team

## 2021-06-21 NOTE — PROGRESS NOTE ADULT - SUBJECTIVE AND OBJECTIVE BOX
SERINA CORREA  71y, Male  Allergy: No Known Allergies      OVERNIGHT EVENTS:      NAEO  new finding RLL PNA (Asp PNA? Hx radiation for esophageal Ca)  new CT 6.20.21: neg recurrent malignancy!     Admitted for Dysphagia associated w/ Odynophagia with Hx esophageal Ca, neg CT abd for Ca,   Mildly Dysphonia,  speech on board  Per [place]: PPN  Per Speech eval: clear diet    Rt Obstructing renal calculi w/ perc nephrostomy in place, new CT : neg malignancy    Discussed with daughter Ric [phone] on 2.20.21 updated care plan  Pending GI     Physical Exam:   GENERAL:  72y/o Male NAD, resting comfortably.  HEAD:  Atraumatic, Normocephalic  EYES: EOMI, PERRLA, conjunctiva and sclera clear  NECK: Supple, No JVD, no cervical lymphadenopathy, non-tender  CHEST/LUNG: Clear to auscultation bilaterally; No wheeze, rhonchi, or rales  HEART: Regular rate and rhythm; S1&S2  ABDOMEN: Soft, Nontender, Nondistended x 4 quadrants; Bowel sounds present; Perc nephrostomy placed right flank- no redness or drainage at incision site- clean.  EXTREMITIES:   Peripheral Pulses Present, No clubbing, no cyanosis, or no edema, no calf tenderness  PSYCH: AAOx3, cooperative, appropriate  NEUROLOGY: non focal  SKIN: no rash      VITALS:  T(F): 97.5 (06-21-21 @ 05:00), Max: 99 (06-20-21 @ 22:05)  HR: 57 (06-21-21 @ 05:00)  BP: 125/71 (06-21-21 @ 05:00) (111/66 - 125/71)  RR: 16 (06-21-21 @ 05:00)  SpO2: --    TESTS & MEASUREMENTS:      06-19-21 @ 07:01  -  06-20-21 @ 07:00  --------------------------------------------------------  IN: 2076.4 mL / OUT: 2650 mL / NET: -573.6 mL    06-20-21 @ 07:01  -  06-21-21 @ 07:00  --------------------------------------------------------  IN: 2726 mL / OUT: 2500 mL / NET: 226 mL    06-21-21 @ 07:01  -  06-21-21 @ 13:11  --------------------------------------------------------  IN: 0 mL / OUT: 600 mL / NET: -600 mL                            12.6   4.25  )-----------( 160      ( 21 Jun 2021 06:46 )             37.7       06-21    140  |  104  |  14  ----------------------------<  101<H>  4.0   |  27  |  0.6<L>    Ca    8.2<L>      21 Jun 2021 06:46  Phos  3.1     06-21  Mg     2.0     06-21              Culture - Blood (collected 06-19-21 @ 19:24)  Source: .Blood None  Preliminary Report (06-21-21 @ 05:01):    No growth to date.    Culture - Urine (collected 06-16-21 @ 10:31)  Source: .Urine Nephrostomy - Right  Final Report (06-17-21 @ 20:41):    No growth              MEDICATIONS:  MEDICATIONS  (STANDING):  ampicillin/sulbactam  IVPB      ampicillin/sulbactam  IVPB 1.5 Gram(s) IV Intermittent every 6 hours  enoxaparin Injectable 40 milliGRAM(s) SubCutaneous daily  fat emulsion (Plant Based) 20% Infusion 1.619 Gm/kG/Day (31.3 mL/Hr) IV Continuous <Continuous>  fat emulsion (Plant Based) 20% Infusion 1.619 Gm/kG/Day (31.3 mL/Hr) IV Continuous <Continuous>  ferrous    sulfate 325 milliGRAM(s) Oral daily  isosorbide   mononitrate ER Tablet (IMDUR) 30 milliGRAM(s) Oral daily  lisinopril 10 milliGRAM(s) Oral daily  pantoprazole  Injectable 40 milliGRAM(s) IV Push daily  Parenteral Nutrition - Adult 1 Each (70 mL/Hr) TPN Continuous <Continuous>  Parenteral Nutrition - Adult 1 Each (70 mL/Hr) TPN Continuous <Continuous>  PARoxetine 20 milliGRAM(s) Oral daily  phenazopyridine 200 milliGRAM(s) Oral every 8 hours  tamsulosin 0.4 milliGRAM(s) Oral at bedtime    MEDICATIONS  (PRN):              HEALTH ISSUES - PROBLEM Dx:  Urinary tract infection with hematuria, site unspecified  Urinary tract infection with hematuria, site unspecified    Dehydration  Dehydration    Pharyngoesophageal dysphagia  Pharyngoesophageal dysphagia    Dysphonia  Dysphonia    Renal calculi  Renal calculi    Hypertension, unspecified type  Hypertension, unspecified type    Palliative care by specialist  Palliative care by specialist

## 2021-06-21 NOTE — DISCHARGE NOTE PROVIDER - NSDCCPCAREPLAN_GEN_ALL_CORE_FT
PRINCIPAL DISCHARGE DIAGNOSIS  Diagnosis: Dehydration  Assessment and Plan of Treatment: resolved      SECONDARY DISCHARGE DIAGNOSES  Diagnosis: Renal stone  Assessment and Plan of Treatment:     Diagnosis: Pneumonia, aspiration  Assessment and Plan of Treatment: augmentin 875/125 twice daily for 10 days    Diagnosis: UTI (urinary tract infection)  Assessment and Plan of Treatment: resolved with antibiotics

## 2021-06-21 NOTE — DISCHARGE NOTE PROVIDER - NSDCMRMEDTOKEN_GEN_ALL_CORE_FT
Augmentin 875 mg-125 mg oral tablet: 1 tab(s) orally 2 times a day   famotidine 40 mg oral tablet: 1 tab(s) orally once a day (at bedtime)  ferrous sulfate (as elemental iron) 45 mg oral tablet, extended release: 1 tab(s) orally once a day  Flomax 0.4 mg oral capsule: 1 cap(s) orally once a day   fosinopril 10 mg oral tablet: 1 tab(s) orally once a day  isosorbide mononitrate 30 mg oral tablet, extended release: 1 tab(s) orally once a day  pantoprazole 40 mg oral delayed release tablet: 1 tab(s) orally 2 times a day  Paxil:   Paxil 20 mg oral tablet: 1 tab(s) orally once a day  phenazopyridine 100 mg oral tablet: 2 tab(s) orally 3 times a day (after meals)

## 2021-06-21 NOTE — DISCHARGE NOTE PROVIDER - HOSPITAL COURSE
72 y/o male with PMHx of Lung CA, Esophageal CA, and Prostate CA, HTN, Obstructing Right kidney dz, Patient sent by Dr Knight, seen at office today for f/u of nephrostomy tube placement 3 days ago secondary to 8-10mm obstructing proximal stone.        new finding RLL PNA (Asp PNA? Hx radiation for esophageal Ca)  on Unasyn  dc on augmentin    Dysphagia associated w/ Odynophagia with Hx esophageal ca  new CT neck chest: neg recurrent malignancy!   f/u GI, barium results  Per Speech eval: clear    UTI  cont Rocephin      Dehydration.    cont IV hydration NS    Rt Obstructing renal calculi, new CT: neg malignancy  s/p  perc nephrostomy in place  neg outflow obstruction or hydro    Hypertension, unspecified type.  Continue Fosinopril.   72 y/o male with PMHx of Lung CA, Esophageal CA, and Prostate CA, HTN, Obstructing Right kidney dz, Patient sent by Dr Knight, seen at office today for f/u of nephrostomy tube placement 3 days ago secondary to 8-10mm obstructing proximal stone.        new finding RLL PNA (Asp PNA? Hx radiation for esophageal Ca)  on Unasyn  dc on augmentin    Dysphagia associated w/ Odynophagia with Hx esophageal ca  new CT neck chest: neg recurrent malignancy!   f/u GI, barium results  Per Speech eval: clear    UTI  Rt Obstructing renal calculi,   s/p  perc nephrostomy in place  neg outflow obstruction or hydro  cont Rocephin      Dehydration.    cont IV hydration NS    Rt Obstructing renal calculi, new CT: neg malignancy  s/p  perc nephrostomy in place  neg outflow obstruction or hydro    Hypertension, unspecified type.  Continue Fosinopril.

## 2021-06-21 NOTE — PROGRESS NOTE ADULT - ASSESSMENT
· Assessment	  72 y/o male with PMHx of Lung CA, Esophageal CA, and Prostate CA, HTN, Obstructing Right kidney dz, Patient sent by Dr Knight, seen at office today for f/u of nephrostomy tube placement 3 days ago secondary to 8-10mm obstructing proximal stone.        new finding RLL PNA (Asp PNA? Hx radiation for esophageal Ca)  on Unasyn    Dysphagia associated w/ Odynophagia with Hx esophageal ca  new CT neck chest: neg recurrent malignancy!   f/u GI, barium results  Per Speech eval: clear    UTI  cont Rocephin      Dehydration.    cont IV hydration NS    Rt Obstructing renal calculi, new CT: neg malignancy  s/p  perc nephrostomy in place  neg outflow obstruction or hydro    Hypertension, unspecified type.  Continue Fosinopril.      Elevated D-Dimer  cont Lovenox    Per Speech eval: clear diet      Discussed with daughter Ric [phone], updated care plan  Pending GI, and barium study

## 2021-06-21 NOTE — DISCHARGE NOTE PROVIDER - PROVIDER TOKENS
PROVIDER:[TOKEN:[[id number]],FOLLOWUP:[2 weeks]],PROVIDER:[TOKEN:[[id number]],FOLLOWUP:[2 weeks]]

## 2021-06-21 NOTE — PHYSICAL THERAPY INITIAL EVALUATION ADULT - CRITERIA FOR SKILLED THERAPEUTIC INTERVENTIONS
impairments found/functional limitations in following categories/rehab potential/therapy frequency/predicted duration of therapy intervention/anticipated equipment needs at discharge

## 2021-06-21 NOTE — DISCHARGE NOTE PROVIDER - CARE PROVIDERS DIRECT ADDRESSES
[email or web address]

## 2021-06-21 NOTE — DISCHARGE NOTE NURSING/CASE MANAGEMENT/SOCIAL WORK - PATIENT PORTAL LINK FT
You can access the FollowMyHealth Patient Portal offered by [facility] by registering at the following website: [email or web address]. By joining [email or web address]’s FollowMyHealth portal, you will also be able to view your health information using other applications (apps) compatible with our system.

## 2021-06-21 NOTE — PHYSICAL THERAPY INITIAL EVALUATION ADULT - ADDITIONAL COMMENTS
Per patient, resides "mostly alone" in an apartment, 14 steps to enter.  Independent with all mobility/ADLs at baseline.

## 2021-06-21 NOTE — DISCHARGE NOTE PROVIDER - CARE PROVIDER_API CALL
Maria De Jesus Cortés)  Gastroenterology  [address] [place] [address]  Phone: [phone]  Fax: [phone]  Follow Up Time: 2 weeks    Julianne Eisenberg  [id number]  [address] [place] [address]  Phone: [phone]  Fax: ()-  Follow Up Time: 2 weeks

## 2021-06-22 ENCOUNTER — TRANSCRIPTION ENCOUNTER (OUTPATIENT)
Age: 71
End: 2021-06-22

## 2021-06-23 LAB — ZINC SERPL-MCNC: 67 UG/DL — SIGNIFICANT CHANGE UP (ref 44–115)

## 2021-06-25 LAB
CULTURE RESULTS: SIGNIFICANT CHANGE UP
SPECIMEN SOURCE: SIGNIFICANT CHANGE UP

## 2021-06-29 ENCOUNTER — OUTPATIENT (OUTPATIENT)
Dept: OUTPATIENT SERVICES | Facility: HOSPITAL | Age: 71
LOS: 1 days | Discharge: HOME | End: 2021-06-29
Payer: MEDICARE

## 2021-06-29 VITALS
WEIGHT: 130.07 LBS | DIASTOLIC BLOOD PRESSURE: 70 MMHG | RESPIRATION RATE: 16 BRPM | OXYGEN SATURATION: 97 % | HEART RATE: 54 BPM | TEMPERATURE: 98 F | HEIGHT: 70 IN | SYSTOLIC BLOOD PRESSURE: 110 MMHG

## 2021-06-29 DIAGNOSIS — K40.90 UNILATERAL INGUINAL HERNIA, WITHOUT OBSTRUCTION OR GANGRENE, NOT SPECIFIED AS RECURRENT: Chronic | ICD-10-CM

## 2021-06-29 DIAGNOSIS — N20.0 CALCULUS OF KIDNEY: ICD-10-CM

## 2021-06-29 DIAGNOSIS — C61 MALIGNANT NEOPLASM OF PROSTATE: Chronic | ICD-10-CM

## 2021-06-29 DIAGNOSIS — Z98.890 OTHER SPECIFIED POSTPROCEDURAL STATES: Chronic | ICD-10-CM

## 2021-06-29 DIAGNOSIS — C15.9 MALIGNANT NEOPLASM OF ESOPHAGUS, UNSPECIFIED: Chronic | ICD-10-CM

## 2021-06-29 DIAGNOSIS — Z90.2 ACQUIRED ABSENCE OF LUNG [PART OF]: Chronic | ICD-10-CM

## 2021-06-29 DIAGNOSIS — Z01.818 ENCOUNTER FOR OTHER PREPROCEDURAL EXAMINATION: ICD-10-CM

## 2021-06-29 DIAGNOSIS — Z96.0 PRESENCE OF UROGENITAL IMPLANTS: Chronic | ICD-10-CM

## 2021-06-29 LAB
APPEARANCE UR: ABNORMAL
BACTERIA # UR AUTO: NEGATIVE — SIGNIFICANT CHANGE UP
BILIRUB UR-MCNC: NEGATIVE — SIGNIFICANT CHANGE UP
COLOR SPEC: YELLOW — SIGNIFICANT CHANGE UP
DIFF PNL FLD: ABNORMAL
EPI CELLS # UR: 3 /HPF — SIGNIFICANT CHANGE UP (ref 0–5)
GLUCOSE UR QL: NEGATIVE — SIGNIFICANT CHANGE UP
HYALINE CASTS # UR AUTO: 6 /LPF — SIGNIFICANT CHANGE UP (ref 0–7)
KETONES UR-MCNC: NEGATIVE — SIGNIFICANT CHANGE UP
LEUKOCYTE ESTERASE UR-ACNC: ABNORMAL
NITRITE UR-MCNC: NEGATIVE — SIGNIFICANT CHANGE UP
PH UR: 6 — SIGNIFICANT CHANGE UP (ref 5–8)
PROT UR-MCNC: ABNORMAL
RBC CASTS # UR COMP ASSIST: >720 /HPF — HIGH (ref 0–4)
SP GR SPEC: 1.02 — SIGNIFICANT CHANGE UP (ref 1.01–1.03)
UROBILINOGEN FLD QL: SIGNIFICANT CHANGE UP
WBC UR QL: 3 /HPF — SIGNIFICANT CHANGE UP (ref 0–5)

## 2021-06-29 PROCEDURE — 93010 ELECTROCARDIOGRAM REPORT: CPT

## 2021-06-29 RX ORDER — PHENAZOPYRIDINE HCL 100 MG
2 TABLET ORAL
Qty: 0 | Refills: 0 | DISCHARGE

## 2021-06-29 NOTE — H&P PST ADULT - NSICDXPASTMEDICALHX_GEN_ALL_CORE_FT
PAST MEDICAL HISTORY:  Abnormal cholesterol test     Cancer left lung 2018, no chemo or rad rx and resection    Cancer ESOPHAGEAL CANCER 2017 RECEIVED CHEMO AND RADIATION and endoscopic resection partial oesophagus and stomach    Hypertension, unspecified type     Prostate cancer     Renal calculi obstructing right renal calculi 8-10mm     PAST MEDICAL HISTORY:  Abnormal cholesterol test     Cancer left lung 2018, no chemo or rad rx and resection    Cancer ESOPHAGEAL CANCER 2017 RECEIVED CHEMO AND RADIATION and endoscopic resection partial oesophagus and stomach    [place] (hard of hearing)     Hypertension, unspecified type     Prostate cancer     Renal calculi nephrostomy tube -6/2021

## 2021-06-29 NOTE — H&P PST ADULT - NSICDXPASTSURGICALHX_GEN_ALL_CORE_FT
PAST SURGICAL HISTORY:  Cancer of esophagus 2017 RESECTION OF LOWER PORTION OF ESOPHAGUS    H/O colonoscopy 2018    History of esophagogastroduodenoscopy (EGD) 2019    Inguinal hernia AS AN INFANT, RIGHT SIDE    Prostate cancer cyber knife intervention 2021    S/P cystoscopy with ureteral stent placement LEFT    S/P lobectomy of lung left ?JUNE 2018

## 2021-06-29 NOTE — H&P PST ADULT - HISTORY OF PRESENT ILLNESS
71YR old male presents to pretesting -is scheduled for RT ESWL. Was admitted to [facility] 6/2021- RT flank pain -wk up revealed RT kidney stones. Is on Augmentin ? PNA? Note UA -Positive nitrites and leuks- will send is C/S today-is asymptomatic. Denies COVID S/S. Verbalized understanding of COVID prevention measures. Exercise [place] 2-3 flat blocks slowly LTD by fatigue.  Anesthesia Alert  NO--Difficult Airway  NO--History of neck surgery or radiation  NO--Limited ROM of neck  NO--History of Malignant hyperthermia  NO--Personal or family history of Pseudocholinesterase deficiency  NO--Prior Anesthesia Complication  NO--Latex Allergy  NO--Loose teeth  NO--History of Rheumatoid Arthritis  NO--FLEX  No Bleeding risk  NO--Other_____  71YR old male presents to pretesting -is scheduled for RT ESWL. Was admitted to [facility] 6/2021- RT flank pain -wk up revealed RT kidney stones s/p RT nephrostomy tube placement. Is on Augmentin ? PNA? has 1or 2 days dosage left- " feeling much better and eating better" with wt maintained.  Note UA -Positive nitrites and leuks- will send is C/S today-is asymptomatic. Denies COVID S/S. Verbalized understanding of COVID prevention measures. Exercise [place] 2-3 flat blocks slowly LTD by fatigue.  Anesthesia Alert  NO--Difficult Airway  NO--History of neck surgery or radiation  NO--Limited ROM of neck  NO--History of Malignant hyperthermia  NO--Personal or family history of Pseudocholinesterase deficiency  NO--Prior Anesthesia Complication  NO--Latex Allergy  NO--Loose teeth  NO--History of Rheumatoid Arthritis  NO--FLEX  No Bleeding risk  NO--Other_____

## 2021-06-29 NOTE — H&P PST ADULT - RS GEN PE MLT RESP DETAILS PC
BASE/respirations non-labored/no chest wall tenderness/no intercostal retractions/diminished breath sounds, L

## 2021-06-30 NOTE — CHART NOTE - NSCHARTNOTEFT_GEN_A_CORE
As per RN the patient is unable to tolerate PO Potassium: will change to IV route
UTI with Rt Obstructing renal calculi (risk factor of)  s/p  perc nephrostomy in place  neg outflow obstruction or hydro  cont Rocephin
Patient c/o burning sensation sub sternal  -change from PO Pepcid to IV Protonix

## 2021-07-01 LAB
CULTURE RESULTS: NO GROWTH — SIGNIFICANT CHANGE UP
SPECIMEN SOURCE: SIGNIFICANT CHANGE UP

## 2021-07-03 ENCOUNTER — INPATIENT (INPATIENT)
Facility: HOSPITAL | Age: 71
LOS: 2 days | Discharge: HOME | End: 2021-07-06
Attending: INTERNAL MEDICINE | Admitting: INTERNAL MEDICINE
Payer: MEDICARE

## 2021-07-03 VITALS
DIASTOLIC BLOOD PRESSURE: 58 MMHG | WEIGHT: 130.07 LBS | OXYGEN SATURATION: 98 % | TEMPERATURE: 97 F | HEART RATE: 103 BPM | SYSTOLIC BLOOD PRESSURE: 97 MMHG | HEIGHT: 70 IN | RESPIRATION RATE: 18 BRPM

## 2021-07-03 DIAGNOSIS — K40.90 UNILATERAL INGUINAL HERNIA, WITHOUT OBSTRUCTION OR GANGRENE, NOT SPECIFIED AS RECURRENT: Chronic | ICD-10-CM

## 2021-07-03 DIAGNOSIS — Z90.2 ACQUIRED ABSENCE OF LUNG [PART OF]: Chronic | ICD-10-CM

## 2021-07-03 DIAGNOSIS — Z96.0 PRESENCE OF UROGENITAL IMPLANTS: Chronic | ICD-10-CM

## 2021-07-03 DIAGNOSIS — Z98.890 OTHER SPECIFIED POSTPROCEDURAL STATES: Chronic | ICD-10-CM

## 2021-07-03 DIAGNOSIS — C61 MALIGNANT NEOPLASM OF PROSTATE: Chronic | ICD-10-CM

## 2021-07-03 DIAGNOSIS — C15.9 MALIGNANT NEOPLASM OF ESOPHAGUS, UNSPECIFIED: Chronic | ICD-10-CM

## 2021-07-03 LAB
ALBUMIN SERPL ELPH-MCNC: 3.9 G/DL — SIGNIFICANT CHANGE UP (ref 3.5–5.2)
ALP SERPL-CCNC: 107 U/L — SIGNIFICANT CHANGE UP (ref 30–115)
ALT FLD-CCNC: 17 U/L — SIGNIFICANT CHANGE UP (ref 0–41)
ANION GAP SERPL CALC-SCNC: 13 MMOL/L — SIGNIFICANT CHANGE UP (ref 7–14)
APPEARANCE UR: ABNORMAL
AST SERPL-CCNC: 19 U/L — SIGNIFICANT CHANGE UP (ref 0–41)
BACTERIA # UR AUTO: ABNORMAL
BASOPHILS # BLD AUTO: 0.03 K/UL — SIGNIFICANT CHANGE UP (ref 0–0.2)
BASOPHILS NFR BLD AUTO: 0.6 % — SIGNIFICANT CHANGE UP (ref 0–1)
BILIRUB DIRECT SERPL-MCNC: 0.2 MG/DL — SIGNIFICANT CHANGE UP (ref 0–0.2)
BILIRUB INDIRECT FLD-MCNC: 0.8 MG/DL — SIGNIFICANT CHANGE UP (ref 0.2–1.2)
BILIRUB SERPL-MCNC: 1 MG/DL — SIGNIFICANT CHANGE UP (ref 0.2–1.2)
BILIRUB UR-MCNC: ABNORMAL
BUN SERPL-MCNC: 21 MG/DL — HIGH (ref 10–20)
CALCIUM SERPL-MCNC: 9.7 MG/DL — SIGNIFICANT CHANGE UP (ref 8.5–10.1)
CHLORIDE SERPL-SCNC: 99 MMOL/L — SIGNIFICANT CHANGE UP (ref 98–110)
CO2 SERPL-SCNC: 26 MMOL/L — SIGNIFICANT CHANGE UP (ref 17–32)
COD CRY URNS QL: NEGATIVE — SIGNIFICANT CHANGE UP
COLOR SPEC: YELLOW — SIGNIFICANT CHANGE UP
CREAT SERPL-MCNC: 1.1 MG/DL — SIGNIFICANT CHANGE UP (ref 0.7–1.5)
DIFF PNL FLD: ABNORMAL
EOSINOPHIL # BLD AUTO: 0.08 K/UL — SIGNIFICANT CHANGE UP (ref 0–0.7)
EOSINOPHIL NFR BLD AUTO: 1.6 % — SIGNIFICANT CHANGE UP (ref 0–8)
EPI CELLS # UR: ABNORMAL /HPF
GLUCOSE SERPL-MCNC: 128 MG/DL — HIGH (ref 70–99)
GLUCOSE UR QL: NEGATIVE MG/DL — SIGNIFICANT CHANGE UP
GRAN CASTS # UR COMP ASSIST: NEGATIVE — SIGNIFICANT CHANGE UP
HCT VFR BLD CALC: 44.5 % — SIGNIFICANT CHANGE UP (ref 42–52)
HGB BLD-MCNC: 14.5 G/DL — SIGNIFICANT CHANGE UP (ref 14–18)
HYALINE CASTS # UR AUTO: NEGATIVE — SIGNIFICANT CHANGE UP
IMM GRANULOCYTES NFR BLD AUTO: 0.8 % — HIGH (ref 0.1–0.3)
KETONES UR-MCNC: ABNORMAL
LACTATE SERPL-SCNC: 2.1 MMOL/L — HIGH (ref 0.7–2)
LEUKOCYTE ESTERASE UR-ACNC: ABNORMAL
LIDOCAIN IGE QN: 47 U/L — SIGNIFICANT CHANGE UP (ref 7–60)
LYMPHOCYTES # BLD AUTO: 0.7 K/UL — LOW (ref 1.2–3.4)
LYMPHOCYTES # BLD AUTO: 14 % — LOW (ref 20.5–51.1)
MAGNESIUM SERPL-MCNC: 1.8 MG/DL — SIGNIFICANT CHANGE UP (ref 1.8–2.4)
MCHC RBC-ENTMCNC: 30.3 PG — SIGNIFICANT CHANGE UP (ref 27–31)
MCHC RBC-ENTMCNC: 32.6 G/DL — SIGNIFICANT CHANGE UP (ref 32–37)
MCV RBC AUTO: 92.9 FL — SIGNIFICANT CHANGE UP (ref 80–94)
MONOCYTES # BLD AUTO: 0.48 K/UL — SIGNIFICANT CHANGE UP (ref 0.1–0.6)
MONOCYTES NFR BLD AUTO: 9.6 % — HIGH (ref 1.7–9.3)
NEUTROPHILS # BLD AUTO: 3.68 K/UL — SIGNIFICANT CHANGE UP (ref 1.4–6.5)
NEUTROPHILS NFR BLD AUTO: 73.4 % — SIGNIFICANT CHANGE UP (ref 42.2–75.2)
NITRITE UR-MCNC: NEGATIVE — SIGNIFICANT CHANGE UP
NRBC # BLD: 0 /100 WBCS — SIGNIFICANT CHANGE UP (ref 0–0)
PH UR: 5.5 — SIGNIFICANT CHANGE UP (ref 5–8)
PLATELET # BLD AUTO: 165 K/UL — SIGNIFICANT CHANGE UP (ref 130–400)
POTASSIUM SERPL-MCNC: 4.7 MMOL/L — SIGNIFICANT CHANGE UP (ref 3.5–5)
POTASSIUM SERPL-SCNC: 4.7 MMOL/L — SIGNIFICANT CHANGE UP (ref 3.5–5)
PROT SERPL-MCNC: 6.8 G/DL — SIGNIFICANT CHANGE UP (ref 6–8)
PROT UR-MCNC: 100 MG/DL
RBC # BLD: 4.79 M/UL — SIGNIFICANT CHANGE UP (ref 4.7–6.1)
RBC # FLD: 13.8 % — SIGNIFICANT CHANGE UP (ref 11.5–14.5)
RBC CASTS # UR COMP ASSIST: ABNORMAL /HPF
SARS-COV-2 RNA SPEC QL NAA+PROBE: SIGNIFICANT CHANGE UP
SODIUM SERPL-SCNC: 138 MMOL/L — SIGNIFICANT CHANGE UP (ref 135–146)
SP GR SPEC: >=1.03 (ref 1.01–1.03)
TRI-PHOS CRY UR QL COMP ASSIST: NEGATIVE — SIGNIFICANT CHANGE UP
URATE CRY FLD QL MICRO: NEGATIVE — SIGNIFICANT CHANGE UP
UROBILINOGEN FLD QL: 0.2 MG/DL — SIGNIFICANT CHANGE UP (ref 0.2–0.2)
WBC # BLD: 5.01 K/UL — SIGNIFICANT CHANGE UP (ref 4.8–10.8)
WBC # FLD AUTO: 5.01 K/UL — SIGNIFICANT CHANGE UP (ref 4.8–10.8)
WBC UR QL: SIGNIFICANT CHANGE UP /HPF

## 2021-07-03 PROCEDURE — 71046 X-RAY EXAM CHEST 2 VIEWS: CPT | Mod: 26

## 2021-07-03 PROCEDURE — 99285 EMERGENCY DEPT VISIT HI MDM: CPT

## 2021-07-03 PROCEDURE — 99223 1ST HOSP IP/OBS HIGH 75: CPT

## 2021-07-03 RX ORDER — CEFTRIAXONE 500 MG/1
1000 INJECTION, POWDER, FOR SOLUTION INTRAMUSCULAR; INTRAVENOUS ONCE
Refills: 0 | Status: COMPLETED | OUTPATIENT
Start: 2021-07-03 | End: 2021-07-03

## 2021-07-03 RX ORDER — SODIUM CHLORIDE 9 MG/ML
1000 INJECTION INTRAMUSCULAR; INTRAVENOUS; SUBCUTANEOUS ONCE
Refills: 0 | Status: COMPLETED | OUTPATIENT
Start: 2021-07-03 | End: 2021-07-03

## 2021-07-03 RX ORDER — TAMSULOSIN HYDROCHLORIDE 0.4 MG/1
0.4 CAPSULE ORAL AT BEDTIME
Refills: 0 | Status: DISCONTINUED | OUTPATIENT
Start: 2021-07-03 | End: 2021-07-06

## 2021-07-03 RX ORDER — ONDANSETRON 8 MG/1
4 TABLET, FILM COATED ORAL ONCE
Refills: 0 | Status: DISCONTINUED | OUTPATIENT
Start: 2021-07-03 | End: 2021-07-06

## 2021-07-03 RX ORDER — DIPHENHYDRAMINE HYDROCHLORIDE AND LIDOCAINE HYDROCHLORIDE AND ALUMINUM HYDROXIDE AND MAGNESIUM HYDRO
30 KIT ONCE
Refills: 0 | Status: COMPLETED | OUTPATIENT
Start: 2021-07-03 | End: 2021-07-03

## 2021-07-03 RX ORDER — PANTOPRAZOLE SODIUM 20 MG/1
40 TABLET, DELAYED RELEASE ORAL DAILY
Refills: 0 | Status: DISCONTINUED | OUTPATIENT
Start: 2021-07-03 | End: 2021-07-05

## 2021-07-03 RX ORDER — ENOXAPARIN SODIUM 100 MG/ML
40 INJECTION SUBCUTANEOUS AT BEDTIME
Refills: 0 | Status: DISCONTINUED | OUTPATIENT
Start: 2021-07-03 | End: 2021-07-06

## 2021-07-03 RX ORDER — SUCRALFATE 1 G
1 TABLET ORAL ONCE
Refills: 0 | Status: COMPLETED | OUTPATIENT
Start: 2021-07-03 | End: 2021-07-03

## 2021-07-03 RX ORDER — LISINOPRIL 2.5 MG/1
10 TABLET ORAL DAILY
Refills: 0 | Status: DISCONTINUED | OUTPATIENT
Start: 2021-07-03 | End: 2021-07-05

## 2021-07-03 RX ADMIN — ENOXAPARIN SODIUM 40 MILLIGRAM(S): 100 INJECTION SUBCUTANEOUS at 22:06

## 2021-07-03 RX ADMIN — DIPHENHYDRAMINE HYDROCHLORIDE AND LIDOCAINE HYDROCHLORIDE AND ALUMINUM HYDROXIDE AND MAGNESIUM HYDRO 30 MILLILITER(S): KIT at 18:47

## 2021-07-03 RX ADMIN — Medication 1 GRAM(S): at 18:48

## 2021-07-03 RX ADMIN — CEFTRIAXONE 100 MILLIGRAM(S): 500 INJECTION, POWDER, FOR SOLUTION INTRAMUSCULAR; INTRAVENOUS at 18:39

## 2021-07-03 RX ADMIN — TAMSULOSIN HYDROCHLORIDE 0.4 MILLIGRAM(S): 0.4 CAPSULE ORAL at 22:06

## 2021-07-03 RX ADMIN — SODIUM CHLORIDE 1000 MILLILITER(S): 9 INJECTION INTRAMUSCULAR; INTRAVENOUS; SUBCUTANEOUS at 17:30

## 2021-07-03 NOTE — H&P ADULT - ASSESSMENT
71 yr old male with hx of lung cancer, esophageal cancer s/p esophageal resection and gastric pull, Prostate Ca, HTN, Rt Obstructing renal calculi s/p  perc nephrostomy in place presented to ER for decreased PO intake for 4 days.    # Dysphagia associated w/ Odynophagia with Hx esophageal Ca likely secondary to radiation therapy   # s/p partial esophageal resection/gastric pull-through with no obstruction or mass  - barium esophagram (06/21/21): Persistent filling of the esophagus/gastric pull-through in the recumbent and upright positions. Valle[place] minimal residual cleared on successive swallows.  - aspiration precaution  - NPO for now   - recall speech and swallow   - c/w Protonix   - GI consult     # Rt Obstructing renal calculi, new CT: neg malignancy  - s/p  perc nephrostomy in place  - neg outflow obstruction or hydro    # Hypertension  - c/w Enalapril     # Depression   - stable  - c/w Paxil     # Prostate Ca  - s/p brachytherapy seeds  - c/w Flomax     # DVT ppx  - start Lovenox     # Ambulate as tolerated    # Full code 71 yr old male with hx of lung cancer, esophageal cancer s/p esophageal resection and gastric pull, Prostate Ca, HTN, Rt Obstructing renal calculi s/p  perc nephrostomy in place presented to ER for decreased PO intake for 4 days.    # Dysphagia associated w/ Odynophagia with   # Hx esophageal Ca s/p chemo and radiation therapy; s/p partial esophageal resection/gastric pull-through  # Malnutrition   - symptoms for past 2 months, worse over past 2 weeks   - barium esophagram (06/21/21): Persistent filling of the esophagus/gastric pull-through in the recumbent and upright positions. Vallecular minimal residual cleared on successive swallows.  - aspiration precaution  - [facility] soft diet  - recall speech and swallow   - c/w Protonix   - GI consult     # Rt Obstructing renal calculi s/p perc nephrostomy in place  - outpt f/u urology    # Hypertension  - c/w Enalapril     # Depression   - stable  - c/w Paxil     # Prostate Ca  - s/p brachytherapy seeds  - c/w Flomax     # DVT ppx  - start Lovenox     # Ambulate as tolerated    # Full code

## 2021-07-03 NOTE — ED PROVIDER NOTE - PMH
Abnormal cholesterol test    Cancer  left lung 2018, no chemo or rad rx and resection  Cancer  ESOPHAGEAL CANCER 2017 RECEIVED CHEMO AND RADIATION and endoscopic resection partial oesophagus and stomach  [place]hard of hearing[place]    Hypertension, unspecified type    Prostate cancer    Renal calculi  nephrostomy tube -6/2021

## 2021-07-03 NOTE — H&P ADULT - NSHPPHYSICALEXAM_GEN_ALL_CORE
T(C): 36.2 (07-03-21 @ 17:02), Max: 36.2 (07-03-21 @ 17:02)  HR: 103 (07-03-21 @ 17:02) (103 - 103)  BP: 97/58 (07-03-21 @ 17:02) (97/58 - 97/58)  RR: 18 (07-03-21 @ 17:02) (18 - 18)  SpO2: 98% (07-03-21 @ 17:02) (98% - 98%)        GENERAL: NAD, well-developed  HEAD:  Atraumatic, Normocephalic  EYES: EOMI, PERRLA, conjunctiva and sclera clear  ENT: Normal tympanic membrane. No nasal obstruction or discharge. No tonsillar exudate, swelling or erythema.  NECK: Supple, No JVD  CHEST/LUNG: Clear to auscultation bilaterally; No wheeze  HEART: Regular rate and rhythm; No murmurs, rubs, or gallops  ABDOMEN: Soft, Nontender, Nondistended; Bowel sounds present  EXTREMITIES:  2+ Peripheral Pulses, No clubbing, cyanosis, or edema  PSYCH: AAOx3  NEUROLOGY: non-focal  SKIN: No rashes or lesions T(C): 36.2 (07-03-21 @ 17:02), Max: 36.2 (07-03-21 @ 17:02)  HR: 103 (07-03-21 @ 17:02) (103 - 103)  BP: 97/58 (07-03-21 @ 17:02) (97/58 - 97/58)  RR: 18 (07-03-21 @ 17:02) (18 - 18)  SpO2: 98% (07-03-21 @ 17:02) (98% - 98%)    GENERAL: NAD, malnourished   HEAD:  Atraumatic, Normocephalic  EYES: EOMI, PERRLA, conjunctiva and sclera clear  ENT: Normal tympanic membrane. No nasal obstruction or discharge. No tonsillar exudate, swelling or erythema.  NECK: Supple, No JVD  CHEST/LUNG: Clear to auscultation bilaterally; No wheeze  HEART: Regular rate and rhythm; No murmurs, rubs, or gallops  ABDOMEN: Soft, Nontender, Nondistended; Bowel sounds present, perc nephrostomy  EXTREMITIES:  2+ Peripheral Pulses, No clubbing, cyanosis, or edema  PSYCH: AAOx3  NEUROLOGY: non-focal  SKIN: No rashes or lesions

## 2021-07-03 NOTE — PATIENT PROFILE ADULT - NSPRONUTRITIONRISK_GEN_A_NUR
No indicators present Significant decrease of oral intake greater than 3 days prior to admission/Unintentional weight loss prior to admission

## 2021-07-03 NOTE — ED PROVIDER NOTE - OBJECTIVE STATEMENT
70 y/o male with PMHx of Lung CA, Esophageal CA, and Prostate CA, HTN, Obstructing Right kidney dz s/p L nephrostomy tube placement or obstructing stone presenting to ER for evaluation of Albert Roberts)

## 2021-07-03 NOTE — ED PROVIDER NOTE - PROGRESS NOTE DETAILS
I personally evaluated the patient. I reviewed the Resident’s or Physician Assistant’s note (as assigned above), and agree with the findings and plan except as documented in my note.  72 y/o M with PMHx HTN and  right sided nephroureterostomy tube presents for evaluation of decreased PO intake x2 weeks. Pt was here 2 weeks ago for similar sxs and was admitted. Pt states hasn't been eating or drinking since being discharged and that  he’s lost weight over this time and hasn’t had an appetite. No fever, chills, HA, CP, SOB, abdominal pain, n/v/d, numbness, weakness or tingling. On exam: NCAT. PERRLA, EOMI. OP clear, +DMM. Lungs CTAB. RRR, S1S2 noted. Radial pulses 2+ and equal, pedal pulses 2+ and equal. Abdomen soft, NT/ND, no rebound or guarding. FROM x4 extremities. No focal neuro deficits. A/P: EKG, labs, IVF. Given pt has decreased PO, will plan for admission.

## 2021-07-03 NOTE — H&P ADULT - HISTORY OF PRESENT ILLNESS
71 yr old male with hx of lung cancer, esophageal cancer s/p esophageal resection and gastric pull, Prostate Ca, HTN, Rt Obstructing renal calculi s/p  perc nephrostomy in place presented to ER for decreased PO intake for 4 days. Patient not eating to drinking for more than 1 week, significant decreased appetite due to complaints of DIFFICULTY swallowing. Pt states both food and water are getting "stuck" in throat and causing choking and vomiting episodes.  Pt described condition as intermittent and resolves intermittently.  Pt denies any chest pain , fever, dyspnea, cough, cold , fever or chills. Pt denies any  complaints.     71 yr old male with hx of lung cancer, esophageal cancer s/p esophageal resection and gastric pull, Prostate Ca, HTN, Rt Obstructing renal calculi s/p  perc nephrostomy in place presented to ER for decreased PO intake for 4 days. Patient not eating to drinking for more than 2 week, significant decreased appetite due to complaints of DIFFICULTY swallowing and significant weight loss. Pt states both food and water are getting "stuck" in throat and causing choking and vomiting episodes. He also noticed saliva accumulating in the mouth overnight  Pt denies any chest pain , fever, dyspnea, cough, cold , fever or chills. Pt denies any  complains.

## 2021-07-03 NOTE — ED PROVIDER NOTE - ENMT, MLM
Airway patent, Nasal mucosa clear. Mouth with normal mucosa. Throat has no vesicles, no oropharyngeal exudates and uvula is midline.  dry mucous membranes

## 2021-07-03 NOTE — H&P ADULT - NSHPLABSRESULTS_GEN_ALL_CORE
14.5   5.01  )-----------( 165      ( 03 Jul 2021 17:29 )             44.5       07-03    138  |  99  |  21<H>  ----------------------------<  128<H>  4.7   |  26  |  1.1    Ca    9.7      03 Jul 2021 17:29  Mg     1.8     07-03    TPro  6.8  /  Alb  3.9  /  TBili  1.0  /  DBili  0.2  /  AST  19  /  ALT  17  /  AlkPhos  107  07-03      Xray Chest: Left basilar opacity/effusion, unchanged (read by me)

## 2021-07-03 NOTE — ED PROVIDER NOTE - CLINICAL SUMMARY MEDICAL DECISION MAKING FREE TEXT BOX
patient presents for evaluation of decreased po, he has not eaten since discharge per the patient and family he denies any chest pain, sob, abdominal pain or back pain. he denies any fevers or chills his exam reveals dry mucous membranes with no abdominal pain no guarding or rebound noted   I have reviewed prior charting I will admit for further workup at this time

## 2021-07-03 NOTE — H&P ADULT - NSICDXPASTMEDICALHX_GEN_ALL_CORE_FT
PAST MEDICAL HISTORY:  Abnormal cholesterol test     Cancer left lung 2018, no chemo or rad rx and resection    Cancer ESOPHAGEAL CANCER 2017 RECEIVED CHEMO AND RADIATION and endoscopic resection partial oesophagus and stomach    [place] (hard of hearing)     Hypertension, unspecified type     Prostate cancer     Renal calculi nephrostomy tube -6/2021

## 2021-07-03 NOTE — ED PROVIDER NOTE - PSH
Cancer of esophagus  2017 RESECTION OF LOWER PORTION OF ESOPHAGUS  H/O colonoscopy  2018  History of esophagogastroduodenoscopy (EGD)  2019  Inguinal hernia  AS AN INFANT, RIGHT SIDE  Prostate cancer  cyber knife intervention 2021  S/P cystoscopy with ureteral stent placement  LEFT  S/P lobectomy of lung  left ?JUNE 2018

## 2021-07-04 DIAGNOSIS — D64.9 ANEMIA, UNSPECIFIED: ICD-10-CM

## 2021-07-04 DIAGNOSIS — Z85.46 PERSONAL HISTORY OF MALIGNANT NEOPLASM OF PROSTATE: ICD-10-CM

## 2021-07-04 DIAGNOSIS — N20.0 CALCULUS OF KIDNEY: ICD-10-CM

## 2021-07-04 DIAGNOSIS — E87.6 HYPOKALEMIA: ICD-10-CM

## 2021-07-04 DIAGNOSIS — Z92.3 PERSONAL HISTORY OF IRRADIATION: ICD-10-CM

## 2021-07-04 DIAGNOSIS — Z93.6 OTHER ARTIFICIAL OPENINGS OF URINARY TRACT STATUS: ICD-10-CM

## 2021-07-04 DIAGNOSIS — J69.0 PNEUMONITIS DUE TO INHALATION OF FOOD AND VOMIT: ICD-10-CM

## 2021-07-04 DIAGNOSIS — N39.0 URINARY TRACT INFECTION, SITE NOT SPECIFIED: ICD-10-CM

## 2021-07-04 DIAGNOSIS — R62.7 ADULT FAILURE TO THRIVE: ICD-10-CM

## 2021-07-04 DIAGNOSIS — R49.0 DYSPHONIA: ICD-10-CM

## 2021-07-04 DIAGNOSIS — R13.14 DYSPHAGIA, PHARYNGOESOPHAGEAL PHASE: ICD-10-CM

## 2021-07-04 DIAGNOSIS — Z85.118 PERSONAL HISTORY OF OTHER MALIGNANT NEOPLASM OF BRONCHUS AND LUNG: ICD-10-CM

## 2021-07-04 DIAGNOSIS — Z85.01 PERSONAL HISTORY OF MALIGNANT NEOPLASM OF ESOPHAGUS: ICD-10-CM

## 2021-07-04 DIAGNOSIS — I10 ESSENTIAL (PRIMARY) HYPERTENSION: ICD-10-CM

## 2021-07-04 DIAGNOSIS — R53.1 WEAKNESS: ICD-10-CM

## 2021-07-04 DIAGNOSIS — Z92.21 PERSONAL HISTORY OF ANTINEOPLASTIC CHEMOTHERAPY: ICD-10-CM

## 2021-07-04 DIAGNOSIS — E86.0 DEHYDRATION: ICD-10-CM

## 2021-07-04 PROBLEM — C80.1 MALIGNANT (PRIMARY) NEOPLASM, UNSPECIFIED: Chronic | Status: ACTIVE | Noted: 2018-11-29

## 2021-07-04 PROBLEM — C61 MALIGNANT NEOPLASM OF PROSTATE: Chronic | Status: ACTIVE | Noted: 2021-06-29

## 2021-07-04 PROBLEM — H91.90 UNSPECIFIED HEARING LOSS, UNSPECIFIED EAR: Chronic | Status: ACTIVE | Noted: 2021-06-29

## 2021-07-04 PROBLEM — C80.1 MALIGNANT (PRIMARY) NEOPLASM, UNSPECIFIED: Chronic | Status: ACTIVE | Noted: 2018-05-22

## 2021-07-04 LAB
ANION GAP SERPL CALC-SCNC: 10 MMOL/L — SIGNIFICANT CHANGE UP (ref 7–14)
BASOPHILS # BLD AUTO: 0.04 K/UL — SIGNIFICANT CHANGE UP (ref 0–0.2)
BASOPHILS NFR BLD AUTO: 1.1 % — HIGH (ref 0–1)
BUN SERPL-MCNC: 18 MG/DL — SIGNIFICANT CHANGE UP (ref 10–20)
CALCIUM SERPL-MCNC: 8.8 MG/DL — SIGNIFICANT CHANGE UP (ref 8.5–10.1)
CHLORIDE SERPL-SCNC: 104 MMOL/L — SIGNIFICANT CHANGE UP (ref 98–110)
CO2 SERPL-SCNC: 27 MMOL/L — SIGNIFICANT CHANGE UP (ref 17–32)
COVID-19 SPIKE DOMAIN AB INTERP: POSITIVE
COVID-19 SPIKE DOMAIN ANTIBODY RESULT: >250 U/ML — HIGH
CREAT SERPL-MCNC: 0.9 MG/DL — SIGNIFICANT CHANGE UP (ref 0.7–1.5)
CULTURE RESULTS: NO GROWTH — SIGNIFICANT CHANGE UP
EOSINOPHIL # BLD AUTO: 0.32 K/UL — SIGNIFICANT CHANGE UP (ref 0–0.7)
EOSINOPHIL NFR BLD AUTO: 8.8 % — HIGH (ref 0–8)
GLUCOSE SERPL-MCNC: 89 MG/DL — SIGNIFICANT CHANGE UP (ref 70–99)
HCT VFR BLD CALC: 38.8 % — LOW (ref 42–52)
HGB BLD-MCNC: 12.6 G/DL — LOW (ref 14–18)
IMM GRANULOCYTES NFR BLD AUTO: 0.3 % — SIGNIFICANT CHANGE UP (ref 0.1–0.3)
LYMPHOCYTES # BLD AUTO: 0.69 K/UL — LOW (ref 1.2–3.4)
LYMPHOCYTES # BLD AUTO: 19 % — LOW (ref 20.5–51.1)
MAGNESIUM SERPL-MCNC: 1.8 MG/DL — SIGNIFICANT CHANGE UP (ref 1.8–2.4)
MCHC RBC-ENTMCNC: 30.6 PG — SIGNIFICANT CHANGE UP (ref 27–31)
MCHC RBC-ENTMCNC: 32.5 G/DL — SIGNIFICANT CHANGE UP (ref 32–37)
MCV RBC AUTO: 94.2 FL — HIGH (ref 80–94)
MONOCYTES # BLD AUTO: 0.44 K/UL — SIGNIFICANT CHANGE UP (ref 0.1–0.6)
MONOCYTES NFR BLD AUTO: 12.1 % — HIGH (ref 1.7–9.3)
NEUTROPHILS # BLD AUTO: 2.13 K/UL — SIGNIFICANT CHANGE UP (ref 1.4–6.5)
NEUTROPHILS NFR BLD AUTO: 58.7 % — SIGNIFICANT CHANGE UP (ref 42.2–75.2)
NRBC # BLD: 0 /100 WBCS — SIGNIFICANT CHANGE UP (ref 0–0)
PLATELET # BLD AUTO: 125 K/UL — LOW (ref 130–400)
POTASSIUM SERPL-MCNC: 4.2 MMOL/L — SIGNIFICANT CHANGE UP (ref 3.5–5)
POTASSIUM SERPL-SCNC: 4.2 MMOL/L — SIGNIFICANT CHANGE UP (ref 3.5–5)
RBC # BLD: 4.12 M/UL — LOW (ref 4.7–6.1)
RBC # FLD: 14.1 % — SIGNIFICANT CHANGE UP (ref 11.5–14.5)
SARS-COV-2 IGG+IGM SERPL QL IA: >250 U/ML — HIGH
SARS-COV-2 IGG+IGM SERPL QL IA: POSITIVE
SODIUM SERPL-SCNC: 141 MMOL/L — SIGNIFICANT CHANGE UP (ref 135–146)
SPECIMEN SOURCE: SIGNIFICANT CHANGE UP
WBC # BLD: 3.63 K/UL — LOW (ref 4.8–10.8)
WBC # FLD AUTO: 3.63 K/UL — LOW (ref 4.8–10.8)

## 2021-07-04 PROCEDURE — 99232 SBSQ HOSP IP/OBS MODERATE 35: CPT

## 2021-07-04 PROCEDURE — 93010 ELECTROCARDIOGRAM REPORT: CPT

## 2021-07-04 RX ADMIN — TAMSULOSIN HYDROCHLORIDE 0.4 MILLIGRAM(S): 0.4 CAPSULE ORAL at 21:09

## 2021-07-04 RX ADMIN — Medication 20 MILLIGRAM(S): at 11:15

## 2021-07-04 RX ADMIN — ENOXAPARIN SODIUM 40 MILLIGRAM(S): 100 INJECTION SUBCUTANEOUS at 21:09

## 2021-07-04 RX ADMIN — PANTOPRAZOLE SODIUM 40 MILLIGRAM(S): 20 TABLET, DELAYED RELEASE ORAL at 11:15

## 2021-07-04 RX ADMIN — LISINOPRIL 10 MILLIGRAM(S): 2.5 TABLET ORAL at 11:15

## 2021-07-04 NOTE — SWALLOW BEDSIDE ASSESSMENT ADULT - H & P REVIEW
71 yr old male with hx of lung cancer, esophageal cancer s/p esophageal resection and gastric pull, Prostate Ca, HTN, Rt Obstructing renal calculi s/p  perc nephrostomy in place presented to ER for decreased PO intake for 4 days. Patient not eating to drinking for more than 2 week, significant decreased appetite due to complaints of DIFFICULTY swallowing and significant weight loss. Pt states both food and water are getting "stuck" in throat and causing choking and vomiting episodes. He also noticed saliva accumulating in the mouth overnight  Pt denies any chest pain , fever, dyspnea, cough, cold , fever or chills. Pt denies any  complains./yes

## 2021-07-04 NOTE — SWALLOW BEDSIDE ASSESSMENT ADULT - SLP GENERAL OBSERVATIONS
Patient is AOx3, able to communicate wants/needs, able to provide pertinent medical information, and able to follow simple directives.

## 2021-07-04 NOTE — SWALLOW BEDSIDE ASSESSMENT ADULT - ADDITIONAL RECOMMENDATIONS
1) Patient may benefit from smaller, more frequent meals.  2) Patient to sit upright 60 minutes after meals.

## 2021-07-04 NOTE — SWALLOW BEDSIDE ASSESSMENT ADULT - ESOPHAGEAL PHASE
Across all consistencies trialed, patient reported globus sensation pointing to mid-distal esophagus. Patient stated it feels "stuck" however after a few minutes said it has passed. Patient also reports liquid wash does not help, as when he feels globus sensation in his esophagus he discontinues eating, as it can become painful and uncomfortable.

## 2021-07-04 NOTE — SWALLOW BEDSIDE ASSESSMENT ADULT - DIET PRIOR TO ADMI
Patient reports consuming a regular consistency diet, however PO intake as decreased over the past 2 weeks.

## 2021-07-05 LAB
BASOPHILS # BLD AUTO: 0.04 K/UL — SIGNIFICANT CHANGE UP (ref 0–0.2)
BASOPHILS NFR BLD AUTO: 1.3 % — HIGH (ref 0–1)
EOSINOPHIL # BLD AUTO: 0.28 K/UL — SIGNIFICANT CHANGE UP (ref 0–0.7)
EOSINOPHIL NFR BLD AUTO: 9.3 % — HIGH (ref 0–8)
HCT VFR BLD CALC: 39.7 % — LOW (ref 42–52)
HGB BLD-MCNC: 13.2 G/DL — LOW (ref 14–18)
IMM GRANULOCYTES NFR BLD AUTO: 0.3 % — SIGNIFICANT CHANGE UP (ref 0.1–0.3)
LYMPHOCYTES # BLD AUTO: 0.59 K/UL — LOW (ref 1.2–3.4)
LYMPHOCYTES # BLD AUTO: 19.7 % — LOW (ref 20.5–51.1)
MCHC RBC-ENTMCNC: 30.8 PG — SIGNIFICANT CHANGE UP (ref 27–31)
MCHC RBC-ENTMCNC: 33.2 G/DL — SIGNIFICANT CHANGE UP (ref 32–37)
MCV RBC AUTO: 92.5 FL — SIGNIFICANT CHANGE UP (ref 80–94)
MONOCYTES # BLD AUTO: 0.38 K/UL — SIGNIFICANT CHANGE UP (ref 0.1–0.6)
MONOCYTES NFR BLD AUTO: 12.7 % — HIGH (ref 1.7–9.3)
NEUTROPHILS # BLD AUTO: 1.7 K/UL — SIGNIFICANT CHANGE UP (ref 1.4–6.5)
NEUTROPHILS NFR BLD AUTO: 56.7 % — SIGNIFICANT CHANGE UP (ref 42.2–75.2)
NRBC # BLD: 0 /100 WBCS — SIGNIFICANT CHANGE UP (ref 0–0)
PLATELET # BLD AUTO: 132 K/UL — SIGNIFICANT CHANGE UP (ref 130–400)
RBC # BLD: 4.29 M/UL — LOW (ref 4.7–6.1)
RBC # FLD: 14 % — SIGNIFICANT CHANGE UP (ref 11.5–14.5)
WBC # BLD: 3 K/UL — LOW (ref 4.8–10.8)
WBC # FLD AUTO: 3 K/UL — LOW (ref 4.8–10.8)

## 2021-07-05 PROCEDURE — 99232 SBSQ HOSP IP/OBS MODERATE 35: CPT

## 2021-07-05 PROCEDURE — 99223 1ST HOSP IP/OBS HIGH 75: CPT

## 2021-07-05 RX ORDER — PANTOPRAZOLE SODIUM 20 MG/1
40 TABLET, DELAYED RELEASE ORAL
Refills: 0 | Status: DISCONTINUED | OUTPATIENT
Start: 2021-07-05 | End: 2021-07-06

## 2021-07-05 RX ORDER — SODIUM CHLORIDE 9 MG/ML
1000 INJECTION, SOLUTION INTRAVENOUS
Refills: 0 | Status: DISCONTINUED | OUTPATIENT
Start: 2021-07-05 | End: 2021-07-06

## 2021-07-05 RX ADMIN — PANTOPRAZOLE SODIUM 40 MILLIGRAM(S): 20 TABLET, DELAYED RELEASE ORAL at 11:58

## 2021-07-05 RX ADMIN — ENOXAPARIN SODIUM 40 MILLIGRAM(S): 100 INJECTION SUBCUTANEOUS at 22:47

## 2021-07-05 RX ADMIN — LISINOPRIL 10 MILLIGRAM(S): 2.5 TABLET ORAL at 05:12

## 2021-07-05 RX ADMIN — TAMSULOSIN HYDROCHLORIDE 0.4 MILLIGRAM(S): 0.4 CAPSULE ORAL at 22:46

## 2021-07-05 RX ADMIN — Medication 20 MILLIGRAM(S): at 11:03

## 2021-07-05 RX ADMIN — SODIUM CHLORIDE 80 MILLILITER(S): 9 INJECTION, SOLUTION INTRAVENOUS at 20:34

## 2021-07-05 NOTE — DIETITIAN INITIAL EVALUATION ADULT. - OTHER INFO
Pertinent medical information: 71 yr old male with hx of lung cancer, esophageal cancer s/p esophageal resection and gastric pull, Prostate Ca, HTN, Rt Obstructing renal calculi s/p  perc nephrostomy in place presented to ER for decreased PO intake for 4 days.  Reports 10 pound weight loss last 2 months. Per attending note, Dysphagia associated w/ Odynophagia with Hx esophageal Ca s/p chemo and radiation therapy; s/p partial esophageal resection/gastric pull-through. barium esophagram (06/21/21): Persistent filling of the esophagus/gastric pull-through in the recumbent and upright positions. Vallecular minimal residual cleared on successive swallows. Per GI, EGD tomorrow given readmission and revaluation of similar complaints in a short window, and history of esophageal cancer.    Per speech and swallow: · Recommended Consistencies	Dysphagia Diet III Mechanical soft consistency with cut up meats with thin liquids, allow for swallow between intakes; maintain upright posture during/after eating for 30 mins; oral hygiene; position upright (90 degrees); small sips/bites.    PERTINENT NUTRITION INFORMATION: Patient reports for ~ 2 months his appetite has been terrible. Reports that he does not have an issue chewing however when he swallows the food it is very hard for him to bring it down and when it goes down he is in a lot of pain, can only tolerate very soft food. Has been eating lots of pasta per pt and completely avoiding protein food. Does not take any vitamins. Use to take ensure enlive 1xday however not consistently. NKFA. No cultural/ Evangelical/ ethnic preferences.     Patient currently reports a very slight improvement in appetite, had a few spoons of eggs for breakfast, however reports "it still does not feed right". Pt reports indigestion and heart burn, and will not eat until he feels better, RN aware. Pertinent medical information: 71 yr old male with hx of lung cancer, esophageal cancer s/p esophageal resection and gastric pull, Prostate Ca, HTN, Rt Obstructing renal calculi s/p  perc nephrostomy in place presented to ER for decreased PO intake for 4 days.  Reports 10 pound weight loss last 2 months. Per attending note, Dysphagia associated w/ Odynophagia with Hx esophageal Ca s/p chemo and radiation therapy; s/p partial esophageal resection/gastric pull-through. barium esophagram (06/21/21): Persistent filling of the esophagus/gastric pull-through in the recumbent and upright positions. Vallecular minimal residual cleared on successive swallows. Per GI, EGD tomorrow given readmission and revaluation of similar complaints in a short window, and history of esophageal cancer.    Per speech and swallow: · Recommended Consistencies	Dysphagia Diet III Mechanical soft consistency with cut up meats with thin liquids, allow for swallow between intakes; maintain upright posture during/after eating for 30 mins; oral hygiene; position upright (90 degrees); small sips/bites.    PERTINENT NUTRITION INFORMATION: Patient reports for ~ 2 months his appetite has been terrible. Reports that he does not have an issue chewing however when he swallows the food it is very hard for him to bring it down and when it goes down he is in a lot of pain, can only tolerate very soft food. Has been eating lots of pasta per pt and completely avoiding protein food. Does not take any vitamins. Use to take ensure enlive 1xday however not consistently. NKFA. No cultural/ Amish/ ethnic preferences.     Patient currently reports a very slight improvement in appetite, had a few spoons of eggs for breakfast, however reports "it still does not feed right". Pt reports indigestion and heart burn, and will not eat until he feels better, RN aware.    Patient was 140 lbs on 06/16/2021; previous admission confirmed wt reported per pt vs 128.7 lbs. ----11.3 lbs weight loss in 2 weeks.    Spoke with pt on the importance of consuming adequate calories and protein as weight loss/ drop in appetite was unintentional which causes the body to be in a state of stress. Discussed different ways to bulk up the food items he can tolerate such as adding gravies, cheese, and sauce. Discussed different nutrition oral supplements. Will monitor improvement of patients PO intake. Will provide nutrition oral supplements in order to optimize energy and protein intake

## 2021-07-05 NOTE — CONSULT NOTE ADULT - ASSESSMENT
71 yr old male with hx of lung cancer, esophageal cancer s/p esophageal resection and gastric pull 2017, Prostate Ca, HTN, Rt Obstructing renal calculi s/p  perc nephrostomy in place presented to ER for decreased PO intake for 4 days. Patient here for odynophagia for 1 week. Patient was evaluated for dysphagia recently. Patient reports odynophagia is improved.    Problem 1-Mildly dilated esophagus with thin peripheral mucosal enhancement which may represent sequela of radiation therapy versus esophagitis in the appropriate clinical setting. No discrete enhancing lesion identified along the aerodigestive tract.  Layering fluid is noted within the esophagus, recommend aspiration precaution.  Status post partial esophageal resection/gastric pull-through with no obstruction or mass. Persistent filling of the esophagus/gastric pull-through in the recumbent and upright positions.. Vallecular minimal residual cleared on successive swallows. on barium  now with odynophagia   Rec  -EGD tomorrow given readmission and revaluation of similar complaints in a short window, and history of esophageal cancer   -The benefits of endoscopy as well as the risks, such as GI bleeding, aspiration pneumonia, perforation, damage or loss of teeth, reaction to medication, or death  were reviewed with the patient.   -NPO after midnight  -AM CBC, BMP  -diet as per speech and swallow     Problem 2-Anemia no gross GI bleeding  Rec  - Follow up with our [facility] located at [address] [place] [address]. Phone Number: [phone] to schedule  colonoscopy    Problem 3-Small left pneumothorax and partially imaged right lower lobe groundglass opacity. Bilateral upper lobe paraseptal emphysema and bullous disease. Please refer to concurrently performed CT chest for detailed findings.  Rec  - Care as per primary team

## 2021-07-05 NOTE — DIETITIAN NUTRITION RISK NOTIFICATION - ADDITIONAL COMMENTS/DIETITIAN RECOMMENDATIONS
Recommendations	  1) Continue diet order per SLP recommendations: dysphagia 3 soft- thin liquids; add GERD modifier to diet order.   2) Order ensure compact TID + ensure pudding TID and bene protein BID to optimize energy and protein intake  3) Order a Multivitamin   4) Order a calorie count and record consumption of nutrition oral supplements on record to quantify if pt is meeting adequate calorie and protein needs. Will continue to monitor patients PO intake over the next 3 days

## 2021-07-05 NOTE — PROGRESS NOTE ADULT - NUTRITIONAL ASSESSMENT
This patient has been assessed with a concern for Malnutrition and has been determined to have a diagnosis/diagnoses of Severe protein-calorie malnutrition and Underweight (BMI < 19).    This patient is being managed with:   Diet NPO after Midnight-     NPO Start Date: 05-Jul-2021   NPO Start Time: 23:59  Entered: Jul 5 2021  1:25PM    Diet Dysphagia 3 Soft-Thin Liquids-  Gastroesophageal Reflux Disease  Beneprotein ([facility] Only)     Qty per Day:  2  Supplement Feeding Modality:  Oral  Ensure Pudding Cans or Servings Per Day:  1       Frequency:  Three Times a day  Ensure Compact Cans or Servings Per Day:  1       Frequency:  Three Times a day  Entered: Jul 5 2021 12:42PM    Diet NPO after Midnight-     NPO Start Date: 05-Jul-2021   NPO Start Time: 23:59  Entered: Jul 5 2021 11:15AM    Diet Dysphagia 3 Soft-Thin Liquids-  DASH/TLC {Sodium & Cholesterol Restricted}  Entered: Jul 4 2021 10:47AM    The following pending diet order is being considered for treatment of Severe protein-calorie malnutrition and Underweight (BMI < 19):null

## 2021-07-05 NOTE — DIETITIAN INITIAL EVALUATION ADULT. - ADD RECOMMEND
1) Continue diet order per SLP recommendations: dysphagia 3 soft- thin liquids; add GERD modifier to diet order. 2) Order ensure compact TID + ensure pudding TID and bene protein BID to optimize energy and protein intake 3) Order a Multivitamin 4) Order a calorie count and record consumption of nutrition oral supplements on record to quantify if pt is meeting adequate calorie and protein needs. Will continue to monitor patients PO intake over the next 3 days

## 2021-07-05 NOTE — DIETITIAN INITIAL EVALUATION ADULT. - NAME AND PHONE
RD to monitor: diet order, body composition, energy intake, nutrition focused physical finding. at risk will f/u in 3 days. Discussed with LIP

## 2021-07-05 NOTE — DIETITIAN NUTRITION RISK NOTIFICATION - TREATMENT: THE FOLLOWING DIET HAS BEEN RECOMMENDED
Diet, NPO after Midnight:      NPO Start Date: 05-Jul-2021,   NPO Start Time: 23:59 (07-05-21 @ 11:16) [Active]  Diet, Dysphagia 3 Soft-Thin Liquids:   DASH/TLC {Sodium & Cholesterol Restricted} (07-04-21 @ 10:48) [Active]      · Usual Weight Prior to Admission (lbs)-140 Pound(s)  · Usual Weight Prior to Admission (kg)-63.5 kg  · Date of Usual Weight (prior to admission)-6-Jun-2021  · Weight Change-Loss   · Pounds Lost/Gained-11 Pound(s)  · % Change	7.85 %  · Time Frame	2 weeks      Severe protein calorie malnutrition in the setting of acute illness secondary to ODYNOPHAGIA as evidenced by < 50% of estimated energy requirements for > 5 days and >2% weight loss in 1 week. Goal: Patient to ideally meet ~75% of estimated energy and protein needs in the next3d

## 2021-07-05 NOTE — DIETITIAN INITIAL EVALUATION ADULT. - PERSON TAUGHT/METHOD
Spoke with pt on the importance of consuming adequate calories and protein as weight loss/ drop in appetite was unintentional which causes the body to be in a state of stress. Discussed different ways to bulk up the food items he can tolerate such as adding gravies, cheese, and sauce. Discussed different nutrition oral supplements. Will monitor improvement of patients PO intake. Will provide nutrition oral supplements in order to optimize energy and protein intake/verbal instruction/patient instructed

## 2021-07-05 NOTE — CONSULT NOTE ADULT - SUBJECTIVE AND OBJECTIVE BOX
Chief complaint/Reason for consult: odynophagia    HPI:  71 yr old male with hx of lung cancer, esophageal cancer s/p esophageal resection and gastric pull, Prostate Ca, HTN, Rt Obstructing renal calculi s/p  perc nephrostomy in place presented to ER for decreased PO intake for 4 days. Patient not eating to drinking for more than 2 week, significant decreased appetite due to complaints of DIFFICULTY swallowing and significant weight loss. Pt states both food and water are getting "stuck" in throat and causing choking and vomiting episodes. He also noticed saliva accumulating in the mouth overnight  Pt denies any chest pain , fever, dyspnea, cough, cold , fever or chills. Pt denies any  complains.    (03 Jul 2021 19:38)    GI Updates: 71 yr old male with hx of lung cancer, esophageal cancer s/p esophageal resection and gastric pull 2017, Prostate Ca, HTN, Rt Obstructing renal calculi s/p  perc nephrostomy in place presented to ER for decreased PO intake for 4 days. Patient here for odynophagia for 1 week. Patient was evaluated for dysphagia recently. Patient reports odynophagia is improved. Patient denies nausea, vomiting, hematemesis, melena, blood in stool, diarrhea, constipation, abdominal pain.    PAST MEDICAL & SURGICAL HISTORY:   Hypertension, unspecified type    Cancer  ESOPHAGEAL CANCER 2017 RECEIVED CHEMO AND RADIATION and endoscopic resection partial oesophagus and stomach    Renal calculi  nephrostomy tube -6/2021    Cancer  left lung 2018, no chemo or rad rx and resection    Abnormal cholesterol test    Prostate cancer    [place] (hard of hearing)    Cancer of esophagus  2017 RESECTION OF LOWER PORTION OF ESOPHAGUS    S/P lobectomy of lung  left ?JUNE 2018    S/P cystoscopy with ureteral stent placement  LEFT    Inguinal hernia  AS AN INFANT, RIGHT SIDE    History of esophagogastroduodenoscopy (EGD)  2019    H/O colonoscopy  2018    Prostate cancer  cyber knife intervention 2021          Family history:  FAMILY HISTORY:  Dementia (Mother)      No GI cancers in first or second degree relatives    Social History: No smoking. No alcohol. No illegal drug use.    Allergies:   No Known Allergies      MEDICATIONS: Home Medications:  famotidine 40 mg oral tablet: 1 tab(s) orally once a day (at bedtime) (29 Jun 2021 17:27)  ferrous sulfate (as elemental iron) 45 mg oral tablet, extended release: 1 tab(s) orally once a day (29 Jun 2021 17:27)  fosinopril 10 mg oral tablet: 1 tab(s) orally once a day (29 Jun 2021 17:27)  Paxil 20 mg oral tablet: 1 tab(s) orally once a day (29 Jun 2021 17:27)    MEDICATIONS  (STANDING):  enoxaparin Injectable 40 milliGRAM(s) SubCutaneous at bedtime  lisinopril 10 milliGRAM(s) Oral daily  ondansetron Injectable 4 milliGRAM(s) IV Push once  pantoprazole   Suspension 40 milliGRAM(s) Oral daily  PARoxetine 20 milliGRAM(s) Oral daily  tamsulosin 0.4 milliGRAM(s) Oral at bedtime            REVIEW OF SYSTEMS  General:  No weight loss, fevers, or chills.  Eyes:  No reported pain or visual changes  ENT:  No sore throat or runny nose.  NECK: No stiffness or lymphadenopathy  CV:  No chest pain or palpitations.  Resp:  No shortness of breath, cough, wheezing or hemoptysis  GI:  No abdominal pain, nausea, vomiting, dysphagia, diarrhea or constipation. No rectal bleeding, melena, or hematemesis.  Muscle:  No aches or weakness  Neuro:  No tingling, numbness       VITALS:   T(F): 97.9 (07-05-21 @ 05:01), Max: 97.9 (07-05-21 @ 05:01)  HR: 53 (07-05-21 @ 05:01) (53 - 65)  BP: 125/73 (07-05-21 @ 05:01) (97/52 - 125/73)  RR: 18 (07-05-21 @ 05:01) (18 - 18)  SpO2: --    PHYSICAL EXAM:  GENERAL: AAOx3, no acute distress.  HEAD:  Atraumatic, Normocephalic  EYES: conjunctiva and sclera clear  NECK: Supple, No thyromegaly   CHEST/LUNG: Clear to auscultation bilaterally; No wheeze, rhonchi, or rales  HEART: Regular rate and rhythm; normal S1, S2, No murmurs.  ABDOMEN: Soft, nontender, nondistended; Bowel sounds present  NEUROLOGY: No asterixis or tremor  SKIN: Intact, no jaundice          LABS:  07-04    141  |  104  |  18  ----------------------------<  89  4.2   |  27  |  0.9    Ca    8.8      04 Jul 2021 07:55  Mg     1.8     07-04    TPro  6.8  /  Alb  3.9  /  TBili  1.0  /  DBili  0.2  /  AST  19  /  ALT  17  /  AlkPhos  107  07-03                          13.2   3.00  )-----------( 132      ( 05 Jul 2021 07:48 )             39.7     LIVER FUNCTIONS - ( 03 Jul 2021 17:29 )  Alb: 3.9 g/dL / Pro: 6.8 g/dL / ALK PHOS: 107 U/L / ALT: 17 U/L / AST: 19 U/L / GGT: x               IMAGING:    < from: CT Neck Soft Tissue w/ IV Cont (06.19.21 @ 13:44) >    EXAM:  CT NECK SOFT TISSUE IC            PROCEDURE DATE:  06/19/2021            INTERPRETATION:  INDICATIONS:  Dysphagia. History of distal esophageal cancer, status post resection, radiotherapy, and chemotherapy.    TECHNIQUE: Axial images were obtained following the intravenous administration of contrast material. Sagittal and coronal reformatted images were obtained. 95 cc of Optiray 320 were administered. 5 cc were discarded.    COMPARISON: PET/CT dated 5/2/2018    FINDINGS:    Mildly dilated proximal esophagus with thin peripheral enhancement of the mucosa with layering fluid noted. No discrete enhancing lesion is noted along the aerodigestive tract.    There is no cervical lymphadenopathy.    The parotid, submandibular, and thyroid glands are normal.    The carotid and vertebral arteries are patent.    The visualized intracranial and intraorbital compartments are unremarkable.    Degenerative change of the cervical spine.    Small left pneumothorax and partially imaged right lowerlobe groundglass opacity. Bilateral upper lobe paraseptal emphysema and bullous disease. Please refer to concurrently performed CT chest for detailed findings.      IMPRESSION:  .  Mildly dilated esophagus with thin peripheral mucosal enhancement which may represent sequela of radiation therapy versus esophagitis in the appropriate clinical setting. No discrete enhancing lesion identified along the aerodigestive tract.    Layering fluid is noted within the esophagus, recommend aspiration precaution.    Small left pneumothorax and partially imaged right lower lobe groundglass opacity. Bilateral upper lobe paraseptal emphysema and bullous disease. Please refer to concurrently performed CT chest for detailed findings.      Dr. Hernesto Vargas discussed preliminary findings with QUYNH BELCHER on 6/19/2021 3:17 PM with readback.            HERNESTO VARGAS M.D., RESIDENT RADIOLOGIST  This document has been electronically signed.  HERMILA PARADA M.D., ATTENDING RADIOLOGIST  This document has been electronically signed. Jun 19 2021  5:06PM    < end of copied text >    < from: Xray Esophagram Single Contrast (06.21.21 @ 11:04) >    EXAM:  XR ESOPH SNGL CON STUDY            PROCEDURE DATE:  06/21/2021            INTERPRETATION:  Clinical History / Reason for exam: Status post partial esophageal resection/gastric pull-through.    Double contrast barium esophagram.    EZ gas is given orally.    Following the oral ingestion of barium rapid sequence filming of the esophagus is performed under fluoroscopic control. The swallowing mechanism is intact. There is vallecular minimal residual that is cleared on successive swallows.. There is filling of the esophagus/gastric pull-through with no leak, mass or ulceration. A 13 mm barium tablet traverses the esophagus/gastric pull-through with no obstruction. The esophagus/gastric pull-through remains filled... There is filling of the stomach, duodenum and jejunum.. Right abdominal catheter. Thoracic and lumbar spine degenerative changes. Right renal calculus.    IMPRESSION: Status post partial esophageal resection/gastric pull-through with no obstruction or mass. Persistent filling of the esophagus/gastric pull-through in the recumbent and upright positions.. Vallecular minimal residual cleared on successive swallows.              ELEN GALEAS MD; Attending Radiologist  This document has been electronically signed. Jun 21 2021 11:34AM    < end of copied text >

## 2021-07-05 NOTE — DIETITIAN INITIAL EVALUATION ADULT. - OTHER CALCULATIONS
Weight used: 58.5 kg -- dosing weight Energy and protein needs increased in the setting of severe protein calorie malnutrition Fluid needs: per LIP

## 2021-07-05 NOTE — DIETITIAN INITIAL EVALUATION ADULT. - MALNUTRITION
Severe protein calorie malnutrition in the setting of acute illness secondary to ODYNOPHAGIA as evidenced by < 50% of estimated energy requirements for > 5 days and >2% weight loss in 1 week. Goal: Patient to ideally meet ~75% of estimated energy and protein needs in the next3d.

## 2021-07-05 NOTE — DIETITIAN INITIAL EVALUATION ADULT. - PERTINENT MEDS FT
MEDICATIONS  (STANDING):  ondansetron Injectable 4 milliGRAM(s) IV Push once  pantoprazole    Tablet 40 milliGRAM(s) Oral before breakfast

## 2021-07-06 ENCOUNTER — TRANSCRIPTION ENCOUNTER (OUTPATIENT)
Age: 71
End: 2021-07-06

## 2021-07-06 ENCOUNTER — RESULT REVIEW (OUTPATIENT)
Age: 71
End: 2021-07-06

## 2021-07-06 VITALS
SYSTOLIC BLOOD PRESSURE: 109 MMHG | RESPIRATION RATE: 16 BRPM | HEART RATE: 57 BPM | DIASTOLIC BLOOD PRESSURE: 68 MMHG | TEMPERATURE: 97 F

## 2021-07-06 LAB
ANION GAP SERPL CALC-SCNC: 8 MMOL/L — SIGNIFICANT CHANGE UP (ref 7–14)
BUN SERPL-MCNC: 12 MG/DL — SIGNIFICANT CHANGE UP (ref 10–20)
CALCIUM SERPL-MCNC: 9 MG/DL — SIGNIFICANT CHANGE UP (ref 8.5–10.1)
CHLORIDE SERPL-SCNC: 103 MMOL/L — SIGNIFICANT CHANGE UP (ref 98–110)
CO2 SERPL-SCNC: 26 MMOL/L — SIGNIFICANT CHANGE UP (ref 17–32)
CREAT SERPL-MCNC: 0.7 MG/DL — SIGNIFICANT CHANGE UP (ref 0.7–1.5)
GLUCOSE SERPL-MCNC: 102 MG/DL — HIGH (ref 70–99)
HCT VFR BLD CALC: 40.9 % — LOW (ref 42–52)
HGB BLD-MCNC: 13.4 G/DL — LOW (ref 14–18)
LACTATE SERPL-SCNC: 1.4 MMOL/L — SIGNIFICANT CHANGE UP (ref 0.7–2)
MCHC RBC-ENTMCNC: 30.3 PG — SIGNIFICANT CHANGE UP (ref 27–31)
MCHC RBC-ENTMCNC: 32.8 G/DL — SIGNIFICANT CHANGE UP (ref 32–37)
MCV RBC AUTO: 92.5 FL — SIGNIFICANT CHANGE UP (ref 80–94)
NRBC # BLD: 0 /100 WBCS — SIGNIFICANT CHANGE UP (ref 0–0)
PLATELET # BLD AUTO: 128 K/UL — LOW (ref 130–400)
POTASSIUM SERPL-MCNC: 4.1 MMOL/L — SIGNIFICANT CHANGE UP (ref 3.5–5)
POTASSIUM SERPL-SCNC: 4.1 MMOL/L — SIGNIFICANT CHANGE UP (ref 3.5–5)
RBC # BLD: 4.42 M/UL — LOW (ref 4.7–6.1)
RBC # FLD: 14 % — SIGNIFICANT CHANGE UP (ref 11.5–14.5)
SODIUM SERPL-SCNC: 137 MMOL/L — SIGNIFICANT CHANGE UP (ref 135–146)
WBC # BLD: 2.96 K/UL — LOW (ref 4.8–10.8)
WBC # FLD AUTO: 2.96 K/UL — LOW (ref 4.8–10.8)

## 2021-07-06 PROCEDURE — 43239 EGD BIOPSY SINGLE/MULTIPLE: CPT

## 2021-07-06 PROCEDURE — 88312 SPECIAL STAINS GROUP 1: CPT | Mod: 26

## 2021-07-06 PROCEDURE — 88313 SPECIAL STAINS GROUP 2: CPT | Mod: 26

## 2021-07-06 PROCEDURE — 99238 HOSP IP/OBS DSCHRG MGMT 30/<: CPT

## 2021-07-06 PROCEDURE — 88305 TISSUE EXAM BY PATHOLOGIST: CPT | Mod: 26

## 2021-07-06 RX ORDER — PANTOPRAZOLE SODIUM 20 MG/1
40 TABLET, DELAYED RELEASE ORAL EVERY 12 HOURS
Refills: 0 | Status: DISCONTINUED | OUTPATIENT
Start: 2021-07-06 | End: 2021-07-06

## 2021-07-06 RX ORDER — TAMSULOSIN HYDROCHLORIDE 0.4 MG/1
1 CAPSULE ORAL
Qty: 0 | Refills: 0 | DISCHARGE
Start: 2021-07-06

## 2021-07-06 RX ORDER — SUCRALFATE 1 G
1 TABLET ORAL
Qty: 120 | Refills: 0
Start: 2021-07-06 | End: 2021-08-04

## 2021-07-06 RX ORDER — PANTOPRAZOLE SODIUM 20 MG/1
1 TABLET, DELAYED RELEASE ORAL
Qty: 60 | Refills: 0
Start: 2021-07-06 | End: 2021-08-04

## 2021-07-06 RX ORDER — SUCRALFATE 1 G
1 TABLET ORAL EVERY 6 HOURS
Refills: 0 | Status: DISCONTINUED | OUTPATIENT
Start: 2021-07-06 | End: 2021-07-06

## 2021-07-06 RX ORDER — LISINOPRIL 2.5 MG/1
10 TABLET ORAL DAILY
Refills: 0 | Status: DISCONTINUED | OUTPATIENT
Start: 2021-07-06 | End: 2021-07-06

## 2021-07-06 RX ADMIN — SODIUM CHLORIDE 80 MILLILITER(S): 9 INJECTION, SOLUTION INTRAVENOUS at 06:30

## 2021-07-06 RX ADMIN — Medication 20 MILLIGRAM(S): at 14:03

## 2021-07-06 RX ADMIN — PANTOPRAZOLE SODIUM 40 MILLIGRAM(S): 20 TABLET, DELAYED RELEASE ORAL at 06:29

## 2021-07-06 NOTE — DISCHARGE NOTE PROVIDER - NSDCMRMEDTOKEN_GEN_ALL_CORE_FT
famotidine 40 mg oral tablet: 1 tab(s) orally once a day (at bedtime)  ferrous sulfate (as elemental iron) 45 mg oral tablet, extended release: 1 tab(s) orally once a day  fosinopril 10 mg oral tablet: 1 tab(s) orally once a day  pantoprazole 40 mg oral delayed release tablet: 1 tab(s) orally 2 times a day   Paxil 20 mg oral tablet: 1 tab(s) orally once a day  sucralfate 1 g oral tablet: 1 tab(s) orally 4 times a day   tamsulosin 0.4 mg oral capsule: 1 cap(s) orally once a day (at bedtime)

## 2021-07-06 NOTE — DISCHARGE NOTE PROVIDER - CARE PROVIDER_API CALL
Maria De Jesus Cortés (MD)  Gastroenterology  [address] [place] [address]  Phone: [phone]  Fax: [phone]  Follow Up Time:

## 2021-07-06 NOTE — DISCHARGE NOTE PROVIDER - DETAILS OF MALNUTRITION DIAGNOSIS/DIAGNOSES
This patient has been assessed with a concern for Malnutrition and was treated during this hospitalization for the following Nutrition diagnosis/diagnoses:     -  07/05/2021: Severe protein-calorie malnutrition   -  07/05/2021: Underweight (BMI < 19)

## 2021-07-06 NOTE — PROGRESS NOTE ADULT - ASSESSMENT
71 yr old male with hx of lung cancer, esophageal cancer s/p esophageal resection and gastric pull, Prostate Ca, HTN, Rt Obstructing renal calculi s/p  perc nephrostomy in place presented to ER for decreased PO intake for 4 days.  Reports 10 pound weight loss last 2 months     # Dysphagia associated w/ Odynophagia with   # Hx esophageal Ca s/p chemo and radiation therapy; s/p partial esophageal resection/gastric pull-through  # Malnutrition   - symptoms for past 2 months, worse over past 2 weeks   - barium esophagram (06/21/21): Persistent filling of the esophagus/gastric pull-through in the recumbent and upright positions. Vallecular minimal residual cleared on successive swallows.  - aspiration precaution  - [facility] soft diet  - recall speech and swallow   - c/w Protonix   - GI recs appreciated  - EGD tomorrow  - NPO and fluids    #Anemia/Leukopenia  -drop in h/h and wbc   -suspect hemodilution  -repeat cbc 7/5    #Irregular heart   -EKG ordered     # Rt Obstructing renal calculi s/p perc nephrostomy in place  - outpt f/u urology    # Hypertension  - c/w Enalapril     # Depression   - stable  - c/w Paxil     # Prostate Ca  - s/p brachytherapy seeds  - c/w Flomax     # DVT ppx  - start Lovenox     # Ambulate as tolerated    # Full code
71 yr old male with hx of lung cancer, esophageal cancer s/p esophageal resection and gastric pull, Prostate Ca, HTN, Rt Obstructing renal calculi s/p  perc nephrostomy in place presented to ER for decreased PO intake for 4 days.  Reports 10 pound weight loss last 2 months     # Dysphagia associated w/ Odynophagia with   # Hx esophageal Ca s/p chemo and radiation therapy; s/p partial esophageal resection/gastric pull-through  # Malnutrition   - symptoms for past 2 months, worse over past 2 weeks   - barium esophagram (06/21/21): Persistent filling of the esophagus/gastric pull-through in the recumbent and upright positions. Vallecular minimal residual cleared on successive swallows.  - aspiration precaution  - [facility] soft diet  - recall speech and swallow   - c/w Protonix   - GI recs appreciated  - EGD tomorrow  - NPO and fluids    #Anemia/Leukopenia  -drop in h/h and wbc   -suspect hemodilution  -repeat cbc 7/5    #Irregular heart   -EKG ordered     # Rt Obstructing renal calculi s/p perc nephrostomy in place  - outpt f/u urology    # Hypertension  - c/w Enalapril     # Depression   - stable  - c/w Paxil     # Prostate Ca  - s/p brachytherapy seeds  - c/w Flomax     # DVT ppx  - start Lovenox     # Ambulate as tolerated    # Full code
71 yr old male with hx of lung cancer, esophageal cancer s/p esophageal resection and gastric pull, Prostate Ca, HTN, Rt Obstructing renal calculi s/p  perc nephrostomy in place presented to ER for decreased PO intake for 4 days.  Reports 10 pound weight loss las t2 months     # Dysphagia associated w/ Odynophagia with   # Hx esophageal Ca s/p chemo and radiation therapy; s/p partial esophageal resection/gastric pull-through  # Malnutrition   - symptoms for past 2 months, worse over past 2 weeks   - barium esophagram (06/21/21): Persistent filling of the esophagus/gastric pull-through in the recumbent and upright positions. Vallecular minimal residual cleared on successive swallows.  - aspiration precaution  - [facility] soft diet  - recall speech and swallow   - c/w Protonix   - GI consult     #Anemia/Leukopenia  -drop in h/h and wbc compared to day before  -suspect hemodilution  -repeat cbc 7/5    #Irregular heart   -EKG ordered     # Rt Obstructing renal calculi s/p perc nephrostomy in place  - outpt f/u urology    # Hypertension  - c/w Enalapril     # Depression   - stable  - c/w Paxil     # Prostate Ca  - s/p brachytherapy seeds  - c/w Flomax     # DVT ppx  - start Lovenox     # Ambulate as tolerated    # Full code

## 2021-07-06 NOTE — PROGRESS NOTE ADULT - SUBJECTIVE AND OBJECTIVE BOX
Patient is a 71y old  Male who presents with a chief complaint of unable to eat (03 Jul 2021 19:38)      Patient seen and examined at bedside.    ALLERGIES:  No Known Allergies    MEDICATIONS:  enoxaparin Injectable 40 milliGRAM(s) SubCutaneous at bedtime  lisinopril 10 milliGRAM(s) Oral daily  ondansetron Injectable 4 milliGRAM(s) IV Push once  pantoprazole   Suspension 40 milliGRAM(s) Oral daily  PARoxetine 20 milliGRAM(s) Oral daily  tamsulosin 0.4 milliGRAM(s) Oral at bedtime    Vital Signs Last 24 Hrs  T(F): 96.5 (04 Jul 2021 14:00), Max: 98.8 (03 Jul 2021 20:13)  HR: 65 (04 Jul 2021 14:00) (51 - 103)  BP: 97/52 (04 Jul 2021 14:00) (96/56 - 127/70)  RR: 18 (04 Jul 2021 14:00) (17 - 18)  SpO2: 98% (03 Jul 2021 20:13) (98% - 98%)  I&O's Summary      PHYSICAL EXAM:  General: NAD, A/O x 3, cachexia   ENT: MMM  Neck: Supple, No JVD  Lungs: Clear to auscultation bilaterally  Cardio: IRR, S1/S2, No murmurs  Abdomen: Soft, Nontender, Nondistended; Bowel sounds present  Extremities: No cyanosis, No edema    LABS:                        12.6   3.63  )-----------( 125      ( 04 Jul 2021 07:55 )             38.8     07-04    141  |  104  |  18  ----------------------------<  89  4.2   |  27  |  0.9    Ca    8.8      04 Jul 2021 07:55  Mg     1.8     07-04    TPro  6.8  /  Alb  3.9  /  TBili  1.0  /  DBili  0.2  /  AST  19  /  ALT  17  /  AlkPhos  107  07-03    eGFR if Non African American: 86 mL/min/1.73M2 (07-04-21 @ 07:55)  eGFR if : 99 mL/min/1.73M2 (07-04-21 @ 07:55)      Lactate, Blood: 2.1 mmol/L (07-03 @ 17:29)        06-18 Chol -- LDL -- HDL -- Trig 82 mg/dL                  Urinalysis Basic - ( 03 Jul 2021 17:38 )    Color: Yellow / Appearance: Cloudy / SG: >=1.030 / pH: x  Gluc: x / Ketone: Trace  / Bili: Small / Urobili: 0.2 mg/dL   Blood: x / Protein: 100 mg/dL / Nitrite: Negative   Leuk Esterase: Trace / RBC: 11-25 /HPF / WBC 3-5 /HPF   Sq Epi: x / Non Sq Epi: Few /HPF / Bacteria: Many        Culture - Urine (collected 29 Jun 2021 17:14)  Source: .Urine Clean Catch (Midstream)  Final Report (01 Jul 2021 00:00):    No growth      COVID-19 PCR: NotDetec (07-03-21 @ 17:45)      RADIOLOGY & ADDITIONAL TESTS:    Care Discussed with Consultants/Other Providers: 
Patient is a 71y old  Male who presents with a chief complaint of unable to eat       Patient seen and examined at bedside. Barium swallow was wnl but given pt's h/o of esophageal ca, GI will do an EGD tomorrow. Pt states he has lost about 14 lbs in the last 5-6 weeks. Pt's albumin is 3.9. Pt's npo after midnight with fluids. Will repeat lactate with am labs.    ALLERGIES:  No Known Allergies    MEDICATIONS:  enoxaparin Injectable 40 milliGRAM(s) SubCutaneous at bedtime  lisinopril 10 milliGRAM(s) Oral daily  ondansetron Injectable 4 milliGRAM(s) IV Push once  pantoprazole   Suspension 40 milliGRAM(s) Oral daily  PARoxetine 20 milliGRAM(s) Oral daily  tamsulosin 0.4 milliGRAM(s) Oral at bedtime    Vital Signs Last 24 Hrs  T(F): 96.5 (04 Jul 2021 14:00), Max: 98.8 (03 Jul 2021 20:13)  HR: 65 (04 Jul 2021 14:00) (51 - 103)  BP: 97/52 (04 Jul 2021 14:00) (96/56 - 127/70)  RR: 18 (04 Jul 2021 14:00) (17 - 18)  SpO2: 98% (03 Jul 2021 20:13) (98% - 98%)  I&O's Summary      PHYSICAL EXAM:  General: NAD, A/O x 3, cachexia   ENT: MMM  Neck: Supple, No JVD  Lungs: Clear to auscultation bilaterally  Cardio: IRR, S1/S2, No murmurs  Abdomen: Soft, Nontender, Nondistended; Bowel sounds present  Extremities: No cyanosis, No edema    LABS:                        12.6   3.63  )-----------( 125      ( 04 Jul 2021 07:55 )             38.8     07-04    141  |  104  |  18  ----------------------------<  89  4.2   |  27  |  0.9    Ca    8.8      04 Jul 2021 07:55  Mg     1.8     07-04    TPro  6.8  /  Alb  3.9  /  TBili  1.0  /  DBili  0.2  /  AST  19  /  ALT  17  /  AlkPhos  107  07-03    eGFR if Non African American: 86 mL/min/1.73M2 (07-04-21 @ 07:55)  eGFR if : 99 mL/min/1.73M2 (07-04-21 @ 07:55)      Lactate, Blood: 2.1 mmol/L (07-03 @ 17:29)        06-18 Chol -- LDL -- HDL -- Trig 82 mg/dL                  Urinalysis Basic - ( 03 Jul 2021 17:38 )    Color: Yellow / Appearance: Cloudy / SG: >=1.030 / pH: x  Gluc: x / Ketone: Trace  / Bili: Small / Urobili: 0.2 mg/dL   Blood: x / Protein: 100 mg/dL / Nitrite: Negative   Leuk Esterase: Trace / RBC: 11-25 /HPF / WBC 3-5 /HPF   Sq Epi: x / Non Sq Epi: Few /HPF / Bacteria: Many        Culture - Urine (collected 29 Jun 2021 17:14)  Source: .Urine Clean Catch (Midstream)  Final Report (01 Jul 2021 00:00):    No growth      COVID-19 PCR: NotDetec (07-03-21 @ 17:45)      RADIOLOGY & ADDITIONAL TESTS:    Care Discussed with Consultants/Other Providers: 
Patient is a 71y old  Male who presents with a chief complaint of unable to eat       Pt npo for EGD today. Can likely d/c postop with OP followup regarding the biopsies    ALLERGIES:  No Known Allergies    MEDICATIONS:  enoxaparin Injectable 40 milliGRAM(s) SubCutaneous at bedtime  lisinopril 10 milliGRAM(s) Oral daily  ondansetron Injectable 4 milliGRAM(s) IV Push once  pantoprazole   Suspension 40 milliGRAM(s) Oral daily  PARoxetine 20 milliGRAM(s) Oral daily  tamsulosin 0.4 milliGRAM(s) Oral at bedtime    Vital Signs Last 24 Hrs  T(F): 96.5 (04 Jul 2021 14:00), Max: 98.8 (03 Jul 2021 20:13)  HR: 65 (04 Jul 2021 14:00) (51 - 103)  BP: 97/52 (04 Jul 2021 14:00) (96/56 - 127/70)  RR: 18 (04 Jul 2021 14:00) (17 - 18)  SpO2: 98% (03 Jul 2021 20:13) (98% - 98%)  I&O's Summary      PHYSICAL EXAM:  General: NAD, A/O x 3, cachexia   ENT: MMM  Neck: Supple, No JVD  Lungs: Clear to auscultation bilaterally  Cardio: IRR, S1/S2, No murmurs  Abdomen: Soft, Nontender, Nondistended; Bowel sounds present  Extremities: No cyanosis, No edema    LABS:                        12.6   3.63  )-----------( 125      ( 04 Jul 2021 07:55 )             38.8     07-04    141  |  104  |  18  ----------------------------<  89  4.2   |  27  |  0.9    Ca    8.8      04 Jul 2021 07:55  Mg     1.8     07-04    TPro  6.8  /  Alb  3.9  /  TBili  1.0  /  DBili  0.2  /  AST  19  /  ALT  17  /  AlkPhos  107  07-03    eGFR if Non African American: 86 mL/min/1.73M2 (07-04-21 @ 07:55)  eGFR if : 99 mL/min/1.73M2 (07-04-21 @ 07:55)      Lactate, Blood: 2.1 mmol/L (07-03 @ 17:29)        06-18 Chol -- LDL -- HDL -- Trig 82 mg/dL                  Urinalysis Basic - ( 03 Jul 2021 17:38 )    Color: Yellow / Appearance: Cloudy / SG: >=1.030 / pH: x  Gluc: x / Ketone: Trace  / Bili: Small / Urobili: 0.2 mg/dL   Blood: x / Protein: 100 mg/dL / Nitrite: Negative   Leuk Esterase: Trace / RBC: 11-25 /HPF / WBC 3-5 /HPF   Sq Epi: x / Non Sq Epi: Few /HPF / Bacteria: Many        Culture - Urine (collected 29 Jun 2021 17:14)  Source: .Urine Clean Catch (Midstream)  Final Report (01 Jul 2021 00:00):    No growth      COVID-19 PCR: NotDetec (07-03-21 @ 17:45)      RADIOLOGY & ADDITIONAL TESTS:    Care Discussed with Consultants/Other Providers:

## 2021-07-06 NOTE — CHART NOTE - NSCHARTNOTEFT_GEN_A_CORE
Please refer to sunrise results section for EGD findings and recommendations   -diet as per speech and swallow

## 2021-07-06 NOTE — DISCHARGE NOTE PROVIDER - NSDCCPCAREPLAN_GEN_ALL_CORE_FT
PRINCIPAL DISCHARGE DIAGNOSIS  Diagnosis: Odynophagia  Assessment and Plan of Treatment: EGD showed ulcers at the lower part of your throat. Please carafate qid and protonix bid and you will need a repeat EGD in 2 months      SECONDARY DISCHARGE DIAGNOSES  Diagnosis: Decreased oral intake  Assessment and Plan of Treatment:

## 2021-07-06 NOTE — DISCHARGE NOTE NURSING/CASE MANAGEMENT/SOCIAL WORK - PATIENT PORTAL LINK FT
You can access the FollowMyHealth Patient Portal offered by [facility] by registering at the following website: [email or web address]. By joining [facility]’s FollowMyHealth portal, you will also be able to view your health information using other applications (apps) compatible with our system.

## 2021-07-06 NOTE — SWALLOW BEDSIDE ASSESSMENT ADULT - COMMENTS
Pt NPO for EGD. SLP to f/u with swallow evaluation
Patient reports decreased PO intake due to globus sensation began about 2 weeks ago. Patient states he feels food getting stuck (patient pointing to lower neck/upper chest area on the right side).  Patient is known to this service from previous hospitalization. Dysphagia evaluation completed on 6/17/2021 with recommendations for clear liquid diet given suspected pharyngoesophageal dysphagia. At that time, GI completed an esophagram on 6/21/21 significant for "Status post partial esophageal resection/gastric pull-through with no obstruction or mass. Persistent filling of the esophagus/gastric pull-through in the recumbent and upright positions.. Vallecular minimal residual cleared on successive swallows."

## 2021-07-06 NOTE — PRE-ANESTHESIA EVALUATION ADULT - NSRADCARDRESULTSFT_GEN_ALL_CORE
CXR:  Stable left lung volume loss with persistent left basilar pulmonary opacity/pleural effusion.  No definite pneumothorax is seen on the left. Stable right basilar pulmonary opacity.    EKG: Sinus bradycardia 50bpm

## 2021-07-06 NOTE — DISCHARGE NOTE PROVIDER - CARE PROVIDERS DIRECT ADDRESSES
[email or web address]

## 2021-07-06 NOTE — PRE-ANESTHESIA EVALUATION ADULT - NSANTHADDINFOFT_GEN_ALL_CORE
risks, benefits, alternatives, general anesthesia discussed with the patient and he agrees to proceed as planned

## 2021-07-06 NOTE — DISCHARGE NOTE PROVIDER - HOSPITAL COURSE
71 yr old male with hx of lung cancer, esophageal cancer s/p esophageal resection and gastric pull, Prostate Ca, HTN, Rt Obstructing renal calculi s/p  perc nephrostomy in place presented to ER for decreased PO intake for 4 days.  Reports 10 pound weight loss last 2 months. EGD today showed ulcers in the lower third in the esophagus, erythema in the stomach compatible with non-erosive gastritis. Pt able to tolerate po and will be discharged on carafate and ppi with follow up EGD in 2 months.

## 2021-07-06 NOTE — PROGRESS NOTE ADULT - NUTRITIONAL ASSESSMENT
This patient has been assessed with a concern for Malnutrition and has been determined to have a diagnosis/diagnoses of Severe protein-calorie malnutrition and Underweight (BMI < 19).    This patient is being managed with:   Diet NPO after Midnight-     NPO Start Date: 05-Jul-2021   NPO Start Time: 23:59  Entered: Jul 5 2021  1:25PM    Diet Dysphagia 3 Soft-Thin Liquids-  Gastroesophageal Reflux Disease  Beneprotein ([facility] Only)     Qty per Day:  2  Supplement Feeding Modality:  Oral  Ensure Pudding Cans or Servings Per Day:  1       Frequency:  Three Times a day  Ensure Compact Cans or Servings Per Day:  1       Frequency:  Three Times a day  Entered: Jul 5 2021 12:42PM    Diet NPO after Midnight-     NPO Start Date: 05-Jul-2021   NPO Start Time: 23:59  Entered: Jul 5 2021 11:15AM    Diet Dysphagia 3 Soft-Thin Liquids-  DASH/TLC {Sodium & Cholesterol Restricted}  Entered: Jul 4 2021 10:47AM    The following pending diet order is being considered for treatment of Severe protein-calorie malnutrition and Underweight (BMI < 19):null

## 2021-07-07 LAB — SURGICAL PATHOLOGY STUDY: SIGNIFICANT CHANGE UP

## 2021-07-20 ENCOUNTER — OUTPATIENT (OUTPATIENT)
Dept: OUTPATIENT SERVICES | Facility: HOSPITAL | Age: 71
LOS: 1 days | Discharge: HOME | End: 2021-07-20

## 2021-07-20 VITALS
RESPIRATION RATE: 17 BRPM | DIASTOLIC BLOOD PRESSURE: 81 MMHG | WEIGHT: 132.06 LBS | OXYGEN SATURATION: 99 % | TEMPERATURE: 98 F | SYSTOLIC BLOOD PRESSURE: 141 MMHG | HEART RATE: 66 BPM | HEIGHT: 70 IN

## 2021-07-20 VITALS — HEART RATE: 62 BPM | DIASTOLIC BLOOD PRESSURE: 94 MMHG | RESPIRATION RATE: 18 BRPM | SYSTOLIC BLOOD PRESSURE: 123 MMHG

## 2021-07-20 DIAGNOSIS — C15.9 MALIGNANT NEOPLASM OF ESOPHAGUS, UNSPECIFIED: Chronic | ICD-10-CM

## 2021-07-20 DIAGNOSIS — Z98.890 OTHER SPECIFIED POSTPROCEDURAL STATES: Chronic | ICD-10-CM

## 2021-07-20 DIAGNOSIS — Z96.0 PRESENCE OF UROGENITAL IMPLANTS: Chronic | ICD-10-CM

## 2021-07-20 DIAGNOSIS — C61 MALIGNANT NEOPLASM OF PROSTATE: Chronic | ICD-10-CM

## 2021-07-20 DIAGNOSIS — N20.1 CALCULUS OF URETER: ICD-10-CM

## 2021-07-20 DIAGNOSIS — Z90.2 ACQUIRED ABSENCE OF LUNG [PART OF]: Chronic | ICD-10-CM

## 2021-07-20 DIAGNOSIS — K40.90 UNILATERAL INGUINAL HERNIA, WITHOUT OBSTRUCTION OR GANGRENE, NOT SPECIFIED AS RECURRENT: Chronic | ICD-10-CM

## 2021-07-20 RX ORDER — ONDANSETRON 8 MG/1
4 TABLET, FILM COATED ORAL ONCE
Refills: 0 | Status: DISCONTINUED | OUTPATIENT
Start: 2021-07-20 | End: 2021-08-04

## 2021-07-20 RX ORDER — HYDROMORPHONE HYDROCHLORIDE 2 MG/ML
0.5 INJECTION INTRAMUSCULAR; INTRAVENOUS; SUBCUTANEOUS ONCE
Refills: 0 | Status: DISCONTINUED | OUTPATIENT
Start: 2021-07-20 | End: 2021-07-20

## 2021-07-20 RX ORDER — SODIUM CHLORIDE 9 MG/ML
1000 INJECTION, SOLUTION INTRAVENOUS
Refills: 0 | Status: DISCONTINUED | OUTPATIENT
Start: 2021-07-20 | End: 2021-08-04

## 2021-07-20 RX ORDER — KETOROLAC TROMETHAMINE 30 MG/ML
30 SYRINGE (ML) INJECTION ONCE
Refills: 0 | Status: DISCONTINUED | OUTPATIENT
Start: 2021-07-20 | End: 2021-07-20

## 2021-07-20 RX ORDER — OXYCODONE AND ACETAMINOPHEN 5; 325 MG/1; MG/1
1 TABLET ORAL ONCE
Refills: 0 | Status: DISCONTINUED | OUTPATIENT
Start: 2021-07-20 | End: 2021-07-20

## 2021-07-20 RX ADMIN — SODIUM CHLORIDE 100 MILLILITER(S): 9 INJECTION, SOLUTION INTRAVENOUS at 16:02

## 2021-07-20 NOTE — CHART NOTE - NSCHARTNOTEFT_GEN_A_CORE
PACU ANESTHESIA ADMISSION NOTE      Procedure: right eswl    ____  Intubated  TV:______       Rate: ______      FiO2: ______    ___x_  Patent Airway    __x__  Full return of protective reflexes    __x__  Full recovery from anesthesia / back to baseline     Vitals:   T:     98.1     R:    21              BP:   141/63            Sat:        99           P: 55      Mental Status:  ___x_ Awake   _____ Alert   _____ Drowsy   _____ Sedated    Nausea/Vomiting:  __x__ NO  ______Yes,   See Post - Op Orders          Pain Scale (0-10):  _0____    Treatment: ____ None    ____ See Post - Op/PCA Orders    Post - Operative Fluids:   ____ Oral   ___x_ See Post - Op Orders    Plan: Discharge:   __x__Home       _____Floor     _____Critical Care    _____  Other:_________________    Comments:

## 2021-07-20 NOTE — PROGRESS NOTE ADULT - SUBJECTIVE AND OBJECTIVE BOX
Hx of 10mm Right proximal ureteral stone with right nephroureteral stent in place.  For Right ESWL of this stone. All R/B/o d/w pt at length... all ? answered.

## 2021-07-20 NOTE — ASU PATIENT PROFILE, ADULT - PMH
Abnormal cholesterol test    Cancer  left lung 2018, no chemo or rad rx and resection  Cancer  ESOPHAGEAL CANCER 2017 RECEIVED CHEMO AND RADIATION and endoscopic resection partial oesophagus and stomach  [place] (hard of hearing)    Hypertension, unspecified type    Prostate cancer    Renal calculi  nephrostomy tube -6/2021

## 2021-07-20 NOTE — ASU PREOP CHECKLIST - ALLERGY BAND ON
Left message in reference to rescheduling her appointment on 3/22/21. Many attempts to schedule her breast MBI prior to the appointment. Letter sent previously as another way to contact her. Will cancel appointment until she returns call to coordinate MBI. no known allergies

## 2021-07-21 DIAGNOSIS — K22.10 ULCER OF ESOPHAGUS WITHOUT BLEEDING: ICD-10-CM

## 2021-07-21 DIAGNOSIS — Z85.46 PERSONAL HISTORY OF MALIGNANT NEOPLASM OF PROSTATE: ICD-10-CM

## 2021-07-21 DIAGNOSIS — D64.9 ANEMIA, UNSPECIFIED: ICD-10-CM

## 2021-07-21 DIAGNOSIS — F32.9 MAJOR DEPRESSIVE DISORDER, SINGLE EPISODE, UNSPECIFIED: ICD-10-CM

## 2021-07-21 DIAGNOSIS — J93.9 PNEUMOTHORAX, UNSPECIFIED: ICD-10-CM

## 2021-07-21 DIAGNOSIS — E43 UNSPECIFIED SEVERE PROTEIN-CALORIE MALNUTRITION: ICD-10-CM

## 2021-07-21 DIAGNOSIS — Z85.01 PERSONAL HISTORY OF MALIGNANT NEOPLASM OF ESOPHAGUS: ICD-10-CM

## 2021-07-21 DIAGNOSIS — K29.70 GASTRITIS, UNSPECIFIED, WITHOUT BLEEDING: ICD-10-CM

## 2021-07-21 DIAGNOSIS — D72.819 DECREASED WHITE BLOOD CELL COUNT, UNSPECIFIED: ICD-10-CM

## 2021-07-21 DIAGNOSIS — R13.10 DYSPHAGIA, UNSPECIFIED: ICD-10-CM

## 2021-07-21 DIAGNOSIS — I10 ESSENTIAL (PRIMARY) HYPERTENSION: ICD-10-CM

## 2021-07-21 DIAGNOSIS — Z85.118 PERSONAL HISTORY OF OTHER MALIGNANT NEOPLASM OF BRONCHUS AND LUNG: ICD-10-CM

## 2021-07-22 DIAGNOSIS — N20.1 CALCULUS OF URETER: ICD-10-CM

## 2021-07-22 DIAGNOSIS — I10 ESSENTIAL (PRIMARY) HYPERTENSION: ICD-10-CM

## 2021-07-29 ENCOUNTER — RX RENEWAL (OUTPATIENT)
Age: 71
End: 2021-07-29

## 2021-08-24 NOTE — PRE-OP CHECKLIST - NOTHING BY MOUTH SINCE
There are no preventive care reminders to display for this patient.    Patient is up to date, no discussion needed.           05-Jul-2021 00:00

## 2021-09-15 NOTE — PRE-ANESTHESIA EVALUATION ADULT - NSDENTALSD_ENT_ALL_CORE
caps/bridge/implants Simple: Patient demonstrates quick and easy understanding/Verbalized Understanding

## 2021-09-17 NOTE — ED ADULT NURSE NOTE - PMH
What Type Of Note Output Would You Prefer (Optional)?: Standard Output Is The Patient Presenting As Previously Scheduled?: Yes How Severe Is Your Rash?: moderate Is This A New Presentation, Or A Follow-Up?: Follow Up Rash Abnormal cholesterol test    Cancer  left lung 2018, no chemo or rad rx  Cancer  ESOPHAGEAL CANCER 2017 RECEIVED CHEMO AND RADIATION  Hypertension, unspecified type    Renal calculi

## 2021-10-21 NOTE — PATIENT PROFILE ADULT - FOOD INSECURITY
Assessment:  67 year old F with recurrent GI bleed, now POD # 3 laparoscopic assisted extended left hemicolectomy with end transverse colostomy, recovering now on floor.     Plan:  - Diet, c/w CLD as tolerated  - Resume home meds  - Follow up Is/Os  - ISS for DM  - Pain control PRN  - Monitor for return of bowel function  - VTE ppx HSQ  - f/u labs and vital signs.    A Team surgery   [id number] Assessment:  67 year old F with recurrent GI bleed, now POD # 3 laparoscopic assisted extended left hemicolectomy with end transverse colostomy, recovering now on floor.     Plan:  - Diet, advanced to LRD today  - Resume home meds  - Follow up Is/Os  - ISS for DM  - Pain control PRN    A Team surgery   [id number] no

## 2021-12-07 NOTE — PATIENT PROFILE ADULT - BRADEN SENSORY
[de-identified] : MANUEL CATHERINE 3) slightly limited

## 2021-12-21 NOTE — ASU PATIENT PROFILE, ADULT - NS PRO INFO GIVEN TO
Chief complaint:   Chief Complaint   Patient presents with   • Hip Pain   • Fall     may 22nd 2021       Vitals:  Visit Vitals  /74   Pulse 75   Resp 18   Ht 5' 3\" (1.6 m)   Wt 76.2 kg (168 lb)   SpO2 96%   BMI 29.76 kg/m²       HISTORY OF PRESENT ILLNESS     Isabel Ferraro is a 65 year old female who presents to the clinic to discuss hip pain. Pt states that pain started May 22 or 23, 2021(she cant remember the exact date) when she  fell in [facility] in [place]. Pt states that she was inside the store pushing a grocery cart when she fell on a puddle of water. Pt states that there were no signs up warning of the puddle. She fell and hit the right side of her hip.Denies hitting head, loss of consciousness. Pt states that she never went to the ED as she was scheduled for surgery for vaginal prolapse in the next day or so and went and had that procedure done. Since this fall in the ED, pt has a pinching pain in her buttock area. Pain is intermittent and is aggravated by movement and improves with sitting. Bending over will exacerbate the pain. Pt endorses tingling at the area, however denies numbness. Pt states that she turned over in bed the other night and felt shooting pain from her hip down her leg. Has not had any other episodes since. Denies any claudications in buttocks. No loss of bowel or bladder function. Pt rates the pain a 7/10 at its worst however is not there all the time. Will take advil for pain relief; finds herself taking it about twice per week if needed. Feels the pain is worse now that it is winter.       Other significant problems:  Patient Active Problem List    Diagnosis Date Noted   • PONV (postoperative nausea and vomiting) 05/21/2021     Priority: Low     Severe per patient     • Knee pain 05/20/2021     Priority: Low   • Localized, primary osteoarthritis 05/20/2021     Priority: Low   • Female bladder prolapse 01/24/2020     Priority: Low   • Cervical cyst 01/24/2020     Priority: Low   •  Allergy 11/21/2019     Priority: Low     Egg white and milk per outside records     • Systolic murmur 11/21/2019     Priority: Low   • Osteopenia 11/21/2019     Priority: Low     DEX[place] completed 12-19-17 in [place]  R Femoral Neck T-Score = -1.3  FRAX = Major Osteoporotic Fracture Risk = 7.2% & Hip Fracture = 0.5%     • Degenerative disc disease, lumbar 10/31/2019     Priority: Low   • Osteoarthritis of knee 10/31/2019     Priority: Low     Multilevel; Worst at L3-L4     • Perioral dermatitis 10/31/2019     Priority: Low   • Mild major depression (CMS/HCC) 10/31/2019     Priority: Low   • Anxiety 10/31/2019     Priority: Low   • Hyperlipidemia 10/31/2019     Priority: Low   • Essential hypertension 10/31/2019     Priority: Low       PAST MEDICAL, FAMILY AND SOCIAL HISTORY     Medications:  Current Outpatient Medications   Medication   • citalopram (CeleXA) 20 MG tablet   • hyoscyamine (HyoMax SR) 0.375 MG 12 hr tablet   • telmisartan (MICARDIS) 40 MG tablet   • hydrochlorothiazide (MICROZIDE) 12.5 MG capsule   • Multiple Vitamins-Minerals (ONE-A-DAY 50 PLUS PO)   • estradiol 0.05 mg/g (0.005 %) cream (in natural base)   • atorvastatin (LIPITOR) 20 MG tablet   • aspirin 81 MG tablet   • naproxen (NAPROSYN) 500 MG tablet     No current facility-administered medications for this visit.       Allergies:  ALLERGIES:   Allergen Reactions   • Anesthesia S-I-60 VOMITING     Severe nausea with both sedation and GA. In past, anesthesia team was successful in not having nausea for 6 hour GA with 2 scopalamine patches and propofol infusion       Past Medical  History/Surgeries:  Past Medical History:   Diagnosis Date   • Anxiety    • Arthritis    • Elevated LFTs    • Essential (primary) hypertension    • High cholesterol    • IBS (irritable bowel syndrome)    • Inflammatory bowel disease    • Intermittent palpitations    • PONV (postoperative nausea and vomiting)     nausea with both sedation and GA, in [place] anesthesia team was  patient successful in not having nausea for 6 hour GA with 2 scopalamine patches and propofol infusion   • Urinary incontinence    • Vitamin D deficiency        Past Surgical History:   Procedure Laterality Date   • Cataract extraction, bilateral      L = Dec 2016; R = Sept 2018   • Cholecystectomy  2006   • Colonoscopy  10-24-18 and 12-2-16   • Cyst removal      Neck   • Dexa,bone density,appendiculr skeltn  12/19/2017   • Eye surgery     • Hysterectomy     • Irrigation of bladder  01/01/2015   • Irrigation of bladder  01/01/2015   • Removal gallbladder     • Supracerv abd hysterectomy  2014    HUGO, BSO, sacrocervicopexy (Alyte Mesh), TVT, cystoscopy - Done at [facility]   • Vaginal prolapse repair         Family History:  Family History   Problem Relation Age of Onset   • Cancer Mother         Reproductive - uterine?   • Cancer, Breast Mother    • Diabetes Mother    • Cardiomyopathy Mother    • Heart disease Mother         CABG   • Hypertension Mother    • Myocardial Infarction Father    • Alcohol Abuse Father    • Hypertension Father    • Hyperlipidemia Father    • Alcohol Abuse Sister    • Other Sister         Unknown, but doesn't speak to her anymore   • Diabetes Maternal Grandmother    • Kidney disease Maternal Grandmother         Nephrolithiasis   • Heart disease Maternal Grandmother    • Hypertension Maternal Grandmother    • Heart disease Maternal Grandfather         CABG   • Patient is unaware of any medical problems Paternal Grandmother    • Alcohol Abuse Paternal Grandfather    • Depression Paternal Grandfather    • Heart disease Son    • Patient is unaware of any medical problems Son        Social History:  Social History     Tobacco Use   • Smoking status: Never Smoker   • Smokeless tobacco: Never Used   Substance Use Topics   • Alcohol use: Yes     Comment: Socially       REVIEW OF SYSTEMS     Review of Systems   Constitutional: Negative for activity change, chills, diaphoresis and fever.    HENT: Negative for sore throat and trouble swallowing.    Eyes: Negative for pain, discharge, redness and visual disturbance.   Respiratory: Negative for cough and shortness of breath.    Cardiovascular: Negative for chest pain, palpitations and leg swelling.   Gastrointestinal: Negative for abdominal pain, diarrhea, nausea and vomiting.   Endocrine: Negative for polydipsia.   Genitourinary: Negative for dysuria, frequency and urgency.   Musculoskeletal: Negative for arthralgias, joint swelling and myalgias.   Skin: Negative for rash.   Allergic/Immunologic: Negative for immunocompromised state.   Neurological: Negative for weakness, numbness and headaches.   Psychiatric/Behavioral: Negative for behavioral problems.       PHYSICAL EXAM     Physical Exam  Vitals and nursing note reviewed.   HENT:      Head: Normocephalic and atraumatic.   Eyes:      Extraocular Movements: Extraocular movements intact.      Conjunctiva/sclera: Conjunctivae normal.   Cardiovascular:      Rate and Rhythm: Normal rate and regular rhythm.      Pulses: Normal pulses.      Heart sounds: Normal heart sounds. No murmur heard.      Pulmonary:      Effort: Pulmonary effort is normal. No respiratory distress.      Breath sounds: Normal breath sounds.   Abdominal:      General: Abdomen is flat. Bowel sounds are normal. There is no distension.      Palpations: Abdomen is soft.      Tenderness: There is no abdominal tenderness.   Musculoskeletal:         General: Normal range of motion.      Thoracic back: No bony tenderness. Normal range of motion.      Lumbar back: No spasms. Normal range of motion. Negative right straight leg raise test and negative left straight leg raise test.      Comments: Pt with tenderness over SI joint. Straight leg test negative. Pain with flexion, abduction and external rotation of hip. Pain exacerbated by bending forward.    Neurological:      General: No focal deficit present.      Mental Status: She is alert and  oriented to person, place, and time.   Psychiatric:         Behavior: Behavior normal.         Thought Content: Thought content normal.         Judgment: Judgment normal.         ASSESSMENT/PLAN   Acute right hip pain  -pt with right hip pain ongoing since may. Pt has tried and failed conservative therapy at home. Will order imaging to evaluate for bony abnormalities/fractures. Will refer to physical therapy and order NSAIDS in interim. PT to follow up once xrays are obtained.   - XR HIP 2 VW RIGHT; Future  - XR PELVIS 1 OR 2 VW; Future  - SERVICE TO PHYSICAL THERAPY  - naproxen (NAPROSYN) 500 MG tablet; Take 1 tablet by mouth 2 times daily (with meals).  Dispense: 60 tablet; Refill: 0      Patient  discussed with Dr. Flowers, who agrees with plan of care.    Basim Reyna MD  Family Medicine PGY-II  12/21/21

## 2022-01-09 ENCOUNTER — RX RENEWAL (OUTPATIENT)
Age: 72
End: 2022-01-09

## 2022-03-29 NOTE — CONSULT NOTE ADULT - PROBLEM SELECTOR PROBLEM 1
Clinic hours for Dr. Norton:  
Pharyngoesophageal dysphagia

## 2022-04-26 ENCOUNTER — APPOINTMENT (OUTPATIENT)
Dept: CARDIOLOGY | Facility: CLINIC | Age: 72
End: 2022-04-26

## 2022-07-25 NOTE — ED PROVIDER NOTE - PATIENT PORTAL LINK FT
You can access the FollowMyHealth Patient Portal offered by [facility] by registering at the following website: [email or web address]. By joining [employer]’s FollowMyHealth portal, you will also be able to view your health information using other applications (apps) compatible with our system.
Language interpreters

## 2022-08-12 NOTE — ASU DISCHARGE PLAN (ADULT/PEDIATRIC) - CONDITION AT DISCHARGE
Stable
Patient reports that she has been leaking fluid for 3 days, she was checked in the office and was found to be 3cm. unclear if exam was performed for rule out rupture at this time. She is having painful contractions, she is 3/90/-2. She reports GBS is negative. She declines an epidural.

## 2022-08-15 ENCOUNTER — EMERGENCY (EMERGENCY)
Facility: HOSPITAL | Age: 72
LOS: 0 days | Discharge: HOME | End: 2022-08-15
Attending: EMERGENCY MEDICINE | Admitting: EMERGENCY MEDICINE

## 2022-08-15 VITALS
OXYGEN SATURATION: 100 % | DIASTOLIC BLOOD PRESSURE: 80 MMHG | SYSTOLIC BLOOD PRESSURE: 150 MMHG | RESPIRATION RATE: 18 BRPM | HEART RATE: 59 BPM

## 2022-08-15 VITALS
SYSTOLIC BLOOD PRESSURE: 137 MMHG | DIASTOLIC BLOOD PRESSURE: 75 MMHG | WEIGHT: 125 LBS | RESPIRATION RATE: 18 BRPM | TEMPERATURE: 97 F | OXYGEN SATURATION: 100 % | HEART RATE: 55 BPM | HEIGHT: 70 IN

## 2022-08-15 DIAGNOSIS — I10 ESSENTIAL (PRIMARY) HYPERTENSION: ICD-10-CM

## 2022-08-15 DIAGNOSIS — C61 MALIGNANT NEOPLASM OF PROSTATE: Chronic | ICD-10-CM

## 2022-08-15 DIAGNOSIS — R10.30 LOWER ABDOMINAL PAIN, UNSPECIFIED: ICD-10-CM

## 2022-08-15 DIAGNOSIS — F02.80 DEMENTIA IN OTHER DISEASES CLASSIFIED ELSEWHERE, UNSPECIFIED SEVERITY, WITHOUT BEHAVIORAL DISTURBANCE, PSYCHOTIC DISTURBANCE, MOOD DISTURBANCE, AND ANXIETY: ICD-10-CM

## 2022-08-15 DIAGNOSIS — H91.90 UNSPECIFIED HEARING LOSS, UNSPECIFIED EAR: ICD-10-CM

## 2022-08-15 DIAGNOSIS — Z98.890 OTHER SPECIFIED POSTPROCEDURAL STATES: Chronic | ICD-10-CM

## 2022-08-15 DIAGNOSIS — C15.9 MALIGNANT NEOPLASM OF ESOPHAGUS, UNSPECIFIED: Chronic | ICD-10-CM

## 2022-08-15 DIAGNOSIS — K40.90 UNILATERAL INGUINAL HERNIA, WITHOUT OBSTRUCTION OR GANGRENE, NOT SPECIFIED AS RECURRENT: Chronic | ICD-10-CM

## 2022-08-15 DIAGNOSIS — Z96.0 PRESENCE OF UROGENITAL IMPLANTS: Chronic | ICD-10-CM

## 2022-08-15 DIAGNOSIS — C61 MALIGNANT NEOPLASM OF PROSTATE: ICD-10-CM

## 2022-08-15 DIAGNOSIS — C34.90 MALIGNANT NEOPLASM OF UNSPECIFIED PART OF UNSPECIFIED BRONCHUS OR LUNG: ICD-10-CM

## 2022-08-15 DIAGNOSIS — C15.9 MALIGNANT NEOPLASM OF ESOPHAGUS, UNSPECIFIED: ICD-10-CM

## 2022-08-15 DIAGNOSIS — N20.0 CALCULUS OF KIDNEY: ICD-10-CM

## 2022-08-15 DIAGNOSIS — Z90.2 ACQUIRED ABSENCE OF LUNG [PART OF]: Chronic | ICD-10-CM

## 2022-08-15 DIAGNOSIS — G30.9 ALZHEIMER'S DISEASE, UNSPECIFIED: ICD-10-CM

## 2022-08-15 LAB
ALBUMIN SERPL ELPH-MCNC: 4.2 G/DL — SIGNIFICANT CHANGE UP (ref 3.5–5.2)
ALP SERPL-CCNC: 90 U/L — SIGNIFICANT CHANGE UP (ref 30–115)
ALT FLD-CCNC: 12 U/L — SIGNIFICANT CHANGE UP (ref 0–41)
ANION GAP SERPL CALC-SCNC: 11 MMOL/L — SIGNIFICANT CHANGE UP (ref 7–14)
APPEARANCE UR: CLEAR — SIGNIFICANT CHANGE UP
AST SERPL-CCNC: 20 U/L — SIGNIFICANT CHANGE UP (ref 0–41)
BACTERIA # UR AUTO: SIGNIFICANT CHANGE UP /HPF
BASOPHILS # BLD AUTO: 0.03 K/UL — SIGNIFICANT CHANGE UP (ref 0–0.2)
BASOPHILS NFR BLD AUTO: 0.7 % — SIGNIFICANT CHANGE UP (ref 0–1)
BILIRUB DIRECT SERPL-MCNC: <0.2 MG/DL — SIGNIFICANT CHANGE UP (ref 0–0.3)
BILIRUB INDIRECT FLD-MCNC: >0.3 MG/DL — SIGNIFICANT CHANGE UP (ref 0.2–1.2)
BILIRUB SERPL-MCNC: 0.5 MG/DL — SIGNIFICANT CHANGE UP (ref 0.2–1.2)
BILIRUB UR-MCNC: NEGATIVE — SIGNIFICANT CHANGE UP
BUN SERPL-MCNC: 17 MG/DL — SIGNIFICANT CHANGE UP (ref 10–20)
CALCIUM SERPL-MCNC: 9.4 MG/DL — SIGNIFICANT CHANGE UP (ref 8.5–10.1)
CHLORIDE SERPL-SCNC: 102 MMOL/L — SIGNIFICANT CHANGE UP (ref 98–110)
CO2 SERPL-SCNC: 28 MMOL/L — SIGNIFICANT CHANGE UP (ref 17–32)
COLOR SPEC: YELLOW — SIGNIFICANT CHANGE UP
COMMENT - URINE: SIGNIFICANT CHANGE UP
CREAT SERPL-MCNC: 0.9 MG/DL — SIGNIFICANT CHANGE UP (ref 0.7–1.5)
DIFF PNL FLD: ABNORMAL
EGFR: 91 ML/MIN/1.73M2 — SIGNIFICANT CHANGE UP
EOSINOPHIL # BLD AUTO: 0.03 K/UL — SIGNIFICANT CHANGE UP (ref 0–0.7)
EOSINOPHIL NFR BLD AUTO: 0.7 % — SIGNIFICANT CHANGE UP (ref 0–8)
EPI CELLS # UR: NEGATIVE — SIGNIFICANT CHANGE UP
GLUCOSE SERPL-MCNC: 111 MG/DL — HIGH (ref 70–99)
GLUCOSE UR QL: NEGATIVE MG/DL — SIGNIFICANT CHANGE UP
HCT VFR BLD CALC: 37.5 % — LOW (ref 42–52)
HGB BLD-MCNC: 11.4 G/DL — LOW (ref 14–18)
IMM GRANULOCYTES NFR BLD AUTO: 0.2 % — SIGNIFICANT CHANGE UP (ref 0.1–0.3)
KETONES UR-MCNC: NEGATIVE — SIGNIFICANT CHANGE UP
LEUKOCYTE ESTERASE UR-ACNC: NEGATIVE — SIGNIFICANT CHANGE UP
LIDOCAIN IGE QN: 14 U/L — SIGNIFICANT CHANGE UP (ref 7–60)
LYMPHOCYTES # BLD AUTO: 0.59 K/UL — LOW (ref 1.2–3.4)
LYMPHOCYTES # BLD AUTO: 12.9 % — LOW (ref 20.5–51.1)
MCHC RBC-ENTMCNC: 24.9 PG — LOW (ref 27–31)
MCHC RBC-ENTMCNC: 30.4 G/DL — LOW (ref 32–37)
MCV RBC AUTO: 81.9 FL — SIGNIFICANT CHANGE UP (ref 80–94)
MONOCYTES # BLD AUTO: 0.37 K/UL — SIGNIFICANT CHANGE UP (ref 0.1–0.6)
MONOCYTES NFR BLD AUTO: 8.1 % — SIGNIFICANT CHANGE UP (ref 1.7–9.3)
NEUTROPHILS # BLD AUTO: 3.54 K/UL — SIGNIFICANT CHANGE UP (ref 1.4–6.5)
NEUTROPHILS NFR BLD AUTO: 77.4 % — HIGH (ref 42.2–75.2)
NITRITE UR-MCNC: NEGATIVE — SIGNIFICANT CHANGE UP
NRBC # BLD: 0 /100 WBCS — SIGNIFICANT CHANGE UP (ref 0–0)
PH UR: 7.5 — SIGNIFICANT CHANGE UP (ref 5–8)
PLATELET # BLD AUTO: 177 K/UL — SIGNIFICANT CHANGE UP (ref 130–400)
POTASSIUM SERPL-MCNC: 4.1 MMOL/L — SIGNIFICANT CHANGE UP (ref 3.5–5)
POTASSIUM SERPL-SCNC: 4.1 MMOL/L — SIGNIFICANT CHANGE UP (ref 3.5–5)
PROT SERPL-MCNC: 7 G/DL — SIGNIFICANT CHANGE UP (ref 6–8)
PROT UR-MCNC: NEGATIVE MG/DL — SIGNIFICANT CHANGE UP
RBC # BLD: 4.58 M/UL — LOW (ref 4.7–6.1)
RBC # FLD: 15.7 % — HIGH (ref 11.5–14.5)
RBC CASTS # UR COMP ASSIST: SIGNIFICANT CHANGE UP /HPF
SODIUM SERPL-SCNC: 141 MMOL/L — SIGNIFICANT CHANGE UP (ref 135–146)
SP GR SPEC: 1.01 — SIGNIFICANT CHANGE UP (ref 1.01–1.03)
UROBILINOGEN FLD QL: 0.2 MG/DL — SIGNIFICANT CHANGE UP
WBC # BLD: 4.57 K/UL — LOW (ref 4.8–10.8)
WBC # FLD AUTO: 4.57 K/UL — LOW (ref 4.8–10.8)
WBC UR QL: SIGNIFICANT CHANGE UP /HPF

## 2022-08-15 PROCEDURE — 74177 CT ABD & PELVIS W/CONTRAST: CPT | Mod: 26,MA

## 2022-08-15 PROCEDURE — 99284 EMERGENCY DEPT VISIT MOD MDM: CPT | Mod: FS

## 2022-08-15 RX ORDER — FOSINOPRIL SODIUM 10 MG/1
1 TABLET ORAL
Qty: 0 | Refills: 0 | DISCHARGE

## 2022-08-15 RX ORDER — FAMOTIDINE 10 MG/ML
1 INJECTION INTRAVENOUS
Qty: 0 | Refills: 0 | DISCHARGE

## 2022-08-15 RX ORDER — FERROUS SULFATE 325(65) MG
1 TABLET ORAL
Qty: 0 | Refills: 0 | DISCHARGE

## 2022-08-15 NOTE — ED PROVIDER NOTE - ATTENDING APP SHARED VISIT CONTRIBUTION OF CARE
Physical exam shows mild lower abdominal tenderness to palpation.  No rebound guarding.  Labs, CAT scan ordered.

## 2022-08-15 NOTE — ED PROVIDER NOTE - PATIENT PORTAL LINK FT
You can access the FollowMyHealth Patient Portal offered by [facility] by registering at the following website: [email or web address]. By joining [email or web address]’s FollowMyHealth portal, you will also be able to view your health information using other applications (apps) compatible with our system.

## 2022-08-15 NOTE — ED ADULT NURSE NOTE - CAS TRG GENERAL AIRWAY, MLM
Patent Prednisone Pregnancy And Lactation Text: This medication is Pregnancy Category C and it isn't know if it is safe during pregnancy. This medication is excreted in breast milk.

## 2022-08-15 NOTE — ED PROVIDER NOTE - CLINICAL SUMMARY MEDICAL DECISION MAKING FREE TEXT BOX
pt with kidney stone, pain controlled po tolerant will d/c supportive care,  f/u. Patient counseled regarding conditions which should prompt return.

## 2022-08-15 NOTE — ED ADULT NURSE NOTE - CHIEF COMPLAINT QUOTE
BIBA via [facility] from home, pt complaining of abdominal pain, recent sono gram but has not been able to get results, history of kidney stones

## 2022-08-15 NOTE — ED ADULT NURSE NOTE - NSIMPLEMENTINTERV_GEN_ALL_ED
Implemented All Universal Safety Interventions:  [place] to call system. Call bell, personal items and telephone within reach. Instruct patient to call for assistance. Room bathroom lighting operational. Non-slip footwear when patient is off stretcher. Physically safe environment: no spills, clutter or unnecessary equipment. Stretcher in lowest position, wheels locked, appropriate side rails in place.

## 2022-08-15 NOTE — ED PROVIDER NOTE - OBJECTIVE STATEMENT
73 yo male, pmh of dementia, prostate ca, lung ca esophageal ca all in remission, kidney stones, presents to ed for lower abd pain, started this am, mild, aching, no radiation. Denies fever, chills, cp, sob, nvd, dysuria, hematuria. States now pain resolved.

## 2022-08-15 NOTE — ED PROVIDER NOTE - PHYSICAL EXAMINATION
Physical Exam    Vital Signs: I have reviewed the initial vital signs.  Constitutional: appears stated age, no acute distress  Eyes: Conjunctiva pink, Sclera clear,   Cardiovascular: S1 and S2, regular rate, regular rhythm, well-perfused extremities, radial pulses equal and 2+, pedal pulses 2+ and equal  Respiratory: unlabored respiratory effort, clear to auscultation bilaterally no wheezing, rales and rhonchi  Gastrointestinal: soft, non-tender abdomen, no pulsatile mass, normal bowl sounds  Musculoskeletal: supple neck, no lower extremity edema, no midline tenderness  Integumentary: warm, dry, no rash  Neurologic: awake, alert, nvi

## 2022-08-15 NOTE — ED ADULT NURSE NOTE - NSICDXPASTMEDICALHX_GEN_ALL_CORE_FT
PAST MEDICAL HISTORY:  Abnormal cholesterol test     Alzheimer disease     Cancer left lung 2018, no chemo or rad rx and resection    Cancer ESOPHAGEAL CANCER 2017 RECEIVED CHEMO AND RADIATION and endoscopic resection partial oesophagus and stomach    [place] (hard of hearing)     Hypertension, unspecified type     Prostate cancer     Renal calculi nephrostomy tube -6/2021

## 2022-08-15 NOTE — ED PROVIDER NOTE - NSICDXPASTMEDICALHX_GEN_ALL_CORE_FT
PAST MEDICAL HISTORY:  Abnormal cholesterol test     Alzheimer disease     Cancer left lung 2018, no chemo or rad rx and resection    Cancer ESOPHAGEAL CANCER 2017 RECEIVED CHEMO AND RADIATION and endoscopic resection partial oesophagus and stomach    [place] ([place] of hearing)     Hypertension, unspecified type     Prostate cancer     Renal calculi nephrostomy tube -6/2021

## 2022-08-15 NOTE — ED ADULT NURSE NOTE - CAS ELECT INFOMATION PROVIDED
Patient:   PEABODY, TIMOTHY J            MRN: [id number]            FIN: [id number]               Age:   41 years     Sex:  Male     :  1975   Associated Diagnoses:   Non-healing surgical wound   Author:   Floyd Baker MD      Impression and Plan   Diagnosis     Non-healing surgical wound ([id number] T81.89XA).     Course:  Unchanged.    Orders     Orders   Charges (Evaluation and Management):  64912 office outpatient visit 15 minutes (Charge) (Order): Quantity: 1, Non-healing surgical wound.     Orders (Selected)   Outpatient Orders  Order  Referral (Request): 10/10/2017 12:06 PM CDT, Referred to: General Surgery, Referred to: Dr. Ricardo Swann ([place]), Non-healing surgical wound.        Visit Information      Date of Service: 10/10/2017 11:08 am  Performing Location: [facility]  Encounter#: [id number]      Primary Care Provider (PCP):  Floyd Baker MD    NPI# [id number]      Referring Provider:  Floyd Baker MD, NPI# [id number]   Visit type:  New symptom.    Accompanied by:  No one.    Source of history:  Self.    History limitation:  None.       Chief Complaint   Chief complaint discussed and confirmed correct.     10/10/2017 11:32 AM CDT  Pt here for infection in incision        History of Present Illness             The patient presents with   Surgical wound in umbilicus has not yet healed closed.  Drains yellow fluid and remains tender..        Review of Systems   Constitutional:  Negative.    Eye:  Negative.    Ear/Nose/Mouth/Throat:  Negative.    Respiratory:  Negative.    Cardiovascular:  Negative.    Gastrointestinal:  Negative.    Genitourinary:  Negative.    Hematology/Lymphatics:  Negative.    Endocrine:  Negative.    Immunologic:  Negative.    Musculoskeletal:  Negative.    Integumentary:  Negative except as documented in history of present illness.    Neurologic:  Negative.    Psychiatric:  Negative.           All other systems reviewed and negative      Health Status    Allergies:    Nonallergic Reactions (Selected)  Severe  Doxycycline (Vomiting)   Medications:  (Selected)   Prescriptions  Prescribed  DULoxetine 60 mg oral delayed release capsule: 1 cap(s) ( 60 mg ), po, daily, # 30 cap(s), 5 Refill(s), Type: Maintenance, Pharmacy: [facility], 1 cap(s) po daily  OxyCONTIN 10 mg oral tablet, extended release: 1 tab(s) ( 10 mg ), PO, q12hr, # 60 tab(s), 0 Refill(s), Type: Maintenance, Pharmacy: [facility], 1 tab(s) po q12 hrs  SUMAtriptan 100 mg oral tablet: 1 tab(s) ( 100 mg ), PO, Once, PRN: for migraine headache, # 9 tab(s), 1 Refill(s), Type: Soft Stop, Pharmacy: [facility], 1 tab(s) po once,PRN:for migraine headache  Zofran ODT 4 mg oral tablet, disintegratin tab(s) ( 4 mg ), po, q8 hrs, PRN: for nausea/vomiting, # 15 tab(s), 1 Refill(s), Type: Maintenance, Pharmacy: [facility], 1 tab(s) po q8 hrs,PRN:for nausea/vomiting  cyclobenzaprine 10 mg oral tablet: 1 tab(s) ( 10 mg ), PO, q6 hrs, PRN: for spasm, # 50 tab(s), 1 Refill(s), Type: Maintenance, Pharmacy: [facility], 1 tab(s) po q6 hrs,PRN:for spasm  oxyCODONE 10 mg oral tablet: 1 tab(s) ( 10 mg ), po, q4-6 hrs, # 90 tab(s), 0 Refill(s), Type: Maintenance, Pharmacy: [facility], 1 tab(s) po q4-6 hrs  traZODone 100 mg oral tablet: 1 tab(s) ( 100 mg ), PO, hs, # 30 tab(s), 5 Refill(s), Type: Maintenance, Pharmacy: [facility], 1 tab(s) po hs   Problem list:    All Problems  Cartilage tear / ICD-9-.9 / Confirmed  Chronic Lumbar Pain / ICD-9-.2 / Confirmed  Insomnia / ICD-9-.52 / Confirmed  Migraine Headache / ICD-9-.90 / Confirmed  Mild Episode of Depression / ICD-9-.31 / Confirmed  Moderate major depression / SNOMED CT 1604025 / Confirmed  Obesity / ICD-9-.00 / Probable  Onychomycosis / SNOMED CT 4396472041 / Confirmed  PUD (Peptic Ulcer Disease) / ICD-9-.90 / Confirmed  Resolved: *Hospitalized@[facility] - Pneumonia  Resolved:  Tobacco user / ICD-9-.1      Histories   Past Medical History:    Active  PUD (Peptic Ulcer Disease) (533.90)  Chronic Lumbar Pain (724.2)  Onychomycosis (3433035543)  Mild Episode of Depression (296.31)  Obesity (278.00)  Cartilage tear (848.9)  Migraine Headache (346.90)  Insomnia (780.52)  Resolved  *Hospitalized@[facility] - Pneumonia: Onset on 2/14/2015 at 39 years.  Resolved on 2/19/2015 at 39 years.  Tobacco user (305.1):  Resolved.   Family History:       Procedure history:    Spinal fusion (SNOMED CT 93970805) performed by Jadiel AMEZCUA Freddy on 8/22/2017 at 41 Years.  Comments:  8/31/2017 8:02 AM - Ashley Amaro  L4-5.  Fusion L5-S1 in 2015 at 40 Years.  Comments:  2/3/2015 8:49 AM - Floyd Baker MD  02/05/2015  [facility]  Dr. Rios  Bilateral L5-S1 Decompression Foramina in 2014 at 39 Years.  Comments:  4/29/2014 3:20 PM - Floyd Baker MD, Dr.  [facility]  05/06/2014    Vasectomy (SNOMED CT 52885788) on 7/26/2012 at 36 Years.  Arthroscopy (SNOMED CT 06315563) in 2011 at 36 Years.  Comments:  7/11/2012 8:59 AM - Miracle Griffiths  Bilateral knee  Polysomnogram (SNOMED CT 839573214) on 8/14/2009 at 33 Years.  Comments:  7/11/2012 9:09 AM - Miracle Griffiths  No significant sleep-disorder findings.  Bruxism.  Follow-up indicated.  Tonsillectomy (SNOMED CT 480599078) in 1980 at 5 Years.   Social History:        Alcohol Assessment: Denies Alcohol Use            Never      Tobacco Assessment: Past            Past, Cigarettes, 4 per day.                     Comments:                      04/29/2014 - Floyd Baker MD                     Quit 04/01/2014      Substance Abuse Assessment            Never      Employment and Education Assessment            Employed                     Comments:                      04/29/2014 - Floyd Baker MD                     [employer]      Home and Environment Assessment            Marital status:  (Living together).  Lives with  Self, Children, Spouse.  Living situation:               Home/Independent.      Exercise and Physical Activity Assessment: Does not exercise            Exercise frequency: No regular exercise..        Physical Examination   Vital signs reviewed  and within acceptable limits    Vital Signs   10/10/2017 11:32 AM CDT Temperature Tympanic 97.7 DegF  LOW    Peripheral Pulse Rate 72 bpm    Pulse Site Radial artery    Systolic Blood Pressure 128 mmHg    Diastolic Blood Pressure 76 mmHg    Mean Arterial Pressure 93 mmHg    BP Site Left arm      Measurements from flowsheet : Measurements   10/10/2017 11:32 AM CDT Height Measured - Standard 70 in    Weight Measured - Standard 278 lb    BSA 2.49 m2    Body Mass Index 39.88 kg/m2      General:  No acute distress.    Neck:  Supple, No lymphadenopathy, No thyromegaly.    Respiratory:  Lungs are clear to auscultation, Respirations are non-labored.    Cardiovascular:  Normal rate, Regular rhythm, No murmur, Normal peripheral perfusion, No edema.    Gastrointestinal:  Soft, Non-tender, Non-distended, Normal bowel sounds, No organomegaly.    Musculoskeletal:  Normal gait.    Integumentary:  Warm, Dry, Pink, surgical wound well healed except in the umbilicus.  Small ulcer 1 cm x 0.5 cm.  No longer erythematous or raw.  No discharge at the time of examination.  No odor..    Neurologic:  Alert, Oriented.    Psychiatric:  Cooperative, Appropriate mood & affect, Normal judgment.       Health Maintenance      Recommendations     Pending (in the next year)        OverDue           Influenza Vaccine due  02/16/16  and every 1  year(s)        Due            Lipid Disorders Screen (Male) due  10/10/17  and every 1  year(s)           Type 2 Diabetes Mellitus Screen (Male) due  10/10/17  Variable frequency        Due In Future            Depression Screen (Male) not due until  06/28/18  and every 1  year(s)           Tetanus Vaccine not due until  09/28/18  and every 10  year(s)     Satisfied  (in the past 1 year)        Satisfied            Alcohol Misuse Screen (Male) on  06/28/17.           Body Mass Index Check (Male) on  10/10/17.           Body Mass Index Check (Male) on  09/19/17.           Body Mass Index Check (Male) on  09/05/17.           Body Mass Index Check (Male) on  08/02/17.           Body Mass Index Check (Male) on  05/19/17.           Body Mass Index Check (Male) on  02/14/17.           Depression Screen (Male) on  06/28/17.           Depression Screen (Male) on  06/28/17.           Depression Screen (Male) on  06/28/17.           High Blood Pressure Screen (Male) on  10/10/17.           High Blood Pressure Screen (Male) on  09/19/17.           High Blood Pressure Screen (Male) on  08/30/17.           High Blood Pressure Screen (Male) on  08/02/17.           High Blood Pressure Screen (Male) on  06/28/17.           High Blood Pressure Screen (Male) on  05/19/17.           High Blood Pressure Screen (Male) on  05/08/17.           High Blood Pressure Screen (Male) on  02/14/17.           Obesity Screen and Counseling (Male) on  10/10/17.           Obesity Screen and Counseling (Male) on  09/19/17.           Obesity Screen and Counseling (Male) on  09/05/17.           Obesity Screen and Counseling (Male) on  08/30/17.           Obesity Screen and Counseling (Male) on  08/02/17.           Obesity Screen and Counseling (Male) on  06/28/17.           Obesity Screen and Counseling (Male) on  05/19/17.           Obesity Screen and Counseling (Male) on  05/08/17.           Obesity Screen and Counseling (Male) on  02/14/17.           Tobacco Use Screen (Male) on  10/10/17.           Tobacco Use Screen (Male) on  09/19/17.           Tobacco Use Screen (Male) on  08/30/17.           Tobacco Use Screen (Male) on  08/02/17.           Tobacco Use Screen (Male) on  06/28/17.           Tobacco Use Screen (Male) on  05/19/17.           Tobacco Use Screen (Male) on  05/08/17.           Tobacco Use Screen  (Male) on  02/14/17.   DC instructions

## 2022-08-15 NOTE — ED ADULT TRIAGE NOTE - CHIEF COMPLAINT QUOTE
BIBA via [facility] from home, pt complaining of abdominal pain, recent sono gram but has not been able to get results, history of kidney stones

## 2022-08-16 LAB
CULTURE RESULTS: SIGNIFICANT CHANGE UP
SPECIMEN SOURCE: SIGNIFICANT CHANGE UP

## 2023-05-25 NOTE — ASU PATIENT PROFILE, ADULT - PAIN SCALE PREFERRED, PROFILE
HPI    Pt here today for 4 mth OCT/DFE. No pain/discomfort. No f/f. Pt states   that VA is blurred OS>OD.    Eye Meds: No gtts  Last edited by Devora Mcfarland on 5/25/2023  9:23 AM.       OCT - OD trace parafoveal ME  OS - inferior fibrosis at TORSTEN site - improved    Prior FA - Foveal MA's OS   No NV of NP OU      Lost  to COVID complications 12/20    A/P    1. Mild NPDR OU  T2 controlled on insulin    2. DME OS  S/p Avastin OS x 5  S/p Ozurdex OS x 5 9/19  S/p Iluvien 9/19    Doing well, will need chronic steroid  - may need additional Iluvien OS soon  Watch OD for now    Can consider light focal/micropulse to parafoveal MA's OS if worsens    5/23 - increase in parafoveal ME OD with good vision - monitor for now      3. HTN Ret OU  BS/BP/chol control  5/22 TORSTEN OS with SRF and preretinal heme  S/p Avastin OS x 2    TORSTEN ruptured and involuted - no tx today    4. PCIOL OU  With small PCO      3 months OCT/FA OD  
numerical 0-10

## 2023-07-13 ENCOUNTER — INPATIENT (INPATIENT)
Facility: HOSPITAL | Age: 73
LOS: 13 days | Discharge: HOME | DRG: 808 | End: 2023-07-27
Attending: STUDENT IN AN ORGANIZED HEALTH CARE EDUCATION/TRAINING PROGRAM | Admitting: HOSPITALIST
Payer: MEDICARE

## 2023-07-13 VITALS
DIASTOLIC BLOOD PRESSURE: 75 MMHG | HEART RATE: 62 BPM | RESPIRATION RATE: 16 BRPM | OXYGEN SATURATION: 100 % | WEIGHT: 149.91 LBS | SYSTOLIC BLOOD PRESSURE: 136 MMHG | HEIGHT: 72 IN

## 2023-07-13 DIAGNOSIS — F32.A DEPRESSION, UNSPECIFIED: ICD-10-CM

## 2023-07-13 DIAGNOSIS — B95.2 ENTEROCOCCUS AS THE CAUSE OF DISEASES CLASSIFIED ELSEWHERE: ICD-10-CM

## 2023-07-13 DIAGNOSIS — D61.818 OTHER PANCYTOPENIA: ICD-10-CM

## 2023-07-13 DIAGNOSIS — I10 ESSENTIAL (PRIMARY) HYPERTENSION: ICD-10-CM

## 2023-07-13 DIAGNOSIS — Z90.2 ACQUIRED ABSENCE OF LUNG [PART OF]: Chronic | ICD-10-CM

## 2023-07-13 DIAGNOSIS — Z66 DO NOT RESUSCITATE: ICD-10-CM

## 2023-07-13 DIAGNOSIS — F02.80 DEMENTIA IN OTHER DISEASES CLASSIFIED ELSEWHERE, UNSPECIFIED SEVERITY, WITHOUT BEHAVIORAL DISTURBANCE, PSYCHOTIC DISTURBANCE, MOOD DISTURBANCE, AND ANXIETY: ICD-10-CM

## 2023-07-13 DIAGNOSIS — E87.0 HYPEROSMOLALITY AND HYPERNATREMIA: ICD-10-CM

## 2023-07-13 DIAGNOSIS — Z92.3 PERSONAL HISTORY OF IRRADIATION: ICD-10-CM

## 2023-07-13 DIAGNOSIS — C61 MALIGNANT NEOPLASM OF PROSTATE: Chronic | ICD-10-CM

## 2023-07-13 DIAGNOSIS — Z85.46 PERSONAL HISTORY OF MALIGNANT NEOPLASM OF PROSTATE: ICD-10-CM

## 2023-07-13 DIAGNOSIS — Z90.49 ACQUIRED ABSENCE OF OTHER SPECIFIED PARTS OF DIGESTIVE TRACT: ICD-10-CM

## 2023-07-13 DIAGNOSIS — Z90.2 ACQUIRED ABSENCE OF LUNG [PART OF]: ICD-10-CM

## 2023-07-13 DIAGNOSIS — Z98.890 OTHER SPECIFIED POSTPROCEDURAL STATES: Chronic | ICD-10-CM

## 2023-07-13 DIAGNOSIS — G93.41 METABOLIC ENCEPHALOPATHY: ICD-10-CM

## 2023-07-13 DIAGNOSIS — E83.42 HYPOMAGNESEMIA: ICD-10-CM

## 2023-07-13 DIAGNOSIS — R42 DIZZINESS AND GIDDINESS: ICD-10-CM

## 2023-07-13 DIAGNOSIS — Z86.73 PERSONAL HISTORY OF TRANSIENT ISCHEMIC ATTACK (TIA), AND CEREBRAL INFARCTION WITHOUT RESIDUAL DEFICITS: ICD-10-CM

## 2023-07-13 DIAGNOSIS — Z85.118 PERSONAL HISTORY OF OTHER MALIGNANT NEOPLASM OF BRONCHUS AND LUNG: ICD-10-CM

## 2023-07-13 DIAGNOSIS — Z85.01 PERSONAL HISTORY OF MALIGNANT NEOPLASM OF ESOPHAGUS: ICD-10-CM

## 2023-07-13 DIAGNOSIS — R91.1 SOLITARY PULMONARY NODULE: ICD-10-CM

## 2023-07-13 DIAGNOSIS — K22.10 ULCER OF ESOPHAGUS WITHOUT BLEEDING: ICD-10-CM

## 2023-07-13 DIAGNOSIS — N39.0 URINARY TRACT INFECTION, SITE NOT SPECIFIED: ICD-10-CM

## 2023-07-13 DIAGNOSIS — C15.9 MALIGNANT NEOPLASM OF ESOPHAGUS, UNSPECIFIED: Chronic | ICD-10-CM

## 2023-07-13 DIAGNOSIS — K40.90 UNILATERAL INGUINAL HERNIA, WITHOUT OBSTRUCTION OR GANGRENE, NOT SPECIFIED AS RECURRENT: Chronic | ICD-10-CM

## 2023-07-13 DIAGNOSIS — Z96.0 PRESENCE OF UROGENITAL IMPLANTS: Chronic | ICD-10-CM

## 2023-07-13 DIAGNOSIS — U07.1 COVID-19: ICD-10-CM

## 2023-07-13 DIAGNOSIS — G30.9 ALZHEIMER'S DISEASE, UNSPECIFIED: ICD-10-CM

## 2023-07-13 LAB
ABO RH CONFIRMATION: SIGNIFICANT CHANGE UP
ALBUMIN SERPL ELPH-MCNC: 3.7 G/DL — SIGNIFICANT CHANGE UP (ref 3.5–5.2)
ALP SERPL-CCNC: 113 U/L — SIGNIFICANT CHANGE UP (ref 30–115)
ALT FLD-CCNC: 9 U/L — SIGNIFICANT CHANGE UP (ref 0–41)
ANION GAP SERPL CALC-SCNC: 10 MMOL/L — SIGNIFICANT CHANGE UP (ref 7–14)
APTT BLD: 24.3 SEC — LOW (ref 27–39.2)
AST SERPL-CCNC: 28 U/L — SIGNIFICANT CHANGE UP (ref 0–41)
BASOPHILS # BLD AUTO: 0.03 K/UL — SIGNIFICANT CHANGE UP (ref 0–0.2)
BASOPHILS NFR BLD AUTO: 0.6 % — SIGNIFICANT CHANGE UP (ref 0–1)
BILIRUB SERPL-MCNC: 0.4 MG/DL — SIGNIFICANT CHANGE UP (ref 0.2–1.2)
BUN SERPL-MCNC: 14 MG/DL — SIGNIFICANT CHANGE UP (ref 10–20)
CALCIUM SERPL-MCNC: 8.8 MG/DL — SIGNIFICANT CHANGE UP (ref 8.4–10.4)
CHLORIDE SERPL-SCNC: 105 MMOL/L — SIGNIFICANT CHANGE UP (ref 98–110)
CO2 SERPL-SCNC: 25 MMOL/L — SIGNIFICANT CHANGE UP (ref 17–32)
CREAT SERPL-MCNC: 0.8 MG/DL — SIGNIFICANT CHANGE UP (ref 0.7–1.5)
EGFR: 93 ML/MIN/1.73M2 — SIGNIFICANT CHANGE UP
EOSINOPHIL # BLD AUTO: 0.22 K/UL — SIGNIFICANT CHANGE UP (ref 0–0.7)
EOSINOPHIL NFR BLD AUTO: 4.7 % — SIGNIFICANT CHANGE UP (ref 0–8)
GLUCOSE SERPL-MCNC: 92 MG/DL — SIGNIFICANT CHANGE UP (ref 70–99)
HCT VFR BLD CALC: 27.8 % — LOW (ref 42–52)
HGB BLD-MCNC: 7.3 G/DL — LOW (ref 14–18)
IMM GRANULOCYTES NFR BLD AUTO: 0.4 % — HIGH (ref 0.1–0.3)
INR BLD: 1.01 RATIO — SIGNIFICANT CHANGE UP (ref 0.65–1.3)
IRON SATN MFR SERPL: 20 UG/DL — LOW (ref 35–150)
IRON SATN MFR SERPL: 5 % — LOW (ref 15–50)
LDH SERPL L TO P-CCNC: 338 U/L — HIGH (ref 50–242)
LYMPHOCYTES # BLD AUTO: 0.83 K/UL — LOW (ref 1.2–3.4)
LYMPHOCYTES # BLD AUTO: 17.8 % — LOW (ref 20.5–51.1)
MAGNESIUM SERPL-MCNC: 1.9 MG/DL — SIGNIFICANT CHANGE UP (ref 1.8–2.4)
MCHC RBC-ENTMCNC: 17.5 PG — LOW (ref 27–31)
MCHC RBC-ENTMCNC: 26.3 G/DL — LOW (ref 32–37)
MCV RBC AUTO: 66.5 FL — LOW (ref 80–94)
MONOCYTES # BLD AUTO: 0.51 K/UL — SIGNIFICANT CHANGE UP (ref 0.1–0.6)
MONOCYTES NFR BLD AUTO: 10.9 % — HIGH (ref 1.7–9.3)
NEUTROPHILS # BLD AUTO: 3.05 K/UL — SIGNIFICANT CHANGE UP (ref 1.4–6.5)
NEUTROPHILS NFR BLD AUTO: 65.6 % — SIGNIFICANT CHANGE UP (ref 42.2–75.2)
NRBC # BLD: 0 /100 WBCS — SIGNIFICANT CHANGE UP (ref 0–0)
NT-PROBNP SERPL-SCNC: 534 PG/ML — HIGH (ref 0–300)
PLATELET # BLD AUTO: 119 K/UL — LOW (ref 130–400)
PMV BLD: SIGNIFICANT CHANGE UP (ref 7.4–10.4)
POTASSIUM SERPL-MCNC: 4.4 MMOL/L — SIGNIFICANT CHANGE UP (ref 3.5–5)
POTASSIUM SERPL-SCNC: 4.4 MMOL/L — SIGNIFICANT CHANGE UP (ref 3.5–5)
PROT SERPL-MCNC: 6.7 G/DL — SIGNIFICANT CHANGE UP (ref 6–8)
PROTHROM AB SERPL-ACNC: 11.5 SEC — SIGNIFICANT CHANGE UP (ref 9.95–12.87)
RBC # BLD: 4.18 M/UL — LOW (ref 4.7–6.1)
RBC # BLD: 4.2 M/UL — LOW (ref 4.7–6.1)
RBC # FLD: 19.6 % — HIGH (ref 11.5–14.5)
RETICS #: 65.9 K/UL — SIGNIFICANT CHANGE UP (ref 25–125)
RETICS/RBC NFR: 1.6 % — HIGH (ref 0.5–1.5)
SODIUM SERPL-SCNC: 140 MMOL/L — SIGNIFICANT CHANGE UP (ref 135–146)
TIBC SERPL-MCNC: 409 UG/DL — SIGNIFICANT CHANGE UP (ref 220–430)
TROPONIN T SERPL-MCNC: <0.01 NG/ML — SIGNIFICANT CHANGE UP
UIBC SERPL-MCNC: 389 UG/DL — HIGH (ref 110–370)
WBC # BLD: 4.66 K/UL — LOW (ref 4.8–10.8)
WBC # FLD AUTO: 4.66 K/UL — LOW (ref 4.8–10.8)

## 2023-07-13 PROCEDURE — 97530 THERAPEUTIC ACTIVITIES: CPT | Mod: GP

## 2023-07-13 PROCEDURE — 82728 ASSAY OF FERRITIN: CPT

## 2023-07-13 PROCEDURE — P9040: CPT

## 2023-07-13 PROCEDURE — 85025 COMPLETE CBC W/AUTO DIFF WBC: CPT

## 2023-07-13 PROCEDURE — 87635 SARS-COV-2 COVID-19 AMP PRB: CPT

## 2023-07-13 PROCEDURE — 87077 CULTURE AEROBIC IDENTIFY: CPT

## 2023-07-13 PROCEDURE — 70496 CT ANGIOGRAPHY HEAD: CPT | Mod: 26,MA

## 2023-07-13 PROCEDURE — 97164 PT RE-EVAL EST PLAN CARE: CPT | Mod: GP

## 2023-07-13 PROCEDURE — 80061 LIPID PANEL: CPT

## 2023-07-13 PROCEDURE — 86901 BLOOD TYPING SEROLOGIC RH(D): CPT

## 2023-07-13 PROCEDURE — 70498 CT ANGIOGRAPHY NECK: CPT | Mod: 26,MA

## 2023-07-13 PROCEDURE — 99223 1ST HOSP IP/OBS HIGH 75: CPT

## 2023-07-13 PROCEDURE — 82746 ASSAY OF FOLIC ACID SERUM: CPT

## 2023-07-13 PROCEDURE — 84443 ASSAY THYROID STIM HORMONE: CPT

## 2023-07-13 PROCEDURE — 83615 LACTATE (LD) (LDH) ENZYME: CPT

## 2023-07-13 PROCEDURE — 85027 COMPLETE CBC AUTOMATED: CPT

## 2023-07-13 PROCEDURE — 87040 BLOOD CULTURE FOR BACTERIA: CPT

## 2023-07-13 PROCEDURE — 36430 TRANSFUSION BLD/BLD COMPNT: CPT

## 2023-07-13 PROCEDURE — 87186 SC STD MICRODIL/AGAR DIL: CPT

## 2023-07-13 PROCEDURE — 97161 PT EVAL LOW COMPLEX 20 MIN: CPT | Mod: GP

## 2023-07-13 PROCEDURE — 81001 URINALYSIS AUTO W/SCOPE: CPT

## 2023-07-13 PROCEDURE — 80076 HEPATIC FUNCTION PANEL: CPT

## 2023-07-13 PROCEDURE — 85610 PROTHROMBIN TIME: CPT

## 2023-07-13 PROCEDURE — 82565 ASSAY OF CREATININE: CPT

## 2023-07-13 PROCEDURE — 80053 COMPREHEN METABOLIC PANEL: CPT

## 2023-07-13 PROCEDURE — 80048 BASIC METABOLIC PNL TOTAL CA: CPT

## 2023-07-13 PROCEDURE — 83036 HEMOGLOBIN GLYCOSYLATED A1C: CPT

## 2023-07-13 PROCEDURE — 70450 CT HEAD/BRAIN W/O DYE: CPT | Mod: 26,MA

## 2023-07-13 PROCEDURE — 86850 RBC ANTIBODY SCREEN: CPT

## 2023-07-13 PROCEDURE — 97110 THERAPEUTIC EXERCISES: CPT | Mod: GP

## 2023-07-13 PROCEDURE — 97116 GAIT TRAINING THERAPY: CPT | Mod: GP

## 2023-07-13 PROCEDURE — 83735 ASSAY OF MAGNESIUM: CPT

## 2023-07-13 PROCEDURE — 86900 BLOOD TYPING SEROLOGIC ABO: CPT

## 2023-07-13 PROCEDURE — 71045 X-RAY EXAM CHEST 1 VIEW: CPT | Mod: 26

## 2023-07-13 PROCEDURE — 99285 EMERGENCY DEPT VISIT HI MDM: CPT | Mod: FS

## 2023-07-13 PROCEDURE — 83550 IRON BINDING TEST: CPT

## 2023-07-13 PROCEDURE — 93010 ELECTROCARDIOGRAM REPORT: CPT

## 2023-07-13 PROCEDURE — 85045 AUTOMATED RETICULOCYTE COUNT: CPT

## 2023-07-13 PROCEDURE — 71045 X-RAY EXAM CHEST 1 VIEW: CPT

## 2023-07-13 PROCEDURE — 82607 VITAMIN B-12: CPT

## 2023-07-13 PROCEDURE — 83540 ASSAY OF IRON: CPT

## 2023-07-13 PROCEDURE — 36415 COLL VENOUS BLD VENIPUNCTURE: CPT

## 2023-07-13 PROCEDURE — 87086 URINE CULTURE/COLONY COUNT: CPT

## 2023-07-13 PROCEDURE — 83010 ASSAY OF HAPTOGLOBIN QUANT: CPT

## 2023-07-13 RX ORDER — MEMANTINE HYDROCHLORIDE 10 MG/1
1 TABLET ORAL
Refills: 0 | DISCHARGE

## 2023-07-13 RX ORDER — PANTOPRAZOLE SODIUM 20 MG/1
40 TABLET, DELAYED RELEASE ORAL
Refills: 0 | Status: DISCONTINUED | OUTPATIENT
Start: 2023-07-13 | End: 2023-07-14

## 2023-07-13 RX ORDER — HEPARIN SODIUM 5000 [USP'U]/ML
5000 INJECTION INTRAVENOUS; SUBCUTANEOUS EVERY 12 HOURS
Refills: 0 | Status: DISCONTINUED | OUTPATIENT
Start: 2023-07-13 | End: 2023-07-27

## 2023-07-13 RX ORDER — MEMANTINE HYDROCHLORIDE 10 MG/1
5 TABLET ORAL
Refills: 0 | Status: DISCONTINUED | OUTPATIENT
Start: 2023-07-13 | End: 2023-07-27

## 2023-07-13 RX ORDER — ACETAMINOPHEN 500 MG
650 TABLET ORAL EVERY 6 HOURS
Refills: 0 | Status: DISCONTINUED | OUTPATIENT
Start: 2023-07-13 | End: 2023-07-27

## 2023-07-13 RX ORDER — LANOLIN ALCOHOL/MO/W.PET/CERES
3 CREAM (GRAM) TOPICAL AT BEDTIME
Refills: 0 | Status: DISCONTINUED | OUTPATIENT
Start: 2023-07-13 | End: 2023-07-27

## 2023-07-13 RX ORDER — IRON SUCROSE 20 MG/ML
200 INJECTION, SOLUTION INTRAVENOUS EVERY 24 HOURS
Refills: 0 | Status: COMPLETED | OUTPATIENT
Start: 2023-07-13 | End: 2023-07-18

## 2023-07-13 RX ORDER — MECLIZINE HCL 12.5 MG
12.5 TABLET ORAL ONCE
Refills: 0 | Status: COMPLETED | OUTPATIENT
Start: 2023-07-13 | End: 2023-07-13

## 2023-07-13 RX ORDER — ONDANSETRON 8 MG/1
4 TABLET, FILM COATED ORAL EVERY 8 HOURS
Refills: 0 | Status: DISCONTINUED | OUTPATIENT
Start: 2023-07-13 | End: 2023-07-27

## 2023-07-13 RX ADMIN — Medication 12.5 MILLIGRAM(S): at 13:15

## 2023-07-13 NOTE — ED PROVIDER NOTE - NS ED ATTENDING STATEMENT MOD
This was a shared visit with the CHARY. I reviewed and verified the documentation and independently performed the documented:

## 2023-07-13 NOTE — H&P ADULT - ASSESSMENT
A 73 year old male with a history of Dementia, HTN, HLD, Depression, Esophageal Cancer s/p resection and gastric pull,, Lung Ca s/p lobectomy, Prostate Cancer s/p Cyberknife presented to the ED with the complaint of dizziness. Dizziness started in the morning likely around 8:30am described as lightheadedness and off balance causing him to feel unstable ambulating. History is limited as the patient has baseline mod/severe dementia. The daughter Ric ([phone]) mentioned that the patient was complaining of feeling dizzy in the morning (unclear if it's room spinning sensation vs lightheadedness) and was unsteady when the family attempted to make him walk.     #ELBERT  -Hb 7.3(was 11.4 in 8/22, baseline of 12-14), MCV 6, Serum Iron 20(low), Unsaturated (high), % saturation 5(low)  -EGD 7/21: Ulcers in the lower third of the esophagus, Erythema in the stomach compatible with non-erosive gastritis, Normal mucosa inthe whole examined duodenum.   -no source of bleeding evident  -ANGIE negative  -S/P 1 U PRBC in the ED  -will start on Venofer 200mg IV for 5 days, d/c on oral iron supplemets  -F/U TSH in the AM  -Trend CBC   -OP GI F/U for colonoscopy/EGD    #Pulmonary nodule on imaging:  -Right upper lobe pulmonary nodule measuring 5 mm, new since CTA chest 6/19/2021.  -OP f/u    # H/O Hypertension  #H/O HLD  - c/w Enalapril   -Lipid profile and A1c in AM    # H/O Depression   - stable  - c/w Paxil     # H/O Prostate Ca  - c/w Flomax     #MISC:  -GI ppx: PPI   DVT ppx: heparin sq  -Diet: DASH/TLC  -Activity: Ambulate with assitance/fall precautions  -Code status: Full code  -Dispo: Acute, Trend CBC     A 73 year old male with a history of Dementia, HTN, HLD, Depression, Esophageal Cancer s/p resection and gastric pull,, Lung Ca s/p lobectomy, Prostate Cancer s/p Cyberknife presented to the ED with the complaint of dizziness. Dizziness started in the morning likely around 8:30am described as lightheadedness and off balance causing him to feel unstable ambulating. History is limited as the patient has baseline mod/severe dementia. The daughter iRc ([phone]) mentioned that the patient was complaining of feeling dizzy in the morning (unclear if it's room spinning sensation vs lightheadedness) and was unsteady when the family attempted to make him walk.     #ELBERT  -Hb 7.3(was 11.4 in 8/22, baseline of 12-14), MCV 6, Serum Iron 20(low), Unsaturated (high), % saturation 5(low)  -EGD 7/21: Ulcers in the lower third of the esophagus, Erythema in the stomach compatible with non-erosive gastritis, Normal mucosa inthe whole examined duodenum.   -no source of bleeding evident  -ANGIE negative  -S/P 1 U PRBC in the ED  -will start on Venofer 200mg IV for 5 days, d/c on oral iron supplemets  -F/U TSH in the AM  -Trend CBC   -OP GI F/U for colonoscopy/EGD    #Dizziness  #Dementia/Alzheimers  -a/w off balance while ambulating  -CTH, CTA head and neck: no acute findings:  -Patient was seen by neurology and recommended if persistent dizziness after anemia correction then might consider MR brain non contrast  -fall precautions  -PT consult  -continue home dose o Memantine 5MG po bid    #Pulmonary nodule on imaging:  -Right upper lobe pulmonary nodule measuring 5 mm, new since CTA chest 6/19/2021.  -OP f/u    # H/O Hypertension  #H/O HLD  - BP controlled, not on any meds at home   -Lipid profile and A1c in AM    # H/O Anxiety/Depression  - No documentation  - Not on any home meds    # H/O Prostate Ca  - was on Flomax in the past    #MISC:  -GI ppx: PPI   DVT ppx: heparin sq  -Diet: DASH/TLC  -Activity: Ambulate with assitance/fall precautions  -Code status: Full code  -Dispo: Acute, Trend CBC

## 2023-07-13 NOTE — H&P ADULT - NSHPLABSRESULTS_GEN_ALL_CORE
LABS:                         7.3    4.66  )-----------( 119      ( 13 Jul 2023 14:44 )             27.8     07-13    140  |  105  |  14  ----------------------------<  92  4.4   |  25  |  0.8    Ca    8.8      13 Jul 2023 14:44  Mg     1.9     07-13    TPro  6.7  /  Alb  3.7  /  TBili  0.4  /  DBili  x   /  AST  28  /  ALT  9   /  AlkPhos  113  07-13    PT/INR - ( 13 Jul 2023 14:44 )   PT: 11.50 sec;   INR: 1.01 ratio         PTT - ( 13 Jul 2023 14:44 )  PTT:24.3 sec  Urinalysis Basic - ( 13 Jul 2023 14:44 )    Color: x / Appearance: x / SG: x / pH: x  Gluc: 92 mg/dL / Ketone: x  / Bili: x / Urobili: x   Blood: x / Protein: x / Nitrite: x   Leuk Esterase: x / RBC: x / WBC x   Sq Epi: x / Non Sq Epi: x / Bacteria: x      CARDIAC MARKERS ( 13 Jul 2023 14:44 )  x     / <0.01 ng/mL / x     / x     / x                RADIOLOGY, EKG & ADDITIONAL TESTS: Reviewed.

## 2023-07-13 NOTE — ED PROVIDER NOTE - PROGRESS NOTE DETAILS
Discussed results with daughter via phone and recommended transfusion which she agreed to. Consent filled by Dr. Cervantes, [facility] [place] and Dr. Goyal. Neurology evaluated at bedside.

## 2023-07-13 NOTE — ED PROVIDER NOTE - PHYSICAL EXAMINATION
Physical Exam    Constitutional: No acute distress.   Eyes: Conjunctiva pale, Sclera clear, PERRLA, EOMI.  ENT: No sinus tenderness. No nasal discharge. No oropharyngeal erythema, edema, or exudates. Uvula midline.   Cardiovascular: Regular rate, regular rhythm. No noted murmurs rubs or gallops.  Respiratory: unlabored respiratory effort, clear to auscultation bilaterally no wheezing, rales or rhonchi  Gastrointestinal: Normal bowel sounds. soft, non distended, non-tender abdomen.   Musculoskeletal: supple neck, no midline tenderness. No joint or bony deformity.   Integumentary: warm, dry, pale skin  Neurologic: awake, alert, cranial nerves II-XII grossly intact, extremities’ motor and sensory functions grossly intact. Gait unstable.   Psychiatric: appropriate mood, appropriate affect

## 2023-07-13 NOTE — ED PROVIDER NOTE - ATTENDING APP SHARED VISIT CONTRIBUTION OF CARE
73-year-old male history of HLD, TIA, Alzheimer's dementia, esophageal CA status postresection, lung CA status post lobectomy, prostate CA status post ablation, non-smoker, denies daily EtOH use, lives at home with daughter, now presents with cute onset of dizziness since yesterday morning (states woke up with symptoms), unable to characterize the dizziness further, symptoms are persistent, denies modifying factors, denies fever, visual disturbances, neck pain, headache, palpitations, irregular heart-beat, chest pain / pressure, dyspnea, near or total loss of consciousness, abnormal speech, paresthesias, clumsiness, difficulty with coordination, focal weakness or neurological deficits, postural changes, gait disturbances, association with coughing or sneezing, new medication changes, change in caffeine, nicotine or alcohol intake, recent trauma or other associated complaints at present. Old chart reviewed. I have reviewed and agree with the initial nursing note, except as documented in my note.    VSS, awake, alert, non-toxic appearing, PERRL / EOMI, unidirectional horizontal fatigable nystagmus, external ears are normal, auditory meatus is clear and non-erythematous bilaterally, EAC appears normal, TM's appear normal bilaterally, oropharynx clear, mmm, no JVD or carotid bruit, no skin rash or lesions, chest CTAB, non-labored breathing, no w/r/r, +S1/S2, RRR, no m/r/g, abdomen soft, NT, ND, +BS, no peripheral edema or deformities, equal pulses upper and lower extremities, alert and oriented to person, place and time, clear speech, cranial nerves II-XII are intact, upper and lower extremity strength is 5/5, symmetrical against gravity and external force, sensation is intact, cerebellar testing of finger to nose is intact, coordination normal.

## 2023-07-13 NOTE — ED PROVIDER NOTE - CHIEF COMPLAINT
Called pt; informed pt I was calling to confirm her virtual EAWV on 7/26/22; pt stated she needed to cancel appt due to another appt; offered to reschedule and pt declined; pt stated she would call me back to reschedule for an in office appt once she looks at her schedule; gave pt my name and direct number   The patient is a 73y Male complaining of dizziness.

## 2023-07-13 NOTE — H&P ADULT - NSICDXPASTMEDICALHX_GEN_ALL_CORE_FT
PAST MEDICAL HISTORY:  Abnormal cholesterol test     Alzheimer disease     Cancer ESOPHAGEAL CANCER 2017 RECEIVED CHEMO AND RADIATION and endoscopic resection partial oesophagus and stomach    Cancer left lung 2018, no chemo or rad rx and resection    [place] (hard of hearing)     Hypertension, unspecified type     Prostate cancer     Renal calculi nephrostomy tube -6/2021

## 2023-07-13 NOTE — H&P ADULT - NSHPPHYSICALEXAM_GEN_ALL_CORE
CONSTITUTIONAL: Well groomed, no apparent distress  EYES: PERRLA and symmetric, EOMI, No conjunctival or scleral injection, non-icteric  ENMT: Oral mucosa with moist membranes. Normal dentition; no pharyngeal injection or exudates  NECK: Supple, symmetric and without tracheal deviation   RESP: No respiratory distress, no use of accessory muscles; CTA b/l, no WRR  CV: RRR, +S1S2, no MRG; no JVD; no peripheral edema  GI: Soft, NT, ND, no rebound, no guarding; no palpable masses; no hepatosplenomegaly; no hernia palpated  LYMPH: No cervical LAD or tenderness; no axillary LAD or tenderness; no inguinal LAD or tenderness  MSK: No digital clubbing or cyanosis  SKIN: No rashes or ulcers noted; no subcutaneous nodules or induration palpable  NEURO: CN II-XII intact; normal reflexes in upper and lower extremities, sensation intact in upper and lower extremities b/l to light touch   PSYCH: Appropriate insight/judgment; A+O x 1

## 2023-07-13 NOTE — H&P ADULT - HISTORY OF PRESENT ILLNESS
A 73 year old male with a history of Dementia, HTN, HLD, Depression, Esophageal Cancer s/p resection and gastric pull,, Lung Ca s/p lobectomy, Prostate Cancer s/p Cyberknife presented to the ED with the complaint of dizziness. Dizziness started in the morning likely around 8:30am described as lightheadedness and off balance causing him to feel unstable ambulating. History is limited as the patient has baseline mod/severe dementia. The daughter Ric ([phone]) mentioned that the patient was complaining of feeling dizzy in the morning (unclear if it's room spinning sensation vs lightheadedness) and was unsteady when the family attempted to make him walk. They thought initially it's related to the low temperature in his room so they adjusted that but he kept feeling dizzy so they brought him to ED. The daughter helps the patient with ADLs na managing his bills. She reports he still drives sometime although family have expressed concerns about his safety but he insists to drive. The patient denies fever, chest pain, SOB, headache, vision changes, weakness, nausea, vomiting, tremors, abdominal pain, constipation, dysuria, hematuria, lower extremity swelling or rash.    #ED vitals:  T(C): --  HR: 62 (07-13-23 @ 11:55) (62 - 62)  BP: 136/75 (07-13-23 @ 11:55) (136/75 - 136/75)  RR: 16 (07-13-23 @ 11:55) (16 - 16)  SpO2: 100% (07-13-23 @ 11:55) (100% - 100%)    #Labs significant for:  Hb 7.3(was 11.4 in 8/22, baseline of 12-14), MCV 6, Serum Iron 20(low), Unsaturated (high), % saturation 5(low), Pro-    #Imaging:  *CT Angio Head/Neck w/ IV Cont  1.  No evidence of major vascular stenosis or occlusion.  2.  Right upper lobe pulmonary nodule measuring 5 mm, new since CTA chest 6/19/2021. Recommend a dedicated chest CT on an outpatient/elective basis.    *CT Head No Cont  No acute intracranial pathology. No evidence of midline shift, mass effect or intracranial hemorrhage.  Mild chronic microvascular type changes.    #S/P: Meclizine 12.5mg PO x 1, and PRBC 1 Unit in the ED.    *Patient seen by neuro team for dizziness in the ED.    Patient is being admitted to Medicine for further management.

## 2023-07-13 NOTE — ED PROVIDER NOTE - CLINICAL SUMMARY MEDICAL DECISION MAKING FREE TEXT BOX
This patient presents with dizziness, possibly secondary to new anemia. No history of recent infection so doubt vestibular neuritis. History not consistent with meniere's disease. No history of trauma. No red flag features for central vertigo to include gradual onset, vertical/bidirectional or non-fatigable nystagmus, focal neurologic findings on exam (including inability to ambulate, ataxia, dysmetria). Presentation not consistent with an acute CNS infection, vertebral basilar artery insufficiency, cerebellar hemorrhage or infarction, intracranial mass or bleed. Character low suspicion for CVA and no focal or localizing findings. No significant arrhythmia or evidence of aortic outflow obstruction. No electrolyte abnormalities. No CP/SOB to suggest ACS or evidence of DVT to suggest PE. No fever or specific infectious symptoms, and UA normal.    Differential diagnoses of anemia includes peptic ulcer disease, versus gastritis/gastric ulcer, versus possible AVM. Presentation not consistent with esophageal or gastric variceal bleeding or Boerhaave’s syndrome. Presentation not consistent with other etiologies GI bleeding at this time. No red flag features or high risk bleeding. Differential diagnoses also include diverticulosis (most common cause) versus hemorrhoids. Less likely etiologies include angiodysplasia, cancer, IBD. Presentation not consistent with mesenteric ischemia or ischemic colitis, brisk or life threatening upper GIB as patient has no evidence of hemorrhagic shock, melena.    Plan to admit for further evaluation and management.

## 2023-07-13 NOTE — ED PROVIDER NOTE - OBJECTIVE STATEMENT
73 year old male with a history of Dementia, Chronic Infarcts x 2, Esophageal Cancer s/p resection, Lung Ca s/p lobectomy, Prostate Cancer s/p Cyberknife presents to the ED with dizziness. Dizziness started this morning likely around 8:30am described as lightheadedness and off balance causing him to feel unstable ambulating. He denies associated headache, vision changes, weakness, paresthesias, nausea, vomiting, tremors. Daughter states that he is at his baseline mental status. Further Denies fever, chills, chest pain, shortness of breath, abdominal pain, nausea, vomiting, diarrhea, constipation, dysuria, hematuria, lower extremity swelling, rash.

## 2023-07-13 NOTE — H&P ADULT - ATTENDING COMMENTS
73 year old male with a history of Dementia, HTN, HLD, Depression, Esophageal Cancer s/p resection and gastric pull,, Lung Ca s/p lobectomy, Prostate Cancer s/p Cyberknife presented to the ED with the complaint of dizziness. Dizziness started in the morning likely around 8:30am described as lightheadedness and off balance causing him to feel unstable ambulating. History is limited as the patient has baseline mod/severe dementia. The daughter Ric ([phone]) mentioned that the patient was complaining of feeling dizzy in the morning (unclear if it's room spinning sensation vs lightheadedness) and was unsteady when the family attempted to make him walk..    Agree  with assessment  except for changes below.     IMPRESSION   Suspected Symptomatic Microcytic   Anemia  Likely Secondary to ELBERT   Hb 7.3(was 11.4 in 8/22, baseline of 12-14), MCV 6, Serum Iron 20(low), Unsaturated (high), % saturation 5(low)  EGD 7/21: Ulcers in the lower third of the esophagus, Erythema in the stomach compatible with non-erosive gastritis,   Normal mucosa in the whole examined duodenum.   No source of bleeding evident or signs of  active  Bleeding   ANGIE negative in the  ED  S/P 1 Unit PRBC    Will transfuse PRBC to keep Hgh >7,    Maintain 2 peripheral 18-gauge IVs,    Keep active type and screen,   Monitor hemoglobin/hematocrit   f/u  GI   continue Iron   Supplementation     Dizziness   Hx  Dementia Alzheimer's  Neurology  following     Imaging as above   Patient  Started in Meclizine  PT Rehab   If not resolving  Consider  ENT  and further imaging    as per  Neuro     Pulmonary nodule on imaging:  -Right upper lobe pulmonary nodule measuring 5 mm, new since CTA chest 6/19/2021.  -OP f/u      Hx  Anxiety/Depression monitor off mediation for now     HX  Prostate Ca- OP PCP management 73 year old male with a history of Dementia, HTN, HLD, Depression, Esophageal Cancer s/p resection and gastric pull,, Lung Ca s/p lobectomy, Prostate Cancer s/p Cyberknife presented to the ED with the complaint of dizziness. Dizziness started in the morning likely around 8:30am described as lightheadedness and off balance causing him to feel unstable ambulating. History is limited as the patient has baseline mod/severe dementia. The daughter Ric ([phone]) mentioned that the patient was complaining of feeling dizzy in the morning (unclear if it's room spinning sensation vs lightheadedness) and was unsteady when the family attempted to make him walk..    Agree  with assessment  except for changes below.     Vital Signs (24 Hrs):  T(C): 36.8 (07-13-23 @ 22:56), Max: 36.8 (07-13-23 @ 22:56)  HR: 80 (07-14-23 @ 01:08) (62 - 80)  BP: 131/70 (07-14-23 @ 01:08) (131/70 - 147/72)  RR: 18 (07-14-23 @ 01:08) (16 - 18)  SpO2: 100% (07-14-23 @ 01:08) (100% - 100%)  Daily Height in cm: 182.88 (13 Jul 2023 11:55)      VITAL SIGNS: AFebrile, vital signs stable  CONSTITUTIONAL: Well-developed; well-nourished; in no acute distress.  SKIN: Skin exam is warm and dry, no acute rash.  HEAD: Normocephalic; atraumatic.  EYES: Extraocular movements intact  ENT: No nasal discharge; airway clear. Moist mucus membranes.  NECK: Supple; non tender. No rigidity  CARD: Rregular rate and rhythm. Normal S1, S2; no murmurs, gallops, or rubs.  RESP: On  auscultation bilaterally. No wheezes, rales or rhonchi.  ABD: Abdomen soft; non-tender; non-distended  EXT: Normal ROM. No clubbing, cyanosis or edema.   NEURO: Alert and oriented x 2. No focal deficits.  PSYCH: cooperative, appropriate.     IMPRESSION   Suspected Symptomatic Microcytic   Anemia  Likely Secondary to ELBERT   Hb 7.3(was 11.4 in 8/22, baseline of 12-14), MCV 6, Serum Iron 20(low), Unsaturated (high), % saturation 5(low)  EGD 7/21: Ulcers in the lower third of the esophagus, Erythema in the stomach compatible with non-erosive gastritis,   Normal mucosa in the whole examined duodenum.   No source of bleeding evident or signs of  active  Bleeding   ANGIE negative in the  ED  S/P 1 Unit PRBC    Will transfuse PRBC to keep Hgh >7,    Maintain 2 peripheral 18-gauge IVs,    Keep active type and screen,   Monitor hemoglobin/hematocrit   f/u  GI   continue Iron   Supplementation       Dizziness   Hx  Dementia Alzheimer's  Neurology  following     Imaging as above   Patient  Started in Meclizine  PT Rehab   If not resolving  Consider  ENT  and further imaging    as per  Neuro     Pulmonary nodule on imaging:  -Right upper lobe pulmonary nodule measuring 5 mm, new since CTA chest 6/19/2021.  -OP f/u      Hx  Anxiety/Depression monitor off mediation for now     HX  Prostate Ca- OP PCP management    Seen on 07/13/23

## 2023-07-13 NOTE — CONSULT NOTE ADULT - ASSESSMENT
73 year old patient with PMH of dementia AOX1-2 at baseline, lung cancer, esophageal cancer s/p esophageal resection and gastric pull, Prostate Ca (all reported to be in remission), HTN, Rt Obstructing renal calculi s/p perc nephrostomy in place presented to ER for dizziness. Unable to determine if it's vertigo vs ataxia vs presyncope from the history given patient's mental status. Currently he reports not feeling dizzy. Exam with no FTN ataxia, and overall intact except for moderate severe cognitive impairment secondary to dementia. Blood workup showing acute anemia (hb dropping 12-->7). CTH with no acute findings and CTA with no focal stenosis.    Impression:  Suspect dizziness to be related to acute anemia rather than neurological etiology  Anemia management per ED/ primary team  Noted Right upper lobe pulmonary nodule measuring 5 mm, new since CTA chest 6/19/2021. Workup by ED/ priamry team to rule out neoplastic etiology  If persistent dizziness after anemia correction then might consider MR brain non contrast      Pending attending attestation

## 2023-07-13 NOTE — CONSULT NOTE ADULT - SUBJECTIVE AND OBJECTIVE BOX
Neurology Consult    HPI:  73 year old patient with PMH of dementia, lung cancer, esophageal cancer s/p esophageal resection and gastric pull, Prostate Ca (all reported to be in remission), HTN, Rt Obstructing renal calculi s/p perc nephrostomy in place presented to ER for dizziness. Unable to obtain history from the patient given his history of dementia so his daughter Ric ([phone]) was contacted. As per daughter the patient was complaining of feeling dizzy this morning (unclear if it's room spinning sensation vs lightheadedness) and was unsteady when the family attempted to make him walk. They thought initially it's related to the low temperature in his room so they adjusted that but he kept feeling dizzy so they brought hin to ED. At baseline the patient has moderate/severe dementia. The daughter reports that he is forgetful and tends to repeat him self and always struggles with finding words. He is able to feed/bathe him self, but she cooks for him otherwise he will forget to eat. She reports he still drives sometime although family have expressed concerns about his safety but he insists to drive. The daughter takes care of all his bill.      PAST MEDICAL & SURGICAL HISTORY:  Hypertension, unspecified type      Cancer  ESOPHAGEAL CANCER 2017 RECEIVED CHEMO AND RADIATION and endoscopic resection partial oesophagus and stomach      Renal calculi  nephrostomy tube -6/2021      Cancer  left lung 2018, no chemo or rad rx and resection      Abnormal cholesterol test      Prostate cancer      [place] (hard of hearing)      Alzheimer disease      Cancer of esophagus  2017 RESECTION OF LOWER PORTION OF ESOPHAGUS      S/P lobectomy of lung  left ?JUNE 2018      S/P cystoscopy with ureteral stent placement  LEFT      Inguinal hernia  AS AN INFANT, RIGHT SIDE      History of esophagogastroduodenoscopy (EGD)  2019      H/O colonoscopy  2018      Prostate cancer  cyber knife intervention 2021          FAMILY HISTORY:  Dementia (Mother)        Allergies    No Known Allergies    Intolerances        MEDICATIONS  (STANDING):  meclizine 12.5 milliGRAM(s) Oral Once    MEDICATIONS  (PRN):      Review of systems:    Unable to obtain    Vital Signs Last 24 Hrs  T(C): --  T(F): --  HR: 62 (13 Jul 2023 11:55) (62 - 62)  BP: 136/75 (13 Jul 2023 11:55) (136/75 - 136/75)  BP(mean): --  RR: 16 (13 Jul 2023 11:55) (16 - 16)  SpO2: 100% (13 Jul 2023 11:55) (100% - 100%)    Parameters below as of 13 Jul 2023 11:55  Patient On (Oxygen Delivery Method): room air          Neurological Examination:  General:  Appearance is consistent with chronologic age.  No abnormal facies.  Gross skin survey within normal limits.  Pale looking  Cognitive/Language:  Pleasant, Awake, alert, and oriented to person, to place and year with clues, but not to month. Naming and repetition impaired. Nondysarthric.    Cranial Nerves  - Eyes: Visual acuity intact, Visual fields full.  EOMI w/o nystagmus, skew or reported double vision.  PERRL.  No ptosis/weakness of eyelid closure.    - Face:  Facial sensation normal V1 - 3, no facial asymmetry.    - Ears/Nose/Throat: Decreased Hearing  b/l .  Palate elevates midline.  Tongue and uvula midline.   Motor examination:  (MRC grade R/L) 5/5 UE; 5/5 LE.  No observable drift. Normal tone and bulk. No tenderness, twitching, tremors or involuntary movements.  Sensory examination:   Intact to light touch and pinprick, pain, temperature and proprioception and vibration in all extremities.  Reflexes:   2+ b/l biceps, triceps, brachioradialis, patella and achilles.  Plantar response downgoing b/l.  Jaw jerk, Saad, clonus absent.  Cerebellum:   FTN/HKS intact.        Labs:   CBC Full  -  ( 13 Jul 2023 14:44 )  WBC Count : 4.66 K/uL  RBC Count : 4.18 M/uL  Hemoglobin : 7.3 g/dL  Hematocrit : 27.8 %  Platelet Count - Automated : 119 K/uL  Mean Cell Volume : 66.5 fL  Mean Cell Hemoglobin : 17.5 pg  Mean Cell Hemoglobin Concentration : 26.3 g/dL  Auto Neutrophil # : 3.05 K/uL  Auto Lymphocyte # : 0.83 K/uL  Auto Monocyte # : 0.51 K/uL  Auto Eosinophil # : 0.22 K/uL  Auto Basophil # : 0.03 K/uL  Auto Neutrophil % : 65.6 %  Auto Lymphocyte % : 17.8 %  Auto Monocyte % : 10.9 %  Auto Eosinophil % : 4.7 %  Auto Basophil % : 0.6 %    07-13    140  |  105  |  14  ----------------------------<  92  4.4   |  25  |  0.8    Ca    8.8      13 Jul 2023 14:44  Mg     1.9     07-13    TPro  6.7  /  Alb  3.7  /  TBili  0.4  /  DBili  x   /  AST  28  /  ALT  9   /  AlkPhos  113  07-13    LIVER FUNCTIONS - ( 13 Jul 2023 14:44 )  Alb: 3.7 g/dL / Pro: 6.7 g/dL / ALK PHOS: 113 U/L / ALT: 9 U/L / AST: 28 U/L / GGT: x           PT/INR - ( 13 Jul 2023 14:44 )   PT: 11.50 sec;   INR: 1.01 ratio         PTT - ( 13 Jul 2023 14:44 )  PTT:24.3 sec  Urinalysis Basic - ( 13 Jul 2023 14:44 )    Color: x / Appearance: x / SG: x / pH: x  Gluc: 92 mg/dL / Ketone: x  / Bili: x / Urobili: x   Blood: x / Protein: x / Nitrite: x   Leuk Esterase: x / RBC: x / WBC x   Sq Epi: x / Non Sq Epi: x / Bacteria: x          Neuroimaging:  NCHCT: CT Head No Cont:   ACC: [id number] EXAM:  CT BRAIN   ORDERED BY: TREY DIXON     PROCEDURE DATE:  07/13/2023          INTERPRETATION:  CLINICAL INDICATION: Dizziness.    TECHNIQUE: CT of the head was performed without the administration of   intravenous contrast.    COMPARISON: None.    FINDINGS:    There is prominence of the sulci, sylvian fissures, and ventricles likely   reflecting mild parenchymal volume loss.    There are scattered patchy low attenuations in bilateral periventricular   cerebral white matter consistent with mild chronic microvascular ischemic   changes.    There is no evidence of acute territorial infarct or intracranial   hemorrhage. There is no hydrocephalus, midline shift, mass effect.    . There are no extra-axial fluid collections.    The visualized intraorbital contents are normal. The imaged portions of   the paranasal sinuses are aerated. The mastoid air cells are aerated. The   visualized soft tissues and osseous structures appear normal.      IMPRESSION:    No acute intracranial pathology. No evidence of midline shift, mass   effect or intracranial hemorrhage.    Mild chronic microvascular type changes.    --- End of Report ---

## 2023-07-13 NOTE — ED PROVIDER NOTE - PRINCIPAL DIAGNOSIS
What Type Of Note Output Would You Prefer (Optional)?: Standard Output
How Severe Is Your Rash?: moderate
Is This A New Presentation, Or A Follow-Up?: Rash
Dizziness

## 2023-07-14 LAB
A1C WITH ESTIMATED AVERAGE GLUCOSE RESULT: 5.7 % — HIGH (ref 4–5.6)
ALBUMIN SERPL ELPH-MCNC: 3.5 G/DL — SIGNIFICANT CHANGE UP (ref 3.5–5.2)
ALP SERPL-CCNC: 110 U/L — SIGNIFICANT CHANGE UP (ref 30–115)
ALT FLD-CCNC: 7 U/L — SIGNIFICANT CHANGE UP (ref 0–41)
ANION GAP SERPL CALC-SCNC: 10 MMOL/L — SIGNIFICANT CHANGE UP (ref 7–14)
AST SERPL-CCNC: 20 U/L — SIGNIFICANT CHANGE UP (ref 0–41)
BASOPHILS # BLD AUTO: 0.07 K/UL — SIGNIFICANT CHANGE UP (ref 0–0.2)
BASOPHILS NFR BLD AUTO: 1.4 % — HIGH (ref 0–1)
BILIRUB SERPL-MCNC: 1.2 MG/DL — SIGNIFICANT CHANGE UP (ref 0.2–1.2)
BUN SERPL-MCNC: 13 MG/DL — SIGNIFICANT CHANGE UP (ref 10–20)
CALCIUM SERPL-MCNC: 8.7 MG/DL — SIGNIFICANT CHANGE UP (ref 8.4–10.4)
CHLORIDE SERPL-SCNC: 104 MMOL/L — SIGNIFICANT CHANGE UP (ref 98–110)
CHOLEST SERPL-MCNC: 148 MG/DL — SIGNIFICANT CHANGE UP
CO2 SERPL-SCNC: 24 MMOL/L — SIGNIFICANT CHANGE UP (ref 17–32)
CREAT SERPL-MCNC: 0.7 MG/DL — SIGNIFICANT CHANGE UP (ref 0.7–1.5)
EGFR: 97 ML/MIN/1.73M2 — SIGNIFICANT CHANGE UP
EOSINOPHIL # BLD AUTO: 0.31 K/UL — SIGNIFICANT CHANGE UP (ref 0–0.7)
EOSINOPHIL NFR BLD AUTO: 6.1 % — SIGNIFICANT CHANGE UP (ref 0–8)
ESTIMATED AVERAGE GLUCOSE: 117 MG/DL — HIGH (ref 68–114)
FERRITIN SERPL-MCNC: 11 NG/ML — LOW (ref 30–400)
FOLATE SERPL-MCNC: >20 NG/ML — SIGNIFICANT CHANGE UP
GLUCOSE SERPL-MCNC: 87 MG/DL — SIGNIFICANT CHANGE UP (ref 70–99)
HAPTOGLOB SERPL-MCNC: 192 MG/DL — SIGNIFICANT CHANGE UP (ref 34–200)
HCT VFR BLD CALC: 26.7 % — LOW (ref 42–52)
HCT VFR BLD CALC: 29.5 % — LOW (ref 42–52)
HDLC SERPL-MCNC: 52 MG/DL — SIGNIFICANT CHANGE UP
HGB BLD-MCNC: 7.1 G/DL — LOW (ref 14–18)
HGB BLD-MCNC: 8.2 G/DL — LOW (ref 14–18)
IMM GRANULOCYTES NFR BLD AUTO: 0.2 % — SIGNIFICANT CHANGE UP (ref 0.1–0.3)
LIPID PNL WITH DIRECT LDL SERPL: 81 MG/DL — SIGNIFICANT CHANGE UP
LYMPHOCYTES # BLD AUTO: 0.84 K/UL — LOW (ref 1.2–3.4)
LYMPHOCYTES # BLD AUTO: 16.4 % — LOW (ref 20.5–51.1)
MAGNESIUM SERPL-MCNC: 1.8 MG/DL — SIGNIFICANT CHANGE UP (ref 1.8–2.4)
MCHC RBC-ENTMCNC: 17.6 PG — LOW (ref 27–31)
MCHC RBC-ENTMCNC: 18.9 PG — LOW (ref 27–31)
MCHC RBC-ENTMCNC: 26.6 G/DL — LOW (ref 32–37)
MCHC RBC-ENTMCNC: 27.8 G/DL — LOW (ref 32–37)
MCV RBC AUTO: 66.1 FL — LOW (ref 80–94)
MCV RBC AUTO: 68 FL — LOW (ref 80–94)
MONOCYTES # BLD AUTO: 0.79 K/UL — HIGH (ref 0.1–0.6)
MONOCYTES NFR BLD AUTO: 15.5 % — HIGH (ref 1.7–9.3)
NEUTROPHILS # BLD AUTO: 3.09 K/UL — SIGNIFICANT CHANGE UP (ref 1.4–6.5)
NEUTROPHILS NFR BLD AUTO: 60.4 % — SIGNIFICANT CHANGE UP (ref 42.2–75.2)
NON HDL CHOLESTEROL: 96 MG/DL — SIGNIFICANT CHANGE UP
NRBC # BLD: 0 /100 WBCS — SIGNIFICANT CHANGE UP (ref 0–0)
NRBC # BLD: 0 /100 WBCS — SIGNIFICANT CHANGE UP (ref 0–0)
PLATELET # BLD AUTO: 171 K/UL — SIGNIFICANT CHANGE UP (ref 130–400)
PLATELET # BLD AUTO: 204 K/UL — SIGNIFICANT CHANGE UP (ref 130–400)
PMV BLD: 10.6 FL — HIGH (ref 7.4–10.4)
PMV BLD: SIGNIFICANT CHANGE UP (ref 7.4–10.4)
POTASSIUM SERPL-MCNC: 4.6 MMOL/L — SIGNIFICANT CHANGE UP (ref 3.5–5)
POTASSIUM SERPL-SCNC: 4.6 MMOL/L — SIGNIFICANT CHANGE UP (ref 3.5–5)
PROT SERPL-MCNC: 6.2 G/DL — SIGNIFICANT CHANGE UP (ref 6–8)
RBC # BLD: 4.04 M/UL — LOW (ref 4.7–6.1)
RBC # BLD: 4.34 M/UL — LOW (ref 4.7–6.1)
RBC # FLD: 19.5 % — HIGH (ref 11.5–14.5)
RBC # FLD: 21.1 % — HIGH (ref 11.5–14.5)
SODIUM SERPL-SCNC: 138 MMOL/L — SIGNIFICANT CHANGE UP (ref 135–146)
TRIGL SERPL-MCNC: 75 MG/DL — SIGNIFICANT CHANGE UP
TSH SERPL-MCNC: 3.12 UIU/ML — SIGNIFICANT CHANGE UP (ref 0.27–4.2)
VIT B12 SERPL-MCNC: 1066 PG/ML — SIGNIFICANT CHANGE UP (ref 232–1245)
WBC # BLD: 5.11 K/UL — SIGNIFICANT CHANGE UP (ref 4.8–10.8)
WBC # BLD: 7.76 K/UL — SIGNIFICANT CHANGE UP (ref 4.8–10.8)
WBC # FLD AUTO: 5.11 K/UL — SIGNIFICANT CHANGE UP (ref 4.8–10.8)
WBC # FLD AUTO: 7.76 K/UL — SIGNIFICANT CHANGE UP (ref 4.8–10.8)

## 2023-07-14 PROCEDURE — 99222 1ST HOSP IP/OBS MODERATE 55: CPT

## 2023-07-14 PROCEDURE — 99223 1ST HOSP IP/OBS HIGH 75: CPT

## 2023-07-14 PROCEDURE — 99232 SBSQ HOSP IP/OBS MODERATE 35: CPT

## 2023-07-14 RX ORDER — PANTOPRAZOLE SODIUM 20 MG/1
40 TABLET, DELAYED RELEASE ORAL
Refills: 0 | Status: DISCONTINUED | OUTPATIENT
Start: 2023-07-14 | End: 2023-07-27

## 2023-07-14 RX ADMIN — PANTOPRAZOLE SODIUM 40 MILLIGRAM(S): 20 TABLET, DELAYED RELEASE ORAL at 11:26

## 2023-07-14 RX ADMIN — MEMANTINE HYDROCHLORIDE 5 MILLIGRAM(S): 10 TABLET ORAL at 17:31

## 2023-07-14 RX ADMIN — Medication 3 MILLIGRAM(S): at 22:31

## 2023-07-14 RX ADMIN — HEPARIN SODIUM 5000 UNIT(S): 5000 INJECTION INTRAVENOUS; SUBCUTANEOUS at 17:31

## 2023-07-14 RX ADMIN — MEMANTINE HYDROCHLORIDE 5 MILLIGRAM(S): 10 TABLET ORAL at 11:27

## 2023-07-14 RX ADMIN — HEPARIN SODIUM 5000 UNIT(S): 5000 INJECTION INTRAVENOUS; SUBCUTANEOUS at 06:47

## 2023-07-14 RX ADMIN — IRON SUCROSE 110 MILLIGRAM(S): 20 INJECTION, SOLUTION INTRAVENOUS at 06:21

## 2023-07-14 RX ADMIN — PANTOPRAZOLE SODIUM 40 MILLIGRAM(S): 20 TABLET, DELAYED RELEASE ORAL at 17:31

## 2023-07-14 NOTE — PATIENT PROFILE ADULT - FALL HARM RISK - HARM RISK INTERVENTIONS
Assistance with ambulation/Assistance OOB with selected safe patient handling equipment/Communicate Risk of Fall with Harm to all staff/Reinforce activity limits and safety measures with patient and family/Tailored Fall Risk Interventions/Use of alarms - bed, chair and/or voice tab/Visual Cue: Yellow wristband and red socks/Bed in lowest position, wheels locked, appropriate side rails in place/Call bell, personal items and telephone in reach/Instruct patient to call for assistance before getting out of bed or chair/Non-slip footwear when patient is out of bed/[place] to call system/Physically safe environment - no spills, clutter or unnecessary equipment/Purposeful Proactive Rounding/Room/bathroom lighting operational, light cord in reach

## 2023-07-14 NOTE — CONSULT NOTE ADULT - ATTENDING COMMENTS
Patient seen and examined and agree with above except as noted.  Patients history, notes ,labs, imaging, vitals and meds reviewed personally.  Intermittently following commands    Plan as above  Possibly related to acute anemia
Hx of esophageal Ca SP resection and G pull up SP CRT in '17. GI is consulted for anemia (ELBERT) with no overt GI bleed (12=>7). Would recommend anemia work up with EGD and colonoscopy. Family requests inpatient consideration given challenges with setting up the procedures (prep, transport etc..) May consider add on next week. PPI BID for now.

## 2023-07-14 NOTE — ED ADULT NURSE NOTE - NSICDXPASTMEDICALHX_GEN_ALL_CORE_FT
PAST MEDICAL HISTORY:  Abnormal cholesterol test     Alzheimer disease     Cancer ESOPHAGEAL CANCER 2017 RECEIVED CHEMO AND RADIATION and endoscopic resection partial oesophagus and stomach    Cancer left lung 2018, no chemo or rad rx and resection    [place] (hard of hearing)     Hypertension, unspecified type     Prostate cancer     Renal calculi nephrostomy tube -6/2021

## 2023-07-14 NOTE — CONSULT NOTE ADULT - ASSESSMENT
72 y/o M with PMHx of HTN, HLD, Dementia, Depression, Esophageal Cancer s/p resection and gastric pull,, Lung Ca s/p lobectomy, Prostate Cancer s/p Cyberknife presented to the ED with the complaint of dizziness that began in the morning of presentation described as lightheadedness and off balance causing him to feel unstable ambulating. Admitted for acute on chronic anemia. GI consulted for anemia.    #Acute on Chronic Microcytic Anemia   #Dizziness possibly 2/2 symptomatic anemia  - Hgb 7.3, MCV 66.5 on admission, repeat Hgb 7.1 s/p 1 unit pRBC  - baseline Hgb ~12  - EGD 7/6/21: Ulcers in the lower third of the esophagus, Erythema in the stomach compatible with non-erosive gastritis, Normal mucosa in the whole examined duodenum.   - Iron studies (7/13/23): Iron 20, TIBC 409, % sat 5, Ferritin 11 c/w ELBERT  - repeat Hgb after 1 unit pRBC corrected appropriately today to Hgb 8.2 (7/14/23)  - ANGIE negative  - on venofer 200mg IVPB x 5 doses  - on Protonix 40mg PO q24hrs    Plan:  - continue IV iron treatment   -  72 y/o M with PMHx of HTN, HLD, Dementia, Depression, Esophageal Cancer s/p resection and gastric pull,, Lung Ca s/p lobectomy, Prostate Cancer s/p Cyberknife presented to the ED with the complaint of dizziness that began in the morning of presentation described as lightheadedness and off balance causing him to feel unstable ambulating. Admitted for acute on chronic anemia. GI consulted for anemia.    #Acute on Chronic Microcytic Anemia   #Dizziness possibly 2/2 symptomatic anemia  - Hgb 7.3, MCV 66.5 on admission, repeat Hgb 7.1 s/p 1 unit pRBC  - baseline Hgb ~12  - EGD 7/6/21: Ulcers in the lower third of the esophagus, Erythema in the stomach compatible with non-erosive gastritis, Normal mucosa in the whole examined duodenum.   - Iron studies (7/13/23): Iron 20, TIBC 409, % sat 5, Ferritin 11 c/w ELBERT  - repeat Hgb after 1 unit pRBC corrected appropriately today to Hgb 8.2 (7/14/23)  - ANGIE negative  - on venofer 200mg IVPB x 5 doses  - on Protonix 40mg PO q24hrs    Plan:  - ANGIE neg, no overt signs of bleeding  - continue IV iron treatment   - PPI BID  - monitor H/H  - keep active T&S  - transfuse as needed, keep Hgb >7  - plan for EGD/colonoscopy next week, will follow

## 2023-07-14 NOTE — PROGRESS NOTE ADULT - SUBJECTIVE AND OBJECTIVE BOX
SERINA CORREA  73y  [facility] ED Hold 063 A      Patient is a 73y old  Male who presents with a chief complaint of Dizziness (13 Jul 2023 20:26)      INTERVAL HPI/OVERNIGHT EVENTS:        REVIEW OF SYSTEMS:        FAMILY HISTORY:  Dementia (Mother)      T(C): 36.7 (07-14-23 @ 09:03), Max: 36.9 (07-14-23 @ 01:08)  HR: 64 (07-14-23 @ 09:03) (62 - 82)  BP: 138/67 (07-14-23 @ 09:03) (127/74 - 147/72)  RR: 18 (07-14-23 @ 09:03) (16 - 18)  SpO2: 100% (07-14-23 @ 09:03) (100% - 100%)  Wt(kg): --Vital Signs Last 24 Hrs  T(C): 36.7 (14 Jul 2023 09:03), Max: 36.9 (14 Jul 2023 01:08)  T(F): 98 (14 Jul 2023 09:03), Max: 98.4 (14 Jul 2023 01:08)  HR: 64 (14 Jul 2023 09:03) (62 - 82)  BP: 138/67 (14 Jul 2023 09:03) (127/74 - 147/72)  BP(mean): --  RR: 18 (14 Jul 2023 09:03) (16 - 18)  SpO2: 100% (14 Jul 2023 09:03) (100% - 100%)    Parameters below as of 14 Jul 2023 09:03  Patient On (Oxygen Delivery Method): room air        PHYSICAL EXAM:  GENERAL: NAD, well-groomed, well-developed  HEAD:  Atraumatic, Normocephalic  EYES: EOMI, PERRLA, conjunctiva and sclera clear  ENMT: No tonsillar erythema, exudates, or enlargement; Moist mucous membranes, Good dentition, No lesions  NECK: Supple, No JVD, Normal thyroid  NERVOUS SYSTEM:  Alert & Oriented X3, Good concentration; Motor Strength 5/5 B/L upper and lower extremities; DTRs 2+ intact and symmetric  PULM: Clear to auscultation bilaterally  CARDIAC: Regular rate and rhythm; No murmurs, rubs, or gallops  GI: Soft, Nontender, Nondistended; Bowel sounds present  EXTREMITIES:  2+ Peripheral Pulses, No clubbing, cyanosis, or edema  LYMPH: No lymphadenopathy noted  SKIN: No rashes or lesions    Consultant(s) Notes Reviewed:  [x ] YES  [ ] NO  Care Discussed with Consultants/Other Providers [ x] YES  [ ] NO    LABS:                            8.2    5.11  )-----------( 171      ( 14 Jul 2023 06:20 )             29.5   07-14    138  |  104  |  13  ----------------------------<  87  4.6   |  24  |  0.7    Ca    8.7      14 Jul 2023 06:20  Mg     1.8     07-14    TPro  6.2  /  Alb  3.5  /  TBili  1.2  /  DBili  x   /  AST  20  /  ALT  7   /  AlkPhos  110  07-14            acetaminophen     Tablet .. 650 milliGRAM(s) Oral every 6 hours PRN  aluminum hydroxide/magnesium hydroxide/simethicone Suspension 30 milliLiter(s) Oral every 4 hours PRN  heparin   Injectable 5000 Unit(s) SubCutaneous every 12 hours  iron sucrose IVPB 200 milliGRAM(s) IV Intermittent every 24 hours  melatonin 3 milliGRAM(s) Oral at bedtime PRN  memantine 5 milliGRAM(s) Oral two times a day  ondansetron Injectable 4 milliGRAM(s) IV Push every 8 hours PRN  pantoprazole    Tablet 40 milliGRAM(s) Oral before breakfast      A 73 year old male with a history of Dementia, HTN, HLD, Depression, Esophageal Cancer s/p resection and gastric pull,, Lung Ca s/p lobectomy, Prostate Cancer s/p Cyberknife presented to the ED with the complaint of dizziness. Dizziness started in the morning likely around 8:30am described as lightheadedness and off balance causing him to feel unstable ambulating. History is limited as the patient has baseline mod/severe dementia. The daughter Ric ([phone]) mentioned that the patient was complaining of feeling dizzy in the morning (unclear if it's room spinning sensation vs lightheadedness) and was unsteady when the family attempted to make him walk.     1.Dizziness due to Symptomatic iron deficiency anemia no bleeding noted s/p 1 PRBC   * EGD 7/21: Ulcers in the lower third of the esophagus, Erythema in the stomach compatible with non-erosive gastritis, Normal mucosa inthe whole examined duodenum.   - Admit to medicine                        - ANGIE: negative          - TSH:pending   - Received 1 PRBC. Venofer 200mg IV daily d:1   - Cont PPI po bid   - GI consult:pending  (as pt por have hard time coming as outpatient for colono and upper endo due to dementia)     2. Dizziness/ Dementia/Alzheimers  -a/w off balance while ambulating  -CTH, CTA head and neck: no acute findings:  -Patient was seen by neurology and recommended if persistent dizziness after anemia correction then might consider MR brain non contrast  -fall precautions  -PT consult  - Cont Memantine 5MG po bid    3. Pulmonary nodule on imaging:  -Right upper lobe pulmonary nodule measuring 5 mm, new since CTA chest 6/19/2021.  -OP f/u    4. H/O Hypertension/H/O HLD  - BP controlled, not on any meds at home   -Lipid profile and A1c in AM    5. H/O Anxiety/Depression  - No documentation  - Not on any home meds    6.  H/O Prostate Ca  - was on Flomax in the past    #MISC:  -GI ppx: PPI   DVT ppx: heparin sq  -Diet: DASH/TLC  -Activity: Ambulate with assitance/fall precautions  -Code status: Full code  -Dispo: Acute, Trend CBC       MADELINE SERINA  73y  [facility] ED Hold 063 A      Patient is a 73y old  Male who presents with a chief complaint of Dizziness (13 Jul 2023 20:26)      INTERVAL HPI/OVERNIGHT EVENTS:  Patient feels better. his dizziness resolved. no overnight events           FAMILY HISTORY:  Dementia (Mother)      T(C): 36.7 (07-14-23 @ 09:03), Max: 36.9 (07-14-23 @ 01:08)  HR: 64 (07-14-23 @ 09:03) (62 - 82)  BP: 138/67 (07-14-23 @ 09:03) (127/74 - 147/72)  RR: 18 (07-14-23 @ 09:03) (16 - 18)  SpO2: 100% (07-14-23 @ 09:03) (100% - 100%)  Wt(kg): --Vital Signs Last 24 Hrs  T(C): 36.7 (14 Jul 2023 09:03), Max: 36.9 (14 Jul 2023 01:08)  T(F): 98 (14 Jul 2023 09:03), Max: 98.4 (14 Jul 2023 01:08)  HR: 64 (14 Jul 2023 09:03) (62 - 82)  BP: 138/67 (14 Jul 2023 09:03) (127/74 - 147/72)  BP(mean): --  RR: 18 (14 Jul 2023 09:03) (16 - 18)  SpO2: 100% (14 Jul 2023 09:03) (100% - 100%)    Parameters below as of 14 Jul 2023 09:03  Patient On (Oxygen Delivery Method): room air        PHYSICAL EXAM:  GENERAL: confused   PULM: Clear to auscultation bilaterally  CARDIAC: Regular rate and rhythm  GI: Soft, Nontender, Nondistended; Bowel sounds present  EXTREMITIES:  2+ Peripheral Pulse    Consultant(s) Notes Reviewed:  [x ] YES  [ ] NO  Care Discussed with Consultants/Other Providers [ x] YES  [ ] NO    LABS:                            8.2    5.11  )-----------( 171      ( 14 Jul 2023 06:20 )             29.5   07-14    138  |  104  |  13  ----------------------------<  87  4.6   |  24  |  0.7    Ca    8.7      14 Jul 2023 06:20  Mg     1.8     07-14    TPro  6.2  /  Alb  3.5  /  TBili  1.2  /  DBili  x   /  AST  20  /  ALT  7   /  AlkPhos  110  07-14            acetaminophen     Tablet .. 650 milliGRAM(s) Oral every 6 hours PRN  aluminum hydroxide/magnesium hydroxide/simethicone Suspension 30 milliLiter(s) Oral every 4 hours PRN  heparin   Injectable 5000 Unit(s) SubCutaneous every 12 hours  iron sucrose IVPB 200 milliGRAM(s) IV Intermittent every 24 hours  melatonin 3 milliGRAM(s) Oral at bedtime PRN  memantine 5 milliGRAM(s) Oral two times a day  ondansetron Injectable 4 milliGRAM(s) IV Push every 8 hours PRN  pantoprazole    Tablet 40 milliGRAM(s) Oral before breakfast      A 73 year old male with a history of Dementia, HTN, HLD, Depression, Esophageal Cancer s/p resection and gastric pull,, Lung Ca s/p lobectomy, Prostate Cancer s/p Cyberknife presented to the ED with the complaint of dizziness. Dizziness started in the morning likely around 8:30am described as lightheadedness and off balance causing him to feel unstable ambulating. History is limited as the patient has baseline mod/severe dementia. The daughter Ric ([phone]) mentioned that the patient was complaining of feeling dizzy in the morning (unclear if it's room spinning sensation vs lightheadedness) and was unsteady when the family attempted to make him walk.     1.Dizziness due to Symptomatic iron deficiency anemia no bleeding noted s/p 1 PRBC resolving   * EGD 7/21: Ulcers in the lower third of the esophagus, Erythema in the stomach compatible with non-erosive gastritis, Normal mucosa inthe whole examined duodenum.   - Admit to medicine                        - ANGIE: negative          - TSH:pending   - Received 1 PRBC. Venofer 200mg IV daily d:1   - Cont PPI po bid   - GI consult appreciated  * Considering inpatient upper and lower endo as it's hard to arrange it as outpatient giving his dementia     2. Dizziness/ Dementia/Alzheimers  -a/w off balance while ambulating  -CTH, CTA head and neck: no acute findings:  -Patient was seen by neurology and recommended if persistent dizziness after anemia correction then might consider MR brain non contrast  -fall precautions  -PT consult:pending  - Cont Memantine 5MG po bid    3. Pulmonary nodule on imaging:  -Right upper lobe pulmonary nodule measuring 5 mm, new since CTA chest 6/19/2021.  -OP f/u    4. H/O Hypertension/H/O HLD  - BP controlled, not on any meds at home   -Lipid profile and A1c in AM    5. H/O Anxiety/Depression  - No documentation  - Not on any home meds    6.  H/O Prostate Ca  - was on Flomax in the past    #MISC:  -GI ppx: PPI   DVT ppx: heparin sq  -Diet: DASH/TLC  -Activity: Ambulate with assitance/fall precautions  -Code status: Full code  -Dispo: Acute, Trend CBC

## 2023-07-14 NOTE — PHYSICAL THERAPY INITIAL EVALUATION ADULT - SPECIFY REASON(S)
925 am PT attempted to see pt for PT initial evaluation; + PCA in rm ( ED), however, pt was very somnolent, unable to participate at this time , will f/u once pt is more alert.

## 2023-07-14 NOTE — CONSULT NOTE ADULT - SUBJECTIVE AND OBJECTIVE BOX
Gastroenterology Consultation:    Patient is a 73y old  Male who presents with a chief complaint of Dizziness (14 Jul 2023 09:46)      Admitted on: 07-13-23  HPI:  A 73 year old male with a history of Dementia, HTN, HLD, Depression, Esophageal Cancer s/p resection and gastric pull,, Lung Ca s/p lobectomy, Prostate Cancer s/p Cyberknife presented to the ED with the complaint of dizziness. Dizziness started in the morning likely around 8:30am described as lightheadedness and off balance causing him to feel unstable ambulating. History is limited as the patient has baseline mod/severe dementia. The daughter Ric ([phone]) mentioned that the patient was complaining of feeling dizzy in the morning (unclear if it's room spinning sensation vs lightheadedness) and was unsteady when the family attempted to make him walk. They thought initially it's related to the low temperature in his room so they adjusted that but he kept feeling dizzy so they brought him to ED. The daughter helps the patient with ADLs na managing his bills. She reports he still drives sometime although family have expressed concerns about his safety but he insists to drive. The patient denies fever, chest pain, SOB, headache, vision changes, weakness, nausea, vomiting, tremors, abdominal pain, constipation, dysuria, hematuria, lower extremity swelling or rash.    #ED vitals:  T(C): --  HR: 62 (07-13-23 @ 11:55) (62 - 62)  BP: 136/75 (07-13-23 @ 11:55) (136/75 - 136/75)  RR: 16 (07-13-23 @ 11:55) (16 - 16)  SpO2: 100% (07-13-23 @ 11:55) (100% - 100%)    #Labs significant for:  Hb 7.3(was 11.4 in 8/22, baseline of 12-14), MCV 6, Serum Iron 20(low), Unsaturated (high), % saturation 5(low), Pro-    #Imaging:  *CT Angio Head/Neck w/ IV Cont  1.  No evidence of major vascular stenosis or occlusion.  2.  Right upper lobe pulmonary nodule measuring 5 mm, new since CTA chest 6/19/2021. Recommend a dedicated chest CT on an outpatient/elective basis.    *CT Head No Cont  No acute intracranial pathology. No evidence of midline shift, mass effect or intracranial hemorrhage.  Mild chronic microvascular type changes.    #S/P: Meclizine 12.5mg PO x 1, and PRBC 1 Unit in the ED.    *Patient seen by neuro team for dizziness in the ED.    Patient is being admitted to Medicine for further management.         (13 Jul 2023 20:26)      GI HPI:  GI consulted for anemia.     Prior records Reviewed (Y/N): Y  History obtained from person other than patient (Y/N): Y    Prior EGD: (7/6/21) performed for odonophagia, showed ulcer in the 3rd part of the esophagus, non-erosive gastritis, negative for H. pylori on pathology  Prior Colonoscopy: N/A      PAST MEDICAL & SURGICAL HISTORY:  Hypertension, unspecified type      Cancer  ESOPHAGEAL CANCER 2017 RECEIVED CHEMO AND RADIATION and endoscopic resection partial oesophagus and stomach      Renal calculi  nephrostomy tube -6/2021      Cancer  left lung 2018, no chemo or rad rx and resection      Abnormal cholesterol test      Prostate cancer      [place] (hard of hearing)      Alzheimer disease      Cancer of esophagus  2017 RESECTION OF LOWER PORTION OF ESOPHAGUS      S/P lobectomy of lung  left ?JUNE 2018      S/P cystoscopy with ureteral stent placement  LEFT      Inguinal hernia  AS AN INFANT, RIGHT SIDE      History of esophagogastroduodenoscopy (EGD)  2019      H/O colonoscopy  2018      Prostate cancer  cyber knife intervention 2021          FAMILY HISTORY:  Dementia (Mother)        Social History:  Tobacco:  Alcohol:  Drugs:    Home Medications:  memantine 5 mg oral tablet: 1 tab(s) orally 2 times a day (13 Jul 2023 21:47)    MEDICATIONS  (STANDING):  heparin   Injectable 5000 Unit(s) SubCutaneous every 12 hours  iron sucrose IVPB 200 milliGRAM(s) IV Intermittent every 24 hours  memantine 5 milliGRAM(s) Oral two times a day  pantoprazole    Tablet 40 milliGRAM(s) Oral before breakfast    MEDICATIONS  (PRN):  acetaminophen     Tablet .. 650 milliGRAM(s) Oral every 6 hours PRN Temp greater or equal to 38C (100.4F), Mild Pain (1 - 3)  aluminum hydroxide/magnesium hydroxide/simethicone Suspension 30 milliLiter(s) Oral every 4 hours PRN Dyspepsia  melatonin 3 milliGRAM(s) Oral at bedtime PRN Insomnia  ondansetron Injectable 4 milliGRAM(s) IV Push every 8 hours PRN Nausea and/or Vomiting      Allergies  No Known Allergies      Review of Systems:   Constitutional:  No Fever, No Chills  ENT/Mouth:  No Hearing Changes,  No Difficulty Swallowing  Eyes:  No Eye Pain, No Vision Changes  Cardiovascular:  No Chest Pain, No Palpitations  Respiratory:  No Cough, No Dyspnea  Gastrointestinal:  As described in HPI  Musculoskeletal:  No Joint Swelling, No Back Pain  Skin:  No Skin Lesions, No Jaundice  Neuro:  No Syncope, No Dizziness  Heme/Lymph:  No Bruising, No Bleeding.          Physical Examination:  T(C): 36.7 (07-14-23 @ 09:03), Max: 36.9 (07-14-23 @ 01:08)  HR: 64 (07-14-23 @ 09:03) (62 - 82)  BP: 138/67 (07-14-23 @ 09:03) (127/74 - 147/72)  RR: 18 (07-14-23 @ 09:03) (16 - 18)  SpO2: 100% (07-14-23 @ 09:03) (100% - 100%)  Height (cm): 182.9 (07-13-23 @ 11:55)  Weight (kg): 67.993 (07-13-23 @ 11:55)      Constitutional: No acute distress.  Eyes:. Conjunctivae are clear, Sclera is non-icteric.  Ears Nose and Throat: The external ears are normal appearing,  Oral mucosa is pink and moist.  Respiratory:  No signs of respiratory distress. Lung sounds are clear bilaterally.  Cardiovascular:  S1 S2, Regular rate and rhythm.  GI: Abdomen is soft, symmetric, and non-tender without distention. There are no visible lesions. Bowel sounds are present and normoactive in all four quadrants. No masses, hepatomegaly, or splenomegaly are noted.   Neuro: No Tremor, No involuntary movements  Skin: No rashes, No Jaundice.          Data: (reviewed by attending)                        8.2    5.11  )-----------( 171      ( 14 Jul 2023 06:20 )             29.5     Hgb Trend:  8.2  07-14-23 @ 06:20  7.1  07-14-23 @ 00:31  7.3  07-13-23 @ 14:44      07-14    138  |  104  |  13  ----------------------------<  87  4.6   |  24  |  0.7    Ca    8.7      14 Jul 2023 06:20  Mg     1.8     07-14    TPro  6.2  /  Alb  3.5  /  TBili  1.2  /  DBili  x   /  AST  20  /  ALT  7   /  AlkPhos  110  07-14    Liver panel trend:  TBili 1.2   /   AST 20   /   ALT 7   /   AlkP 110   /   Tptn 6.2   /   Alb 3.5    /   DBili --      07-14  TBili 0.4   /   AST 28   /   ALT 9   /   AlkP 113   /   Tptn 6.7   /   Alb 3.7    /   DBili --      07-13      PT/INR - ( 13 Jul 2023 14:44 )   PT: 11.50 sec;   INR: 1.01 ratio         PTT - ( 13 Jul 2023 14:44 )  PTT:24.3 sec        Radiology:(reviewed by attending)  N/A     Gastroenterology Consultation:    Patient is a 73y old  Male who presents with a chief complaint of Dizziness (14 Jul 2023 09:46)      Admitted on: 07-13-23  HPI:  A 73 year old male with a history of Dementia, HTN, HLD, Depression, Esophageal Cancer (in 2017 s/p resection and gastric pull in [facility]), Lung Ca s/p lobectomy, Prostate Cancer s/p Cyberknife presented to the ED with the complaint of dizziness. Dizziness started in the morning likely around 8:30am described as lightheadedness and off balance causing him to feel unstable ambulating. History is limited as the patient has baseline mod/severe dementia. The daughter Ric ([phone]) mentioned that the patient was complaining of feeling dizzy in the morning (unclear if it's room spinning sensation vs lightheadedness) and was unsteady when the family attempted to make him walk. They thought initially it's related to the low temperature in his room so they adjusted that but he kept feeling dizzy so they brought him to ED. The daughter helps the patient with ADLs na managing his bills. She reports he still drives sometime although family have expressed concerns about his safety but he insists to drive. The patient denies fever, chest pain, SOB, headache, vision changes, weakness, nausea, vomiting, tremors, abdominal pain, constipation, dysuria, hematuria, lower extremity swelling or rash.    #ED vitals:  T(C): --  HR: 62 (07-13-23 @ 11:55) (62 - 62)  BP: 136/75 (07-13-23 @ 11:55) (136/75 - 136/75)  RR: 16 (07-13-23 @ 11:55) (16 - 16)  SpO2: 100% (07-13-23 @ 11:55) (100% - 100%)    #Labs significant for:  Hb 7.3(was 11.4 in 8/22, baseline of 12-14), MCV 6, Serum Iron 20(low), Unsaturated (high), % saturation 5(low), Pro-    #Imaging:  *CT Angio Head/Neck w/ IV Cont  1.  No evidence of major vascular stenosis or occlusion.  2.  Right upper lobe pulmonary nodule measuring 5 mm, new since CTA chest 6/19/2021. Recommend a dedicated chest CT on an outpatient/elective basis.    *CT Head No Cont  No acute intracranial pathology. No evidence of midline shift, mass effect or intracranial hemorrhage.  Mild chronic microvascular type changes.    #S/P: Meclizine 12.5mg PO x 1, and pRBC 1 Unit in the ED.    *Patient seen by neuro team for dizziness in the ED.    Patient is being admitted to Medicine for further management.         (13 Jul 2023 20:26)      GI HPI:  GI consulted for anemia. Patient has baseline mod/severe dementia, history primarily obtained from daughter at bedside. Patient presented to the hospital for dizziness, unclear about the characterization but described as lightheadedness. Patient has had poor PO intake ever since his diagnosis of esophageal cancer in 2017 and underwent surgical resection and chemo/radiation. Daughter reports patient has been cachectic and picky about what he eats, periodically eating out from fast food restaurants. Patient had underwent EGD in July 2021 for odynophagia     Prior records Reviewed (Y/N): Y  History obtained from person other than patient (Y/N): Y    Prior EGD: (7/6/21) performed for odonophagia, showed ulcer in the 3rd part of the esophagus, non-erosive gastritis, negative for H. pylori on pathology  Prior Colonoscopy: approx 10 yrs ago with Dr. Coronado, reports pt had 1 polyp removed      PAST MEDICAL & SURGICAL HISTORY:  Hypertension, unspecified type      Cancer  ESOPHAGEAL CANCER 2017 RECEIVED CHEMO AND RADIATION and endoscopic resection partial oesophagus and stomach      Renal calculi  nephrostomy tube -6/2021      Cancer  left lung 2018, no chemo or rad rx and resection      Abnormal cholesterol test      Prostate cancer      [place] (hard of hearing)      Alzheimer disease      Cancer of esophagus  2017 RESECTION OF LOWER PORTION OF ESOPHAGUS      S/P lobectomy of lung  left ?JUNE 2018      S/P cystoscopy with ureteral stent placement  LEFT      Inguinal hernia  AS AN INFANT, RIGHT SIDE      History of esophagogastroduodenoscopy (EGD)  2019      H/O colonoscopy  2018      Prostate cancer  cyber knife intervention 2021          FAMILY HISTORY:  Dementia (Mother)  No significant hx of GI malignancy/diseases in family      Social History:  Tobacco: quit 40+ yrs ago  Alcohol: No alcohol use  Drugs: No illicit drug use    Home Medications:  memantine 5 mg oral tablet: 1 tab(s) orally 2 times a day (13 Jul 2023 21:47)    MEDICATIONS  (STANDING):  heparin   Injectable 5000 Unit(s) SubCutaneous every 12 hours  iron sucrose IVPB 200 milliGRAM(s) IV Intermittent every 24 hours  memantine 5 milliGRAM(s) Oral two times a day  pantoprazole    Tablet 40 milliGRAM(s) Oral before breakfast    MEDICATIONS  (PRN):  acetaminophen     Tablet .. 650 milliGRAM(s) Oral every 6 hours PRN Temp greater or equal to 38C (100.4F), Mild Pain (1 - 3)  aluminum hydroxide/magnesium hydroxide/simethicone Suspension 30 milliLiter(s) Oral every 4 hours PRN Dyspepsia  melatonin 3 milliGRAM(s) Oral at bedtime PRN Insomnia  ondansetron Injectable 4 milliGRAM(s) IV Push every 8 hours PRN Nausea and/or Vomiting      Allergies  No Known Allergies      Review of Systems:   Constitutional:  No Fever, No Chills  ENT/Mouth:  No Hearing Changes,  No Difficulty Swallowing  Eyes:  No Eye Pain, No Vision Changes  Cardiovascular:  No Chest Pain, No Palpitations  Respiratory:  No Cough, No Dyspnea  Gastrointestinal:  As described in HPI  Musculoskeletal:  No Joint Swelling, No Back Pain  Skin:  No Skin Lesions, No Jaundice  Neuro:  No Syncope, No Dizziness  Heme/Lymph:  No Bruising, No Bleeding.          Physical Examination:  T(C): 36.7 (07-14-23 @ 09:03), Max: 36.9 (07-14-23 @ 01:08)  HR: 64 (07-14-23 @ 09:03) (62 - 82)  BP: 138/67 (07-14-23 @ 09:03) (127/74 - 147/72)  RR: 18 (07-14-23 @ 09:03) (16 - 18)  SpO2: 100% (07-14-23 @ 09:03) (100% - 100%)  Height (cm): 182.9 (07-13-23 @ 11:55)  Weight (kg): 67.993 (07-13-23 @ 11:55)      Constitutional: No acute distress.  Eyes:. Conjunctivae are clear, Sclera is non-icteric.  Ears Nose and Throat: The external ears are normal appearing,  Oral mucosa is pink and moist.  Respiratory:  No signs of respiratory distress. Lung sounds are clear bilaterally.  Cardiovascular:  S1 S2, Regular rate and rhythm.  GI: Abdomen is soft, symmetric, and non-tender without distention. There are no visible lesions. Bowel sounds are present and normoactive in all four quadrants. No masses, hepatomegaly, or splenomegaly are noted.   Neuro: No Tremor, No involuntary movements  Skin: No rashes, No Jaundice.          Data: (reviewed by attending)                        8.2    5.11  )-----------( 171      ( 14 Jul 2023 06:20 )             29.5     Hgb Trend:  8.2  07-14-23 @ 06:20  7.1  07-14-23 @ 00:31  7.3  07-13-23 @ 14:44      07-14    138  |  104  |  13  ----------------------------<  87  4.6   |  24  |  0.7    Ca    8.7      14 Jul 2023 06:20  Mg     1.8     07-14    TPro  6.2  /  Alb  3.5  /  TBili  1.2  /  DBili  x   /  AST  20  /  ALT  7   /  AlkPhos  110  07-14    Liver panel trend:  TBili 1.2   /   AST 20   /   ALT 7   /   AlkP 110   /   Tptn 6.2   /   Alb 3.5    /   DBili --      07-14  TBili 0.4   /   AST 28   /   ALT 9   /   AlkP 113   /   Tptn 6.7   /   Alb 3.7    /   DBili --      07-13      PT/INR - ( 13 Jul 2023 14:44 )   PT: 11.50 sec;   INR: 1.01 ratio         PTT - ( 13 Jul 2023 14:44 )  PTT:24.3 sec        Radiology:(reviewed by attending)  N/A

## 2023-07-15 LAB
ALBUMIN SERPL ELPH-MCNC: 3.5 G/DL — SIGNIFICANT CHANGE UP (ref 3.5–5.2)
ALP SERPL-CCNC: 113 U/L — SIGNIFICANT CHANGE UP (ref 30–115)
ALT FLD-CCNC: 11 U/L — SIGNIFICANT CHANGE UP (ref 0–41)
ANION GAP SERPL CALC-SCNC: 10 MMOL/L — SIGNIFICANT CHANGE UP (ref 7–14)
AST SERPL-CCNC: 32 U/L — SIGNIFICANT CHANGE UP (ref 0–41)
BASOPHILS # BLD AUTO: 0.05 K/UL — SIGNIFICANT CHANGE UP (ref 0–0.2)
BASOPHILS NFR BLD AUTO: 0.9 % — SIGNIFICANT CHANGE UP (ref 0–1)
BILIRUB SERPL-MCNC: 0.7 MG/DL — SIGNIFICANT CHANGE UP (ref 0.2–1.2)
BLD GP AB SCN SERPL QL: SIGNIFICANT CHANGE UP
BUN SERPL-MCNC: 15 MG/DL — SIGNIFICANT CHANGE UP (ref 10–20)
CALCIUM SERPL-MCNC: 8.7 MG/DL — SIGNIFICANT CHANGE UP (ref 8.4–10.5)
CHLORIDE SERPL-SCNC: 105 MMOL/L — SIGNIFICANT CHANGE UP (ref 98–110)
CO2 SERPL-SCNC: 23 MMOL/L — SIGNIFICANT CHANGE UP (ref 17–32)
CREAT SERPL-MCNC: 0.7 MG/DL — SIGNIFICANT CHANGE UP (ref 0.7–1.5)
EGFR: 97 ML/MIN/1.73M2 — SIGNIFICANT CHANGE UP
EOSINOPHIL # BLD AUTO: 0.23 K/UL — SIGNIFICANT CHANGE UP (ref 0–0.7)
EOSINOPHIL NFR BLD AUTO: 4.4 % — SIGNIFICANT CHANGE UP (ref 0–8)
GLUCOSE SERPL-MCNC: 81 MG/DL — SIGNIFICANT CHANGE UP (ref 70–99)
HCT VFR BLD CALC: 29.8 % — LOW (ref 42–52)
HGB BLD-MCNC: 8.5 G/DL — LOW (ref 14–18)
IMM GRANULOCYTES NFR BLD AUTO: 0.6 % — HIGH (ref 0.1–0.3)
LYMPHOCYTES # BLD AUTO: 0.7 K/UL — LOW (ref 1.2–3.4)
LYMPHOCYTES # BLD AUTO: 13.3 % — LOW (ref 20.5–51.1)
MAGNESIUM SERPL-MCNC: 1.9 MG/DL — SIGNIFICANT CHANGE UP (ref 1.8–2.4)
MCHC RBC-ENTMCNC: 19.2 PG — LOW (ref 27–31)
MCHC RBC-ENTMCNC: 28.5 G/DL — LOW (ref 32–37)
MCV RBC AUTO: 67.3 FL — LOW (ref 80–94)
MONOCYTES # BLD AUTO: 0.73 K/UL — HIGH (ref 0.1–0.6)
MONOCYTES NFR BLD AUTO: 13.9 % — HIGH (ref 1.7–9.3)
NEUTROPHILS # BLD AUTO: 3.53 K/UL — SIGNIFICANT CHANGE UP (ref 1.4–6.5)
NEUTROPHILS NFR BLD AUTO: 66.9 % — SIGNIFICANT CHANGE UP (ref 42.2–75.2)
NRBC # BLD: 0 /100 WBCS — SIGNIFICANT CHANGE UP (ref 0–0)
PLATELET # BLD AUTO: 182 K/UL — SIGNIFICANT CHANGE UP (ref 130–400)
PMV BLD: SIGNIFICANT CHANGE UP (ref 7.4–10.4)
POTASSIUM SERPL-MCNC: 3.8 MMOL/L — SIGNIFICANT CHANGE UP (ref 3.5–5)
POTASSIUM SERPL-SCNC: 3.8 MMOL/L — SIGNIFICANT CHANGE UP (ref 3.5–5)
PROT SERPL-MCNC: 6 G/DL — SIGNIFICANT CHANGE UP (ref 6–8)
RBC # BLD: 4.43 M/UL — LOW (ref 4.7–6.1)
RBC # FLD: 20.8 % — HIGH (ref 11.5–14.5)
SODIUM SERPL-SCNC: 138 MMOL/L — SIGNIFICANT CHANGE UP (ref 135–146)
WBC # BLD: 5.27 K/UL — SIGNIFICANT CHANGE UP (ref 4.8–10.8)
WBC # FLD AUTO: 5.27 K/UL — SIGNIFICANT CHANGE UP (ref 4.8–10.8)

## 2023-07-15 PROCEDURE — 99231 SBSQ HOSP IP/OBS SF/LOW 25: CPT

## 2023-07-15 RX ADMIN — IRON SUCROSE 110 MILLIGRAM(S): 20 INJECTION, SOLUTION INTRAVENOUS at 07:13

## 2023-07-15 RX ADMIN — MEMANTINE HYDROCHLORIDE 5 MILLIGRAM(S): 10 TABLET ORAL at 07:14

## 2023-07-15 RX ADMIN — HEPARIN SODIUM 5000 UNIT(S): 5000 INJECTION INTRAVENOUS; SUBCUTANEOUS at 07:14

## 2023-07-15 RX ADMIN — MEMANTINE HYDROCHLORIDE 5 MILLIGRAM(S): 10 TABLET ORAL at 18:36

## 2023-07-15 RX ADMIN — PANTOPRAZOLE SODIUM 40 MILLIGRAM(S): 20 TABLET, DELAYED RELEASE ORAL at 18:36

## 2023-07-15 RX ADMIN — HEPARIN SODIUM 5000 UNIT(S): 5000 INJECTION INTRAVENOUS; SUBCUTANEOUS at 18:36

## 2023-07-15 NOTE — PROGRESS NOTE ADULT - SUBJECTIVE AND OBJECTIVE BOX
S: no ne wevnts/complaints      All other pertinent ROS negative.      Vital Signs Last 24 Hrs  T(C): 36.3 (15 Jul 2023 04:42), Max: 36.3 (15 Jul 2023 04:42)  T(F): 97.4 (15 Jul 2023 04:42), Max: 97.4 (15 Jul 2023 04:42)  HR: 68 (15 Jul 2023 04:42) (68 - 68)  BP: 111/59 (15 Jul 2023 04:42) (111/59 - 111/59)  BP(mean): --  RR: 18 (15 Jul 2023 04:42) (18 - 18)  SpO2: --      PHYSICAL EXAM:    Constitutional: NAD, awake and alert  HEENT: PERR, EOMI, Normal Hearing, MMM  Neck: Soft and supple, No LAD, No JVD  Respiratory: Breath sounds are clear bilaterally, No wheezing, rales or rhonchi  Cardiovascular: S1 and S2, regular rate and rhythm, no Murmurs, gallops or rubs  Gastrointestinal: Bowel Sounds present, soft, nontender, nondistended, no guarding, no rebound  Extremities: No peripheral edema      MEDICATIONS:  MEDICATIONS  (STANDING):  heparin   Injectable 5000 Unit(s) SubCutaneous every 12 hours  iron sucrose IVPB 200 milliGRAM(s) IV Intermittent every 24 hours  memantine 5 milliGRAM(s) Oral two times a day  pantoprazole    Tablet 40 milliGRAM(s) Oral two times a day      LABS: All Labs Reviewed:                        8.5    5.27  )-----------( 182      ( 15 Jul 2023 08:52 )             29.8     07-15    138  |  105  |  15  ----------------------------<  81  3.8   |  23  |  0.7    Ca    8.7      15 Jul 2023 08:52  Mg     1.9     07-15    TPro  6.0  /  Alb  3.5  /  TBili  0.7  /  DBili  x   /  AST  32  /  ALT  11  /  AlkPhos  113  07-15          Blood Culture:     Radiology: reviewed

## 2023-07-15 NOTE — PROGRESS NOTE ADULT - ASSESSMENT
A 73 year old male with a history of Dementia, HTN, HLD, Depression, Esophageal Cancer s/p resection and gastric pull,, Lung Ca s/p lobectomy, Prostate Cancer s/p Cyberknife presented to the ED with the complaint of dizziness. Dizziness started in the morning likely around 8:30am described as lightheadedness and off balance causing him to feel unstable ambulating. History is limited as the patient has baseline mod/severe dementia. The daughter Ric ([phone]) mentioned that the patient was complaining of feeling dizzy in the morning (unclear if it's room spinning sensation vs lightheadedness) and was unsteady when the family attempted to make him walk.     1.Dizziness due to Symptomatic iron deficiency anemia no bleeding noted s/p 1 PRBC resolving   * EGD 7/21: Ulcers in the lower third of the esophagus, Erythema in the stomach compatible with non-erosive gastritis, Normal mucosa inthe whole examined duodenum.   - Admit to medicine                        - ANGIE: negative          - TSH:pending   - Received 1 PRBC. Venofer 200mg IV daily d:1   - Cont PPI po bid   - GI consult appreciated  * Considering inpatient upper and lower endo as it's hard to arrange it as outpatient giving his dementia   plan for coming week.     2. Dizziness/ Dementia/Alzheimers  -a/w off balance while ambulating  -CTH, CTA head and neck: no acute findings:  -Patient was seen by neurology and recommended if persistent dizziness after anemia correction then might consider MR brain non contrast  -fall precautions  -PT consult:pending  - Cont Memantine 5MG po bid    3. Pulmonary nodule on imaging:  -Right upper lobe pulmonary nodule measuring 5 mm, new since CTA chest 6/19/2021.  -OP f/u    4. H/O Hypertension/H/O HLD  - BP controlled, not on any meds at home   -Lipid profile and A1c in AM    5. H/O Anxiety/Depression  - No documentation  - Not on any home meds    6.  H/O Prostate Ca  - was on Flomax in the past    #MISC:  -GI ppx: PPI   DVT ppx: heparin sq  -Diet: DASH/TLC  -Activity: Ambulate with assitance/fall precautions  -Code status: Full code  -Dispo: Acute, Trend CBC

## 2023-07-16 LAB
ALBUMIN SERPL ELPH-MCNC: 3.5 G/DL — SIGNIFICANT CHANGE UP (ref 3.5–5.2)
ALP SERPL-CCNC: 115 U/L — SIGNIFICANT CHANGE UP (ref 30–115)
ALT FLD-CCNC: 11 U/L — SIGNIFICANT CHANGE UP (ref 0–41)
ANION GAP SERPL CALC-SCNC: 14 MMOL/L — SIGNIFICANT CHANGE UP (ref 7–14)
APPEARANCE UR: ABNORMAL
AST SERPL-CCNC: 35 U/L — SIGNIFICANT CHANGE UP (ref 0–41)
BASOPHILS # BLD AUTO: 0.07 K/UL — SIGNIFICANT CHANGE UP (ref 0–0.2)
BASOPHILS NFR BLD AUTO: 1.2 % — HIGH (ref 0–1)
BILIRUB SERPL-MCNC: 0.4 MG/DL — SIGNIFICANT CHANGE UP (ref 0.2–1.2)
BILIRUB UR-MCNC: NEGATIVE — SIGNIFICANT CHANGE UP
BUN SERPL-MCNC: 18 MG/DL — SIGNIFICANT CHANGE UP (ref 10–20)
CALCIUM SERPL-MCNC: 8.9 MG/DL — SIGNIFICANT CHANGE UP (ref 8.4–10.5)
CHLORIDE SERPL-SCNC: 111 MMOL/L — HIGH (ref 98–110)
CO2 SERPL-SCNC: 23 MMOL/L — SIGNIFICANT CHANGE UP (ref 17–32)
COLOR SPEC: YELLOW — SIGNIFICANT CHANGE UP
CREAT SERPL-MCNC: 0.9 MG/DL — SIGNIFICANT CHANGE UP (ref 0.7–1.5)
DIFF PNL FLD: ABNORMAL
EGFR: 90 ML/MIN/1.73M2 — SIGNIFICANT CHANGE UP
EOSINOPHIL # BLD AUTO: 0.34 K/UL — SIGNIFICANT CHANGE UP (ref 0–0.7)
EOSINOPHIL NFR BLD AUTO: 5.9 % — SIGNIFICANT CHANGE UP (ref 0–8)
GLUCOSE SERPL-MCNC: 105 MG/DL — HIGH (ref 70–99)
GLUCOSE UR QL: NEGATIVE — SIGNIFICANT CHANGE UP
HCT VFR BLD CALC: 33.9 % — LOW (ref 42–52)
HGB BLD-MCNC: 9.3 G/DL — LOW (ref 14–18)
IMM GRANULOCYTES NFR BLD AUTO: 0.9 % — HIGH (ref 0.1–0.3)
KETONES UR-MCNC: NEGATIVE — SIGNIFICANT CHANGE UP
LEUKOCYTE ESTERASE UR-ACNC: ABNORMAL
LYMPHOCYTES # BLD AUTO: 0.77 K/UL — LOW (ref 1.2–3.4)
LYMPHOCYTES # BLD AUTO: 13.3 % — LOW (ref 20.5–51.1)
MAGNESIUM SERPL-MCNC: 1.9 MG/DL — SIGNIFICANT CHANGE UP (ref 1.8–2.4)
MCHC RBC-ENTMCNC: 19.2 PG — LOW (ref 27–31)
MCHC RBC-ENTMCNC: 27.4 G/DL — LOW (ref 32–37)
MCV RBC AUTO: 69.9 FL — LOW (ref 80–94)
MONOCYTES # BLD AUTO: 0.95 K/UL — HIGH (ref 0.1–0.6)
MONOCYTES NFR BLD AUTO: 16.4 % — HIGH (ref 1.7–9.3)
NEUTROPHILS # BLD AUTO: 3.63 K/UL — SIGNIFICANT CHANGE UP (ref 1.4–6.5)
NEUTROPHILS NFR BLD AUTO: 62.3 % — SIGNIFICANT CHANGE UP (ref 42.2–75.2)
NITRITE UR-MCNC: NEGATIVE — SIGNIFICANT CHANGE UP
NRBC # BLD: 0 /100 WBCS — SIGNIFICANT CHANGE UP (ref 0–0)
PH UR: 6 — SIGNIFICANT CHANGE UP (ref 5–8)
PLATELET # BLD AUTO: 196 K/UL — SIGNIFICANT CHANGE UP (ref 130–400)
PMV BLD: SIGNIFICANT CHANGE UP (ref 7.4–10.4)
POTASSIUM SERPL-MCNC: 4.4 MMOL/L — SIGNIFICANT CHANGE UP (ref 3.5–5)
POTASSIUM SERPL-SCNC: 4.4 MMOL/L — SIGNIFICANT CHANGE UP (ref 3.5–5)
PROT SERPL-MCNC: 6.5 G/DL — SIGNIFICANT CHANGE UP (ref 6–8)
PROT UR-MCNC: ABNORMAL
RBC # BLD: 4.85 M/UL — SIGNIFICANT CHANGE UP (ref 4.7–6.1)
RBC # FLD: 21.9 % — HIGH (ref 11.5–14.5)
SODIUM SERPL-SCNC: 148 MMOL/L — HIGH (ref 135–146)
SP GR SPEC: 1.02 — SIGNIFICANT CHANGE UP (ref 1.01–1.03)
UROBILINOGEN FLD QL: ABNORMAL
WBC # BLD: 5.81 K/UL — SIGNIFICANT CHANGE UP (ref 4.8–10.8)
WBC # FLD AUTO: 5.81 K/UL — SIGNIFICANT CHANGE UP (ref 4.8–10.8)

## 2023-07-16 PROCEDURE — 99232 SBSQ HOSP IP/OBS MODERATE 35: CPT

## 2023-07-16 RX ORDER — SODIUM CHLORIDE 9 MG/ML
1000 INJECTION, SOLUTION INTRAVENOUS
Refills: 0 | Status: DISCONTINUED | OUTPATIENT
Start: 2023-07-16 | End: 2023-07-19

## 2023-07-16 RX ADMIN — MEMANTINE HYDROCHLORIDE 5 MILLIGRAM(S): 10 TABLET ORAL at 17:58

## 2023-07-16 RX ADMIN — Medication 3 MILLIGRAM(S): at 21:23

## 2023-07-16 RX ADMIN — PANTOPRAZOLE SODIUM 40 MILLIGRAM(S): 20 TABLET, DELAYED RELEASE ORAL at 05:26

## 2023-07-16 RX ADMIN — SODIUM CHLORIDE 60 MILLILITER(S): 9 INJECTION, SOLUTION INTRAVENOUS at 18:09

## 2023-07-16 RX ADMIN — PANTOPRAZOLE SODIUM 40 MILLIGRAM(S): 20 TABLET, DELAYED RELEASE ORAL at 17:58

## 2023-07-16 RX ADMIN — MEMANTINE HYDROCHLORIDE 5 MILLIGRAM(S): 10 TABLET ORAL at 05:27

## 2023-07-16 RX ADMIN — HEPARIN SODIUM 5000 UNIT(S): 5000 INJECTION INTRAVENOUS; SUBCUTANEOUS at 05:26

## 2023-07-16 RX ADMIN — HEPARIN SODIUM 5000 UNIT(S): 5000 INJECTION INTRAVENOUS; SUBCUTANEOUS at 17:58

## 2023-07-16 RX ADMIN — IRON SUCROSE 110 MILLIGRAM(S): 20 INJECTION, SOLUTION INTRAVENOUS at 05:26

## 2023-07-16 NOTE — PROGRESS NOTE ADULT - ASSESSMENT
A 73 year old male with a history of Dementia, HTN, HLD, Depression, Esophageal Cancer s/p resection and gastric pull,, Lung Ca s/p lobectomy, Prostate Cancer s/p Cyberknife presented to the ED with the complaint of dizziness. Dizziness started in the morning likely around 8:30am described as lightheadedness and off balance causing him to feel unstable ambulating. History is limited as the patient has baseline mod/severe dementia. The daughter Ric ([phone]) mentioned that the patient was complaining of feeling dizzy in the morning (unclear if it's room spinning sensation vs lightheadedness) and was unsteady when the family attempted to make him walk.     1.Dizziness due to Symptomatic iron deficiency anemia no bleeding noted s/p 1 PRBC resolving   * EGD 7/21: Ulcers in the lower third of the esophagus, Erythema in the stomach compatible with non-erosive gastritis, Normal mucosa inthe whole examined duodenum.   - Admit to medicine                        - ANGIE: negative          - TSH:pending   - Received 1 PRBC. Venofer 200mg IV daily d:1   - Cont PPI po bid   - GI consult appreciated  * Considering inpatient upper and lower endo as it's hard to arrange it as outpatient giving his dementia   plan for coming week.   7/16 Hgb climbing 9.3    2. Dizziness/ Dementia/Alzheimers  -a/w off balance while ambulating  -CTH, CTA head and neck: no acute findings:  -Patient was seen by neurology and recommended if persistent dizziness after anemia correction then might consider MR brain non contrast  -fall precautions  -PT consult:pending  - Cont Memantine 5MG po bid    3. Pulmonary nodule on imaging:  -Right upper lobe pulmonary nodule measuring 5 mm, new since CTA chest 6/19/2021.  -OP f/u    4. H/O Hypertension/H/O HLD  - BP controlled, not on any meds at home   -Lipid profile and A1c in AM    5. H/O Anxiety/Depression  - No documentation  - Not on any home meds    6.  H/O Prostate Ca  - was on Flomax in the past    #MISC:  -GI ppx: PPI   DVT ppx: heparin sq  -Diet: DASH/TLC  -Activity: Ambulate with assitance/fall precautions  -Code status: Full code  -Dispo: Acute, Trend CBC   A 73 year old male with a history of Dementia, HTN, HLD, Depression, Esophageal Cancer s/p resection and gastric pull,, Lung Ca s/p lobectomy, Prostate Cancer s/p Cyberknife presented to the ED with the complaint of dizziness. Dizziness started in the morning likely around 8:30am described as lightheadedness and off balance causing him to feel unstable ambulating. History is limited as the patient has baseline mod/severe dementia. The daughter Ric ([phone]) mentioned that the patient was complaining of feeling dizzy in the morning (unclear if it's room spinning sensation vs lightheadedness) and was unsteady when the family attempted to make him walk.     1.Dizziness due to Symptomatic iron deficiency anemia no bleeding noted s/p 1 PRBC resolving   * EGD 7/21: Ulcers in the lower third of the esophagus, Erythema in the stomach compatible with non-erosive gastritis, Normal mucosa inthe whole examined duodenum.   - Admit to medicine                        - ANGIE: negative          - TSH:pending   - Received 1 PRBC. Venofer 200mg IV daily d:1   - Cont PPI po bid   - GI consult appreciated  * Considering inpatient upper and lower endo as it's hard to arrange it as outpatient giving his dementia   plan for coming week. f/u GI  7/16 Hgb climbing 9.3    2. Dizziness/ Dementia/Alzheimers  -a/w off balance while ambulating  -CTH, CTA head and neck: no acute findings:  -Patient was seen by neurology and recommended if persistent dizziness after anemia correction then might consider MR brain non contrast  -fall precautions  -PT consult:pending  - Cont Memantine 5MG po bid    3. Pulmonary nodule on imaging:  -Right upper lobe pulmonary nodule measuring 5 mm, new since CTA chest 6/19/2021.  -OP f/u    4. H/O Hypertension/H/O HLD  - BP controlled, not on any meds at home   -Lipid profile and A1c in AM    5. H/O Anxiety/Depression  - No documentation  - Not on any home meds    6.  H/O Prostate Ca  - was on Flomax in the past    7. Hypernatremia Na 148  - 0.45% NS 60 cc/hr  X 24 hrs    #MISC:  -GI ppx: PPI   DVT ppx: heparin sq  -Diet: DASH/TLC  -Activity: Ambulate with assitance/fall precautions  -Code status: Full code  -Dispo: Acute, Trend CBC

## 2023-07-16 NOTE — PROGRESS NOTE ADULT - ATTENDING COMMENTS
A 73 year old male with a history of Dementia, HTN, HLD, Depression, Esophageal Cancer s/p resection and gastric pull,, Lung Ca s/p lobectomy, Prostate Cancer s/p Cyberknife presented to the ED with the complaint of dizziness. Dizziness started in the morning likely around 8:30am described as lightheadedness and off balance causing him to feel unstable ambulating. History is limited as the patient has baseline mod/severe dementia. The daughter Ric ([phone]) mentioned that the patient was complaining of feeling dizzy in the morning (unclear if it's room spinning sensation vs lightheadedness) and was unsteady when the family attempted to make him walk.     1.Dizziness due to Symptomatic iron deficiency anemia no bleeding noted s/p 1 PRBC resolving   * EGD 7/21: Ulcers in the lower third of the esophagus, Erythema in the stomach compatible with non-erosive gastritis, Normal mucosa inthe whole examined duodenum.   - Admit to medicine                        - ANGIE: negative          - TSH: 3.12 normal.   - Received 1 PRBC. Venofer 200mg IV daily d:1   - Cont PPI po bid   - GI consult appreciated  * Considering inpatient upper and lower endo as it's hard to arrange it as outpatient giving his dementia   plan for coming week.   F/U GI ON MONDAY ON DATE FOR SCOPES. prep accordingly.    2. Dizziness/ Dementia/Alzheimers  -a/w off balance while ambulating  -CTH, CTA head and neck: no acute findings:  -Patient was seen by neurology and recommended if persistent dizziness after anemia correction then might consider MR brain non contrast  -fall precautions  -PT consult:pending (patient was somnolent. recall PT)  - Cont Memantine 5MG po bid    3. Pulmonary nodule on imaging:  -Right upper lobe pulmonary nodule measuring 5 mm, new since CTA chest 6/19/2021.  -OP f/u    4. H/O Hypertension/H/O HLD  - BP controlled, not on any meds at home   -Lipid profile and A1c in AM    5. H/O Anxiety/Depression  - No documentation  - Not on any home meds    6.  H/O Prostate Ca  - was on Flomax in the past    #MISC:  -GI ppx: PPI   DVT ppx: heparin sq  -Diet: DASH/TLC  -Activity: Ambulate with assitance/fall precautions  -Code status: Full code  -Dispo: Acute, Trend CBC    HANDOFF: f/u GI on date for scopes. A 73 year old male with a history of Dementia, HTN, HLD, Depression, Esophageal Cancer s/p resection and gastric pull,, Lung Ca s/p lobectomy, Prostate Cancer s/p Cyberknife presented to the ED with the complaint of dizziness. Dizziness started in the morning likely around 8:30am described as lightheadedness and off balance causing him to feel unstable ambulating. History is limited as the patient has baseline mod/severe dementia. The daughter Ric ([phone]) mentioned that the patient was complaining of feeling dizzy in the morning (unclear if it's room spinning sensation vs lightheadedness) and was unsteady when the family attempted to make him walk.     1.Dizziness due to Symptomatic iron deficiency anemia no bleeding noted s/p 1 PRBC resolving   * EGD 7/21: Ulcers in the lower third of the esophagus, Erythema in the stomach compatible with non-erosive gastritis, Normal mucosa inthe whole examined duodenum.   - Admit to medicine                        - ANGIE: negative          - TSH: 3.12 normal.   - Received 1 PRBC. Venofer 200mg IV daily d:1   - Cont PPI po bid   - GI consult appreciated  * Considering inpatient upper and lower endo as it's hard to arrange it as outpatient giving his dementia   plan for coming week.   F/U GI ON MONDAY ON DATE FOR SCOPES. prep accordingly.    2. Dizziness/ Dementia/Alzheimers  -a/w off balance while ambulating  -CTH, CTA head and neck: no acute findings:  -Patient was seen by neurology and recommended if persistent dizziness after anemia correction then might consider MR brain non contrast  -fall precautions  -PT consult:pending (patient was somnolent. recall PT)  - Cont Memantine 5MG po bid    3. Pulmonary nodule on imaging:  -Right upper lobe pulmonary nodule measuring 5 mm, new since CTA chest 6/19/2021.  -OP f/u    4. H/O Hypertension/H/O HLD  - BP controlled, not on any meds at home   -Lipid profile and A1c in AM    5. H/O Anxiety/Depression  - No documentation  - Not on any home meds    6.  H/O Prostate Ca  - was on Flomax in the past    #HYpernatremia: Na 138 > 148 (7/16). Start 1/2NS @ 60 x 24 hours. encourage po intake.     #MISC:  -GI ppx: PPI   DVT ppx: heparin sq  -Diet: DASH/TLC  -Activity: Ambulate with assitance/fall precautions  -Code status: Full code  -Dispo: Acute, Trend CBC    HANDOFF: f/u GI on date for scopes.

## 2023-07-16 NOTE — PROGRESS NOTE ADULT - SUBJECTIVE AND OBJECTIVE BOX
24H events:    Patient is a 73y old Male who presents with a chief complaint of Dizziness (15 Jul 2023 19:30)    Primary diagnosis of Dizziness        Today is hospital day 3d. This morning patient was seen and examined at bedside, resting comfortably in bed.    No acute or major events overnight. Pt is upset at being in the hospital, asking why he's different than other people. Explained to pt the reason for admission and the plan, but pt is forgetful and repeated the same qs again a few minutes later. re-explained the plan for him, Pt is upset at various family members because he's here. says he feels fine.    Code Status:    Family communication:  Contact date:  Name of person contacted:  Relationship to patient:  Communication details:  What matters most:    PAST MEDICAL & SURGICAL HISTORY  Hypertension, unspecified type    Cancer  ESOPHAGEAL CANCER 2017 RECEIVED CHEMO AND RADIATION and endoscopic resection partial oesophagus and stomach    Renal calculi  nephrostomy tube -6/2021    Cancer  left lung 2018, no chemo or rad rx and resection    Abnormal cholesterol test    Prostate cancer    [place]    Alzheimer disease    Cancer of esophagus  2017 RESECTION OF LOWER PORTION OF ESOPHAGUS    S/P lobectomy of lung  left ?JUNE 2018    S/P cystoscopy with ureteral stent placement  LEFT    Inguinal hernia  AS AN INFANT, RIGHT SIDE    History of esophagogastroduodenoscopy (EGD)  2019    H/O colonoscopy  2018    Prostate cancer  cyber knife intervention 2021      SOCIAL HISTORY:  Social History:      ALLERGIES:  No Known Allergies    MEDICATIONS:  STANDING MEDICATIONS  heparin   Injectable 5000 Unit(s) SubCutaneous every 12 hours  iron sucrose IVPB 200 milliGRAM(s) IV Intermittent every 24 hours  memantine 5 milliGRAM(s) Oral two times a day  pantoprazole    Tablet 40 milliGRAM(s) Oral two times a day    PRN MEDICATIONS  acetaminophen     Tablet .. 650 milliGRAM(s) Oral every 6 hours PRN  aluminum hydroxide/magnesium hydroxide/simethicone Suspension 30 milliLiter(s) Oral every 4 hours PRN  melatonin 3 milliGRAM(s) Oral at bedtime PRN  ondansetron Injectable 4 milliGRAM(s) IV Push every 8 hours PRN    VITALS:   T(F): 98.1  HR: 65  BP: 99/55  RR: 18  SpO2: --    PHYSICAL EXAM:  GENERAL:   ( x ) NAD, lying in bed comfortably     (  ) obtunded     (  ) lethargic     (  ) somnolent    HEAD:   (x) Atraumatic     (  ) hematoma     (  ) laceration (specify location:       )     NECK:  (  ) Supple     (  ) neck stiffness     (  ) nuchal rigidity     (  )  no JVD     (  ) JVD present ( -- cm)    HEART:  Rate -->     ( x ) normal rate     (  ) bradycardic     (  ) tachycardic  Rhythm -->     (x  ) regular     (  ) regularly irregular     (  ) irregularly irregular  Murmurs -->     ( x ) normal s1s2     (  ) systolic murmur     (  ) diastolic murmur     (  ) continuous murmur      (  ) S3 present     (  ) S4 present    LUNGS:   ( x )Unlabored respirations     (  ) tachypnea  (x  ) B/L air entry     (  ) decreased breath sounds in:  (location     )    (  ) no adventitious sound     (  ) crackles     (  ) wheezing      (  ) rhonchi      (specify location:       )  (  ) chest wall tenderness (specify location:       )    ABDOMEN:   (x  ) Soft     (  ) tense   |   ( x ) nondistended     (  ) distended   |   (  ) +BS     (  ) hypoactive bowel sounds     (  ) hyperactive bowel sounds  (x  ) nontender     (  ) RUQ tenderness     (  ) RLQ tenderness     (  ) LLQ tenderness     (  ) epigastric tenderness     (  ) diffuse tenderness  (  ) Splenomegaly      (  ) Hepatomegaly      (  ) Jaundice     (  ) ecchymosis     EXTREMITIES: 2+ peripheral pulses bilaterally. No clubbing, cyanosis, or edema  (  ) Normal     (  ) Rash     (  ) ecchymosis     (  ) varicose veins      (  ) pitting edema     (  ) non-pitting edema   (  ) ulceration     (  ) gangrene:     (location:     )    NERVOUS SYSTEM:    ( x ) A&Ox3     (  ) confused     (  ) lethargic  CN II-XII:     (  ) Intact     (  ) deficits found     (Specify:     )   Upper extremities:     (  ) no sensorimotor deficits     (  ) weakness     (  ) loss of proprioception/vibration     (  ) loss of touch/temperature (specify:    )  Lower extremities:     (  ) no sensorimotor deficits     (  ) weakness     (  ) loss of proprioception/vibration     (  ) loss of touch/temperature (specify:    )    SKIN:   (  ) No rashes or lesions     (  ) maculopapular rash     (  ) pustules     (  ) vesicles     (  ) ulcer     (  ) ecchymosis     (specify location:     )        (  ) Indwelling Roman Catheter:   Date insterted:    Reason (  ) Critical illness     (  ) urinary retention    (  ) Accurate Ins/Outs Monitoring     (  ) CMO patient        LABS:                        9.3    5.81  )-----------( 196      ( 16 Jul 2023 09:18 )             33.9     07-16    148<H>  |  111<H>  |  18  ----------------------------<  105<H>  4.4   |  23  |  0.9    Ca    8.9      16 Jul 2023 09:18  Mg     1.9     07-16    TPro  6.5  /  Alb  3.5  /  TBili  0.4  /  DBili  x   /  AST  35  /  ALT  11  /  AlkPhos  115  07-16      Urinalysis Basic - ( 16 Jul 2023 09:18 )    Color: x / Appearance: x / SG: x / pH: x  Gluc: 105 mg/dL / Ketone: x  / Bili: x / Urobili: x   Blood: x / Protein: x / Nitrite: x   Leuk Esterase: x / RBC: x / WBC x   Sq Epi: x / Non Sq Epi: x / Bacteria: x                RADIOLOGY:

## 2023-07-17 LAB
ALBUMIN SERPL ELPH-MCNC: 3.7 G/DL — SIGNIFICANT CHANGE UP (ref 3.5–5.2)
ALP SERPL-CCNC: 116 U/L — HIGH (ref 30–115)
ALT FLD-CCNC: 12 U/L — SIGNIFICANT CHANGE UP (ref 0–41)
ANION GAP SERPL CALC-SCNC: 13 MMOL/L — SIGNIFICANT CHANGE UP (ref 7–14)
AST SERPL-CCNC: 30 U/L — SIGNIFICANT CHANGE UP (ref 0–41)
BASOPHILS # BLD AUTO: 0.08 K/UL — SIGNIFICANT CHANGE UP (ref 0–0.2)
BASOPHILS NFR BLD AUTO: 1.3 % — HIGH (ref 0–1)
BILIRUB SERPL-MCNC: 0.4 MG/DL — SIGNIFICANT CHANGE UP (ref 0.2–1.2)
BUN SERPL-MCNC: 17 MG/DL — SIGNIFICANT CHANGE UP (ref 10–20)
CALCIUM SERPL-MCNC: 8.8 MG/DL — SIGNIFICANT CHANGE UP (ref 8.4–10.4)
CHLORIDE SERPL-SCNC: 108 MMOL/L — SIGNIFICANT CHANGE UP (ref 98–110)
CO2 SERPL-SCNC: 22 MMOL/L — SIGNIFICANT CHANGE UP (ref 17–32)
CREAT SERPL-MCNC: 1 MG/DL — SIGNIFICANT CHANGE UP (ref 0.7–1.5)
EGFR: 79 ML/MIN/1.73M2 — SIGNIFICANT CHANGE UP
EOSINOPHIL # BLD AUTO: 0.32 K/UL — SIGNIFICANT CHANGE UP (ref 0–0.7)
EOSINOPHIL NFR BLD AUTO: 5.3 % — SIGNIFICANT CHANGE UP (ref 0–8)
GLUCOSE SERPL-MCNC: 201 MG/DL — HIGH (ref 70–99)
HCT VFR BLD CALC: 32.8 % — LOW (ref 42–52)
HGB BLD-MCNC: 8.9 G/DL — LOW (ref 14–18)
IMM GRANULOCYTES NFR BLD AUTO: 0.8 % — HIGH (ref 0.1–0.3)
LYMPHOCYTES # BLD AUTO: 0.77 K/UL — LOW (ref 1.2–3.4)
LYMPHOCYTES # BLD AUTO: 12.6 % — LOW (ref 20.5–51.1)
MAGNESIUM SERPL-MCNC: 1.8 MG/DL — SIGNIFICANT CHANGE UP (ref 1.8–2.4)
MCHC RBC-ENTMCNC: 18.9 PG — LOW (ref 27–31)
MCHC RBC-ENTMCNC: 27.1 G/DL — LOW (ref 32–37)
MCV RBC AUTO: 69.8 FL — LOW (ref 80–94)
MONOCYTES # BLD AUTO: 0.68 K/UL — HIGH (ref 0.1–0.6)
MONOCYTES NFR BLD AUTO: 11.2 % — HIGH (ref 1.7–9.3)
NEUTROPHILS # BLD AUTO: 4.19 K/UL — SIGNIFICANT CHANGE UP (ref 1.4–6.5)
NEUTROPHILS NFR BLD AUTO: 68.8 % — SIGNIFICANT CHANGE UP (ref 42.2–75.2)
NRBC # BLD: 0 /100 WBCS — SIGNIFICANT CHANGE UP (ref 0–0)
PLATELET # BLD AUTO: 202 K/UL — SIGNIFICANT CHANGE UP (ref 130–400)
PMV BLD: SIGNIFICANT CHANGE UP (ref 7.4–10.4)
POTASSIUM SERPL-MCNC: 3.7 MMOL/L — SIGNIFICANT CHANGE UP (ref 3.5–5)
POTASSIUM SERPL-SCNC: 3.7 MMOL/L — SIGNIFICANT CHANGE UP (ref 3.5–5)
PROT SERPL-MCNC: 6.5 G/DL — SIGNIFICANT CHANGE UP (ref 6–8)
RBC # BLD: 4.7 M/UL — SIGNIFICANT CHANGE UP (ref 4.7–6.1)
RBC # FLD: 22.4 % — HIGH (ref 11.5–14.5)
SODIUM SERPL-SCNC: 143 MMOL/L — SIGNIFICANT CHANGE UP (ref 135–146)
WBC # BLD: 6.09 K/UL — SIGNIFICANT CHANGE UP (ref 4.8–10.8)
WBC # FLD AUTO: 6.09 K/UL — SIGNIFICANT CHANGE UP (ref 4.8–10.8)

## 2023-07-17 PROCEDURE — 99232 SBSQ HOSP IP/OBS MODERATE 35: CPT

## 2023-07-17 PROCEDURE — 99233 SBSQ HOSP IP/OBS HIGH 50: CPT

## 2023-07-17 RX ADMIN — MEMANTINE HYDROCHLORIDE 5 MILLIGRAM(S): 10 TABLET ORAL at 05:28

## 2023-07-17 RX ADMIN — HEPARIN SODIUM 5000 UNIT(S): 5000 INJECTION INTRAVENOUS; SUBCUTANEOUS at 06:52

## 2023-07-17 RX ADMIN — PANTOPRAZOLE SODIUM 40 MILLIGRAM(S): 20 TABLET, DELAYED RELEASE ORAL at 08:53

## 2023-07-17 RX ADMIN — PANTOPRAZOLE SODIUM 40 MILLIGRAM(S): 20 TABLET, DELAYED RELEASE ORAL at 17:07

## 2023-07-17 RX ADMIN — IRON SUCROSE 110 MILLIGRAM(S): 20 INJECTION, SOLUTION INTRAVENOUS at 06:53

## 2023-07-17 RX ADMIN — MEMANTINE HYDROCHLORIDE 5 MILLIGRAM(S): 10 TABLET ORAL at 17:07

## 2023-07-17 RX ADMIN — HEPARIN SODIUM 5000 UNIT(S): 5000 INJECTION INTRAVENOUS; SUBCUTANEOUS at 17:07

## 2023-07-17 NOTE — PHYSICAL THERAPY INITIAL EVALUATION ADULT - ADDITIONAL COMMENTS
Pt. is a poor historian, only oriented to self. As per patient's chart, patient lives with family in a private house with 15+ steps to negotiate. Unclear whether patient used an AD PTA.

## 2023-07-17 NOTE — PROGRESS NOTE ADULT - SUBJECTIVE AND OBJECTIVE BOX
----------Daily Progress Note----------    HISTORY OF PRESENT ILLNESS:  A 73 year old male with a history of Dementia, HTN, HLD, Depression, Esophageal Cancer s/p resection and gastric pull,, Lung Ca s/p lobectomy, Prostate Cancer s/p Cyberknife presented to the ED with the complaint of dizziness, found to be anemia s/p 1 u pRBC now admitted for workup of his anemia. His dizziness and anemia has currently resolved.     Today is hospital day 4d.     ED course:  A 73 year old male with a history of Dementia, HTN, HLD, Depression, Esophageal Cancer s/p resection and gastric pull,, Lung Ca s/p lobectomy, Prostate Cancer s/p Cyberknife presented to the ED with the complaint of dizziness. Dizziness started in the morning likely around 8:30am described as lightheadedness and off balance causing him to feel unstable ambulating. History is limited as the patient has baseline mod/severe dementia. The daughter Ric ([phone]) mentioned that the patient was complaining of feeling dizzy in the morning (unclear if it's room spinning sensation vs lightheadedness) and was unsteady when the family attempted to make him walk. They thought initially it's related to the low temperature in his room so they adjusted that but he kept feeling dizzy so they brought him to ED. The daughter helps the patient with ADLs na managing his bills. She reports he still drives sometime although family have expressed concerns about his safety but he insists to drive. The patient denies fever, chest pain, SOB, headache, vision changes, weakness, nausea, vomiting, tremors, abdominal pain, constipation, dysuria, hematuria, lower extremity swelling or rash.    #ED vitals:  T(C): --  HR: 62 (07-13-23 @ 11:55) (62 - 62)  BP: 136/75 (07-13-23 @ 11:55) (136/75 - 136/75)  RR: 16 (07-13-23 @ 11:55) (16 - 16)  SpO2: 100% (07-13-23 @ 11:55) (100% - 100%)    #Labs significant for:  Hb 7.3(was 11.4 in 8/22, baseline of 12-14), MCV 6, Serum Iron 20(low), Unsaturated (high), % saturation 5(low), Pro-    #Imaging:  *CT Angio Head/Neck w/ IV Cont  1.  No evidence of major vascular stenosis or occlusion.  2.  Right upper lobe pulmonary nodule measuring 5 mm, new since CTA chest 6/19/2021. Recommend a dedicated chest CT on an outpatient/elective basis.    *CT Head No Cont  No acute intracranial pathology. No evidence of midline shift, mass effect or intracranial hemorrhage.  Mild chronic microvascular type changes.    #S/P: Meclizine 12.5mg PO x 1, and PRBC 1 Unit in the ED.    *Patient seen by neuro team for dizziness in the ED.    Patient is being admitted to Medicine for further management.      INTERVAL HOSPITAL COURSE / OVERNIGHT EVENTS:  O/N events:  None    24 hr events:  None    Patient was examined and seen at bedside. This morning he is resting comfortably in bed. Denies any dizziness, reports good appetite and sleep, endorses urinating well without dysuria, endorses having BM yesterday, denies any new issues or discomfort.     Review of Systems: Otherwise unremarkable     <<<<<PAST MEDICAL & SURGICAL HISTORY>>>>>  Hypertension, unspecified type    Cancer  ESOPHAGEAL CANCER 2017 RECEIVED CHEMO AND RADIATION and endoscopic resection partial oesophagus and stomach    Renal calculi  nephrostomy tube -6/2021    Cancer  left lung 2018, no chemo or rad rx and resection    Abnormal cholesterol test    Prostate cancer    [place] (hard of hearing)    Alzheimer disease    Cancer of esophagus  2017 RESECTION OF LOWER PORTION OF ESOPHAGUS    S/P lobectomy of lung  left ?JUNE 2018    S/P cystoscopy with ureteral stent placement  LEFT    Inguinal hernia  AS AN INFANT, RIGHT SIDE    History of esophagogastroduodenoscopy (EGD)  2019    H/O colonoscopy  2018    Prostate cancer  cyber knife intervention 2021      ALLERGIES  No Known Allergies      Home Medications:  memantine 5 mg oral tablet: 1 tab(s) orally 2 times a day (13 Jul 2023 21:47)        MEDICATIONS  STANDING MEDICATIONS  heparin   Injectable 5000 Unit(s) SubCutaneous every 12 hours  iron sucrose IVPB 200 milliGRAM(s) IV Intermittent every 24 hours  memantine 5 milliGRAM(s) Oral two times a day  pantoprazole    Tablet 40 milliGRAM(s) Oral two times a day  sodium chloride 0.45%. 1000 milliLiter(s) IV Continuous <Continuous>    PRN MEDICATIONS  acetaminophen     Tablet .. 650 milliGRAM(s) Oral every 6 hours PRN  aluminum hydroxide/magnesium hydroxide/simethicone Suspension 30 milliLiter(s) Oral every 4 hours PRN  melatonin 3 milliGRAM(s) Oral at bedtime PRN  ondansetron Injectable 4 milliGRAM(s) IV Push every 8 hours PRN    VITALS:  T(F): 97  HR: 63  BP: 111/62  RR: 18  SpO2: 96%    <<<<<PHYSICAL EXAM>>>>>  GENERAL: Cachetic appearing elderly man resting comfortably in bed, in no apparent distress  HEENT: Normocephalic, atraumatic  PULMONARY: Clear to auscultation bilaterally. No rales, rhonchi, or wheezing.  CARDIOVASCULAR: RRR, no M/R/G, S1-S2  GASTROINTESTINAL: Soft, non-tender, non-distended, no guarding.  SKIN/EXTREMITIES: Warm, 2+ dorsalis pedis pulses, no M/R/G    <<<<<LABS>>>>>                        8.9    6.09  )-----------( 202      ( 17 Jul 2023 09:33 )             32.8     07-17    143  |  108  |  17  ----------------------------<  201<H>  3.7   |  22  |  1.0    Ca    8.8      17 Jul 2023 09:33  Mg     1.8     07-17    TPro  6.5  /  Alb  3.7  /  TBili  0.4  /  DBili  x   /  AST  30  /  ALT  12  /  AlkPhos  116<H>  07-17      Urinalysis Basic - ( 17 Jul 2023 09:33 )    Color: x / Appearance: x / SG: x / pH: x  Gluc: 201 mg/dL / Ketone: x  / Bili: x / Urobili: x   Blood: x / Protein: x / Nitrite: x   Leuk Esterase: x / RBC: x / WBC x   Sq Epi: x / Non Sq Epi: x / Bacteria: x          <<<<<RADIOLOGY>>>>>  ACC: [id number] EXAM:  CT ANGIO NECK (W)AW IC   ORDERED BY: TREY DIXON     ACC: [id number] EXAM:  CT ANGIO BRAIN (W)AW IC   ORDERED BY: TREY DIXON     PROCEDURE DATE:  07/13/2023      INTERPRETATION:  Clinical History / Reason for exam: Dizziness    Technique: CT angiogram of the head and neck. Contiguous CT axial images   of the head and neck were obtained following the intravenous   administration of 100 cc Omnipaque 350 (0 cc discarded) with coronal,   sagittal and multiple 3-D MIP andvolume rendered reformats.    Comparison: None. CTA chest and CT neck dated 6/19/2021 are reviewed.    Findings:    NECK:  The visualized aortic arch and great vessel origins are patent. There is   a direct origin of the left vertebral artery from the aortic arch, normal   variant.    The common, internal and external carotid arteries are patent.    The vertebral arteries are patent. The right vertebral artery is dominant.    HEAD:  The distal internal carotid arteries are patent. The anterior and middle   cerebral arteries are patent. There is a hypoplastic A1 segment of the   right [place], normal variant.    The distal vertebral arteries are patent.  The basilar artery is patent   though diffusely diminutive in caliber felt to reflect a dominant   anterior circulation. The posterior cerebral arteries are patent.    OTHER:  Retained secretions are seen within the esophagus.  There is emphysema with bullous changes of the lung apices.  There is a right upper lobe a nodule measuring 5 mm on series 4 image 8,   new since the CTA chest dated 6/19/2021.    IMPRESSION:    1.  No evidence of major vascular stenosis or occlusion.    2.  Right upper lobe pulmonary nodule measuring 5 mm, new since CTA chest   6/19/2021. Recommend a dedicated chest CT on an outpatient/elective basis.    --- End of Report ---    EL AMARAL MD; Attending Radiologist  This document has been electronically signed. Jul 13 2023  4:26PM            ACC: [id number] EXAM:  CT ANGIO NECK (W)AW IC   ORDERED BY: TREY DIXON     ACC: [id number] EXAM:  CT ANGIO BRAIN (W)AW IC   ORDERED BY: TREY DIXON     PROCEDURE DATE:  07/13/2023      INTERPRETATION:  Clinical History / Reason for exam: Dizziness    Technique: CT angiogram of the head and neck. Contiguous CT axial images   of the head and neck were obtained following the intravenous   administration of 100 cc Omnipaque 350 (0 cc discarded) with coronal,   sagittal and multiple 3-D MIP andvolume rendered reformats.    Comparison: None. CTA chest and CT neck dated 6/19/2021 are reviewed.    Findings:    NECK:  The visualized aortic arch and great vessel origins are patent. There is   a direct origin of the left vertebral artery from the aortic arch, normal   variant.    The common, internal and external carotid arteries are patent.    The vertebral arteries are patent. The right vertebral artery is dominant.    HEAD:  The distal internal carotid arteries are patent. The anterior and middle   cerebral arteries are patent. There is a hypoplastic A1 segment of the   right [place], normal variant.    The distal vertebral arteries are patent.  The basilar artery is patent   though diffusely diminutive in caliber felt to reflect a dominant   anterior circulation. The posterior cerebral arteries are patent.    OTHER:  Retained secretions are seen within the esophagus.  There is emphysema with bullous changes of the lung apices.  There is a right upper lobe a nodule measuring 5 mm on series 4 image 8,   new since the CTA chest dated 6/19/2021.    IMPRESSION:    1.  No evidence of major vascular stenosis or occlusion.    2.  Right upper lobe pulmonary nodule measuring 5 mm, new since CTA chest   6/19/2021. Recommend a dedicated chest CT on an outpatient/elective basis.    --- End of Report ---    EL AMARAL MD; Attending Radiologist  This document has been electronically signed. Jul 13 2023  4:26PM          ACC: [id number] EXAM:  CT BRAIN   ORDERED BY: TREY DIXON     PROCEDURE DATE:  07/13/2023      INTERPRETATION:  CLINICAL INDICATION: Dizziness.    TECHNIQUE: CT of the head was performed without the administration of   intravenous contrast.    COMPARISON: None.    FINDINGS:    There is prominence of the sulci, sylvian fissures, and ventricles likely   reflecting mild parenchymal volume loss.    There are scattered patchy low attenuations in bilateral periventricular   cerebral white matter consistent with mild chronic microvascular ischemic   changes.    There is no evidence of acute territorial infarct or intracranial   hemorrhage. There is no hydrocephalus, midline shift, mass effect.    . There are no extra-axial fluid collections.    The visualized intraorbital contents are normal. The imaged portions of   the paranasal sinuses are aerated. The mastoid air cells are aerated. The   visualized soft tissues and osseous structures appear normal.      IMPRESSION:    No acute intracranial pathology. No evidence of midline shift, mass   effect or intracranial hemorrhage.    Mild chronic microvascular type changes.    --- End of Report ---    KIM BAHENA MD; Resident Radiologist  This document has been electronically signed.  HERMILA PARADA MD; Attending Radiologist  This document has been electronically signed. Jul 13 2023  4:31PM          ACC: [id number] EXAM:  XR CHEST PORTABLE URGENT 1V   ORDERED BY: TREY DIXON     PROCEDURE DATE:  07/13/2023      INTERPRETATION:  Clinical History / Reason for exam: Shortness of breath    Comparison : Chest radiograph July 3, 2021.    Technique/Positioning: Single AP view of the chest.    Findings:    Support devices: None.    Cardiac/mediastinum/hilum: Magnified cardiac silhouette. Ectatic thoracic   aorta.    Lung parenchyma/Pleura: Chronic left basilar opacity/atelectasis. No   pneumothorax. Interstitial changes bilaterally.    Skeleton/soft tissues: No acute osseous abnormality.    Impression:    Chronic left basilar opacity/atelectasis.    Interstitial changes bilaterally.    --- End of Report ---    ELLIOT LANDAU MD; Attending Radiologist  This document has been electronically signed. Jul 14 2023  8:58AM    -----------------------------------------------------------------------------------------------------------------------------------------------------------------------------------------------

## 2023-07-17 NOTE — PHYSICAL THERAPY INITIAL EVALUATION ADULT - PERTINENT HX OF CURRENT PROBLEM, REHAB EVAL
73 year old male with a history of Dementia, HTN, HLD, Depression, Esophageal Cancer s/p resection and gastric pull,, Lung Ca s/p lobectomy, Prostate Cancer s/p Cyberknife presented to the ED with the complaint of dizziness. Dizziness started in the morning likely around 8:30am described as lightheadedness and off balance causing him to feel unstable ambulating. History is limited as the patient has baseline mod/severe dementia. The daughter Ric ([phone]) mentioned that the patient was complaining of feeling dizzy in the morning (unclear if it's room spinning sensation vs lightheadedness) and was unsteady when the family attempted to make him walk. They thought initially it's related to the low temperature in his room so they adjusted that but he kept feeling dizzy so they brought him to ED. The daughter helps the patient with ADLs na managing his bills. She reports he still drives sometime although family have expressed concerns about his safety but he insists to drive. The patient denies fever, chest pain, SOB, headache, vision changes, weakness, nausea, vomiting, tremors, abdominal pain, constipation, dysuria, hematuria, lower extremity swelling or rash.

## 2023-07-17 NOTE — PHYSICAL THERAPY INITIAL EVALUATION ADULT - NSACTIVITYREC_GEN_A_PT
Cont with PT including transfer training, gait training, and stairs training in order to return home safely.

## 2023-07-17 NOTE — PROGRESS NOTE ADULT - SUBJECTIVE AND OBJECTIVE BOX
S: no new events/complaints      All other pertinent ROS negative.      07-16-23 @ 07:01  -  07-17-23 @ 07:00  --------------------------------------------------------  IN: 0 mL / OUT: 1 mL / NET: -1 mL      Vital Signs Last 24 Hrs  T(C): 36.1 (17 Jul 2023 13:53), Max: 37 (16 Jul 2023 21:16)  T(F): 97 (17 Jul 2023 13:53), Max: 98.6 (16 Jul 2023 21:16)  HR: 63 (17 Jul 2023 13:53) (60 - 74)  BP: 111/62 (17 Jul 2023 13:53) (111/62 - 138/73)  BP(mean): --  RR: 18 (17 Jul 2023 13:53) (18 - 18)  SpO2: 96% (17 Jul 2023 05:05) (96% - 96%)      PHYSICAL EXAM:    Constitutional: NAD, awake and alert  HEENT: PERR, EOMI, Normal Hearing, MMM  Neck: Soft and supple, No LAD, No JVD  Respiratory: Breath sounds are clear bilaterally, No wheezing, rales or rhonchi. mild wheeze bibasally.  Cardiovascular: S1 and S2, regular rate and rhythm, no Murmurs, gallops or rubs  Gastrointestinal: Bowel Sounds present, soft, nontender, nondistended, no guarding, no rebound  Extremities: No peripheral edema      MEDICATIONS:  MEDICATIONS  (STANDING):  heparin   Injectable 5000 Unit(s) SubCutaneous every 12 hours  iron sucrose IVPB 200 milliGRAM(s) IV Intermittent every 24 hours  memantine 5 milliGRAM(s) Oral two times a day  pantoprazole    Tablet 40 milliGRAM(s) Oral two times a day  sodium chloride 0.45%. 1000 milliLiter(s) (60 mL/Hr) IV Continuous <Continuous>      LABS: All Labs Reviewed:                        8.9    6.09  )-----------( 202      ( 17 Jul 2023 09:33 )             32.8     07-17    143  |  108  |  17  ----------------------------<  201<H>  3.7   |  22  |  1.0    Ca    8.8      17 Jul 2023 09:33  Mg     1.8     07-17    TPro  6.5  /  Alb  3.7  /  TBili  0.4  /  DBili  x   /  AST  30  /  ALT  12  /  AlkPhos  116<H>  07-17          Blood Culture:     Radiology: reviewed

## 2023-07-17 NOTE — PHYSICAL THERAPY INITIAL EVALUATION ADULT - GAIT TRAINING, PT EVAL
Pt. will amb at least 200 ft using a rolling walker with Sup by D/C. Pt. will ascend and descend at least 10 steps using 1 HR with Sup by D/C

## 2023-07-17 NOTE — PHYSICAL THERAPY INITIAL EVALUATION ADULT - IMPAIRMENTS CONTRIBUTING TO GAIT DEVIATIONS, PT EVAL
Pt. expressed wanting to ambulate further but ws unable to due to muscle weakness and "heaviness" in his BLE's./cognition/decreased strength

## 2023-07-17 NOTE — PROGRESS NOTE ADULT - ASSESSMENT
A 73 year old male with a history of Dementia, HTN, HLD, Depression, Esophageal Cancer s/p resection and gastric pull,, Lung Ca s/p lobectomy, Prostate Cancer s/p Cyberknife presented to the ED with the complaint of dizziness, found to be anemia s/p 1 u pRBC now admitted for workup of his anemia.    #Dizziness due to Symptomatic iron deficiency anemia no bleeding noted s/p 1 PRBC resolving   * EGD 7/2021: Ulcers in the lower third of the esophagus, Erythema in the stomach compatible with non-erosive gastritis, Normal mucosa in the whole examined duodenum.   - ANGIE negative            - TSH 3.2 (7/14)  - hgb 9.3 (7/16) -> 8.9 (7/17)  - s/p 1 pRBC  - c/w Venofer 200mg IV daily d:1  - c/w protonix 40 mg bid  - GI planning for EGD and colonoscopy tentatively this week but patient refusing any endoscopic treatment at this time    # Dizziness/ Dementia/Alzheimers  - a/w off balance while ambulating  - CTH, CTA head and neck: no acute findings:  - Patient was seen by neurology and recommended if persistent dizziness after anemia correction then might consider MR brain non contrast  - all precautions  - PT consult:pending  - c/w home Memantine 5MG po bid    # Hypernatremia (resolved)   - Na 148 (7/16) -> 143 (7/17)  - s/p 0.45% NS 60 cc/hr  X 24 hrs    # Pulmonary nodule on imaging  - Right upper lobe pulmonary nodule measuring 5 mm, new since CTA chest 6/19/2021.  - OP f/u    # H/O Hypertension/H/O HLD  - BP controlled, not on any meds at home  - Lipid profile WNL  - A1c 5.7%    # H/O Anxiety/Depression  - Not on any home meds    #  H/O Prostate Ca  - Not on any home meds    #MISC:  -GI ppx: protonix   DVT ppx: heparin sq  -Diet: DASH/TLC  -Activity: Ambulate with assitance/fall precautions  -Code status: Full code  -Dispo: Pending possible inpatient EGD and colonoscopy

## 2023-07-17 NOTE — PHYSICAL THERAPY INITIAL EVALUATION ADULT - GENERAL OBSERVATIONS, REHAB EVAL
Pt. encountered alert and NAD, (+)1:1 at bedside, agreeable to PT IE. Pt. left as found, supine in bed (+)alarms (+)call bell in reach, NAD

## 2023-07-17 NOTE — PROGRESS NOTE ADULT - ASSESSMENT
A 73 year old male with a history of Dementia, HTN, HLD, Depression, Esophageal Cancer s/p resection and gastric pull,, Lung Ca s/p lobectomy, Prostate Cancer s/p Cyberknife presented to the ED with the complaint of dizziness. Dizziness started in the morning likely around 8:30am described as lightheadedness and off balance causing him to feel unstable ambulating. History is limited as the patient has baseline mod/severe dementia. The daughter Ric ([phone]) mentioned that the patient was complaining of feeling dizzy in the morning (unclear if it's room spinning sensation vs lightheadedness) and was unsteady when the family attempted to make him walk.     1.Dizziness due to Symptomatic iron deficiency anemia no bleeding noted s/p 1 PRBC resolving   * EGD 7/21: Ulcers in the lower third of the esophagus, Erythema in the stomach compatible with non-erosive gastritis, Normal mucosa inthe whole examined duodenum.   - Admit to medicine                        - ANGIE: negative          - TSH: 3.12 normal.   - Received 1 PRBC. Venofer 200mg IV daily d:1   - Cont PPI po bid   - GI consult appreciated  * Considering inpatient upper and lower endo as it's hard to arrange it as outpatient giving his dementia   plan for coming week.   F/U GI ON MONDAY ON DATE FOR SCOPES. prep accordingly.    2. Dizziness/ Dementia/Alzheimers  -a/w off balance while ambulating  -CTH, CTA head and neck: no acute findings:  -Patient was seen by neurology and recommended if persistent dizziness after anemia correction then might consider MR brain non contrast  -fall precautions  -PT consult:pending (patient was somnolent. recall PT)  - Cont Memantine 5MG po bid    3. Pulmonary nodule on imaging:  -Right upper lobe pulmonary nodule measuring 5 mm, new since CTA chest 6/19/2021.  -OP f/u    4. H/O Hypertension/H/O HLD  - BP controlled, not on any meds at home   -Lipid profile and A1c in AM    5. H/O Anxiety/Depression  - No documentation  - Not on any home meds    6.  H/O Prostate Ca  - was on Flomax in the past    #HYpernatremia: Na 138 > 148 (7/16) > 143. D/sherri 1/2NS @ 60 . encourage po intake.     #MISC:  -GI ppx: PPI   DVT ppx: heparin sq  -Diet: DASH/TLC  -Activity: Ambulate with assitance/fall precautions  -Code status: Full code  -Dispo: Acute, Trend CBC    HANDOFF: f/u GI on date for scopes.

## 2023-07-17 NOTE — PROGRESS NOTE ADULT - SUBJECTIVE AND OBJECTIVE BOX
Gastroenterology progress note:     Patient is a 73y old  Male who presents with a chief complaint of Dizziness (16 Jul 2023 10:32)       Admitted on: 07-13-23    We are following the patient for: anemia       Interval History: no overt bleeding , patient had a 1:1 sit. patient mentions he does not want any endoscopic interventions at this time    No acute events overnight.       PAST MEDICAL & SURGICAL HISTORY:  Hypertension, unspecified type      Cancer  ESOPHAGEAL CANCER 2017 RECEIVED CHEMO AND RADIATION and endoscopic resection partial oesophagus and stomach      Renal calculi  nephrostomy tube -6/2021      Cancer  left lung 2018, no chemo or rad rx and resection      Abnormal cholesterol test      Prostate cancer      [place] (hard of hearing)      Alzheimer disease      Cancer of esophagus  2017 RESECTION OF LOWER PORTION OF ESOPHAGUS      S/P lobectomy of lung  left ?JUNE 2018      S/P cystoscopy with ureteral stent placement  LEFT      Inguinal hernia  AS AN INFANT, RIGHT SIDE      History of esophagogastroduodenoscopy (EGD)  2019      H/O colonoscopy  2018      Prostate cancer  cyber knife intervention 2021          MEDICATIONS  (STANDING):  heparin   Injectable 5000 Unit(s) SubCutaneous every 12 hours  iron sucrose IVPB 200 milliGRAM(s) IV Intermittent every 24 hours  memantine 5 milliGRAM(s) Oral two times a day  pantoprazole    Tablet 40 milliGRAM(s) Oral two times a day  sodium chloride 0.45%. 1000 milliLiter(s) (60 mL/Hr) IV Continuous <Continuous>    MEDICATIONS  (PRN):  acetaminophen     Tablet .. 650 milliGRAM(s) Oral every 6 hours PRN Temp greater or equal to 38C (100.4F), Mild Pain (1 - 3)  aluminum hydroxide/magnesium hydroxide/simethicone Suspension 30 milliLiter(s) Oral every 4 hours PRN Dyspepsia  melatonin 3 milliGRAM(s) Oral at bedtime PRN Insomnia  ondansetron Injectable 4 milliGRAM(s) IV Push every 8 hours PRN Nausea and/or Vomiting      Allergies  No Known Allergies      Review of Systems:   Cardiovascular:  No Chest Pain, No Palpitations  Respiratory:  No Cough, No Dyspnea  Gastrointestinal:  As described in HPI  Skin:  No Skin Lesions, No Jaundice  Neuro:  No Syncope, No Dizziness    Physical Examination:  T(C): 36.1 (07-17-23 @ 13:53), Max: 37 (07-16-23 @ 21:16)  HR: 63 (07-17-23 @ 13:53) (60 - 74)  BP: 111/62 (07-17-23 @ 13:53) (111/62 - 138/73)  RR: 18 (07-17-23 @ 13:53) (18 - 18)  SpO2: 96% (07-17-23 @ 05:05) (96% - 96%)      07-16-23 @ 07:01  -  07-17-23 @ 07:00  --------------------------------------------------------  IN: 0 mL / OUT: 1 mL / NET: -1 mL        GENERAL: AAOx2, no acute distress.  HEAD:  Atraumatic, Normocephalic  EYES: conjunctiva and sclera clear  NECK: Supple, no JVD or thyromegaly  CHEST/LUNG: Clear to auscultation bilaterally; No wheeze, rhonchi, or rales  HEART: Regular rate and rhythm; normal S1, S2, No murmurs.  ABDOMEN: Soft, nontender, nondistended; Bowel sounds present  NEUROLOGY: No asterixis or tremor.   SKIN: Intact, no jaundice     Data:                        8.9    6.09  )-----------( 202      ( 17 Jul 2023 09:33 )             32.8     Hgb trend:  8.9  07-17-23 @ 09:33  9.3  07-16-23 @ 09:18  8.5  07-15-23 @ 08:52        07-17    143  |  108  |  17  ----------------------------<  201<H>  3.7   |  22  |  1.0    Ca    8.8      17 Jul 2023 09:33  Mg     1.8     07-17    TPro  6.5  /  Alb  3.7  /  TBili  0.4  /  DBili  x   /  AST  30  /  ALT  12  /  AlkPhos  116<H>  07-17    Liver panel trend:  TBili 0.4   /   AST 30   /   ALT 12   /   AlkP 116   /   Tptn 6.5   /   Alb 3.7    /   DBili --      07-17  TBili 0.4   /   AST 35   /   ALT 11   /   AlkP 115   /   Tptn 6.5   /   Alb 3.5    /   DBili --      07-16  TBili 0.7   /   AST 32   /   ALT 11   /   AlkP 113   /   Tptn 6.0   /   Alb 3.5    /   DBili --      07-15  TBili 1.2   /   AST 20   /   ALT 7   /   AlkP 110   /   Tptn 6.2   /   Alb 3.5    /   DBili --      07-14  TBili 0.4   /   AST 28   /   ALT 9   /   AlkP 113   /   Tptn 6.7   /   Alb 3.7    /   DBili --      07-13         Radiology:

## 2023-07-17 NOTE — PROGRESS NOTE ADULT - ASSESSMENT
74 y/o M with PMHx of HTN, HLD, Dementia, Depression, Esophageal Cancer s/p resection and gastric pull,, Lung Ca s/p lobectomy, Prostate Cancer s/p Cyberknife presented to the ED with the complaint of dizziness that began in the morning of presentation described as lightheadedness and off balance causing him to feel unstable ambulating. Admitted for acute on chronic anemia. GI consulted for anemia.    #Acute on Chronic symptomatic Microcytic Anemia s/p 1u pRBC  - hemodynamically stable, no overt signs of bleeding  - Hgb 7.3, MCV 66.5 on admission, Hb today: 8.9  - baseline Hgb ~12  - EGD 7/6/21 for odynophagia: Ulcers in the lower third of the esophagus, Erythema in the stomach compatible with non-erosive gastritis, Normal mucosa in the whole examined duodenum. (bx negative for H pylori, ulcer bx : focal active esophagitis, fibropurulent exudate)  - Iron studies (7/13/23): Iron 20, TIBC 409, % sat 5, Ferritin 11 c/w ELBERT  - s/p IV venofer  - on Protonix 40mg PO q24hrs    Plan:  - plan for EGD, colonoscopy tentatively this week. Patient at this time refuses any endoscopic intervention, reached out to family, unsuccessful  - monitor H/H  - c.w PPI   - keep active T&S  - transfuse as needed, keep Hgb >7

## 2023-07-18 LAB
ANION GAP SERPL CALC-SCNC: 9 MMOL/L — SIGNIFICANT CHANGE UP (ref 7–14)
BUN SERPL-MCNC: 11 MG/DL — SIGNIFICANT CHANGE UP (ref 10–20)
CALCIUM SERPL-MCNC: 8.6 MG/DL — SIGNIFICANT CHANGE UP (ref 8.4–10.4)
CHLORIDE SERPL-SCNC: 106 MMOL/L — SIGNIFICANT CHANGE UP (ref 98–110)
CO2 SERPL-SCNC: 25 MMOL/L — SIGNIFICANT CHANGE UP (ref 17–32)
CREAT SERPL-MCNC: 0.8 MG/DL — SIGNIFICANT CHANGE UP (ref 0.7–1.5)
EGFR: 93 ML/MIN/1.73M2 — SIGNIFICANT CHANGE UP
GLUCOSE SERPL-MCNC: 89 MG/DL — SIGNIFICANT CHANGE UP (ref 70–99)
HCT VFR BLD CALC: 33.6 % — LOW (ref 42–52)
HGB BLD-MCNC: 9.3 G/DL — LOW (ref 14–18)
MAGNESIUM SERPL-MCNC: 1.7 MG/DL — LOW (ref 1.8–2.4)
MCHC RBC-ENTMCNC: 19.8 PG — LOW (ref 27–31)
MCHC RBC-ENTMCNC: 27.7 G/DL — LOW (ref 32–37)
MCV RBC AUTO: 71.5 FL — LOW (ref 80–94)
NRBC # BLD: 0 /100 WBCS — SIGNIFICANT CHANGE UP (ref 0–0)
PLATELET # BLD AUTO: 147 K/UL — SIGNIFICANT CHANGE UP (ref 130–400)
PMV BLD: SIGNIFICANT CHANGE UP (ref 7.4–10.4)
POTASSIUM SERPL-MCNC: 4.1 MMOL/L — SIGNIFICANT CHANGE UP (ref 3.5–5)
POTASSIUM SERPL-SCNC: 4.1 MMOL/L — SIGNIFICANT CHANGE UP (ref 3.5–5)
RBC # BLD: 4.7 M/UL — SIGNIFICANT CHANGE UP (ref 4.7–6.1)
RBC # FLD: 24.4 % — HIGH (ref 11.5–14.5)
SARS-COV-2 RNA SPEC QL NAA+PROBE: DETECTED
SODIUM SERPL-SCNC: 140 MMOL/L — SIGNIFICANT CHANGE UP (ref 135–146)
WBC # BLD: 5.4 K/UL — SIGNIFICANT CHANGE UP (ref 4.8–10.8)
WBC # FLD AUTO: 5.4 K/UL — SIGNIFICANT CHANGE UP (ref 4.8–10.8)

## 2023-07-18 PROCEDURE — 99232 SBSQ HOSP IP/OBS MODERATE 35: CPT

## 2023-07-18 PROCEDURE — 71045 X-RAY EXAM CHEST 1 VIEW: CPT | Mod: 26

## 2023-07-18 RX ORDER — MAGNESIUM SULFATE 500 MG/ML
2 VIAL (ML) INJECTION ONCE
Refills: 0 | Status: COMPLETED | OUTPATIENT
Start: 2023-07-18 | End: 2023-07-18

## 2023-07-18 RX ADMIN — MEMANTINE HYDROCHLORIDE 5 MILLIGRAM(S): 10 TABLET ORAL at 17:01

## 2023-07-18 RX ADMIN — HEPARIN SODIUM 5000 UNIT(S): 5000 INJECTION INTRAVENOUS; SUBCUTANEOUS at 17:01

## 2023-07-18 RX ADMIN — MEMANTINE HYDROCHLORIDE 5 MILLIGRAM(S): 10 TABLET ORAL at 06:26

## 2023-07-18 RX ADMIN — Medication 650 MILLIGRAM(S): at 17:51

## 2023-07-18 RX ADMIN — PANTOPRAZOLE SODIUM 40 MILLIGRAM(S): 20 TABLET, DELAYED RELEASE ORAL at 17:01

## 2023-07-18 RX ADMIN — IRON SUCROSE 110 MILLIGRAM(S): 20 INJECTION, SOLUTION INTRAVENOUS at 06:26

## 2023-07-18 RX ADMIN — Medication 25 GRAM(S): at 11:04

## 2023-07-18 RX ADMIN — HEPARIN SODIUM 5000 UNIT(S): 5000 INJECTION INTRAVENOUS; SUBCUTANEOUS at 06:26

## 2023-07-18 RX ADMIN — PANTOPRAZOLE SODIUM 40 MILLIGRAM(S): 20 TABLET, DELAYED RELEASE ORAL at 06:26

## 2023-07-18 RX ADMIN — Medication 650 MILLIGRAM(S): at 19:00

## 2023-07-18 NOTE — PROGRESS NOTE ADULT - ASSESSMENT
A 73 year old male with a history of Dementia, HTN, HLD, Depression, Esophageal Cancer s/p resection and gastric pull,, Lung Ca s/p lobectomy, Prostate Cancer s/p Cyberknife presented to the ED with the complaint of dizziness, found to be anemia s/p 1 u pRBC now admitted for workup of his anemia.    #Dizziness due to Symptomatic iron deficiency anemia no bleeding noted s/p 1 PRBC resolving   * EGD 7/2021: Ulcers in the lower third of the esophagus, Erythema in the stomach compatible with non-erosive gastritis, Normal mucosa in the whole examined duodenum.   - ANGIE negative            - TSH 3.2 (7/14)  - hgb 9.3 (7/16) -> 8.9 (7/17)  - s/p 1 pRBC  - c/w Venofer 200mg IV daily d:1  - c/w protonix 40 mg bid  - GI planning for EGD and colonoscopy tentatively this week but patient refusing any endoscopic treatment at this time    # Dizziness/ Dementia/Alzheimers  - a/w off balance while ambulating  - CTH, CTA head and neck: no acute findings:  - Patient was seen by neurology and recommended if persistent dizziness after anemia correction then might consider MR brain non contrast  - all precautions  - PT consult:pending  - c/w home Memantine 5MG po bid    # Hypernatremia (resolved)   - Na 148 (7/16) -> 143 (7/17)  - s/p 0.45% NS 60 cc/hr  X 24 hrs    # Pulmonary nodule on imaging  - Right upper lobe pulmonary nodule measuring 5 mm, new since CTA chest 6/19/2021.  - OP f/u    # H/O Hypertension/H/O HLD  - BP controlled, not on any meds at home  - Lipid profile WNL  - A1c 5.7%    # H/O Anxiety/Depression  - Not on any home meds    #  H/O Prostate Ca  - Not on any home meds    #MISC:  -GI ppx: protonix   DVT ppx: heparin sq  -Diet: DASH/TLC  -Activity: Ambulate with assitance/fall precautions  -Code status: Full code  -Dispo: Pending possible inpatient EGD and colonoscopy    A 73 year old male with a history of Dementia, HTN, HLD, Depression, Esophageal Cancer s/p resection and gastric pull,, Lung Ca s/p lobectomy, Prostate Cancer s/p Cyberknife presented to the ED with the complaint of dizziness, found to be anemia s/p 1 u pRBC now admitted for workup of his anemia.    #Acutely worse mental status  #Possible brewing infection vs worsened dementia?  Electrolytes are stable, patient is afebrile, WBC normal, exam stable other than worsening mental status    #Dizziness due to Symptomatic iron deficiency anemia no bleeding noted s/p 1 PRBC resolving   * EGD 7/2021: Ulcers in the lower third of the esophagus, Erythema in the stomach compatible with non-erosive gastritis, Normal mucosa in the whole examined duodenum.   - ANGIE negative            - TSH 3.2 (7/14)  - hgb 8.9 (7/17) -> 9.3 (7/18)  - s/p 1 pRBC  - c/w Venofer 200mg IV daily d:1  - c/w protonix 40 mg bid  - GI planning for EGD and colonoscopy tentatively this week, patient's daughter is agreeable with it    # Dizziness/ Dementia/Alzheimers  - a/w off balance while ambulating  - CTH, CTA head and neck: no acute findings:  - Patient was seen by neurology and recommended if persistent dizziness after anemia correction then might consider MR brain non contrast  - all precautions  - PT consult:pending  - c/w home Memantine 5MG po bid    # Hypernatremia (resolved)   - Na 143 (7/17) -> 140 (7/18)  - s/p 0.45% NS 60 cc/hr  X 24 hrs    # Pulmonary nodule on imaging  - Right upper lobe pulmonary nodule measuring 5 mm, new since CTA chest 6/19/2021.  - OP f/u    # H/O Hypertension/H/O HLD  - BP controlled, not on any meds at home  - Lipid profile WNL  - A1c 5.7%    # H/O Anxiety/Depression  - Not on any home meds    #  H/O Prostate Ca  - Not on any home meds    #MISC:  -GI ppx: protonix   DVT ppx: heparin sq  -Diet: DASH/TLC  -Activity: Ambulate with assitance/fall precautions  -Code status: Full code  -Dispo: Pending inpatient EGD and colonoscopy

## 2023-07-18 NOTE — PROGRESS NOTE ADULT - SUBJECTIVE AND OBJECTIVE BOX
----------Daily Progress Note----------    HISTORY OF PRESENT ILLNESS:  A 73 year old male with a history of Dementia, HTN, HLD, Depression, Esophageal Cancer s/p resection and gastric pull,, Lung Ca s/p lobectomy, Prostate Cancer s/p Cyberknife presented to the ED with the complaint of dizziness, found to be anemia s/p 1 u pRBC now admitted for workup of his anemia. His dizziness and anemia has currently resolved.    Today is hospital day 5d.       ED course:  A 73 year old male with a history of Dementia, HTN, HLD, Depression, Esophageal Cancer s/p resection and gastric pull,, Lung Ca s/p lobectomy, Prostate Cancer s/p Cyberknife presented to the ED with the complaint of dizziness. Dizziness started in the morning likely around 8:30am described as lightheadedness and off balance causing him to feel unstable ambulating. History is limited as the patient has baseline mod/severe dementia. The daughter Ric ([phone]) mentioned that the patient was complaining of feeling dizzy in the morning (unclear if it's room spinning sensation vs lightheadedness) and was unsteady when the family attempted to make him walk. They thought initially it's related to the low temperature in his room so they adjusted that but he kept feeling dizzy so they brought him to ED. The daughter helps the patient with ADLs na managing his bills. She reports he still drives sometime although family have expressed concerns about his safety but he insists to drive. The patient denies fever, chest pain, SOB, headache, vision changes, weakness, nausea, vomiting, tremors, abdominal pain, constipation, dysuria, hematuria, lower extremity swelling or rash.    #ED vitals:  T(C): --  HR: 62 (07-13-23 @ 11:55) (62 - 62)  BP: 136/75 (07-13-23 @ 11:55) (136/75 - 136/75)  RR: 16 (07-13-23 @ 11:55) (16 - 16)  SpO2: 100% (07-13-23 @ 11:55) (100% - 100%)    #Labs significant for:  Hb 7.3(was 11.4 in 8/22, baseline of 12-14), MCV 6, Serum Iron 20(low), Unsaturated (high), % saturation 5(low), Pro-    #Imaging:  *CT Angio Head/Neck w/ IV Cont  1.  No evidence of major vascular stenosis or occlusion.  2.  Right upper lobe pulmonary nodule measuring 5 mm, new since CTA chest 6/19/2021. Recommend a dedicated chest CT on an outpatient/elective basis.    *CT Head No Cont  No acute intracranial pathology. No evidence of midline shift, mass effect or intracranial hemorrhage.  Mild chronic microvascular type changes.    #S/P: Meclizine 12.5mg PO x 1, and PRBC 1 Unit in the ED.    *Patient seen by neuro team for dizziness in the ED.    Patient is being admitted to Medicine for further management.    INTERVAL HOSPITAL COURSE / OVERNIGHT EVENTS:  O/N events:  None    24 hr event:  None      Patient was examined and seen at bedside. This morning he is resting comfortably in bed and reports no new issues or overnight events.    Review of Systems: Otherwise unremarkable     <<<<<PAST MEDICAL & SURGICAL HISTORY>>>>>  Hypertension, unspecified type    Cancer  ESOPHAGEAL CANCER 2017 RECEIVED CHEMO AND RADIATION and endoscopic resection partial oesophagus and stomach    Renal calculi  nephrostomy tube -6/2021    Cancer  left lung 2018, no chemo or rad rx and resection    Abnormal cholesterol test    Prostate cancer    [place] (hard of hearing)    Alzheimer disease    Cancer of esophagus  2017 RESECTION OF LOWER PORTION OF ESOPHAGUS    S/P lobectomy of lung  left ?JUNE 2018    S/P cystoscopy with ureteral stent placement  LEFT    Inguinal hernia  AS AN INFANT, RIGHT SIDE    History of esophagogastroduodenoscopy (EGD)  2019    H/O colonoscopy  2018    Prostate cancer  cyber knife intervention 2021      ALLERGIES  No Known Allergies      Home Medications:  memantine 5 mg oral tablet: 1 tab(s) orally 2 times a day (13 Jul 2023 21:47)        MEDICATIONS  STANDING MEDICATIONS  heparin   Injectable 5000 Unit(s) SubCutaneous every 12 hours  iron sucrose IVPB 200 milliGRAM(s) IV Intermittent every 24 hours  memantine 5 milliGRAM(s) Oral two times a day  pantoprazole    Tablet 40 milliGRAM(s) Oral two times a day  sodium chloride 0.45%. 1000 milliLiter(s) IV Continuous <Continuous>    PRN MEDICATIONS  acetaminophen     Tablet .. 650 milliGRAM(s) Oral every 6 hours PRN  aluminum hydroxide/magnesium hydroxide/simethicone Suspension 30 milliLiter(s) Oral every 4 hours PRN  melatonin 3 milliGRAM(s) Oral at bedtime PRN  ondansetron Injectable 4 milliGRAM(s) IV Push every 8 hours PRN    VITALS:  T(F): 97.7  HR: 76  BP: 127/73  RR: 18  SpO2: 98%    <<<<<PHYSICAL EXAM>>>>>  GENERAL: Cachetic appearing elderly man resting comfortably in bed, in no apparent distress  HEENT: Normocephalic, atraumatic  PULMONARY: Clear to auscultation bilaterally. No rales, rhonchi, or wheezing.  CARDIOVASCULAR: RRR, no M/R/G, S1-S2  GASTROINTESTINAL: Soft, non-tender, non-distended, no guarding.  SKIN/EXTREMITIES: Warm, 2+ dorsalis pedis pulses, no M/R/G    <<<<<LABS>>>>>                        8.9    6.09  )-----------( 202      ( 17 Jul 2023 09:33 )             32.8     07-17    143  |  108  |  17  ----------------------------<  201<H>  3.7   |  22  |  1.0    Ca    8.8      17 Jul 2023 09:33  Mg     1.8     07-17    TPro  6.5  /  Alb  3.7  /  TBili  0.4  /  DBili  x   /  AST  30  /  ALT  12  /  AlkPhos  116<H>  07-17      Urinalysis Basic - ( 17 Jul 2023 09:33 )    Color: x / Appearance: x / SG: x / pH: x  Gluc: 201 mg/dL / Ketone: x  / Bili: x / Urobili: x   Blood: x / Protein: x / Nitrite: x   Leuk Esterase: x / RBC: x / WBC x   Sq Epi: x / Non Sq Epi: x / Bacteria: x          [id number]        <<<<<RADIOLOGY>>>>>  ACC: [id number] EXAM:  CT ANGIO NECK (W)AW IC   ORDERED BY: TREY DIXON     ACC: [id number] EXAM:  CT ANGIO BRAIN (W)AW IC   ORDERED BY: TREY DIXON     PROCEDURE DATE:  07/13/2023      INTERPRETATION:  Clinical History / Reason for exam: Dizziness    Technique: CT angiogram of the head and neck. Contiguous CT axial images   of the head and neck were obtained following the intravenous   administration of 100 cc Omnipaque 350 (0 cc discarded) with coronal,   sagittal and multiple 3-D MIP andvolume rendered reformats.    Comparison: None. CTA chest and CT neck dated 6/19/2021 are reviewed.    Findings:    NECK:  The visualized aortic arch and great vessel origins are patent. There is   a direct origin of the left vertebral artery from the aortic arch, normal   variant.    The common, internal and external carotid arteries are patent.    The vertebral arteries are patent. The right vertebral artery is dominant.    HEAD:  The distal internal carotid arteries are patent. The anterior and middle   cerebral arteries are patent. There is a hypoplastic A1 segment of the   right [place], normal variant.    The distal vertebral arteries are patent.  The basilar artery is patent   though diffusely diminutive in caliber felt to reflect a dominant   anterior circulation. The posterior cerebral arteries are patent.    OTHER:  Retained secretions are seen within the esophagus.  There is emphysema with bullous changes of the lung apices.  There is a right upper lobe a nodule measuring 5 mm on series 4 image 8,   new since the CTA chest dated 6/19/2021.    IMPRESSION:    1.  No evidence of major vascular stenosis or occlusion.    2.  Right upper lobe pulmonary nodule measuring 5 mm, new since CTA chest   6/19/2021. Recommend a dedicated chest CT on an outpatient/elective basis.    --- End of Report ---    EL AMARAL MD; Attending Radiologist  This document has been electronically signed. Jul 13 2023  4:26PM            ACC: [id number] EXAM:  CT ANGIO NECK (W)AW IC   ORDERED BY: TREY DIXON     ACC: [id number] EXAM:  CT ANGIO BRAIN (W)AW IC   ORDERED BY: TREY DIXON     PROCEDURE DATE:  07/13/2023      INTERPRETATION:  Clinical History / Reason for exam: Dizziness    Technique: CT angiogram of the head and neck. Contiguous CT axial images   of the head and neck were obtained following the intravenous   administration of 100 cc Omnipaque 350 (0 cc discarded) with coronal,   sagittal and multiple 3-D MIP andvolume rendered reformats.    Comparison: None. CTA chest and CT neck dated 6/19/2021 are reviewed.    Findings:    NECK:  The visualized aortic arch and great vessel origins are patent. There is   a direct origin of the left vertebral artery from the aortic arch, normal   variant.    The common, internal and external carotid arteries are patent.    The vertebral arteries are patent. The right vertebral artery is dominant.    HEAD:  The distal internal carotid arteries are patent. The anterior and middle   cerebral arteries are patent. There is a hypoplastic A1 segment of the   right [place], normal variant.    The distal vertebral arteries are patent.  The basilar artery is patent   though diffusely diminutive in caliber felt to reflect a dominant   anterior circulation. The posterior cerebral arteries are patent.    OTHER:  Retained secretions are seen within the esophagus.  There is emphysema with bullous changes of the lung apices.  There is a right upper lobe a nodule measuring 5 mm on series 4 image 8,   new since the CTA chest dated 6/19/2021.    IMPRESSION:    1.  No evidence of major vascular stenosis or occlusion.    2.  Right upper lobe pulmonary nodule measuring 5 mm, new since CTA chest   6/19/2021. Recommend a dedicated chest CT on an outpatient/elective basis.    --- End of Report ---    EL AMARAL MD; Attending Radiologist  This document has been electronically signed. Jul 13 2023  4:26PM          ACC: [id number] EXAM:  CT BRAIN   ORDERED BY: TREY DIXON     PROCEDURE DATE:  07/13/2023      INTERPRETATION:  CLINICAL INDICATION: Dizziness.    TECHNIQUE: CT of the head was performed without the administration of   intravenous contrast.    COMPARISON: None.    FINDINGS:    There is prominence of the sulci, sylvian fissures, and ventricles likely   reflecting mild parenchymal volume loss.    There are scattered patchy low attenuations in bilateral periventricular   cerebral white matter consistent with mild chronic microvascular ischemic   changes.    There is no evidence of acute territorial infarct or intracranial   hemorrhage. There is no hydrocephalus, midline shift, mass effect.    . There are no extra-axial fluid collections.    The visualized intraorbital contents are normal. The imaged portions of   the paranasal sinuses are aerated. The mastoid air cells are aerated. The   visualized soft tissues and osseous structures appear normal.      IMPRESSION:    No acute intracranial pathology. No evidence of midline shift, mass   effect or intracranial hemorrhage.    Mild chronic microvascular type changes.    --- End of Report ---    KIM BAHENA MD; Resident Radiologist  This document has been electronically signed.  HERMILA PARADA MD; Attending Radiologist  This document has been electronically signed. Jul 13 2023  4:31PM          ACC: [id number] EXAM:  XR CHEST PORTABLE URGENT 1V   ORDERED BY: TREY DIXON     PROCEDURE DATE:  07/13/2023      INTERPRETATION:  Clinical History / Reason for exam: Shortness of breath    Comparison : Chest radiograph July 3, 2021.    Technique/Positioning: Single AP view of the chest.    Findings:    Support devices: None.    Cardiac/mediastinum/hilum: Magnified cardiac silhouette. Ectatic thoracic   aorta.    Lung parenchyma/Pleura: Chronic left basilar opacity/atelectasis. No   pneumothorax. Interstitial changes bilaterally.    Skeleton/soft tissues: No acute osseous abnormality.    Impression:    Chronic left basilar opacity/atelectasis.    Interstitial changes bilaterally.    --- End of Report ---    ELLIOT LANDAU MD; Attending Radiologist  This document has been electronically signed. Jul 14 2023  8:58AM      ----------------------------------------------------------------------------------------------------------------------------------------------------------------------------------------------- ----------Daily Progress Note----------    HISTORY OF PRESENT ILLNESS:  A 73 year old male with a history of Dementia, HTN, HLD, Depression, Esophageal Cancer s/p resection and gastric pull,, Lung Ca s/p lobectomy, Prostate Cancer s/p Cyberknife presented to the ED with the complaint of dizziness, found to be anemia s/p 1 u pRBC now admitted for workup of his anemia. His dizziness and anemia has currently resolved.    Today is hospital day 5d.     ED course:  A 73 year old male with a history of Dementia, HTN, HLD, Depression, Esophageal Cancer s/p resection and gastric pull,, Lung Ca s/p lobectomy, Prostate Cancer s/p Cyberknife presented to the ED with the complaint of dizziness. Dizziness started in the morning likely around 8:30am described as lightheadedness and off balance causing him to feel unstable ambulating. History is limited as the patient has baseline mod/severe dementia. The daughter Ric ([phone]) mentioned that the patient was complaining of feeling dizzy in the morning (unclear if it's room spinning sensation vs lightheadedness) and was unsteady when the family attempted to make him walk. They thought initially it's related to the low temperature in his room so they adjusted that but he kept feeling dizzy so they brought him to ED. The daughter helps the patient with ADLs na managing his bills. She reports he still drives sometime although family have expressed concerns about his safety but he insists to drive. The patient denies fever, chest pain, SOB, headache, vision changes, weakness, nausea, vomiting, tremors, abdominal pain, constipation, dysuria, hematuria, lower extremity swelling or rash.    #ED vitals:  T(C): --  HR: 62 (07-13-23 @ 11:55) (62 - 62)  BP: 136/75 (07-13-23 @ 11:55) (136/75 - 136/75)  RR: 16 (07-13-23 @ 11:55) (16 - 16)  SpO2: 100% (07-13-23 @ 11:55) (100% - 100%)    #Labs significant for:  Hb 7.3(was 11.4 in 8/22, baseline of 12-14), MCV 6, Serum Iron 20(low), Unsaturated (high), % saturation 5(low), Pro-    #Imaging:  *CT Angio Head/Neck w/ IV Cont  1.  No evidence of major vascular stenosis or occlusion.  2.  Right upper lobe pulmonary nodule measuring 5 mm, new since CTA chest 6/19/2021. Recommend a dedicated chest CT on an outpatient/elective basis.    *CT Head No Cont  No acute intracranial pathology. No evidence of midline shift, mass effect or intracranial hemorrhage.  Mild chronic microvascular type changes.    #S/P: Meclizine 12.5mg PO x 1, and PRBC 1 Unit in the ED.    *Patient seen by neuro team for dizziness in the ED.    Patient is being admitted to Medicine for further management.    INTERVAL HOSPITAL COURSE / OVERNIGHT EVENTS:  O/N events:  None    24 hr event:  None    Patient was examined and seen at bedside. He was resting comfortably at bedside this morning but appeared somewhat more confused this afternoon w/perseverative thought, speaking nonsensical sentences, and inability to follow directions. His daughter and son-in-law are concerned about his mental status today as they have never seen him like this before. They also reports that he appears to be shaking, which he normally does not do. They agreed with him getting endoscopy and colonoscopy while hospitalized.    Review of Systems: Otherwise unremarkable     <<<<<PAST MEDICAL & SURGICAL HISTORY>>>>>  Hypertension, unspecified type    Cancer  ESOPHAGEAL CANCER 2017 RECEIVED CHEMO AND RADIATION and endoscopic resection partial oesophagus and stomach    Renal calculi  nephrostomy tube -6/2021    Cancer  left lung 2018, no chemo or rad rx and resection    Abnormal cholesterol test    Prostate cancer    [place] (hard of hearing)    Alzheimer disease    Cancer of esophagus  2017 RESECTION OF LOWER PORTION OF ESOPHAGUS    S/P lobectomy of lung  left ?JUNE 2018    S/P cystoscopy with ureteral stent placement  LEFT    Inguinal hernia  AS AN INFANT, RIGHT SIDE    History of esophagogastroduodenoscopy (EGD)  2019    H/O colonoscopy  2018    Prostate cancer  cyber knife intervention 2021      ALLERGIES  No Known Allergies      Home Medications:  memantine 5 mg oral tablet: 1 tab(s) orally 2 times a day (13 Jul 2023 21:47)        MEDICATIONS  STANDING MEDICATIONS  heparin   Injectable 5000 Unit(s) SubCutaneous every 12 hours  iron sucrose IVPB 200 milliGRAM(s) IV Intermittent every 24 hours  memantine 5 milliGRAM(s) Oral two times a day  pantoprazole    Tablet 40 milliGRAM(s) Oral two times a day  sodium chloride 0.45%. 1000 milliLiter(s) IV Continuous <Continuous>    PRN MEDICATIONS  acetaminophen     Tablet .. 650 milliGRAM(s) Oral every 6 hours PRN  aluminum hydroxide/magnesium hydroxide/simethicone Suspension 30 milliLiter(s) Oral every 4 hours PRN  melatonin 3 milliGRAM(s) Oral at bedtime PRN  ondansetron Injectable 4 milliGRAM(s) IV Push every 8 hours PRN    VITALS:  T(F): 97.7  HR: 76  BP: 127/73  RR: 18  SpO2: 98%    <<<<<PHYSICAL EXAM>>>>>  GENERAL: Cachetic appearing elderly man resting comfortably in bed, in no apparent distress  HEENT: Normocephalic, atraumatic  PULMONARY: Clear to auscultation bilaterally. No rales, rhonchi, or wheezing.  CARDIOVASCULAR: RRR, no M/R/G, S1-S2  GASTROINTESTINAL: Soft, non-tender, non-distended, no guarding.  SKIN/EXTREMITIES: Warm, 2+ dorsalis pedis pulses, no M/R/G  NEUROLOGY: AxO1, unable to follow directions    <<<<<LABS>>>>>                        8.9    6.09  )-----------( 202      ( 17 Jul 2023 09:33 )             32.8     07-17    143  |  108  |  17  ----------------------------<  201<H>  3.7   |  22  |  1.0    Ca    8.8      17 Jul 2023 09:33  Mg     1.8     07-17    TPro  6.5  /  Alb  3.7  /  TBili  0.4  /  DBili  x   /  AST  30  /  ALT  12  /  AlkPhos  116<H>  07-17      Urinalysis Basic - ( 17 Jul 2023 09:33 )    Color: x / Appearance: x / SG: x / pH: x  Gluc: 201 mg/dL / Ketone: x  / Bili: x / Urobili: x   Blood: x / Protein: x / Nitrite: x   Leuk Esterase: x / RBC: x / WBC x   Sq Epi: x / Non Sq Epi: x / Bacteria: x          [id number]        <<<<<RADIOLOGY>>>>>  ACC: [id number] EXAM:  CT ANGIO NECK (W)AW IC   ORDERED BY: TREY DIXON     ACC: [id number] EXAM:  CT ANGIO BRAIN (W)AW IC   ORDERED BY: TREY DIXON     PROCEDURE DATE:  07/13/2023      INTERPRETATION:  Clinical History / Reason for exam: Dizziness    Technique: CT angiogram of the head and neck. Contiguous CT axial images   of the head and neck were obtained following the intravenous   administration of 100 cc Omnipaque 350 (0 cc discarded) with coronal,   sagittal and multiple 3-D MIP andvolume rendered reformats.    Comparison: None. CTA chest and CT neck dated 6/19/2021 are reviewed.    Findings:    NECK:  The visualized aortic arch and great vessel origins are patent. There is   a direct origin of the left vertebral artery from the aortic arch, normal   variant.    The common, internal and external carotid arteries are patent.    The vertebral arteries are patent. The right vertebral artery is dominant.    HEAD:  The distal internal carotid arteries are patent. The anterior and middle   cerebral arteries are patent. There is a hypoplastic A1 segment of the   right [place], normal variant.    The distal vertebral arteries are patent.  The basilar artery is patent   though diffusely diminutive in caliber felt to reflect a dominant   anterior circulation. The posterior cerebral arteries are patent.    OTHER:  Retained secretions are seen within the esophagus.  There is emphysema with bullous changes of the lung apices.  There is a right upper lobe a nodule measuring 5 mm on series 4 image 8,   new since the CTA chest dated 6/19/2021.    IMPRESSION:    1.  No evidence of major vascular stenosis or occlusion.    2.  Right upper lobe pulmonary nodule measuring 5 mm, new since CTA chest   6/19/2021. Recommend a dedicated chest CT on an outpatient/elective basis.    --- End of Report ---    EL AMARAL MD; Attending Radiologist  This document has been electronically signed. Jul 13 2023  4:26PM            ACC: [id number] EXAM:  CT ANGIO NECK (W)AW IC   ORDERED BY: TREY DIXON     ACC: [id number] EXAM:  CT ANGIO BRAIN (W)AW IC   ORDERED BY: TREY DIXON     PROCEDURE DATE:  07/13/2023      INTERPRETATION:  Clinical History / Reason for exam: Dizziness    Technique: CT angiogram of the head and neck. Contiguous CT axial images   of the head and neck were obtained following the intravenous   administration of 100 cc Omnipaque 350 (0 cc discarded) with coronal,   sagittal and multiple 3-D MIP andvolume rendered reformats.    Comparison: None. CTA chest and CT neck dated 6/19/2021 are reviewed.    Findings:    NECK:  The visualized aortic arch and great vessel origins are patent. There is   a direct origin of the left vertebral artery from the aortic arch, normal   variant.    The common, internal and external carotid arteries are patent.    The vertebral arteries are patent. The right vertebral artery is dominant.    HEAD:  The distal internal carotid arteries are patent. The anterior and middle   cerebral arteries are patent. There is a hypoplastic A1 segment of the   right [place], normal variant.    The distal vertebral arteries are patent.  The basilar artery is patent   though diffusely diminutive in caliber felt to reflect a dominant   anterior circulation. The posterior cerebral arteries are patent.    OTHER:  Retained secretions are seen within the esophagus.  There is emphysema with bullous changes of the lung apices.  There is a right upper lobe a nodule measuring 5 mm on series 4 image 8,   new since the CTA chest dated 6/19/2021.    IMPRESSION:    1.  No evidence of major vascular stenosis or occlusion.    2.  Right upper lobe pulmonary nodule measuring 5 mm, new since CTA chest   6/19/2021. Recommend a dedicated chest CT on an outpatient/elective basis.    --- End of Report ---    EL AMARAL MD; Attending Radiologist  This document has been electronically signed. Jul 13 2023  4:26PM          ACC: [id number] EXAM:  CT BRAIN   ORDERED BY: TREY DIXON     PROCEDURE DATE:  07/13/2023      INTERPRETATION:  CLINICAL INDICATION: Dizziness.    TECHNIQUE: CT of the head was performed without the administration of   intravenous contrast.    COMPARISON: None.    FINDINGS:    There is prominence of the sulci, sylvian fissures, and ventricles likely   reflecting mild parenchymal volume loss.    There are scattered patchy low attenuations in bilateral periventricular   cerebral white matter consistent with mild chronic microvascular ischemic   changes.    There is no evidence of acute territorial infarct or intracranial   hemorrhage. There is no hydrocephalus, midline shift, mass effect.    . There are no extra-axial fluid collections.    The visualized intraorbital contents are normal. The imaged portions of   the paranasal sinuses are aerated. The mastoid air cells are aerated. The   visualized soft tissues and osseous structures appear normal.      IMPRESSION:    No acute intracranial pathology. No evidence of midline shift, mass   effect or intracranial hemorrhage.    Mild chronic microvascular type changes.    --- End of Report ---    KIM BAHENA MD; Resident Radiologist  This document has been electronically signed.  HERMILA PARADA MD; Attending Radiologist  This document has been electronically signed. Jul 13 2023  4:31PM          ACC: [id number] EXAM:  XR CHEST PORTABLE URGENT 1V   ORDERED BY: TREY DIXON     PROCEDURE DATE:  07/13/2023      INTERPRETATION:  Clinical History / Reason for exam: Shortness of breath    Comparison : Chest radiograph July 3, 2021.    Technique/Positioning: Single AP view of the chest.    Findings:    Support devices: None.    Cardiac/mediastinum/hilum: Magnified cardiac silhouette. Ectatic thoracic   aorta.    Lung parenchyma/Pleura: Chronic left basilar opacity/atelectasis. No   pneumothorax. Interstitial changes bilaterally.    Skeleton/soft tissues: No acute osseous abnormality.    Impression:    Chronic left basilar opacity/atelectasis.    Interstitial changes bilaterally.    --- End of Report ---    ELLIOT LANDAU MD; Attending Radiologist  This document has been electronically signed. Jul 14 2023  8:58AM      -----------------------------------------------------------------------------------------------------------------------------------------------------------------------------------------------

## 2023-07-18 NOTE — PROGRESS NOTE ADULT - ATTENDING COMMENTS
A 73 year old male with a history of Dementia, HTN, HLD, Depression, Esophageal Cancer s/p resection and gastric pull,, Lung Ca s/p lobectomy, Prostate Cancer s/p Cyberknife presented to the ED with the complaint of dizziness. Dizziness started in the morning likely around 8:30am described as lightheadedness and off balance causing him to feel unstable ambulating. History is limited as the patient has baseline mod/severe dementia. The daughter Ric ([phone]) mentioned that the patient was complaining of feeling dizzy in the morning (unclear if it's room spinning sensation vs lightheadedness) and was unsteady when the family attempted to make him walk.     1.Dizziness due to Symptomatic iron deficiency anemia no bleeding noted s/p 1 PRBC resolving   * EGD 7/21: Ulcers in the lower third of the esophagus, Erythema in the stomach compatible with non-erosive gastritis, Normal mucosa inthe whole examined duodenum.   - Admit to medicine                        - ANGIE: negative          - TSH: 3.12 normal.   - Received 1 PRBC. Venofer 200mg IV daily for 5 days.  - Cont PPI po bid   - GI consult appreciated  * Considering inpatient upper and lower endo as it's hard to arrange it as outpatient giving his dementia     GI spoke to patient who was disinterested in scopes. Awaiting to hear from his family discussion.    2. Dizziness/ Dementia/Alzheimers  -a/w off balance while ambulating  -CTH, CTA head and neck: no acute findings:  -Patient was seen by neurology and recommended if persistent dizziness after anemia correction then might consider MR brain non contrast  -fall precautions  -PT consult:pending (patient was somnolent. recall PT)  - Cont Memantine 5MG po bid  Now AAOx2-3 (monitor mental status. Sometimes it can wax and wane)    3. Pulmonary nodule on imaging:  -Right upper lobe pulmonary nodule measuring 5 mm, new since CTA chest 6/19/2021.  -OP f/u    4. H/O Hypertension/H/O HLD  - BP controlled, not on any meds at home   -Lipid profile and A1c in AM    5. H/O Anxiety/Depression  - No documentation  - Not on any home meds    6.  H/O Prostate Ca  - was on Flomax in the past    #HYpernatremia: Na 138 > 148 (7/16) > 143. D/sherri 1/2NS @ 60 . encourage po intake.     #MISC:  -GI ppx: PPI   DVT ppx: heparin sq  -Diet: DASH/TLC  -Activity: Ambulate with assitance/fall precautions  -Code status: Full code  -Dispo: Acute, Trend CBC    HANDOFF: f/u GI and patient's family. If no plan for scopes, d/c on 7/19

## 2023-07-19 ENCOUNTER — TRANSCRIPTION ENCOUNTER (OUTPATIENT)
Age: 73
End: 2023-07-19

## 2023-07-19 LAB
-  AMPICILLIN: SIGNIFICANT CHANGE UP
-  CIPROFLOXACIN: SIGNIFICANT CHANGE UP
-  LEVOFLOXACIN: SIGNIFICANT CHANGE UP
-  NITROFURANTOIN: SIGNIFICANT CHANGE UP
-  TETRACYCLINE: SIGNIFICANT CHANGE UP
-  VANCOMYCIN: SIGNIFICANT CHANGE UP
ALBUMIN SERPL ELPH-MCNC: 3.5 G/DL — SIGNIFICANT CHANGE UP (ref 3.5–5.2)
ALP SERPL-CCNC: 111 U/L — SIGNIFICANT CHANGE UP (ref 30–115)
ALT FLD-CCNC: 13 U/L — SIGNIFICANT CHANGE UP (ref 0–41)
AST SERPL-CCNC: 35 U/L — SIGNIFICANT CHANGE UP (ref 0–41)
BILIRUB DIRECT SERPL-MCNC: 0.2 MG/DL — SIGNIFICANT CHANGE UP (ref 0–0.3)
BILIRUB INDIRECT FLD-MCNC: 0.3 MG/DL — SIGNIFICANT CHANGE UP (ref 0.2–1.2)
BILIRUB SERPL-MCNC: 0.5 MG/DL — SIGNIFICANT CHANGE UP (ref 0.2–1.2)
CREAT SERPL-MCNC: 0.9 MG/DL — SIGNIFICANT CHANGE UP (ref 0.7–1.5)
EGFR: 90 ML/MIN/1.73M2 — SIGNIFICANT CHANGE UP
INR BLD: 1.06 RATIO — SIGNIFICANT CHANGE UP (ref 0.65–1.3)
METHOD TYPE: SIGNIFICANT CHANGE UP
PROT SERPL-MCNC: 6.3 G/DL — SIGNIFICANT CHANGE UP (ref 6–8)
PROTHROM AB SERPL-ACNC: 12.1 SEC — SIGNIFICANT CHANGE UP (ref 9.95–12.87)

## 2023-07-19 PROCEDURE — 99233 SBSQ HOSP IP/OBS HIGH 50: CPT

## 2023-07-19 PROCEDURE — 99232 SBSQ HOSP IP/OBS MODERATE 35: CPT

## 2023-07-19 RX ORDER — REMDESIVIR 5 MG/ML
200 INJECTION INTRAVENOUS EVERY 24 HOURS
Refills: 0 | Status: COMPLETED | OUTPATIENT
Start: 2023-07-19 | End: 2023-07-19

## 2023-07-19 RX ORDER — REMDESIVIR 5 MG/ML
INJECTION INTRAVENOUS
Refills: 0 | Status: DISCONTINUED | OUTPATIENT
Start: 2023-07-19 | End: 2023-07-19

## 2023-07-19 RX ORDER — REMDESIVIR 5 MG/ML
100 INJECTION INTRAVENOUS EVERY 24 HOURS
Refills: 0 | Status: COMPLETED | OUTPATIENT
Start: 2023-07-20 | End: 2023-07-21

## 2023-07-19 RX ORDER — MAGNESIUM SULFATE 500 MG/ML
2 VIAL (ML) INJECTION ONCE
Refills: 0 | Status: COMPLETED | OUTPATIENT
Start: 2023-07-19 | End: 2023-07-19

## 2023-07-19 RX ORDER — REMDESIVIR 5 MG/ML
INJECTION INTRAVENOUS
Refills: 0 | Status: COMPLETED | OUTPATIENT
Start: 2023-07-19 | End: 2023-07-21

## 2023-07-19 RX ADMIN — HEPARIN SODIUM 5000 UNIT(S): 5000 INJECTION INTRAVENOUS; SUBCUTANEOUS at 06:53

## 2023-07-19 RX ADMIN — HEPARIN SODIUM 5000 UNIT(S): 5000 INJECTION INTRAVENOUS; SUBCUTANEOUS at 17:49

## 2023-07-19 RX ADMIN — Medication 1 TABLET(S): at 17:47

## 2023-07-19 RX ADMIN — MEMANTINE HYDROCHLORIDE 5 MILLIGRAM(S): 10 TABLET ORAL at 06:53

## 2023-07-19 RX ADMIN — PANTOPRAZOLE SODIUM 40 MILLIGRAM(S): 20 TABLET, DELAYED RELEASE ORAL at 17:48

## 2023-07-19 RX ADMIN — PANTOPRAZOLE SODIUM 40 MILLIGRAM(S): 20 TABLET, DELAYED RELEASE ORAL at 06:52

## 2023-07-19 RX ADMIN — Medication 25 GRAM(S): at 14:16

## 2023-07-19 RX ADMIN — REMDESIVIR 200 MILLIGRAM(S): 5 INJECTION INTRAVENOUS at 17:47

## 2023-07-19 RX ADMIN — MEMANTINE HYDROCHLORIDE 5 MILLIGRAM(S): 10 TABLET ORAL at 17:47

## 2023-07-19 NOTE — PROGRESS NOTE ADULT - SUBJECTIVE AND OBJECTIVE BOX
Gastroenterology progress note:     Patient is a 73y old  Male who presents with a chief complaint of Dizziness (19 Jul 2023 07:03)       Admitted on: 07-13-23    We are following the patient for: iron deficiency anemia        Interval History:    No acute events overnight.   - Diet - tolerating regular diet  - last BM - yesterday, no blood  - Abdominal pain - none      PAST MEDICAL & SURGICAL HISTORY:  Hypertension, unspecified type      Cancer  ESOPHAGEAL CANCER 2017 RECEIVED CHEMO AND RADIATION and endoscopic resection partial oesophagus and stomach      Renal calculi  nephrostomy tube -6/2021      Cancer  left lung 2018, no chemo or rad rx and resection      Abnormal cholesterol test      Prostate cancer      [place] ([place] hearing)      Alzheimer disease      Cancer of esophagus  2017 RESECTION OF LOWER PORTION OF ESOPHAGUS      S/P lobectomy of lung  left ?JUNE 2018      S/P cystoscopy with ureteral stent placement  LEFT      Inguinal hernia  AS AN INFANT, RIGHT SIDE      History of esophagogastroduodenoscopy (EGD)  2019      H/O colonoscopy  2018      Prostate cancer  cyber knife intervention 2021          MEDICATIONS  (STANDING):  amoxicillin  875 milliGRAM(s)/clavulanate 1 Tablet(s) Oral two times a day  heparin   Injectable 5000 Unit(s) SubCutaneous every 12 hours  memantine 5 milliGRAM(s) Oral two times a day  pantoprazole    Tablet 40 milliGRAM(s) Oral two times a day  remdesivir  IVPB 200 milliGRAM(s) IV Intermittent every 24 hours  remdesivir  IVPB   IV Intermittent     MEDICATIONS  (PRN):  acetaminophen     Tablet .. 650 milliGRAM(s) Oral every 6 hours PRN Temp greater or equal to 38C (100.4F), Mild Pain (1 - 3)  aluminum hydroxide/magnesium hydroxide/simethicone Suspension 30 milliLiter(s) Oral every 4 hours PRN Dyspepsia  melatonin 3 milliGRAM(s) Oral at bedtime PRN Insomnia  ondansetron Injectable 4 milliGRAM(s) IV Push every 8 hours PRN Nausea and/or Vomiting      Allergies  No Known Allergies      Review of Systems:   Cardiovascular:  No Chest Pain, No Palpitations  Respiratory:  No Cough, No Dyspnea  Gastrointestinal:  As described in HPI  Skin:  No Skin Lesions, No Jaundice  Neuro:  No Syncope, No Dizziness    Physical Examination:  T(C): 37.9 (07-19-23 @ 13:00), Max: 39.7 (07-18-23 @ 17:50)  HR: 70 (07-19-23 @ 13:00) (70 - 94)  BP: 138/65 (07-19-23 @ 13:00) (121/71 - 138/65)  RR: 18 (07-19-23 @ 13:00) (18 - 18)  SpO2: --        GENERAL: AAOx3, no acute distress.  HEAD:  Atraumatic, Normocephalic  EYES: conjunctiva and sclera clear  NECK: Supple, no JVD or thyromegaly  CHEST/LUNG: Clear to auscultation bilaterally; No wheeze, rhonchi, or rales  HEART: Regular rate and rhythm; normal S1, S2, No murmurs.  ABDOMEN: Soft, nontender, nondistended; Bowel sounds present  NEUROLOGY: No asterixis or tremor.   SKIN: Intact, no jaundice     Data:                        9.3    5.40  )-----------( 147      ( 18 Jul 2023 07:18 )             33.6     Hgb trend:  9.3  07-18-23 @ 07:18  8.9  07-17-23 @ 09:33        07-18    140  |  106  |  11  ----------------------------<  89  4.1   |  25  |  0.8    Ca    8.6      18 Jul 2023 07:18  Mg     1.7     07-18      Liver panel trend:  TBili 0.4   /   AST 30   /   ALT 12   /   AlkP 116   /   Tptn 6.5   /   Alb 3.7    /   DBili --      07-17  TBili 0.4   /   AST 35   /   ALT 11   /   AlkP 115   /   Tptn 6.5   /   Alb 3.5    /   DBili --      07-16  TBili 0.7   /   AST 32   /   ALT 11   /   AlkP 113   /   Tptn 6.0   /   Alb 3.5    /   DBili --      07-15  TBili 1.2   /   AST 20   /   ALT 7   /   AlkP 110   /   Tptn 6.2   /   Alb 3.5    /   DBili --      07-14  TBili 0.4   /   AST 28   /   ALT 9   /   AlkP 113   /   Tptn 6.7   /   Alb 3.7    /   DBili --      07-13       Radiology:   CT Angio Neck w/ IV Cont (07.13.23 @ 16:11) >  1.  No evidence of major vascular stenosis or occlusion.    2.  Right upper lobe pulmonary nodule measuring 5 mm, new since CTA chest   6/19/2021. Recommend a dedicated chest CT on an outpatient/elective basis.

## 2023-07-19 NOTE — PROGRESS NOTE ADULT - ATTENDING COMMENTS
patient seen and examined at bedside independently of medical resident and agree with the note unless otherwise stated     Vital Signs Last 24 Hrs  T(C): 37.9 (19 Jul 2023 13:00), Max: 39.7 (18 Jul 2023 17:50)  T(F): 100.2 (19 Jul 2023 13:00), Max: 103.4 (18 Jul 2023 17:50)  HR: 70 (19 Jul 2023 13:00) (70 - 94)  BP: 138/65 (19 Jul 2023 13:00) (121/71 - 138/65)  RR: 18 (19 Jul 2023 13:00) (18 - 18)                          9.3    5.40  )-----------( 147      ( 18 Jul 2023 07:18 )             33.6   07-18    140  |  106  |  11  ----------------------------<  89  4.1   |  25  |  0.8    Ca    8.6      18 Jul 2023 07:18  Mg     1.7     07-18    # metabolic encephalopathy - multifactorial  # COVID - 19 Infection   # UTI (E- fecalis)  # Acute on chronic Anemia   # Dementia/Anxiety/Dementia   # Resolved Hypernatremia   # Dizziness  # Prostate ca, Lung ca, Esophageal Ca hx     -start RDV - currently on RA; check CXR and supplemental O2 as needed to keep pulse ox > 92%  -add Ampicillin for current UTI - pending urine cultures sensitivities   -last hb > 9 with no active bleed - unsure why labs not collected today - am labs ordered   -patient will need EGD/Colonoscopy once clinically stable - discussed with GI fellow on the case this am   -replete electrolytes as needed   -continue with rest of maintenance meds   -on 1:1 sit for nursing safety (getting out of bed) - Fall risk protocol   -skin care as per nursing     DISPO: medical intern will update the family     Attending Physician Dr. Moriah Hi # [id number]

## 2023-07-19 NOTE — DISCHARGE NOTE NURSING/CASE MANAGEMENT/SOCIAL WORK - NSDCPEFALRISK_GEN_ALL_CORE
For information on Fall & Injury Prevention, visit: [email or web address] OR  [email or web address] OR  https://www.cdc.gov/steadi/patient.html

## 2023-07-19 NOTE — PROGRESS NOTE ADULT - ASSESSMENT
A 73 year old male with a history of Dementia, HTN, HLD, Depression, Esophageal Cancer s/p resection and gastric pull,, Lung Ca s/p lobectomy, Prostate Cancer s/p Cyberknife presented to the ED with the complaint of dizziness, found to be anemia s/p 1 u pRBC now admitted for workup of his anemia.    #Acutely worse mental status  #COVID positive  Electrolytes are stable, patient is afebrile, WBC normal, exam stable other than worsening mental status  - On contact and airborne iso    #Dizziness due to Symptomatic iron deficiency anemia no bleeding noted s/p 1 PRBC resolving   * EGD 7/2021: Ulcers in the lower third of the esophagus, Erythema in the stomach compatible with non-erosive gastritis, Normal mucosa in the whole examined duodenum.   - ANGIE negative            - TSH 3.2 (7/14)  - hgb 8.9 (7/17) -> 9.3 (7/18)  - s/p 1 pRBC  - c/w Venofer 200mg IV daily d:1  - c/w protonix 40 mg bid  - GI planning for EGD and colonoscopy tentatively this week, patient's daughter is agreeable with it    # Dizziness/ Dementia/Alzheimers  - a/w off balance while ambulating  - CTH, CTA head and neck: no acute findings:  - Patient was seen by neurology and recommended if persistent dizziness after anemia correction then might consider MR brain non contrast  - all precautions  - PT consult:pending  - c/w home Memantine 5MG po bid    # Hypernatremia (resolved)   - Na 143 (7/17) -> 140 (7/18)  - s/p 0.45% NS 60 cc/hr  X 24 hrs    # Pulmonary nodule on imaging  - Right upper lobe pulmonary nodule measuring 5 mm, new since CTA chest 6/19/2021.  - OP f/u    # H/O Hypertension/H/O HLD  - BP controlled, not on any meds at home  - Lipid profile WNL  - A1c 5.7%    # H/O Anxiety/Depression  - Not on any home meds    #  H/O Prostate Ca  - Not on any home meds    #MISC:  -GI ppx: protonix   DVT ppx: heparin sq  -Diet: DASH/TLC  -Activity: Ambulate with assitance/fall precautions  -Code status: Full code  -Dispo: Pending inpatient EGD and colonoscopy    A 73 year old male with a history of Dementia, HTN, HLD, Depression, Esophageal Cancer s/p resection and gastric pull,, Lung Ca s/p lobectomy, Prostate Cancer s/p Cyberknife presented to the ED with the complaint of dizziness, found to be anemia s/p 1 u pRBC now admitted for workup of his anemia.    #Acutely worse mental status  #COVID positive  #E.faecalis UTI  Electrolytes are stable, patient is afebrile, WBC normal, exam notable for course breath sounds and worsened mental status  - On contact and airborne iso  - E.faecalis on Ucx (7/16)  - Started remdesivir for 3 days as per ID recs  - Started augmentin 875 mg bid for 10 days for UTI  - Trend WBCs  - Appreciate ID recs    #Dizziness due to Symptomatic iron deficiency anemia no bleeding noted s/p 1 PRBC resolving   * EGD 7/2021: Ulcers in the lower third of the esophagus, Erythema in the stomach compatible with non-erosive gastritis, Normal mucosa in the whole examined duodenum.   - ANGIE negative            - TSH 3.2 (7/14)  - hgb 8.9 (7/17) -> 9.3 (7/18)  - s/p 1 pRBC  - c/w Venofer 200mg IV daily d:1  - c/w protonix 40 mg bid  - as per GI notes, will follow up with GI for EGD and colonoscopy once patient is COVID negative and UTI free    # Dizziness/ Dementia/Alzheimers  - a/w off balance while ambulating  - CTH, CTA head and neck: no acute findings:  - Patient was seen by neurology and recommended if persistent dizziness after anemia correction then might consider MR brain non contrast  - all precautions  - PT consult:pending  - c/w home Memantine 5MG po bid    # Hypernatremia (resolved)   - Na 143 (7/17) -> 140 (7/18)  - s/p 0.45% NS 60 cc/hr  X 24 hrs    # Pulmonary nodule on imaging  - Right upper lobe pulmonary nodule measuring 5 mm, new since CTA chest 6/19/2021.  - OP f/u    # H/O Hypertension/H/O HLD  - BP controlled, not on any meds at home  - Lipid profile WNL  - A1c 5.7%    # H/O Anxiety/Depression  - Not on any home meds    #  H/O Prostate Ca  - Not on any home meds    #MISC:  -GI ppx: protonix   DVT ppx: heparin sq  -Diet: DASH/TLC  -Activity: Ambulate with assitance/fall precautions  -Code status: Full code  -Dispo: Transferred to  for isolation precautions

## 2023-07-19 NOTE — PROGRESS NOTE ADULT - SUBJECTIVE AND OBJECTIVE BOX
----------Daily Progress Note----------    HISTORY OF PRESENT ILLNESS:  Patient is a 73y old Male who presents with a chief complaint of Dizziness (18 Jul 2023 06:18)    Currently admitted to medicine with the primary diagnosis of Dizziness       Today is hospital day 6d.     INTERVAL HOSPITAL COURSE / OVERNIGHT EVENTS:    Patient was examined and seen at bedside. This morning he is resting comfortably in bed and reports no new issues or overnight events.     Review of Systems: Otherwise unremarkable     <<<<<PAST MEDICAL & SURGICAL HISTORY>>>>>  Hypertension, unspecified type    Cancer  ESOPHAGEAL CANCER 2017 RECEIVED CHEMO AND RADIATION and endoscopic resection partial oesophagus and stomach    Renal calculi  nephrostomy tube -6/2021    Cancer  left lung 2018, no chemo or rad rx and resection    Abnormal cholesterol test    Prostate cancer    [place] (hard of hearing)    Alzheimer disease    Cancer of esophagus  2017 RESECTION OF LOWER PORTION OF ESOPHAGUS    S/P lobectomy of lung  left ?JUNE 2018    S/P cystoscopy with ureteral stent placement  LEFT    Inguinal hernia  AS AN INFANT, RIGHT SIDE    History of esophagogastroduodenoscopy (EGD)  2019    H/O colonoscopy  2018    Prostate cancer  cyber knife intervention 2021      ALLERGIES  No Known Allergies      Home Medications:  memantine 5 mg oral tablet: 1 tab(s) orally 2 times a day (13 Jul 2023 21:47)        MEDICATIONS  STANDING MEDICATIONS  heparin   Injectable 5000 Unit(s) SubCutaneous every 12 hours  memantine 5 milliGRAM(s) Oral two times a day  pantoprazole    Tablet 40 milliGRAM(s) Oral two times a day  sodium chloride 0.45%. 1000 milliLiter(s) IV Continuous <Continuous>    PRN MEDICATIONS  acetaminophen     Tablet .. 650 milliGRAM(s) Oral every 6 hours PRN  aluminum hydroxide/magnesium hydroxide/simethicone Suspension 30 milliLiter(s) Oral every 4 hours PRN  melatonin 3 milliGRAM(s) Oral at bedtime PRN  ondansetron Injectable 4 milliGRAM(s) IV Push every 8 hours PRN    VITALS:  T(F): 99  HR: 94  BP: 127/74  RR: 18  SpO2: --    <<<<<LABS>>>>>                        9.3    5.40  )-----------( 147      ( 18 Jul 2023 07:18 )             33.6     07-18    140  |  106  |  11  ----------------------------<  89  4.1   |  25  |  0.8    Ca    8.6      18 Jul 2023 07:18  Mg     1.7     07-18    TPro  6.5  /  Alb  3.7  /  TBili  0.4  /  DBili  x   /  AST  30  /  ALT  12  /  AlkPhos  116<H>  07-17      Urinalysis Basic - ( 18 Jul 2023 07:18 )    Color: x / Appearance: x / SG: x / pH: x  Gluc: 89 mg/dL / Ketone: x  / Bili: x / Urobili: x   Blood: x / Protein: x / Nitrite: x   Leuk Esterase: x / RBC: x / WBC x   Sq Epi: x / Non Sq Epi: x / Bacteria: x            Culture - Urine (collected 16 Jul 2023 08:25)  Source: Clean Catch Clean Catch (Midstream)  Preliminary Report (18 Jul 2023 19:18):    >100,000 CFU/ml Enterococcus faecalis    [id number]        <<<<<RADIOLOGY>>>>>    <<<<<PHYSICAL EXAM>>>>>  GENERAL: Well developed, well nourished and in no acute distress. Resting comfortably in bed.  HEENT: Normocephalic, atraumatic, mucous membranes moist, EOMI, PERRLA, bilateral sclera anicteric, no conjunctival injection  Neck: Supple, non-tender, no lymphadenopathy.  PULMONARY: Clear to auscultation bilaterally. No rales, rhonchi, or wheezing.  CARDIOVASCULAR: Regular rate and rhythm, S1-S2, no murmurs  GASTROINTESTINAL: Soft, non-tender, non-distended, no guarding.  RENAL: No CVA tenderness.  SKIN/EXTREMITIES: No clubbing or edema  NEUROLOGIC/MUSCULOSKELETAL: AOx4, grossly moving all extremities, no focal deficits.      ----------------------------------------------------------------------------------------------------------------------------------------------------------------------------------------------- ----------Daily Progress Note----------  HISTORY OF PRESENT ILLNESS:  A 73 year old male with a history of Dementia, HTN, HLD, Depression, Esophageal Cancer s/p resection and gastric pull,, Lung Ca s/p lobectomy, Prostate Cancer s/p Cyberknife presented to the ED with the complaint of dizziness, found to be anemia s/p 1 u pRBC now admitted for workup of his anemia. His dizziness and anemia has currently resolved.    Today is hospital day 6d.     ED course:  A 73 year old male with a history of Dementia, HTN, HLD, Depression, Esophageal Cancer s/p resection and gastric pull,, Lung Ca s/p lobectomy, Prostate Cancer s/p Cyberknife presented to the ED with the complaint of dizziness. Dizziness started in the morning likely around 8:30am described as lightheadedness and off balance causing him to feel unstable ambulating. History is limited as the patient has baseline mod/severe dementia. The daughter Ric ([phone]) mentioned that the patient was complaining of feeling dizzy in the morning (unclear if it's room spinning sensation vs lightheadedness) and was unsteady when the family attempted to make him walk. They thought initially it's related to the low temperature in his room so they adjusted that but he kept feeling dizzy so they brought him to ED. The daughter helps the patient with ADLs na managing his bills. She reports he still drives sometime although family have expressed concerns about his safety but he insists to drive. The patient denies fever, chest pain, SOB, headache, vision changes, weakness, nausea, vomiting, tremors, abdominal pain, constipation, dysuria, hematuria, lower extremity swelling or rash.    #ED vitals:  T(C): --  HR: 62 (07-13-23 @ 11:55) (62 - 62)  BP: 136/75 (07-13-23 @ 11:55) (136/75 - 136/75)  RR: 16 (07-13-23 @ 11:55) (16 - 16)  SpO2: 100% (07-13-23 @ 11:55) (100% - 100%)    #Labs significant for:  Hb 7.3(was 11.4 in 8/22, baseline of 12-14), MCV 6, Serum Iron 20(low), Unsaturated (high), % saturation 5(low), Pro-    #Imaging:  *CT Angio Head/Neck w/ IV Cont  1.  No evidence of major vascular stenosis or occlusion.  2.  Right upper lobe pulmonary nodule measuring 5 mm, new since CTA chest 6/19/2021. Recommend a dedicated chest CT on an outpatient/elective basis.    *CT Head No Cont  No acute intracranial pathology. No evidence of midline shift, mass effect or intracranial hemorrhage.  Mild chronic microvascular type changes.    #S/P: Meclizine 12.5mg PO x 1, and PRBC 1 Unit in the ED.    *Patient seen by neuro team for dizziness in the ED.    Patient is being admitted to Medicine for further management.    INTERVAL HOSPITAL COURSE / OVERNIGHT EVENTS:  O/N events:  Febrile to 103.4, BCx, UA, CXR, and Covid swab ordered  COVID+, on contact and airborne iso      24 hr events:      Patient was examined and seen at bedside. This morning he is resting comfortably in bed and reports no new issues or overnight events.     Review of Systems: Otherwise unremarkable     <<<<<PAST MEDICAL & SURGICAL HISTORY>>>>>  Hypertension, unspecified type    Cancer  ESOPHAGEAL CANCER 2017 RECEIVED CHEMO AND RADIATION and endoscopic resection partial oesophagus and stomach    Renal calculi  nephrostomy tube -6/2021    Cancer  left lung 2018, no chemo or rad rx and resection    Abnormal cholesterol test    Prostate cancer    [place] (hard of hearing)    Alzheimer disease    Cancer of esophagus  2017 RESECTION OF LOWER PORTION OF ESOPHAGUS    S/P lobectomy of lung  left ?JUNE 2018    S/P cystoscopy with ureteral stent placement  LEFT    Inguinal hernia  AS AN INFANT, RIGHT SIDE    History of esophagogastroduodenoscopy (EGD)  2019    H/O colonoscopy  2018    Prostate cancer  cyber knife intervention 2021      ALLERGIES  No Known Allergies      Home Medications:  memantine 5 mg oral tablet: 1 tab(s) orally 2 times a day (13 Jul 2023 21:47)        MEDICATIONS  STANDING MEDICATIONS  heparin   Injectable 5000 Unit(s) SubCutaneous every 12 hours  memantine 5 milliGRAM(s) Oral two times a day  pantoprazole    Tablet 40 milliGRAM(s) Oral two times a day  sodium chloride 0.45%. 1000 milliLiter(s) IV Continuous <Continuous>    PRN MEDICATIONS  acetaminophen     Tablet .. 650 milliGRAM(s) Oral every 6 hours PRN  aluminum hydroxide/magnesium hydroxide/simethicone Suspension 30 milliLiter(s) Oral every 4 hours PRN  melatonin 3 milliGRAM(s) Oral at bedtime PRN  ondansetron Injectable 4 milliGRAM(s) IV Push every 8 hours PRN    VITALS:  T(F): 99  HR: 94  BP: 127/74  RR: 18  SpO2: --    <<<<<LABS>>>>>                        9.3    5.40  )-----------( 147      ( 18 Jul 2023 07:18 )             33.6     07-18    140  |  106  |  11  ----------------------------<  89  4.1   |  25  |  0.8    Ca    8.6      18 Jul 2023 07:18  Mg     1.7     07-18    TPro  6.5  /  Alb  3.7  /  TBili  0.4  /  DBili  x   /  AST  30  /  ALT  12  /  AlkPhos  116<H>  07-17      Urinalysis Basic - ( 18 Jul 2023 07:18 )    Color: x / Appearance: x / SG: x / pH: x  Gluc: 89 mg/dL / Ketone: x  / Bili: x / Urobili: x   Blood: x / Protein: x / Nitrite: x   Leuk Esterase: x / RBC: x / WBC x   Sq Epi: x / Non Sq Epi: x / Bacteria: x            Culture - Urine (collected 16 Jul 2023 08:25)  Source: Clean Catch Clean Catch (Midstream)  Preliminary Report (18 Jul 2023 19:18):    >100,000 CFU/ml Enterococcus faecalis    [id number]        <<<<<RADIOLOGY>>>>>    <<<<<PHYSICAL EXAM>>>>>  GENERAL: Well developed, well nourished and in no acute distress. Resting comfortably in bed.  HEENT: Normocephalic, atraumatic, mucous membranes moist, EOMI, PERRLA, bilateral sclera anicteric, no conjunctival injection  Neck: Supple, non-tender, no lymphadenopathy.  PULMONARY: Clear to auscultation bilaterally. No rales, rhonchi, or wheezing.  CARDIOVASCULAR: Regular rate and rhythm, S1-S2, no murmurs  GASTROINTESTINAL: Soft, non-tender, non-distended, no guarding.  RENAL: No CVA tenderness.  SKIN/EXTREMITIES: No clubbing or edema  NEUROLOGIC/MUSCULOSKELETAL: AOx4, grossly moving all extremities, no focal deficits.      ----------------------------------------------------------------------------------------------------------------------------------------------------------------------------------------------- ----------Daily Progress Note----------  HISTORY OF PRESENT ILLNESS:  A 73 year old male with a history of Dementia, HTN, HLD, Depression, Esophageal Cancer s/p resection and gastric pull,, Lung Ca s/p lobectomy, Prostate Cancer s/p Cyberknife presented to the ED with the complaint of dizziness, found to be anemia s/p 1 u pRBC now admitted for workup of his anemia. His dizziness and anemia has currently resolved.    Today is hospital day 6d.     ED course:  A 73 year old male with a history of Dementia, HTN, HLD, Depression, Esophageal Cancer s/p resection and gastric pull,, Lung Ca s/p lobectomy, Prostate Cancer s/p Cyberknife presented to the ED with the complaint of dizziness. Dizziness started in the morning likely around 8:30am described as lightheadedness and off balance causing him to feel unstable ambulating. History is limited as the patient has baseline mod/severe dementia. The daughter Ric ([phone]) mentioned that the patient was complaining of feeling dizzy in the morning (unclear if it's room spinning sensation vs lightheadedness) and was unsteady when the family attempted to make him walk. They thought initially it's related to the low temperature in his room so they adjusted that but he kept feeling dizzy so they brought him to ED. The daughter helps the patient with ADLs na managing his bills. She reports he still drives sometime although family have expressed concerns about his safety but he insists to drive. The patient denies fever, chest pain, SOB, headache, vision changes, weakness, nausea, vomiting, tremors, abdominal pain, constipation, dysuria, hematuria, lower extremity swelling or rash.    #ED vitals:  T(C): --  HR: 62 (07-13-23 @ 11:55) (62 - 62)  BP: 136/75 (07-13-23 @ 11:55) (136/75 - 136/75)  RR: 16 (07-13-23 @ 11:55) (16 - 16)  SpO2: 100% (07-13-23 @ 11:55) (100% - 100%)    #Labs significant for:  Hb 7.3(was 11.4 in 8/22, baseline of 12-14), MCV 6, Serum Iron 20(low), Unsaturated (high), % saturation 5(low), Pro-    #Imaging:  *CT Angio Head/Neck w/ IV Cont  1.  No evidence of major vascular stenosis or occlusion.  2.  Right upper lobe pulmonary nodule measuring 5 mm, new since CTA chest 6/19/2021. Recommend a dedicated chest CT on an outpatient/elective basis.    *CT Head No Cont  No acute intracranial pathology. No evidence of midline shift, mass effect or intracranial hemorrhage.  Mild chronic microvascular type changes.    #S/P: Meclizine 12.5mg PO x 1, and PRBC 1 Unit in the ED.    *Patient seen by neuro team for dizziness in the ED.    Patient is being admitted to Medicine for further management.    INTERVAL HOSPITAL COURSE / OVERNIGHT EVENTS:  O/N events:  Febrile to 103.4, BCx, UA, CXR, and Covid swab ordered  COVID+, on contact and airborne iso      24 hr events:  None    Patient was examined and seen at bedside. This morning he is resting comfortably in bed and denies any shortness of breath or cough, denies any new symptoms or discomfort, patient is a poor historian. Spoke with family about starting patient on remdesivir for COVID infection     Review of Systems: Otherwise unremarkable     <<<<<PAST MEDICAL & SURGICAL HISTORY>>>>>  Hypertension, unspecified type    Cancer  ESOPHAGEAL CANCER 2017 RECEIVED CHEMO AND RADIATION and endoscopic resection partial oesophagus and stomach    Renal calculi  nephrostomy tube -6/2021    Cancer  left lung 2018, no chemo or rad rx and resection    Abnormal cholesterol test    Prostate cancer    [place] (hard of hearing)    Alzheimer disease    Cancer of esophagus  2017 RESECTION OF LOWER PORTION OF ESOPHAGUS    S/P lobectomy of lung  left ?JUNE 2018    S/P cystoscopy with ureteral stent placement  LEFT    Inguinal hernia  AS AN INFANT, RIGHT SIDE    History of esophagogastroduodenoscopy (EGD)  2019    H/O colonoscopy  2018    Prostate cancer  cyber knife intervention 2021      ALLERGIES  No Known Allergies      Home Medications:  memantine 5 mg oral tablet: 1 tab(s) orally 2 times a day (13 Jul 2023 21:47)        MEDICATIONS  STANDING MEDICATIONS  heparin   Injectable 5000 Unit(s) SubCutaneous every 12 hours  memantine 5 milliGRAM(s) Oral two times a day  pantoprazole    Tablet 40 milliGRAM(s) Oral two times a day  sodium chloride 0.45%. 1000 milliLiter(s) IV Continuous <Continuous>    PRN MEDICATIONS  acetaminophen     Tablet .. 650 milliGRAM(s) Oral every 6 hours PRN  aluminum hydroxide/magnesium hydroxide/simethicone Suspension 30 milliLiter(s) Oral every 4 hours PRN  melatonin 3 milliGRAM(s) Oral at bedtime PRN  ondansetron Injectable 4 milliGRAM(s) IV Push every 8 hours PRN    VITALS:  T(F): 99  HR: 94  BP: 127/74  RR: 18  SpO2: --    <<<<<LABS>>>>>                        9.3    5.40  )-----------( 147      ( 18 Jul 2023 07:18 )             33.6     07-18    140  |  106  |  11  ----------------------------<  89  4.1   |  25  |  0.8    Ca    8.6      18 Jul 2023 07:18  Mg     1.7     07-18    TPro  6.5  /  Alb  3.7  /  TBili  0.4  /  DBili  x   /  AST  30  /  ALT  12  /  AlkPhos  116<H>  07-17      Urinalysis Basic - ( 18 Jul 2023 07:18 )    Color: x / Appearance: x / SG: x / pH: x  Gluc: 89 mg/dL / Ketone: x  / Bili: x / Urobili: x   Blood: x / Protein: x / Nitrite: x   Leuk Esterase: x / RBC: x / WBC x   Sq Epi: x / Non Sq Epi: x / Bacteria: x            Culture - Urine (collected 16 Jul 2023 08:25)  Source: Clean Catch Clean Catch (Midstream)  Preliminary Report (18 Jul 2023 19:18):    >100,000 CFU/ml Enterococcus faecalis    [id number]        <<<<<RADIOLOGY>>>>>    <<<<<PHYSICAL EXAM>>>>>  GENERAL: Well developed, well nourished and in no acute distress. Resting comfortably in bed.  HEENT: Normocephalic, atraumatic, mucous membranes moist, EOMI, PERRLA, bilateral sclera anicteric, no conjunctival injection  Neck: Supple, non-tender, no lymphadenopathy.  PULMONARY: Clear to auscultation bilaterally. No rales, rhonchi, or wheezing.  CARDIOVASCULAR: Regular rate and rhythm, S1-S2, no murmurs  GASTROINTESTINAL: Soft, non-tender, non-distended, no guarding.  RENAL: No CVA tenderness.  SKIN/EXTREMITIES: No clubbing or edema  NEUROLOGIC/MUSCULOSKELETAL: AOx4, grossly moving all extremities, no focal deficits.      -----------------------------------------------------------------------------------------------------------------------------------------------------------------------------------------------

## 2023-07-19 NOTE — DISCHARGE NOTE NURSING/CASE MANAGEMENT/SOCIAL WORK - PATIENT PORTAL LINK FT
You can access the FollowMyHealth Patient Portal offered by [facility] by registering at the following website: [email or web address]. By joining [facility]’s FollowMyHealth portal, you will also be able to view your health information using other applications (apps) compatible with our system.

## 2023-07-19 NOTE — PROGRESS NOTE ADULT - ASSESSMENT
74 y/o M with PMHx of HTN, HLD, Dementia, Depression, Esophageal Cancer s/p resection and gastric pull,, Lung Ca s/p lobectomy, Prostate Cancer s/p Cyberknife presented to the ED with the complaint of dizziness that began in the morning of presentation described as lightheadedness and off balance causing him to feel unstable ambulating. Admitted for acute on chronic anemia. GI consulted for anemia. Patient has 1:1 sit unsure , now COVID +,     #Acute on Chronic symptomatic Microcytic Anemia s/p 1u pRBC- stable  - hemodynamically stable, no overt signs of bleeding  - Hgb 7.3, MCV 66.5 on admission, Hb today: 8.9  - baseline Hgb ~12  - EGD 7/6/21 for odynophagia: Ulcers in the lower third of the esophagus, Erythema in the stomach compatible with non-erosive gastritis, Normal mucosa in the whole examined duodenum. (bx negative for H pylori, ulcer bx : focal active esophagitis, fibropurulent exudate)  - Iron studies (7/13/23): Iron 20, TIBC 409, % sat 5, Ferritin 11 c/w ELBERT  - s/p IV venofer  - on Protonix 40mg PO q24hrs  - patient is COVID +, AAox2, as per primary team change in mental status, patient has 1:1 sit, crawling out of bed    Plan:  - patient has to be optimized medically before any endoscopic intervention at this time  - monitor H/H  - c.w PPI   - keep active T&S  - transfuse as needed, keep Hgb >7

## 2023-07-20 DIAGNOSIS — U07.1 COVID-19: ICD-10-CM

## 2023-07-20 DIAGNOSIS — D62 ACUTE POSTHEMORRHAGIC ANEMIA: ICD-10-CM

## 2023-07-20 DIAGNOSIS — Z51.5 ENCOUNTER FOR PALLIATIVE CARE: ICD-10-CM

## 2023-07-20 DIAGNOSIS — F03.90 UNSPECIFIED DEMENTIA WITHOUT BEHAVIORAL DISTURBANCE: ICD-10-CM

## 2023-07-20 DIAGNOSIS — N39.0 URINARY TRACT INFECTION, SITE NOT SPECIFIED: ICD-10-CM

## 2023-07-20 DIAGNOSIS — Z71.89 OTHER SPECIFIED COUNSELING: ICD-10-CM

## 2023-07-20 LAB
ALBUMIN SERPL ELPH-MCNC: 3.2 G/DL — LOW (ref 3.5–5.2)
ALP SERPL-CCNC: 106 U/L — SIGNIFICANT CHANGE UP (ref 30–115)
ALT FLD-CCNC: 13 U/L — SIGNIFICANT CHANGE UP (ref 0–41)
ANION GAP SERPL CALC-SCNC: 10 MMOL/L — SIGNIFICANT CHANGE UP (ref 7–14)
AST SERPL-CCNC: 35 U/L — SIGNIFICANT CHANGE UP (ref 0–41)
BILIRUB DIRECT SERPL-MCNC: 0.2 MG/DL — SIGNIFICANT CHANGE UP (ref 0–0.3)
BILIRUB INDIRECT FLD-MCNC: 0.3 MG/DL — SIGNIFICANT CHANGE UP (ref 0.2–1.2)
BILIRUB SERPL-MCNC: 0.5 MG/DL — SIGNIFICANT CHANGE UP (ref 0.2–1.2)
BUN SERPL-MCNC: 17 MG/DL — SIGNIFICANT CHANGE UP (ref 10–20)
CALCIUM SERPL-MCNC: 8 MG/DL — LOW (ref 8.4–10.5)
CHLORIDE SERPL-SCNC: 100 MMOL/L — SIGNIFICANT CHANGE UP (ref 98–110)
CO2 SERPL-SCNC: 24 MMOL/L — SIGNIFICANT CHANGE UP (ref 17–32)
CREAT SERPL-MCNC: 0.9 MG/DL — SIGNIFICANT CHANGE UP (ref 0.7–1.5)
CULTURE RESULTS: SIGNIFICANT CHANGE UP
EGFR: 90 ML/MIN/1.73M2 — SIGNIFICANT CHANGE UP
GLUCOSE SERPL-MCNC: 84 MG/DL — SIGNIFICANT CHANGE UP (ref 70–99)
HCT VFR BLD CALC: 34.5 % — LOW (ref 42–52)
HGB BLD-MCNC: 9.7 G/DL — LOW (ref 14–18)
INR BLD: 1.03 RATIO — SIGNIFICANT CHANGE UP (ref 0.65–1.3)
MAGNESIUM SERPL-MCNC: 2.1 MG/DL — SIGNIFICANT CHANGE UP (ref 1.8–2.4)
MCHC RBC-ENTMCNC: 20.2 PG — LOW (ref 27–31)
MCHC RBC-ENTMCNC: 28.1 G/DL — LOW (ref 32–37)
MCV RBC AUTO: 71.7 FL — LOW (ref 80–94)
NRBC # BLD: 0 /100 WBCS — SIGNIFICANT CHANGE UP (ref 0–0)
ORGANISM # SPEC MICROSCOPIC CNT: SIGNIFICANT CHANGE UP
ORGANISM # SPEC MICROSCOPIC CNT: SIGNIFICANT CHANGE UP
PLATELET # BLD AUTO: 154 K/UL — SIGNIFICANT CHANGE UP (ref 130–400)
PMV BLD: SIGNIFICANT CHANGE UP (ref 7.4–10.4)
POTASSIUM SERPL-MCNC: 4.1 MMOL/L — SIGNIFICANT CHANGE UP (ref 3.5–5)
POTASSIUM SERPL-SCNC: 4.1 MMOL/L — SIGNIFICANT CHANGE UP (ref 3.5–5)
PROT SERPL-MCNC: 6 G/DL — SIGNIFICANT CHANGE UP (ref 6–8)
PROTHROM AB SERPL-ACNC: 11.7 SEC — SIGNIFICANT CHANGE UP (ref 9.95–12.87)
RBC # BLD: 4.81 M/UL — SIGNIFICANT CHANGE UP (ref 4.7–6.1)
RBC # FLD: 26.5 % — HIGH (ref 11.5–14.5)
SODIUM SERPL-SCNC: 134 MMOL/L — LOW (ref 135–146)
SPECIMEN SOURCE: SIGNIFICANT CHANGE UP
WBC # BLD: 5.94 K/UL — SIGNIFICANT CHANGE UP (ref 4.8–10.8)
WBC # FLD AUTO: 5.94 K/UL — SIGNIFICANT CHANGE UP (ref 4.8–10.8)

## 2023-07-20 PROCEDURE — 99233 SBSQ HOSP IP/OBS HIGH 50: CPT

## 2023-07-20 PROCEDURE — 99223 1ST HOSP IP/OBS HIGH 75: CPT

## 2023-07-20 RX ORDER — SUCRALFATE 1 G
1 TABLET ORAL
Refills: 0 | Status: DISCONTINUED | OUTPATIENT
Start: 2023-07-20 | End: 2023-07-27

## 2023-07-20 RX ADMIN — MEMANTINE HYDROCHLORIDE 5 MILLIGRAM(S): 10 TABLET ORAL at 17:47

## 2023-07-20 RX ADMIN — PANTOPRAZOLE SODIUM 40 MILLIGRAM(S): 20 TABLET, DELAYED RELEASE ORAL at 05:28

## 2023-07-20 RX ADMIN — HEPARIN SODIUM 5000 UNIT(S): 5000 INJECTION INTRAVENOUS; SUBCUTANEOUS at 17:47

## 2023-07-20 RX ADMIN — Medication 1 TABLET(S): at 05:27

## 2023-07-20 RX ADMIN — REMDESIVIR 200 MILLIGRAM(S): 5 INJECTION INTRAVENOUS at 17:47

## 2023-07-20 RX ADMIN — PANTOPRAZOLE SODIUM 40 MILLIGRAM(S): 20 TABLET, DELAYED RELEASE ORAL at 17:47

## 2023-07-20 RX ADMIN — MEMANTINE HYDROCHLORIDE 5 MILLIGRAM(S): 10 TABLET ORAL at 05:27

## 2023-07-20 RX ADMIN — HEPARIN SODIUM 5000 UNIT(S): 5000 INJECTION INTRAVENOUS; SUBCUTANEOUS at 05:28

## 2023-07-20 RX ADMIN — Medication 1 GRAM(S): at 17:47

## 2023-07-20 RX ADMIN — Medication 1 GRAM(S): at 23:58

## 2023-07-20 RX ADMIN — Medication 1 TABLET(S): at 17:47

## 2023-07-20 RX ADMIN — Medication 1 GRAM(S): at 13:28

## 2023-07-20 NOTE — CONSULT NOTE ADULT - ASSESSMENT
73M with PMH of dementia, HTN, HLD< depression, esophageal CA s/p resection, lung CA s/p lobectomy, prostate CA s/p cyberknfe, here with lightheadedness, and found to have anemia from esophageal ulcers, s/p pRBC. GI also planning for repeat EGD and possible colonoscopy. Patient's course c/b COVID +, on remdesivir, clinically asymptomatic. Also found to have E. faecalis UTI, on augmentin. Patient is full code. Palliative care consulted for GOC.

## 2023-07-20 NOTE — CONSULT NOTE ADULT - NSPROBSELRECBLANK_5_GEN
Brief Medicine Cross-Cover Note:    Was notified by RN regarding family concerns that patient seems to be having an increase in generalized weakness and increase in his baseline tremors. Has chronic tingling in bilateral lower extremities unchanged. Patient earlier today reported no tingling in upper extremities to RNs but later reported to family tingling in bilateral upper extremities on and off for a few days. Currently, he has no focal neurologic deficits and was able to ambulate to the bathroom with assist of one; no gait abnormalities noted. Vitals stable. No noted headache. No nausea or vomiting. No known falls or head trauma. Lasix was started on 7/12 and he has not had any renal function or electrolyte labs recently. It appears Q4H neuro checks have been ordered since 7/3/22.   - Continue neuro checks Q4H. Notify medicine immediately and call RRT for any acute change in neurologic status.   - Will check BMP, Mag, Phos, B12, TSH, platelets   - Continue to work with PT and OT   - In regards to concerns regarding rectal prolapse, started BID Miralax and General Surgery to evaluate patient tomorrow.    Michael Henson PA-C on 7/16/2022 at 8:37 PM       DISPLAY PLAN FREE TEXT

## 2023-07-20 NOTE — DIETITIAN INITIAL EVALUATION ADULT - PERTINENT MEDS FT
MEDICATIONS  (STANDING):  amoxicillin  875 milliGRAM(s)/clavulanate 1 Tablet(s) Oral two times a day  heparin   Injectable 5000 Unit(s) SubCutaneous every 12 hours  memantine 5 milliGRAM(s) Oral two times a day  pantoprazole    Tablet 40 milliGRAM(s) Oral two times a day  remdesivir  IVPB 100 milliGRAM(s) IV Intermittent every 24 hours  remdesivir  IVPB   IV Intermittent   sucralfate suspension 1 Gram(s) Oral four times a day    MEDICATIONS  (PRN):  acetaminophen     Tablet .. 650 milliGRAM(s) Oral every 6 hours PRN Temp greater or equal to 38C (100.4F), Mild Pain (1 - 3)  aluminum hydroxide/magnesium hydroxide/simethicone Suspension 30 milliLiter(s) Oral every 4 hours PRN Dyspepsia  melatonin 3 milliGRAM(s) Oral at bedtime PRN Insomnia  ondansetron Injectable 4 milliGRAM(s) IV Push every 8 hours PRN Nausea and/or Vomiting

## 2023-07-20 NOTE — GOALS OF CARE CONVERSATION - ADVANCED CARE PLANNING - CONVERSATION DETAILS
Spoke with NOK daughter: Ric Rony via phone and disucssed MOLST. Ric noted Pt would want to be DNR/DNI  MOLST completed

## 2023-07-20 NOTE — DIETITIAN INITIAL EVALUATION ADULT - PERTINENT LABORATORY DATA
07-20    134<L>  |  100  |  17  ----------------------------<  84  4.1   |  24  |  0.9    Ca    8.0<L>      20 Jul 2023 07:42  Mg     2.1     07-20    TPro  6.0  /  Alb  3.2<L>  /  TBili  0.5  /  DBili  0.2  /  AST  35  /  ALT  13  /  AlkPhos  106  07-20  A1C with Estimated Average Glucose Result: 5.7 % (07-14-23 @ 06:20)

## 2023-07-20 NOTE — DIETITIAN INITIAL EVALUATION ADULT - ORAL INTAKE PTA/DIET HISTORY
Pt unable to participate in nutrition assessment interview; confused/disoriented per flow sheet. Unable to reach emergency contact to obtain nutrition hx at this time. Will attempt to again at follow up. Hx limited to medical chart.

## 2023-07-20 NOTE — CONSULT NOTE ADULT - ASSESSMENT
73 year old male with a history of Dementia, HTN, HLD, Depression, Esophageal Cancer s/p resection and gastric pull,, Lung Ca s/p lobectomy, Prostate Cancer s/p Cyberknife presented to the ED with the complaint of dizziness.    IMPRESSION/RECOMMENDATIONS  COVID with a positive test and asymptomatic  On RA  CXR no GGO   Unknown vaccination status  Pt is possibly in the early viral replicative phase based on the timeline/onset of symptoms.  - mg iv on Day 1, then 100 mg iv D2 and D3.  -no steroids    Asymptomatic bacteuria with E fecalis  7/18 BCx NG  -no ABx    Please do not hesitate to recall ID if any questions arise either through [facility] or through [employer]

## 2023-07-20 NOTE — DIETITIAN INITIAL EVALUATION ADULT - OTHER INFO
Pertinent Medical Information: Per H&P, pt is a 72 y/o male w/ PMHx of Dementia, HTN, HLD, Depression, Esophageal Cancer s/p resection and gastric pull,, Lung Ca s/p lobectomy, Prostate Cancer s/p Cyberknife presented to the ED with the complaint of dizziness.    Pertinent Subjective Information: Per staff, pt has varying appetite; tolerating diet texture/consistency well. No nausea or vomiting reported.     Weight Hx: UBW unknown. Current dosing weight is 68 KG. Previous admission weight was  Pertinent Medical Information: Per H&P, pt is a 74 y/o male w/ PMHx of Dementia, HTN, HLD, Depression, Esophageal Cancer s/p resection and gastric pull,, Lung Ca s/p lobectomy, Prostate Cancer s/p Cyberknife presented to the ED with the complaint of dizziness.  # Acutely worse mental status  # COVID positive  # E.faecalis UTI  # H/O Prostate Ca  - Not on any home meds    Pertinent Subjective Information: Per staff, pt has varying appetite; tolerating diet texture/consistency well. No nausea or vomiting reported.     Weight Hx: UBW unknown. Current dosing weight is 68 KG. Previous admission weight was 58.5 KG on 7/6/21; 13.9% unintentional weight gain in over 2 years compared to current dosing weight. Pt does not meet weight criteria for malnutrition at this time.

## 2023-07-20 NOTE — CONSULT NOTE ADULT - PROBLEM SELECTOR RECOMMENDATION 4
- supportive care  - daughter states that he is worsening here, doesn't recognize family anymore  - monitor mental status

## 2023-07-20 NOTE — CONSULT NOTE ADULT - PROBLEM SELECTOR RECOMMENDATION 9
- history of esophageal ulcers in past  - awaiting EGD/colonoscopy once clinically stable, given UTI and COVID  - daughter would like colonoscopy to be done inpatient; states it would be difficult logistically with prep and transport to coordinate as outpatient  - will d/w medical attending

## 2023-07-20 NOTE — PROGRESS NOTE ADULT - ASSESSMENT
74 y/o M with PMHx of HTN, HLD, Dementia, Depression, Esophageal Cancer s/p resection and gastric pull,, Lung Ca s/p lobectomy, Prostate Cancer s/p Cyberknife presented to the ED with the complaint of dizziness that began in the morning of presentation described as lightheadedness and off balance causing him to feel unstable ambulating. Admitted for acute on chronic anemia. GI consulted for anemia. Patient has 1:1 sit unsure , now COVID +,     #Acute on Chronic symptomatic Microcytic Anemia s/p 1u pRBC- stable  - hemodynamically stable, no overt signs of bleeding  - Hgb 7.3, MCV 66.5 on admission, Hb today: 9.7  - baseline Hgb ~12  - EGD 7/6/21 for odynophagia: Ulcers in the lower third of the esophagus, Erythema in the stomach compatible with non-erosive gastritis, Normal mucosa in the whole examined duodenum. (bx negative for H pylori, ulcer bx : focal active esophagitis, fibropurulent exudate)  - Iron studies (7/13/23): Iron 20, TIBC 409, % sat 5, Ferritin 11 c/w ELBERT  - s/p IV venofer  - on Protonix 40mg PO q24hrs  - patient is COVID +, AAox2, as per primary team change in mental status, patient has 1:1 sit, crawling out of bed working up for metabolic encephalopathy. Spoke to daughter Amelia Larios ([phone]) ([phone]) at length, regarding his mentation which she agrees he got worse compared to last week.      Plan:  - patient has to be optimized medically before any endoscopic intervention at this time  - monitor H/H  - c.w PPI   - keep active T&S  - transfuse as needed, keep Hgb >7 74 y/o M with PMHx of HTN, HLD, Dementia, Depression, Esophageal Cancer s/p resection and gastric pull,, Lung Ca s/p lobectomy, Prostate Cancer s/p Cyberknife presented to the ED with the complaint of dizziness that began in the morning of presentation described as lightheadedness and off balance causing him to feel unstable ambulating. Admitted for acute on chronic anemia. GI consulted for anemia. Patient has 1:1 sit unsure , now COVID +  Spoke to daughter Amelia Larios ([phone]) ([phone]) at length, regarding his mentation which she agrees he got worse compared to last week.  Medical team working up for metabolic encephalopathy, UTI? COVID.     #Acute on Chronic symptomatic Microcytic Anemia s/p 1u pRBC- stable  - hemodynamically stable, no overt signs of bleeding  - Hgb 7.3, MCV 66.5 on admission, Hb today: 9.7  - baseline Hgb ~12  - EGD 7/6/21 for odynophagia: Ulcers in the lower third of the esophagus, Erythema in the stomach compatible with non-erosive gastritis, Normal mucosa in the whole examined duodenum. (bx negative for H pylori, ulcer bx : focal active esophagitis, fibropurulent exudate)  - Iron studies (7/13/23): Iron 20, TIBC 409, % sat 5, Ferritin 11 c/w ELBERT  - s/p IV venofer  - on Protonix 40mg PO q24hrs  - patient is COVID +, AAox2, as per primary team change in mental status, patient has 1:1 sit, crawling out of bed working up for metabolic encephalopathy.         Plan:  - patient has to be optimized medically before any endoscopic intervention at this time, non urgent at this time. Can follow up in [facility] for outpatient EGD/Colonoscopy in 2-4 weeks.  - Spoke to daughter Amelia Larios ([phone]) ([phone]) at length, regarding his mentation which she agrees he got worse compared to last week.  Medical team working up for metabolic encephalopathy, UTI? COVID. Daughter still insists on inpatient consideration given challenges with setting up the procedures (prep, transport etc..). I explained that patient has to medically optimzed before any intervention and can be arranged outpatient as keeping the patient in the hospital for a non urgent procedure will risk him with hospital acquired infections. She understands, she will think about and update medical team.   - monitor H/H  - c.w PPI   - keep active T&S  - transfuse as needed, keep Hgb >7  - Follow up with our [facility] located at [address] [place] [address]. Phone Number: [phone]    - recall us PRN 72 y/o M with PMHx of HTN, HLD, Dementia, Depression, Esophageal Cancer s/p resection and gastric pull,, Lung Ca s/p lobectomy, Prostate Cancer s/p Cyberknife presented to the ED with the complaint of dizziness that began in the morning of presentation described as lightheadedness and off balance causing him to feel unstable ambulating. Admitted for acute on chronic anemia. GI consulted for anemia. Patient has 1:1 sit unsure , now COVID +  Spoke to daughter Amelia Larios ([phone]) ([phone]) at length, regarding his mentation which she agrees he got worse compared to last week.  Medical team working up for metabolic encephalopathy, UTI? COVID.     #Acute on Chronic symptomatic Microcytic Anemia s/p 1u pRBC- stable  - hemodynamically stable, no overt signs of bleeding  - Hgb 7.3, MCV 66.5 on admission, Hb today: 9.7  - baseline Hgb ~12  - EGD 7/6/21 for odynophagia: Ulcers in the lower third of the esophagus, Erythema in the stomach compatible with non-erosive gastritis, Normal mucosa in the whole examined duodenum. (bx negative for H pylori, ulcer bx : focal active esophagitis, fibropurulent exudate)  - Iron studies (7/13/23): Iron 20, TIBC 409, % sat 5, Ferritin 11 c/w ELBERT  - s/p IV venofer  - on Protonix 40mg PO q24hrs  - patient is COVID +, AAox2, as per primary team change in mental status, patient has 1:1 sit, crawling out of bed working up for metabolic encephalopathy.         Plan:  - patient has to be optimized medically before any endoscopic intervention at this time, non urgent at this time. Can follow up in [facility] for outpatient EGD/Colonoscopy in 2-4 weeks.  - Spoke to daughter Amelia Larios ([phone]) ([phone]) at length, regarding his mentation which she agrees he got worse compared to last week.  Medical team working up for metabolic encephalopathy, UTI? COVID. Daughter still insists on inpatient consideration given challenges with setting up the procedures (prep, transport etc..). I explained that patient has to medically optimzed before any intervention and can be arranged outpatient as keeping the patient in the hospital for a non urgent procedure will risk him with hospital acquired infections. She understands, she will think about it and update medical team.   - monitor H/H  - c.w PPI   - keep active T&S  - transfuse as needed, keep Hgb >7  - Follow up with our [facility] located at [address] [place] [address]. Phone Number: [phone]    - recall us PRN

## 2023-07-20 NOTE — DIETITIAN INITIAL EVALUATION ADULT - OTHER CALCULATIONS
Using ABW: ENERGY: 0422-9673 kcal/day (MSJ 1468.285 * 1-1.3 SF); PROTEIN: 68-88 g/day (1-1.3 g/kg); FLUID: 1700 mL/day (25 mL/kg) -- with consideration for age, BMI, h/o prostate cancer - not on home meds

## 2023-07-20 NOTE — CONSULT NOTE ADULT - PROBLEM SELECTOR RECOMMENDATION 6
- will follow  ______________  Malachi Hoffmann MD  Palliative Medicine  [facility]   of Geriatric and Palliative Medicine  [phone]

## 2023-07-20 NOTE — PROGRESS NOTE ADULT - SUBJECTIVE AND OBJECTIVE BOX
Gastroenterology progress note:     Patient is a 73y old  Male who presents with a chief complaint of Dizziness and giddiness     (20 Jul 2023 12:30)       Admitted on: 07-13-23    We are following the patient for:       Interval History:    No acute events overnight.   - Diet -   - last BM -   - Abdominal pain -       PAST MEDICAL & SURGICAL HISTORY:  Hypertension, unspecified type      Cancer  ESOPHAGEAL CANCER 2017 RECEIVED CHEMO AND RADIATION and endoscopic resection partial oesophagus and stomach      Renal calculi  nephrostomy tube -6/2021      Cancer  left lung 2018, no chemo or rad rx and resection      Abnormal cholesterol test      Prostate cancer      [place] (hard of hearing)      Alzheimer disease      Cancer of esophagus  2017 RESECTION OF LOWER PORTION OF ESOPHAGUS      S/P lobectomy of lung  left ?JUNE 2018      S/P cystoscopy with ureteral stent placement  LEFT      Inguinal hernia  AS AN INFANT, RIGHT SIDE      History of esophagogastroduodenoscopy (EGD)  2019      H/O colonoscopy  2018      Prostate cancer  cyber knife intervention 2021          MEDICATIONS  (STANDING):  amoxicillin  875 milliGRAM(s)/clavulanate 1 Tablet(s) Oral two times a day  heparin   Injectable 5000 Unit(s) SubCutaneous every 12 hours  memantine 5 milliGRAM(s) Oral two times a day  pantoprazole    Tablet 40 milliGRAM(s) Oral two times a day  remdesivir  IVPB 100 milliGRAM(s) IV Intermittent every 24 hours  remdesivir  IVPB   IV Intermittent   sucralfate suspension 1 Gram(s) Oral four times a day    MEDICATIONS  (PRN):  acetaminophen     Tablet .. 650 milliGRAM(s) Oral every 6 hours PRN Temp greater or equal to 38C (100.4F), Mild Pain (1 - 3)  aluminum hydroxide/magnesium hydroxide/simethicone Suspension 30 milliLiter(s) Oral every 4 hours PRN Dyspepsia  melatonin 3 milliGRAM(s) Oral at bedtime PRN Insomnia  ondansetron Injectable 4 milliGRAM(s) IV Push every 8 hours PRN Nausea and/or Vomiting      Allergies  No Known Allergies      Review of Systems:   Cardiovascular:  No Chest Pain, No Palpitations  Respiratory:  No Cough, No Dyspnea  Gastrointestinal:  As described in HPI  Skin:  No Skin Lesions, No Jaundice  Neuro:  No Syncope, No Dizziness    Physical Examination:  T(C): 37.2 (07-20-23 @ 12:48), Max: 37.7 (07-19-23 @ 20:07)  HR: 54 (07-20-23 @ 12:48) (54 - 72)  BP: 96/59 (07-20-23 @ 12:48) (96/59 - 136/65)  RR: 18 (07-20-23 @ 12:48) (18 - 18)  SpO2: --        GENERAL: AAOx3, no acute distress.  HEAD:  Atraumatic, Normocephalic  EYES: conjunctiva and sclera clear  NECK: Supple, no JVD or thyromegaly  CHEST/LUNG: Clear to auscultation bilaterally; No wheeze, rhonchi, or rales  HEART: Regular rate and rhythm; normal S1, S2, No murmurs.  ABDOMEN: Soft, nontender, nondistended; Bowel sounds present  NEUROLOGY: No asterixis or tremor.   SKIN: Intact, no jaundice     Data:                        9.7    5.94  )-----------( 154      ( 20 Jul 2023 07:42 )             34.5     Hgb trend:  9.7  07-20-23 @ 07:42  9.3  07-18-23 @ 07:18      07-20    134<L>  |  100  |  17  ----------------------------<  84  4.1   |  24  |  0.9    Ca    8.0<L>      20 Jul 2023 07:42  Mg     2.1     07-20    TPro  6.0  /  Alb  3.2<L>  /  TBili  0.5  /  DBili  0.2  /  AST  35  /  ALT  13  /  AlkPhos  106  07-20    Liver panel trend:  TBili 0.5   /   AST 35   /   ALT 13   /   AlkP 106   /   Tptn 6.0   /   Alb 3.2    /   DBili 0.2      07-20  TBili 0.5   /   AST 35   /   ALT 13   /   AlkP 111   /   Tptn 6.3   /   Alb 3.5    /   DBili 0.2      07-19  TBili 0.4   /   AST 30   /   ALT 12   /   AlkP 116   /   Tptn 6.5   /   Alb 3.7    /   DBili --      07-17  TBili 0.4   /   AST 35   /   ALT 11   /   AlkP 115   /   Tptn 6.5   /   Alb 3.5    /   DBili --      07-16  TBili 0.7   /   AST 32   /   ALT 11   /   AlkP 113   /   Tptn 6.0   /   Alb 3.5    /   DBili --      07-15  TBili 1.2   /   AST 20   /   ALT 7   /   AlkP 110   /   Tptn 6.2   /   Alb 3.5    /   DBili --      07-14  TBili 0.4   /   AST 28   /   ALT 9   /   AlkP 113   /   Tptn 6.7   /   Alb 3.7    /   DBili --      07-13      PT/INR - ( 20 Jul 2023 07:42 )   PT: 11.70 sec;   INR: 1.03 ratio         Culture - Blood (collected 18 Jul 2023 20:28)  Source: .Blood Blood  Preliminary Report (20 Jul 2023 02:02):    No growth at 24 hours       Radiology:   CT Angio Neck w/ IV Cont (07.13.23 @ 16:11) >  1.  No evidence of major vascular stenosis or occlusion.    2.  Right upper lobe pulmonary nodule measuring 5 mm, new since CTA chest   6/19/2021. Recommend a dedicated chest CT on an outpatient/elective basis.         Gastroenterology progress note:     Patient is a 73y old  Male who presents with a chief complaint of Dizziness and giddiness     (20 Jul 2023 12:30)       Admitted on: 07-13-23    We are following the patient for:       Interval History:    No acute events overnight.   - Diet -   - last BM -   - Abdominal pain -       PAST MEDICAL & SURGICAL HISTORY:  Hypertension, unspecified type      Cancer  ESOPHAGEAL CANCER 2017 RECEIVED CHEMO AND RADIATION and endoscopic resection partial oesophagus and stomach      Renal calculi  nephrostomy tube -6/2021      Cancer  left lung 2018, no chemo or rad rx and resection      Abnormal cholesterol test      Prostate cancer      [place] (hard of hearing)      Alzheimer disease      Cancer of esophagus  2017 RESECTION OF LOWER PORTION OF ESOPHAGUS      S/P lobectomy of lung  left ?JUNE 2018      S/P cystoscopy with ureteral stent placement  LEFT      Inguinal hernia  AS AN INFANT, RIGHT SIDE      History of esophagogastroduodenoscopy (EGD)  2019      H/O colonoscopy  2018      Prostate cancer  cyber knife intervention 2021          MEDICATIONS  (STANDING):  amoxicillin  875 milliGRAM(s)/clavulanate 1 Tablet(s) Oral two times a day  heparin   Injectable 5000 Unit(s) SubCutaneous every 12 hours  memantine 5 milliGRAM(s) Oral two times a day  pantoprazole    Tablet 40 milliGRAM(s) Oral two times a day  remdesivir  IVPB 100 milliGRAM(s) IV Intermittent every 24 hours  remdesivir  IVPB   IV Intermittent   sucralfate suspension 1 Gram(s) Oral four times a day    MEDICATIONS  (PRN):  acetaminophen     Tablet .. 650 milliGRAM(s) Oral every 6 hours PRN Temp greater or equal to 38C (100.4F), Mild Pain (1 - 3)  aluminum hydroxide/magnesium hydroxide/simethicone Suspension 30 milliLiter(s) Oral every 4 hours PRN Dyspepsia  melatonin 3 milliGRAM(s) Oral at bedtime PRN Insomnia  ondansetron Injectable 4 milliGRAM(s) IV Push every 8 hours PRN Nausea and/or Vomiting      Allergies  No Known Allergies      Review of Systems:   Cardiovascular:  No Chest Pain, No Palpitations  Respiratory:  No Cough, No Dyspnea  Gastrointestinal:  As described in HPI  Skin:  No Skin Lesions, No Jaundice  Neuro:  No Syncope, No Dizziness    Physical Examination:  T(C): 37.2 (07-20-23 @ 12:48), Max: 37.7 (07-19-23 @ 20:07)  HR: 54 (07-20-23 @ 12:48) (54 - 72)  BP: 96/59 (07-20-23 @ 12:48) (96/59 - 136/65)  RR: 18 (07-20-23 @ 12:48) (18 - 18)        GENERAL: AAOx2, no acute distress.  HEAD:  Atraumatic, Normocephalic  EYES: conjunctiva and sclera clear  NECK: Supple, no JVD or thyromegaly  CHEST/LUNG: Clear to auscultation bilaterally; No wheeze, rhonchi, or rales  HEART: Regular rate and rhythm; normal S1, S2, No murmurs.  ABDOMEN: Soft, nontender, nondistended; Bowel sounds present  NEUROLOGY: No asterixis or tremor.   SKIN: Intact, no jaundice     Data:                        9.7    5.94  )-----------( 154      ( 20 Jul 2023 07:42 )             34.5     Hgb trend:  9.7  07-20-23 @ 07:42  9.3  07-18-23 @ 07:18      07-20    134<L>  |  100  |  17  ----------------------------<  84  4.1   |  24  |  0.9    Ca    8.0<L>      20 Jul 2023 07:42  Mg     2.1     07-20    TPro  6.0  /  Alb  3.2<L>  /  TBili  0.5  /  DBili  0.2  /  AST  35  /  ALT  13  /  AlkPhos  106  07-20    Liver panel trend:  TBili 0.5   /   AST 35   /   ALT 13   /   AlkP 106   /   Tptn 6.0   /   Alb 3.2    /   DBili 0.2      07-20  TBili 0.5   /   AST 35   /   ALT 13   /   AlkP 111   /   Tptn 6.3   /   Alb 3.5    /   DBili 0.2      07-19  TBili 0.4   /   AST 30   /   ALT 12   /   AlkP 116   /   Tptn 6.5   /   Alb 3.7    /   DBili --      07-17  TBili 0.4   /   AST 35   /   ALT 11   /   AlkP 115   /   Tptn 6.5   /   Alb 3.5    /   DBili --      07-16  TBili 0.7   /   AST 32   /   ALT 11   /   AlkP 113   /   Tptn 6.0   /   Alb 3.5    /   DBili --      07-15  TBili 1.2   /   AST 20   /   ALT 7   /   AlkP 110   /   Tptn 6.2   /   Alb 3.5    /   DBili --      07-14  TBili 0.4   /   AST 28   /   ALT 9   /   AlkP 113   /   Tptn 6.7   /   Alb 3.7    /   DBili --      07-13      PT/INR - ( 20 Jul 2023 07:42 )   PT: 11.70 sec;   INR: 1.03 ratio         Culture - Blood (collected 18 Jul 2023 20:28)  Source: .Blood Blood  Preliminary Report (20 Jul 2023 02:02):    No growth at 24 hours       Radiology:   CT Angio Neck w/ IV Cont (07.13.23 @ 16:11) >  1.  No evidence of major vascular stenosis or occlusion.    2.  Right upper lobe pulmonary nodule measuring 5 mm, new since CTA chest   6/19/2021. Recommend a dedicated chest CT on an outpatient/elective basis.         Gastroenterology progress note:     Patient is a 73y old  Male who presents with a chief complaint of Dizziness and giddiness     (20 Jul 2023 12:30)       Admitted on: 07-13-23    We are following the patient for: acute on chronic ELBERT       Interval History: Hb stable, Patient positive COVID, AMS        PAST MEDICAL & SURGICAL HISTORY:  Hypertension, unspecified type      Cancer  ESOPHAGEAL CANCER 2017 RECEIVED CHEMO AND RADIATION and endoscopic resection partial oesophagus and stomach      Renal calculi  nephrostomy tube -6/2021      Cancer  left lung 2018, no chemo or rad rx and resection      Abnormal cholesterol test      Prostate cancer      [place] (hard of hearing)      Alzheimer disease      Cancer of esophagus  2017 RESECTION OF LOWER PORTION OF ESOPHAGUS      S/P lobectomy of lung  left ?JUNE 2018      S/P cystoscopy with ureteral stent placement  LEFT      Inguinal hernia  AS AN INFANT, RIGHT SIDE      History of esophagogastroduodenoscopy (EGD)  2019      H/O colonoscopy  2018      Prostate cancer  cyber knife intervention 2021          MEDICATIONS  (STANDING):  amoxicillin  875 milliGRAM(s)/clavulanate 1 Tablet(s) Oral two times a day  heparin   Injectable 5000 Unit(s) SubCutaneous every 12 hours  memantine 5 milliGRAM(s) Oral two times a day  pantoprazole    Tablet 40 milliGRAM(s) Oral two times a day  remdesivir  IVPB 100 milliGRAM(s) IV Intermittent every 24 hours  remdesivir  IVPB   IV Intermittent   sucralfate suspension 1 Gram(s) Oral four times a day    MEDICATIONS  (PRN):  acetaminophen     Tablet .. 650 milliGRAM(s) Oral every 6 hours PRN Temp greater or equal to 38C (100.4F), Mild Pain (1 - 3)  aluminum hydroxide/magnesium hydroxide/simethicone Suspension 30 milliLiter(s) Oral every 4 hours PRN Dyspepsia  melatonin 3 milliGRAM(s) Oral at bedtime PRN Insomnia  ondansetron Injectable 4 milliGRAM(s) IV Push every 8 hours PRN Nausea and/or Vomiting      Allergies  No Known Allergies      Review of Systems:   Cardiovascular:  No Chest Pain, No Palpitations  Respiratory:  No Cough, No Dyspnea  Gastrointestinal:  As described in HPI  Skin:  No Skin Lesions, No Jaundice  Neuro:  No Syncope, No Dizziness    Physical Examination:  T(C): 37.2 (07-20-23 @ 12:48), Max: 37.7 (07-19-23 @ 20:07)  HR: 54 (07-20-23 @ 12:48) (54 - 72)  BP: 96/59 (07-20-23 @ 12:48) (96/59 - 136/65)  RR: 18 (07-20-23 @ 12:48) (18 - 18)        GENERAL: AAOx2, no acute distress.  HEAD:  Atraumatic, Normocephalic  EYES: conjunctiva and sclera clear  NECK: Supple, no JVD or thyromegaly  CHEST/LUNG: Clear to auscultation bilaterally; No wheeze, rhonchi, or rales  HEART: Regular rate and rhythm; normal S1, S2, No murmurs.  ABDOMEN: Soft, nontender, nondistended; Bowel sounds present  NEUROLOGY: No asterixis or tremor.   SKIN: Intact, no jaundice     Data:                        9.7    5.94  )-----------( 154      ( 20 Jul 2023 07:42 )             34.5     Hgb trend:  9.7  07-20-23 @ 07:42  9.3  07-18-23 @ 07:18      07-20    134<L>  |  100  |  17  ----------------------------<  84  4.1   |  24  |  0.9    Ca    8.0<L>      20 Jul 2023 07:42  Mg     2.1     07-20    TPro  6.0  /  Alb  3.2<L>  /  TBili  0.5  /  DBili  0.2  /  AST  35  /  ALT  13  /  AlkPhos  106  07-20    Liver panel trend:  TBili 0.5   /   AST 35   /   ALT 13   /   AlkP 106   /   Tptn 6.0   /   Alb 3.2    /   DBili 0.2      07-20  TBili 0.5   /   AST 35   /   ALT 13   /   AlkP 111   /   Tptn 6.3   /   Alb 3.5    /   DBili 0.2      07-19  TBili 0.4   /   AST 30   /   ALT 12   /   AlkP 116   /   Tptn 6.5   /   Alb 3.7    /   DBili --      07-17  TBili 0.4   /   AST 35   /   ALT 11   /   AlkP 115   /   Tptn 6.5   /   Alb 3.5    /   DBili --      07-16  TBili 0.7   /   AST 32   /   ALT 11   /   AlkP 113   /   Tptn 6.0   /   Alb 3.5    /   DBili --      07-15  TBili 1.2   /   AST 20   /   ALT 7   /   AlkP 110   /   Tptn 6.2   /   Alb 3.5    /   DBili --      07-14  TBili 0.4   /   AST 28   /   ALT 9   /   AlkP 113   /   Tptn 6.7   /   Alb 3.7    /   DBili --      07-13      PT/INR - ( 20 Jul 2023 07:42 )   PT: 11.70 sec;   INR: 1.03 ratio         Culture - Blood (collected 18 Jul 2023 20:28)  Source: .Blood Blood  Preliminary Report (20 Jul 2023 02:02):    No growth at 24 hours       Radiology:   CT Angio Neck w/ IV Cont (07.13.23 @ 16:11) >  1.  No evidence of major vascular stenosis or occlusion.    2.  Right upper lobe pulmonary nodule measuring 5 mm, new since CTA chest   6/19/2021. Recommend a dedicated chest CT on an outpatient/elective basis.

## 2023-07-20 NOTE — PROGRESS NOTE ADULT - SUBJECTIVE AND OBJECTIVE BOX
MEDICINE ATTENDING PROGRESS NOTE  73 year old male with a history of Dementia, HTN, HLD, Depression, Esophageal Cancer s/p resection and gastric pull,, Lung Ca s/p lobectomy, Prostate Cancer s/p Cyberknife presented to the ED with the complaint of dizziness, found to be anemia s/p 1 u pRBC now admitted for workup of his anemia. Admission was complicated by COVID+    Interval/Overnight Events  - Today, patient has no complains; no o/n events  - There is a sitter at bedside for safety;  crawling out of bed  - Pt was found to have COVID  - O/E was benign  - Vitals stable; spike a fever overnight in setting of COVID  - labs benign  - Imaging significant for  Right upper lobe pulmonary nodule measuring 5 mm, new since CTA chest 6/19/2021.  Recommend a dedicated chest CT on an outpatient/elective basis.  - Pending repeating EGD with possible Colonoscopy by GI once patient is COVID negative and UTI free  - Palliative care for GOC discussion given patient condition.    ROS  General: Denies fevers, chills, nightsweats, weight loss  HEENT: Denies headache, rhinorrhea, sore throat, eye pain  CV: Denies CP, palpitations  PULM: Denies SOB, cough  GI: Denies abdominal pain, diarrhea  : Denies dysuria, hematuria  MSK: Denies arthralgias  SKIN: Denies rash   NEURO: Denies paresthesias, weakness  PSYCH: Denies depression    MEDICATIONS  (STANDING):  amoxicillin  875 milliGRAM(s)/clavulanate 1 Tablet(s) Oral two times a day  heparin   Injectable 5000 Unit(s) SubCutaneous every 12 hours  memantine 5 milliGRAM(s) Oral two times a day  pantoprazole    Tablet 40 milliGRAM(s) Oral two times a day  remdesivir  IVPB   IV Intermittent   remdesivir  IVPB 100 milliGRAM(s) IV Intermittent every 24 hours    MEDICATIONS  (PRN):  acetaminophen     Tablet .. 650 milliGRAM(s) Oral every 6 hours PRN Temp greater or equal to 38C (100.4F), Mild Pain (1 - 3)  aluminum hydroxide/magnesium hydroxide/simethicone Suspension 30 milliLiter(s) Oral every 4 hours PRN Dyspepsia  melatonin 3 milliGRAM(s) Oral at bedtime PRN Insomnia  ondansetron Injectable 4 milliGRAM(s) IV Push every 8 hours PRN Nausea and/or Vomiting    ANTIBIOTICS:  amoxicillin  875 milliGRAM(s)/clavulanate 1 Tablet(s) Oral two times a day  remdesivir  IVPB 100 milliGRAM(s) IV Intermittent every 24 hours  remdesivir  IVPB   IV Intermittent     VITALS:  T(F): 98.5, Max: 100.2 (07-19-23 @ 13:00)  HR: 58  BP: 130/68  RR: 18Vital Signs Last 24 Hrs  T(C): 36.9 (20 Jul 2023 05:15), Max: 37.9 (19 Jul 2023 13:00)  T(F): 98.5 (20 Jul 2023 05:15), Max: 100.2 (19 Jul 2023 13:00)  HR: 58 (20 Jul 2023 05:15) (58 - 72)  BP: 130/68 (20 Jul 2023 05:15) (130/68 - 138/65)  BP(mean): --  RR: 18 (20 Jul 2023 05:15) (18 - 18)  SpO2: --     I&O's Summary    PHYSICAL EXAM:  Gen: NAD, resting in bed  HEENT: Normocephalic, atraumatic  Neck: supple, no lymphadenopathy  CV: Regular rate & regular rhythm  Lungs: CTABL no wheeze  Abdomen: Soft, NTND+ BS present  Ext: Warm, well perfused no CCE  Neuro: non focal, awake, CN II-XII intact   Skin: no rash, no erythema  Psych: no SI, HI, Hallucination     LABS/MICROBIOLOGY:  07-20    134<L>  |  100  |  17  ----------------------------<  84  4.1   |  24  |  0.9    Ca    8.0<L>      20 Jul 2023 07:42  Mg     2.1     07-20    TPro  6.0  /  Alb  3.2<L>  /  TBili  0.5  /  DBili  0.2  /  AST  35  /  ALT  13  /  AlkPhos  106  07-20    LIVER FUNCTIONS - ( 20 Jul 2023 07:42 )  Alb: 3.2 g/dL / Pro: 6.0 g/dL / ALK PHOS: 106 U/L / ALT: 13 U/L / AST: 35 U/L / GGT: x           PT/INR - ( 20 Jul 2023 07:42 )   PT: 11.70 sec;   INR: 1.03 ratio         MICROBIOLOGY  Urinalysis Basic - ( 20 Jul 2023 07:42 )  Color: x / Appearance: x / SG: x / pH: x  Gluc: 84 mg/dL / Ketone: x  / Bili: x / Urobili: x   Blood: x / Protein: x / Nitrite: x   Leuk Esterase: x / RBC: x / WBC x   Sq Epi: x / Non Sq Epi: x / Bacteria: x    Culture - Blood (collected 07-18-23 @ 20:28)  Source: .Blood Blood  Preliminary Report (07-20-23 @ 02:02):    No growth at 24 hours    Culture - Urine (collected 07-16-23 @ 08:25)  Source: Clean Catch Clean Catch (Midstream)  Preliminary Report (07-18-23 @ 19:18):    >100,000 CFU/ml Enterococcus faecalis  Organism: Enterococcus faecalis (07-19-23 @ 19:58)  Organism: Enterococcus faecalis (07-19-23 @ 19:58)      Method Type: JOSH      -  Ampicillin: S <=2 Predicts results to ampicillin/sulbactam, amoxacillin-clavulanate and  piperacillin-tazobactam.      -  Ciprofloxacin: S <=1      -  Levofloxacin: S 1      -  Nitrofurantoin: S <=32 Should not be used to treat pyelonephritis.      -  Tetracycline: R >8      -  Vancomycin: S 4    COVID-19 PCR: Detected (18 Jul 2023 17:34)    IMAGING:  Xray Chest 1 View- PORTABLE-Routine (Xray Chest 1 View- PORTABLE-Routine .) (07.18.23 @ 19:46)   Biapical opacities/scarring without significant change. No significant change in left basilar opacity/effusion.    CTA NECK:  The visualized aortic arch and great vessel origins are patent. There is a direct origin of the left vertebral artery from the aortic arch, normal variant.  The common, internal and external carotid arteries are patent.  The vertebral arteries are patent. The right vertebral artery is dominant.    CTA HEAD:  The distal internal carotid arteries are patent. The anterior and middle cerebral arteries are patent. There is a hypoplastic A1 segment of the right [place], normal variant.  The distal vertebral arteries are patent.  The basilar artery is patent though diffusely diminutive in caliber felt to reflect a dominant anterior circulation. The posterior cerebral arteries are patent.    OTHER:  Retained secretions are seen within the esophagus.  There is emphysema with bullous changes of the lung apices.  There is a right upper lobe a nodule measuring 5 mm on series 4 image 8, new since the CTA chest dated 6/19/2021.    IMPRESSION:  1.  No evidence of major vascular stenosis or occlusion.  2.  Right upper lobe pulmonary nodule measuring 5 mm, new since CTA chest 6/19/2021. Recommend a dedicated chest CT on an outpatient/elective basis.    EGD (07.06.21 @ 10:00)  impressions:    Ulcers in the lower third of the esophagus. (Biopsy).    Post surgical anatomy with gastric pull-up-.    Erythema in the stomach compatible with non-erosive gastritis. (Biopsy).    Normal mucosa inthe whole examined duodenum.     CARDIOLOGY TESTING  12 Lead ECG:   Ventricular Rate 59 BPM  Atrial Rate 59 BPM  P-R Interval 164 ms  QRS Duration 88 ms  Q-T Interval 458 ms  QTC Calculation(Bazett) 453 ms  P Axis 76 degrees  R Axis 24 degrees  T Axis 36 degrees    Diagnosis Line Sinus bradycardia with sinus arrhythmia  Otherwise normal ECG    Confirmed by CLAUDIA PATRICK MD ([id number]) on 7/14/2023 7:02:19 AM (07-13-23 @ 15:12)    ASSESSMENT/PLAN:  73 year old male with a history of Dementia, HTN, HLD, Depression, Esophageal Cancer s/p resection and gastric pull,, Lung Ca s/p lobectomy, Prostate Cancer s/p Cyberknife presented to the ED with the complaint of dizziness, found to be anemia s/p 1 u pRBC now admitted for workup of his anemia. Admission was complicated by COVID+    #Generalized Weakness/Diziness due to Acute Blood Loss Anemia in setting of Ulcers in the lower third of the esophagus  #Microcytic Anemia due to Esophageal Ulcers  - Hgb 7.1 (on admission) s/p 1 unit pRBCs, now 9.3 (baseline Hgb ~12)  - Iron studies (7/13/23): Iron 20, TIBC 409, % sat 5, Ferritin 11 c/w ELBERT  - EGD 7/6/21 for odynophagia: Ulcers in the lower third of the esophagus, Erythema in the stomach compatible with non-erosive gastritis, Normal mucosa in the whole examined duodenum. (bx negative for H pylori, ulcer bx : focal active esophagitis, fibropurulent exudate)  - s/p IV venofer  - c/w Carafate Suspension 1g po qid  - c/w Protonix 40 mg po bid  - transfuse as needed, keep Hgb >7  - ANGIE negative   - GI planned to repeat EGD with possible Colonoscopy once patient is COVID negative and UTI free    #COVID positive  - Patient is in clinically asymptomatic; On RA  - Unknown vaccination status  - CXR benign  - On contact and airborne iso  - ID: Pt is possibly in the early viral replicative phase based on the timeline/onset of symptoms:  mg iv on Day 1, then 100 mg iv D2 and D3 but no steroids    #Acutely worse mental status likely due to E.faecalis UTI  #Acute Metabolic Encephalopathy  - Electrolytes are stable, patient is afebrile, WBC normal, exam notable for course breath sounds and worsened mental status  - TSH 3.2 (7/14)  - E.faecalis on Ucx (7/16) sensitive to augmentin  - c/w augmentin 875 mg bid for 10 days for UTI  - Appreciate ID recs    # Dementia/Alzheimers  - a/w off balance while ambulating  - CTH, CTA head and neck: no acute findings  - c/w home Memantine 5MG po bid  - all precautions  - PT consult:pending  - Neurology follows    # Hypernatremia (resolved)   - Na 143 (7/17) -> 140 (7/18)  - s/p 0.45% NS 60 cc/hr  X 24 hrs    # Pulmonary nodule on imaging  - Right upper lobe pulmonary nodule measuring 5 mm, new since CTA chest 6/19/2021.  - OP f/u    # H/O Hypertension/H/O HLD  - BP controlled, not on any meds at home  - Lipid profile WNL  - A1c 5.7%    # H/O Anxiety/Depression  - Not on any home meds    # H/O Prostate Ca  - Not on any home meds    #Supportive Management:  Dispo:   DVT Ppx: heparin   Injectable 5000 Unit(s) SubCutaneous every 12 hours  GI Ppx: pantoprazole    Tablet 40 milliGRAM(s) Oral two times a day  Diet:  Diet, DASH/TLC:   Sodium & Cholesterol Restricted (07-13-23 @ 21:14) [Active]    Total time spent to complete patient's bedside assessment, review medical chart, discuss medical plan of care with covering medical team was more than 45 minutes with >50% of time spent face to face with patient, discussion with patient/family and/or coordination of care    Sukh Oconnor MD/Cipriano  Attending Physician

## 2023-07-20 NOTE — DIETITIAN INITIAL EVALUATION ADULT - NSICDXPASTMEDICALHX_GEN_ALL_CORE_FT
PAST MEDICAL HISTORY:  Abnormal cholesterol test     Alzheimer disease     Cancer ESOPHAGEAL CANCER 2017 RECEIVED CHEMO AND RADIATION and endoscopic resection partial oesophagus and stomach    Cancer left lung 2018, no chemo or rad rx and resection    [place]     Hypertension, unspecified type     Prostate cancer     Renal calculi nephrostomy tube -6/2021

## 2023-07-20 NOTE — DIETITIAN INITIAL EVALUATION ADULT - ADD RECOMMEND
1. ADD Ensure Plus HP 3X/DAILY to optimize kcal/pro intake -- provides 1050 kcal/day total, 60g pro/day total  2. Continue with current diet order  3. Encourage PO intake and assist during meals prn    Pt is at moderate nutrition risk; will f/u in 5-7 days or prn.

## 2023-07-20 NOTE — CONSULT NOTE ADULT - SUBJECTIVE AND OBJECTIVE BOX
MADELINESERINA  73y, Male  Allergy: No Known Allergies      All historical available data reviewed.    HPI:  A 73 year old male with a history of Dementia, HTN, HLD, Depression, Esophageal Cancer s/p resection and gastric pull,, Lung Ca s/p lobectomy, Prostate Cancer s/p Cyberknife presented to the ED with the complaint of dizziness. Dizziness started in the morning likely around 8:30am described as lightheadedness and off balance causing him to feel unstable ambulating. History is limited as the patient has baseline mod/severe dementia. The daughter Ric ([phone]) mentioned that the patient was complaining of feeling dizzy in the morning (unclear if it's room spinning sensation vs lightheadedness) and was unsteady when the family attempted to make him walk. They thought initially it's related to the low temperature in his room so they adjusted that but he kept feeling dizzy so they brought him to ED. The daughter helps the patient with ADLs na managing his bills. She reports he still drives sometime although family have expressed concerns about his safety but he insists to drive. The patient denies fever, chest pain, SOB, headache, vision changes, weakness, nausea, vomiting, tremors, abdominal pain, constipation, dysuria, hematuria, lower extremity swelling or rash.    #ED vitals:  T(C): --  HR: 62 (07-13-23 @ 11:55) (62 - 62)  BP: 136/75 (07-13-23 @ 11:55) (136/75 - 136/75)  RR: 16 (07-13-23 @ 11:55) (16 - 16)  SpO2: 100% (07-13-23 @ 11:55) (100% - 100%)    #Labs significant for:  Hb 7.3(was 11.4 in 8/22, baseline of 12-14), MCV 6, Serum Iron 20(low), Unsaturated (high), % saturation 5(low), Pro-    #Imaging:  *CT Angio Head/Neck w/ IV Cont  1.  No evidence of major vascular stenosis or occlusion.  2.  Right upper lobe pulmonary nodule measuring 5 mm, new since CTA chest 6/19/2021. Recommend a dedicated chest CT on an outpatient/elective basis.    *CT Head No Cont  No acute intracranial pathology. No evidence of midline shift, mass effect or intracranial hemorrhage.  Mild chronic microvascular type changes.    #S/P: Meclizine 12.5mg PO x 1, and PRBC 1 Unit in the ED.    *Patient seen by neuro team for dizziness in the ED.    Patient is being admitted to Medicine for further management.         (13 Jul 2023 20:26)    FAMILY HISTORY:  Dementia (Mother)      PAST MEDICAL & SURGICAL HISTORY:  Hypertension, unspecified type      Cancer  ESOPHAGEAL CANCER 2017 RECEIVED CHEMO AND RADIATION and endoscopic resection partial oesophagus and stomach      Renal calculi  nephrostomy tube -6/2021      Cancer  left lung 2018, no chemo or rad rx and resection      Abnormal cholesterol test      Prostate cancer      [place] (hard of hearing)      Alzheimer disease      Cancer of esophagus  2017 RESECTION OF LOWER PORTION OF ESOPHAGUS      S/P lobectomy of lung  left ?JUNE 2018      S/P cystoscopy with ureteral stent placement  LEFT      Inguinal hernia  AS AN INFANT, RIGHT SIDE      History of esophagogastroduodenoscopy (EGD)  2019      H/O colonoscopy  2018      Prostate cancer  cyber knife intervention 2021            VITALS:  T(F): 98.5, Max: 100.2 (07-19-23 @ 13:00)  HR: 58  BP: 130/68  RR: 18Vital Signs Last 24 Hrs  T(C): 36.9 (20 Jul 2023 05:15), Max: 37.9 (19 Jul 2023 13:00)  T(F): 98.5 (20 Jul 2023 05:15), Max: 100.2 (19 Jul 2023 13:00)  HR: 58 (20 Jul 2023 05:15) (58 - 72)  BP: 130/68 (20 Jul 2023 05:15) (130/68 - 138/65)  BP(mean): --  RR: 18 (20 Jul 2023 05:15) (18 - 18)  SpO2: --        TESTS & MEASUREMENTS:    07-19    x   |  x   |  x   ----------------------------<  x   x    |  x   |  0.9      TPro  6.3  /  Alb  3.5  /  TBili  0.5  /  DBili  0.2  /  AST  35  /  ALT  13  /  AlkPhos  111  07-19    LIVER FUNCTIONS - ( 19 Jul 2023 17:39 )  Alb: 3.5 g/dL / Pro: 6.3 g/dL / ALK PHOS: 111 U/L / ALT: 13 U/L / AST: 35 U/L / GGT: x             Culture - Blood (collected 07-18-23 @ 20:28)  Source: .Blood Blood  Preliminary Report (07-20-23 @ 02:02):    No growth at 24 hours    Culture - Urine (collected 07-16-23 @ 08:25)  Source: Clean Catch Clean Catch (Midstream)  Preliminary Report (07-18-23 @ 19:18):    >100,000 CFU/ml Enterococcus faecalis  Organism: Enterococcus faecalis (07-19-23 @ 19:58)  Organism: Enterococcus faecalis (07-19-23 @ 19:58)      Method Type: JOSH      -  Ampicillin: S <=2 Predicts results to ampicillin/sulbactam, amoxacillin-clavulanate and  piperacillin-tazobactam.      -  Ciprofloxacin: S <=1      -  Levofloxacin: S 1      -  Nitrofurantoin: S <=32 Should not be used to treat pyelonephritis.      -  Tetracycline: R >8      -  Vancomycin: S 4            RADIOLOGY & ADDITIONAL TESTS:  Personal review of radiological diagnostics performed  Echo and EKG results noted when applicable.     MEDICATIONS:  acetaminophen     Tablet .. 650 milliGRAM(s) Oral every 6 hours PRN  aluminum hydroxide/magnesium hydroxide/simethicone Suspension 30 milliLiter(s) Oral every 4 hours PRN  amoxicillin  875 milliGRAM(s)/clavulanate 1 Tablet(s) Oral two times a day  heparin   Injectable 5000 Unit(s) SubCutaneous every 12 hours  melatonin 3 milliGRAM(s) Oral at bedtime PRN  memantine 5 milliGRAM(s) Oral two times a day  ondansetron Injectable 4 milliGRAM(s) IV Push every 8 hours PRN  pantoprazole    Tablet 40 milliGRAM(s) Oral two times a day  remdesivir  IVPB 100 milliGRAM(s) IV Intermittent every 24 hours  remdesivir  IVPB   IV Intermittent       ANTIBIOTICS:  amoxicillin  875 milliGRAM(s)/clavulanate 1 Tablet(s) Oral two times a day  remdesivir  IVPB 100 milliGRAM(s) IV Intermittent every 24 hours  remdesivir  IVPB   IV Intermittent

## 2023-07-20 NOTE — CHART NOTE - NSCHARTNOTEFT_GEN_A_CORE
Provider:                                              Met with: [x  ] Patient  [ x  ] Family  [   ] Other:         Primary Language: [  x ] English [   ] Other*:                      *Interpretation provided by:         SUPPORT DIAGNOSES            (Check all that apply)         [   ] EOL issues    [  x ] Advanced Illness    [   ] Cultural / spiritual concerns    [   ] Pain / suffering    [ x  ] Dementia / AMS    [   ] Other:    [  x ] AD issues    [   ] Grief / loss / sadness    [   ] Discharge issues    [   ] Distress / coping         PSYCHOSOCIAL ASSESSMENT OF PATIENT         (Check all that apply)         [ X  ] Initial Assessment            [   ] Reassessment          [   ] Not Applicable this visit         Pain/suffering acuity:    [  x ] None to mild (0-3)           [   ] Moderate (4-6)        [   ] High (7-10)         Mental Status:    [   ] Alert/oriented (x3)          [ x  ] Confused/Altered(x2/x1)         [   ] Non-resp         Functional status:    [   ] Independent w ADLs      [ x  ] Needs Assistance             [   ] Bedbound/Full Care         Coping:    [ x  ] Coping well                     [  ] Coping w/difficulty            [   ] Poor coping         Support system:    [x   ] Strong                              [   ] Adequate                        [   ] Inadequate              Past history and medications for:         [ ] Anxiety       [ ] Depression    [ ] Sleep disorders              SERVICE PROVIDED    [   ]Discharge support / facilitation    [  x ]AD / goals of care counseling                                  [   ]EOL / death / bereavement counseling    [ X  ]Counseling / support                                                [   ] Family meeting    [   ]Prayer / sacrament / ritual                                      [   ] Referral    [   ]Other                                                                           NOTE and Plan of Care (PoC):    HPI: 73M with PMH of dementia, HTN, HLD< depression, esophageal CA s/p resection, lung CA s/p lobectomy, prostate CA s/p cyberknfe, here with lightheadedness, and found to have anemia from esophageal ulcers, s/p pRBC. GI also planning for repeat EGD and possible colonoscopy. Patient's course c/b COVID +, on remdesivir, clinically asymptomatic. Also found to have E. faecalis UTI, on augmentin. Patient is full code. Palliative care consulted for GOC.  LMSW met with PT at bedside. No family present at time of assessment. Pt was alseep and resting comfortably. LMSW spoke with Pt's NOK/ daughter: Ric and introduced Palliative Care role. Pt's daughter verbalized understanding. Daughter reported Pt's  medical course and noted a decline in overall health leading to Pt's current admission. GOC discussed and Pt's daughter in agreement with DNR/DNI. MOLST completed and placed in chart. Palliative Care contact provided. Will continue to follow for on-going support. [phone]

## 2023-07-20 NOTE — CONSULT NOTE ADULT - SUBJECTIVE AND OBJECTIVE BOX
HPI: 73M with PMH of dementia, HTN, HLD< depression, esophageal CA s/p resection, lung CA s/p lobectomy, prostate CA s/p cyberknfe, here with lightheadedness, and found to have anemia from esophageal ulcers, s/p pRBC. GI also planning for repeat EGD and possible colonoscopy. Patient's course c/b COVID +, on remdesivir, clinically asymptomatic. Also found to have E. faecalis UTI, on augmentin. Patient is full code. Palliative care consulted for GOC.    PERTINENT PM/SXH:   Hypertension, unspecified type    Cancer    Renal calculi    Cancer    Abnormal cholesterol test    Prostate cancer    [place] (hard of hearing)    Alzheimer disease      Cancer of esophagus    S/P lobectomy of lung    S/P cystoscopy with ureteral stent placement    Inguinal hernia    History of esophagogastroduodenoscopy (EGD)    H/O colonoscopy    Prostate cancer      FAMILY HISTORY:  Dementia (Mother)      ITEMS NOT CHECKED ARE NOT PRESENT    SOCIAL HISTORY:   Significant other/partner[ ]  Children[ X]  Taoist/Spirituality:  Substance hx:  [ ]   Tobacco hx:  [ ]   Alcohol hx: [ ]   Living Situation: [X ]Home  [ ]Long term care  [ ]Rehab [ ]Other  Home Services: [ ] HHA [ ] Visting RN [ ] Hospice  Occupation:  Home Opioid hx:  [ ] Y [ ] N [ ] I-Stop Reference No:     ADVANCE DIRECTIVES:    [ ] Full Code [ ] DNR  MOLST  [ ]  Living Will  [ ]   DECISION MAKER(s):  [ ] Health Care Proxy(s)  [ ] Surrogate(s)  [ ] Guardian           Name(s): Phone Number(s):  jorge Larios ([phone])    BASELINE (I)ADL(s) (prior to admission):  [place]: [ ]Total  [ ] Moderate [ ]Dependent  Palliative Performance Status Version 2:         %    http://npcrc.org/files/news/palliative_performance_scale_ppsv2.pdf    Allergies    No Known Allergies    Intolerances    MEDICATIONS  (STANDING):  amoxicillin  875 milliGRAM(s)/clavulanate 1 Tablet(s) Oral two times a day  heparin   Injectable 5000 Unit(s) SubCutaneous every 12 hours  memantine 5 milliGRAM(s) Oral two times a day  pantoprazole    Tablet 40 milliGRAM(s) Oral two times a day  remdesivir  IVPB 100 milliGRAM(s) IV Intermittent every 24 hours  remdesivir  IVPB   IV Intermittent   sucralfate suspension 1 Gram(s) Oral four times a day    MEDICATIONS  (PRN):  acetaminophen     Tablet .. 650 milliGRAM(s) Oral every 6 hours PRN Temp greater or equal to 38C (100.4F), Mild Pain (1 - 3)  aluminum hydroxide/magnesium hydroxide/simethicone Suspension 30 milliLiter(s) Oral every 4 hours PRN Dyspepsia  melatonin 3 milliGRAM(s) Oral at bedtime PRN Insomnia  ondansetron Injectable 4 milliGRAM(s) IV Push every 8 hours PRN Nausea and/or Vomiting      PRESENT SYMPTOMS: [ ]Unable to obtain due to poor mentation   Source if other than patient:  [ ]Family   [ ]Team     Pain: [ ]yes [ ]no  QOL impact -   Location -                    Aggravating factors -  Quality -  Radiation -  Timing-  Severity (0-10 scale):  Minimal acceptable level (0-10 scale):     CPOT:    https://www.[facility].org/getattachment/[email or web address]/Critical-Care-Pain-Observation-Tool-(CPOT)    PAIN AD Score:   http://geriatrictoolkit.[place].[facility]/cog/painad.pdf (press ctrl +  left click to view)    Dyspnea:                           [ ]None[ ]Mild [ ]Moderate [ ]Severe     Respiratory Distress Observation Scale (RDOS):   A score of 0 to 2 signifies little or no respiratory distress, 3 signifies mild distress, scores 4 to 6 indicate moderate distress, and scores greater than 7 signify severe distress  https://www.[facility].ca/sites/default/files/PDFS/138308-hwgmoywxyqi-xcbcefwc-tsjhxdriwux-ljsgk.pdf    Anxiety:                             [ ]None[ ]Mild [ ]Moderate [ ]Severe   Fatigue:                             [ ]None[ ]Mild [ ]Moderate [ ]Severe   Nausea:                             [ ]None[ ]Mild [ ]Moderate [ ]Severe   Loss of appetite:              [ ]None[ ]Mild [ ]Moderate [ ]Severe   Constipation:                    [ ]None[ ]Mild [ ]Moderate [ ]Severe    Other Symptoms:  [ ]All other review of systems negative     Palliative Performance Status Version 2:         %    http://npcrc.org/files/news/palliative_performance_scale_ppsv2.pdf  PHYSICAL EXAM:  Vital Signs Last 24 Hrs  T(C): 36.9 (20 Jul 2023 05:15), Max: 37.9 (19 Jul 2023 13:00)  T(F): 98.5 (20 Jul 2023 05:15), Max: 100.2 (19 Jul 2023 13:00)  HR: 58 (20 Jul 2023 05:15) (58 - 72)  BP: 130/68 (20 Jul 2023 05:15) (130/68 - 138/65)  BP(mean): --  RR: 18 (20 Jul 2023 05:15) (18 - 18)  SpO2: --     I&O's Summary      GENERAL:  [X ] No acute distress [ ]Lethargic  [ ]Unarousable  [ ]Verbal  [ ]Non-Verbal [ ]Cachexia    BEHAVIORAL/PSYCH:  [ ]Alert and Oriented x  [ ] Anxiety [ ] Delirium [ ] Agitation [X ] Calm   EYES: [ ] No scleral icterus [ ] Scleral icterus [ ] Closed  ENMT:  [ ]Dry mouth  [ ]No external oral lesions [ ] No external ear or nose lesions  CARDIOVASCULAR:  [ ]Regular [ ]Irregular [ ]Tachy [ ]Not Tachy  [ ]Jas [ ] Edema [ ] No edema  PULMONARY:  [ ]Tachypnea  [ ]Audible excessive secretions [X ] No labored breathing [ ] labored breathing  GASTROINTESTINAL: [ ]Soft  [ ]Distended  [ X]Not distended [ ]Non tender [ ]Tender  MUSCULOSKELETAL: [ ]No clubbing [ ] clubbing  [ ] No cyanosis [ ] cyanosis  NEUROLOGIC: [ ]No focal deficits  [ ]Follows commands  [ ]Does not follow commands  [ ]Cognitive impairment  [ ]Dysphagia  [ ]Dysarthria  [ ]Paresis   SKIN: [ ] Jaundiced [X ] Non-jaundiced [ ]Rash [ ]No Rash [ ] Warm [ ] Dry  MISC/LINES: [ ] ET tube [ ] Trach [ ]NGT/OGT [ ]PEG [ ]Roman    CRITICAL CARE:  [ ] Shock Present  [ ]Septic [ ]Cardiogenic [ ]Neurologic [ ]Hypovolemic  [ ]  Vasopressors [ ]  Inotropes   [ ]Respiratory failure present [ ]Mechanical ventilation [ ]Non-invasive ventilatory support [ ]High flow  [ ]Acute  [ ]Chronic [ ]Hypoxic  [ ]Hypercarbic [ ]Other  [ ]Other organ failure     LABS: reviewed by me                        9.7    5.94  )-----------( 154      ( 20 Jul 2023 07:42 )             34.5   07-20    134<L>  |  100  |  17  ----------------------------<  84  4.1   |  24  |  0.9    Ca    8.0<L>      20 Jul 2023 07:42  Mg     2.1     07-20    TPro  6.0  /  Alb  3.2<L>  /  TBili  0.5  /  DBili  0.2  /  AST  35  /  ALT  13  /  AlkPhos  106  07-20  PT/INR - ( 20 Jul 2023 07:42 )   PT: 11.70 sec;   INR: 1.03 ratio             Urinalysis Basic - ( 20 Jul 2023 07:42 )    Color: x / Appearance: x / SG: x / pH: x  Gluc: 84 mg/dL / Ketone: x  / Bili: x / Urobili: x   Blood: x / Protein: x / Nitrite: x   Leuk Esterase: x / RBC: x / WBC x   Sq Epi: x / Non Sq Epi: x / Bacteria: x      RADIOLOGY & ADDITIONAL STUDIES: reviewed by me    CXR 7/18/23    Biapical opacities/scarring without significant change. No significant   change in left basilar opacity/effusion.    PROTEIN CALORIE MALNUTRITION PRESENT: [ ]mild [ ]moderate [ ]severe [ ]underweight [ ]morbid obesity  https://www.andeal.org/vault/2440/web/files/ONC/Table_Clinical%20Characteristics%20to%20Document%20Malnutrition-White%20JV%20et%20al%202012.pdf    Height (cm): 182.9 (07-13-23 @ 11:55), 177.8 (08-15-22 @ 13:31)  Weight (kg): 67.993 (07-13-23 @ 11:55), 56.7 (08-15-22 @ 13:31)  BMI (kg/m2): 20.3 (07-13-23 @ 11:55), 17.9 (08-15-22 @ 13:31)    [ ]PPSV2 < or = to 30% [ ]significant weight loss  [ ]poor nutritional intake  [ ]anasarca      [ ]Artificial Nutrition      Palliative Care Spiritual/Emotional Screening Tool Question  Severity (0-4):                    OR                    [ ] Unable to determine/NA  Score of 2 or greater indicates recommendation of Chaplaincy referral  Chaplaincy Referral: [ ] Yes [ ] Refused [ ] Following     Caregiver [place]:  [ ] Yes [ ] No [ ] Deferred  Social Work Referral [ ]  Patient and Family Centered Care Referral [ ]    Anticipatory Grief Present: [ ] Yes [ ] No [ ] Deferred  Social Work Referral [ ]  Patient and Family Centered Care Referral [ ]    REFERRALS:   [ ]Chaplaincy  [ ]Hospice  [ ]Child Life  [ ]Social Work  [ ]Case management [ ]Holistic Therapy     Palliative care education provided to patient and/or family    Goals of Care Document:     ______________  Malachi Hoffmann MD  Palliative Medicine  [facility]   of Geriatric and Palliative Medicine  [phone]   HPI: 73M with PMH of dementia, HTN, HLD, depression, esophageal CA s/p resection, lung CA s/p lobectomy, prostate CA s/p cyberknfe, here with lightheadedness, and found to have anemia from esophageal ulcers, s/p pRBC. GI also planning for repeat EGD and possible colonoscopy. Patient's course c/b COVID +, on remdesivir, clinically asymptomatic. Also found to have E. faecalis UTI, on augmentin. Patient is full code. Palliative care consulted for GOC.    PERTINENT PM/SXH:   Hypertension, unspecified type  Cancer  Renal calculi  Cancer  Abnormal cholesterol test  Prostate cancer  [place] (hard of hearing)  Alzheimer disease    Cancer of esophagus  S/P lobectomy of lung  S/P cystoscopy with ureteral stent placement  Inguinal hernia  History of esophagogastroduodenoscopy (EGD)  H/O colonoscopy  Prostate cancer    FAMILY HISTORY:  Dementia (Mother)      ITEMS NOT CHECKED ARE NOT PRESENT    SOCIAL HISTORY:   Significant other/partner[ ]  Children[ X]  Scientologist/Spirituality:  Substance hx:  [ ]   Tobacco hx:  [ ]   Alcohol hx: [ ]   Living Situation: [X ]Home  [ ]Long term care  [ ]Rehab [ ]Other  Home Services: [ ] HHA [ ] Visting RN [ ] Hospice  Occupation:  Home Opioid hx:  [ ] Y [ ] N [ ] I-Stop Reference No:     ADVANCE DIRECTIVES:    [ ] Full Code [ X] DNR  MOLST  [X ]  Living Will  [ ]   DECISION MAKER(s):  [ ] Health Care Proxy(s)  [ ] Surrogate(s)  [ ] Guardian           Name(s): Phone Number(s):  daughter Ric ([phone])    BASELINE (I)ADL(s) (prior to admission):  [place]: [ ]Total  [ ] Moderate [ ]Dependent  Palliative Performance Status Version 2:         %    http://npcrc.org/files/news/palliative_performance_scale_ppsv2.pdf    Allergies    No Known Allergies    Intolerances    MEDICATIONS  (STANDING):  amoxicillin  875 milliGRAM(s)/clavulanate 1 Tablet(s) Oral two times a day  heparin   Injectable 5000 Unit(s) SubCutaneous every 12 hours  memantine 5 milliGRAM(s) Oral two times a day  pantoprazole    Tablet 40 milliGRAM(s) Oral two times a day  remdesivir  IVPB 100 milliGRAM(s) IV Intermittent every 24 hours  remdesivir  IVPB   IV Intermittent   sucralfate suspension 1 Gram(s) Oral four times a day    MEDICATIONS  (PRN):  acetaminophen     Tablet .. 650 milliGRAM(s) Oral every 6 hours PRN Temp greater or equal to 38C (100.4F), Mild Pain (1 - 3)  aluminum hydroxide/magnesium hydroxide/simethicone Suspension 30 milliLiter(s) Oral every 4 hours PRN Dyspepsia  melatonin 3 milliGRAM(s) Oral at bedtime PRN Insomnia  ondansetron Injectable 4 milliGRAM(s) IV Push every 8 hours PRN Nausea and/or Vomiting      PRESENT SYMPTOMS: [ ]Unable to obtain due to poor mentation   Source if other than patient:  [ ]Family   [ ]Team     Pain: [ ]yes [X]no  QOL impact -   Location -                    Aggravating factors -  Quality -  Radiation -  Timing-  Severity (0-10 scale):  Minimal acceptable level (0-10 scale):     CPOT:    https://www.[facility].org/getattachment/[email or web address]/Critical-Care-Pain-Observation-Tool-(CPOT)    PAIN AD Score:   http://geriatrictoolkit.[place].[facility]/cog/painad.pdf (press ctrl +  left click to view)    Dyspnea:                           [ ]None[ ]Mild [ ]Moderate [ ]Severe     Respiratory Distress Observation Scale (RDOS):   A score of 0 to 2 signifies little or no respiratory distress, 3 signifies mild distress, scores 4 to 6 indicate moderate distress, and scores greater than 7 signify severe distress  https://www.[facility].ca/sites/default/files/PDFS/610732-dqmijiwdcvs-biwdqqge-omyweqrfibq-ukjws.pdf    Anxiety:                             [ ]None[ ]Mild [ ]Moderate [ ]Severe   Fatigue:                             [ ]None[ ]Mild [ ]Moderate [ ]Severe   Nausea:                             [ ]None[ ]Mild [ ]Moderate [ ]Severe   Loss of appetite:              [ ]None[ ]Mild [ ]Moderate [ ]Severe   Constipation:                    [ ]None[ ]Mild [ ]Moderate [ ]Severe    Other Symptoms:  [X ]All other review of systems negative     Palliative Performance Status Version 2:         %    http://npcrc.org/files/news/palliative_performance_scale_ppsv2.pdf  PHYSICAL EXAM:  Vital Signs Last 24 Hrs  T(C): 36.9 (20 Jul 2023 05:15), Max: 37.9 (19 Jul 2023 13:00)  T(F): 98.5 (20 Jul 2023 05:15), Max: 100.2 (19 Jul 2023 13:00)  HR: 58 (20 Jul 2023 05:15) (58 - 72)  BP: 130/68 (20 Jul 2023 05:15) (130/68 - 138/65)  BP(mean): --  RR: 18 (20 Jul 2023 05:15) (18 - 18)  SpO2: --     I&O's Summary      GENERAL:  [X ] No acute distress [ ]Lethargic  [ ]Unarousable  [ ]Verbal  [ ]Non-Verbal [ ]Cachexia    BEHAVIORAL/PSYCH:  [X ]Alert and Oriented x 1 [ ] Anxiety [ ] Delirium [ ] Agitation [X ] Calm   EYES: [X ] No scleral icterus [ ] Scleral icterus [ ] Closed  ENMT:  [ ]Dry mouth  [ ]No external oral lesions [ X] No external ear or nose lesions  CARDIOVASCULAR:  [ ]Regular [ ]Irregular [ ]Tachy [ ]Not Tachy  [ ]Jas [ ] Edema [ ] No edema  PULMONARY:  [ ]Tachypnea  [ ]Audible excessive secretions [X ] No labored breathing [ ] labored breathing  GASTROINTESTINAL: [ ]Soft  [ ]Distended  [ X]Not distended [ ]Non tender [ ]Tender  MUSCULOSKELETAL: [ ]No clubbing [ ] clubbing  [X ] No cyanosis [ ] cyanosis  NEUROLOGIC: [ ]No focal deficits  [ ]Follows commands  [ ]Does not follow commands  [X ]Cognitive impairment  [ ]Dysphagia  [ ]Dysarthria  [ ]Paresis   SKIN: [ ] Jaundiced [X ] Non-jaundiced [ ]Rash [ ]No Rash [ ] Warm [ ] Dry  MISC/LINES: [ ] ET tube [ ] Trach [ ]NGT/OGT [ ]PEG [ ]Roman    CRITICAL CARE:  [ ] Shock Present  [ ]Septic [ ]Cardiogenic [ ]Neurologic [ ]Hypovolemic  [ ]  Vasopressors [ ]  Inotropes   [ ]Respiratory failure present [ ]Mechanical ventilation [ ]Non-invasive ventilatory support [ ]High flow  [ ]Acute  [ ]Chronic [ ]Hypoxic  [ ]Hypercarbic [ ]Other  [ ]Other organ failure     LABS: reviewed by me                        9.7    5.94  )-----------( 154      ( 20 Jul 2023 07:42 )             34.5   07-20    134<L>  |  100  |  17  ----------------------------<  84  4.1   |  24  |  0.9    Ca    8.0<L>      20 Jul 2023 07:42  Mg     2.1     07-20    TPro  6.0  /  Alb  3.2<L>  /  TBili  0.5  /  DBili  0.2  /  AST  35  /  ALT  13  /  AlkPhos  106  07-20  PT/INR - ( 20 Jul 2023 07:42 )   PT: 11.70 sec;   INR: 1.03 ratio      Urinalysis Basic - ( 20 Jul 2023 07:42 )    Color: x / Appearance: x / SG: x / pH: x  Gluc: 84 mg/dL / Ketone: x  / Bili: x / Urobili: x   Blood: x / Protein: x / Nitrite: x   Leuk Esterase: x / RBC: x / WBC x   Sq Epi: x / Non Sq Epi: x / Bacteria: x      RADIOLOGY & ADDITIONAL STUDIES: reviewed by me    CXR 7/18/23    Biapical opacities/scarring without significant change. No significant   change in left basilar opacity/effusion.    PROTEIN CALORIE MALNUTRITION PRESENT: [ ]mild [ ]moderate [ ]severe [ ]underweight [ ]morbid obesity  https://www.andeal.org/vault/2440/web/files/ONC/Table_Clinical%20Characteristics%20to%20Document%20Malnutrition-White%20JV%20et%20al%202012.pdf    Height (cm): 182.9 (07-13-23 @ 11:55), 177.8 (08-15-22 @ 13:31)  Weight (kg): 67.993 (07-13-23 @ 11:55), 56.7 (08-15-22 @ 13:31)  BMI (kg/m2): 20.3 (07-13-23 @ 11:55), 17.9 (08-15-22 @ 13:31)    [ ]PPSV2 < or = to 30% [ ]significant weight loss  [ ]poor nutritional intake  [ ]anasarca      [ ]Artificial Nutrition      Palliative Care Spiritual/Emotional Screening Tool Question  Severity (0-4):                    OR                    [X ] Unable to determine/NA  Score of 2 or greater indicates recommendation of Chaplaincy referral  Chaplaincy Referral: [ ] Yes [ ] Refused [ ] Following     Caregiver [place]:  [ ] Yes [ ] No [ ] Deferred  Social Work Referral [ ]  Patient and Family Centered Care Referral [ ]    Anticipatory Grief Present: [ ] Yes [ ] No [ ] Deferred  Social Work Referral [ ]  Patient and Family Centered Care Referral [ ]    REFERRALS:   [ ]Chaplaincy  [ ]Hospice  [ ]Child Life  [X ]Social Work  [ ]Case management [ ]Holistic Therapy     Palliative care education provided to patient and/or family    Goals of Care Document:     ______________  Malachi Hoffmann MD  Palliative Medicine  [facility]   of Geriatric and Palliative Medicine  [phone]

## 2023-07-20 NOTE — DIETITIAN INITIAL EVALUATION ADULT - NAME AND PHONE
Mary [phone] or TEAMS    Nutrition Interventions: Meals, snacks, medical nutrition supplements; Nutrition Monitoring: Diet order, PO intake, weights, labs, NFPF, body composition, BM and tolerance to medical food supplements

## 2023-07-21 LAB
ACANTHOCYTES BLD QL SMEAR: SLIGHT — SIGNIFICANT CHANGE UP
ALBUMIN SERPL ELPH-MCNC: 3.2 G/DL — LOW (ref 3.5–5.2)
ALBUMIN SERPL ELPH-MCNC: 3.2 G/DL — LOW (ref 3.5–5.2)
ALP SERPL-CCNC: 100 U/L — SIGNIFICANT CHANGE UP (ref 30–115)
ALP SERPL-CCNC: 98 U/L — SIGNIFICANT CHANGE UP (ref 30–115)
ALT FLD-CCNC: 14 U/L — SIGNIFICANT CHANGE UP (ref 0–41)
ALT FLD-CCNC: 15 U/L — SIGNIFICANT CHANGE UP (ref 0–41)
ANION GAP SERPL CALC-SCNC: 14 MMOL/L — SIGNIFICANT CHANGE UP (ref 7–14)
ANISOCYTOSIS BLD QL: SIGNIFICANT CHANGE UP
AST SERPL-CCNC: 36 U/L — SIGNIFICANT CHANGE UP (ref 0–41)
AST SERPL-CCNC: 52 U/L — HIGH (ref 0–41)
BASOPHILS # BLD AUTO: 0.04 K/UL — SIGNIFICANT CHANGE UP (ref 0–0.2)
BASOPHILS NFR BLD AUTO: 0.9 % — SIGNIFICANT CHANGE UP (ref 0–1)
BILIRUB DIRECT SERPL-MCNC: 0.2 MG/DL — SIGNIFICANT CHANGE UP (ref 0–0.3)
BILIRUB INDIRECT FLD-MCNC: 0.3 MG/DL — SIGNIFICANT CHANGE UP (ref 0.2–1.2)
BILIRUB SERPL-MCNC: 0.4 MG/DL — SIGNIFICANT CHANGE UP (ref 0.2–1.2)
BILIRUB SERPL-MCNC: 0.5 MG/DL — SIGNIFICANT CHANGE UP (ref 0.2–1.2)
BUN SERPL-MCNC: 26 MG/DL — HIGH (ref 10–20)
BURR CELLS BLD QL SMEAR: PRESENT — SIGNIFICANT CHANGE UP
CALCIUM SERPL-MCNC: 8.4 MG/DL — SIGNIFICANT CHANGE UP (ref 8.4–10.5)
CHLORIDE SERPL-SCNC: 105 MMOL/L — SIGNIFICANT CHANGE UP (ref 98–110)
CO2 SERPL-SCNC: 22 MMOL/L — SIGNIFICANT CHANGE UP (ref 17–32)
CREAT SERPL-MCNC: 0.8 MG/DL — SIGNIFICANT CHANGE UP (ref 0.7–1.5)
CREAT SERPL-MCNC: 0.9 MG/DL — SIGNIFICANT CHANGE UP (ref 0.7–1.5)
EGFR: 90 ML/MIN/1.73M2 — SIGNIFICANT CHANGE UP
EGFR: 93 ML/MIN/1.73M2 — SIGNIFICANT CHANGE UP
EOSINOPHIL # BLD AUTO: 0 K/UL — SIGNIFICANT CHANGE UP (ref 0–0.7)
EOSINOPHIL NFR BLD AUTO: 0 % — SIGNIFICANT CHANGE UP (ref 0–8)
GLUCOSE SERPL-MCNC: 59 MG/DL — LOW (ref 70–99)
HCT VFR BLD CALC: 36.2 % — LOW (ref 42–52)
HGB BLD-MCNC: 10.3 G/DL — LOW (ref 14–18)
HYPOCHROMIA BLD QL: SIGNIFICANT CHANGE UP
INR BLD: 1.05 RATIO — SIGNIFICANT CHANGE UP (ref 0.65–1.3)
LYMPHOCYTES # BLD AUTO: 0.08 K/UL — LOW (ref 1.2–3.4)
LYMPHOCYTES # BLD AUTO: 1.7 % — LOW (ref 20.5–51.1)
MACROCYTES BLD QL: SIGNIFICANT CHANGE UP
MCHC RBC-ENTMCNC: 20.5 PG — LOW (ref 27–31)
MCHC RBC-ENTMCNC: 28.5 G/DL — LOW (ref 32–37)
MCV RBC AUTO: 72 FL — LOW (ref 80–94)
MICROCYTES BLD QL: SIGNIFICANT CHANGE UP
MONOCYTES # BLD AUTO: 0.48 K/UL — SIGNIFICANT CHANGE UP (ref 0.1–0.6)
MONOCYTES NFR BLD AUTO: 10.4 % — HIGH (ref 1.7–9.3)
NEUTROPHILS # BLD AUTO: 4.05 K/UL — SIGNIFICANT CHANGE UP (ref 1.4–6.5)
NEUTROPHILS NFR BLD AUTO: 86.1 % — HIGH (ref 42.2–75.2)
NEUTS BAND # BLD: 0.9 % — SIGNIFICANT CHANGE UP (ref 0–6)
NRBC # BLD: 2 /100 — HIGH (ref 0–0)
NRBC # BLD: SIGNIFICANT CHANGE UP /100 WBCS (ref 0–0)
OVALOCYTES BLD QL SMEAR: SLIGHT — SIGNIFICANT CHANGE UP
PLAT MORPH BLD: NORMAL — SIGNIFICANT CHANGE UP
PLATELET # BLD AUTO: 152 K/UL — SIGNIFICANT CHANGE UP (ref 130–400)
PMV BLD: SIGNIFICANT CHANGE UP (ref 7.4–10.4)
POIKILOCYTOSIS BLD QL AUTO: SIGNIFICANT CHANGE UP
POLYCHROMASIA BLD QL SMEAR: SIGNIFICANT CHANGE UP
POTASSIUM SERPL-MCNC: 4.3 MMOL/L — SIGNIFICANT CHANGE UP (ref 3.5–5)
POTASSIUM SERPL-SCNC: 4.3 MMOL/L — SIGNIFICANT CHANGE UP (ref 3.5–5)
PROT SERPL-MCNC: 6.2 G/DL — SIGNIFICANT CHANGE UP (ref 6–8)
PROT SERPL-MCNC: 6.2 G/DL — SIGNIFICANT CHANGE UP (ref 6–8)
PROTHROM AB SERPL-ACNC: 12 SEC — SIGNIFICANT CHANGE UP (ref 9.95–12.87)
RBC # BLD: 5.03 M/UL — SIGNIFICANT CHANGE UP (ref 4.7–6.1)
RBC # FLD: 27.9 % — HIGH (ref 11.5–14.5)
RBC BLD AUTO: ABNORMAL
SODIUM SERPL-SCNC: 141 MMOL/L — SIGNIFICANT CHANGE UP (ref 135–146)
SPHEROCYTES BLD QL SMEAR: SLIGHT — SIGNIFICANT CHANGE UP
WBC # BLD: 4.65 K/UL — LOW (ref 4.8–10.8)
WBC # FLD AUTO: 4.65 K/UL — LOW (ref 4.8–10.8)

## 2023-07-21 PROCEDURE — 99232 SBSQ HOSP IP/OBS MODERATE 35: CPT

## 2023-07-21 PROCEDURE — 99233 SBSQ HOSP IP/OBS HIGH 50: CPT

## 2023-07-21 RX ADMIN — MEMANTINE HYDROCHLORIDE 5 MILLIGRAM(S): 10 TABLET ORAL at 17:15

## 2023-07-21 RX ADMIN — Medication 1 GRAM(S): at 05:04

## 2023-07-21 RX ADMIN — HEPARIN SODIUM 5000 UNIT(S): 5000 INJECTION INTRAVENOUS; SUBCUTANEOUS at 17:21

## 2023-07-21 RX ADMIN — PANTOPRAZOLE SODIUM 40 MILLIGRAM(S): 20 TABLET, DELAYED RELEASE ORAL at 05:04

## 2023-07-21 RX ADMIN — Medication 1 GRAM(S): at 23:25

## 2023-07-21 RX ADMIN — Medication 1 GRAM(S): at 17:15

## 2023-07-21 RX ADMIN — HEPARIN SODIUM 5000 UNIT(S): 5000 INJECTION INTRAVENOUS; SUBCUTANEOUS at 05:04

## 2023-07-21 RX ADMIN — Medication 1 TABLET(S): at 17:15

## 2023-07-21 RX ADMIN — REMDESIVIR 200 MILLIGRAM(S): 5 INJECTION INTRAVENOUS at 17:16

## 2023-07-21 RX ADMIN — PANTOPRAZOLE SODIUM 40 MILLIGRAM(S): 20 TABLET, DELAYED RELEASE ORAL at 17:15

## 2023-07-21 RX ADMIN — Medication 1 GRAM(S): at 12:06

## 2023-07-21 RX ADMIN — Medication 3 MILLIGRAM(S): at 23:26

## 2023-07-21 RX ADMIN — MEMANTINE HYDROCHLORIDE 5 MILLIGRAM(S): 10 TABLET ORAL at 05:04

## 2023-07-21 RX ADMIN — Medication 1 TABLET(S): at 05:04

## 2023-07-21 NOTE — PROGRESS NOTE ADULT - SUBJECTIVE AND OBJECTIVE BOX
HPI: 73M with PMH of dementia, HTN, HLD, depression, esophageal CA s/p resection, lung CA s/p lobectomy, prostate CA s/p cyberknfe, here with lightheadedness, and found to have anemia from esophageal ulcers, s/p pRBC. GI also planning for repeat EGD and possible colonoscopy. Patient's course c/b COVID +, on remdesivir, clinically asymptomatic. Also found to have E. faecalis UTI, on augmentin. Patient is full code. Palliative care consulted for GOC.      Interval history:     -7/21- Pt alert and confused. Appears comfortable      ADVANCE DIRECTIVES:       [ ] Full Code [ X] DNR  MOLST  [X ]  Living Will  [ ]   DECISION MAKER(s):  [ ] Health Care Proxy(s)  [ ] Surrogate(s)  [ ] Guardian           Name(s): Phone Number(s):  jorge Larios ([phone])  BASELINE (I)ADL(s) (prior to admission):    BASELINE (I)ADL(s) (prior to admission):  [place]: [ ]Total  [ ] Moderate [ ]Dependent  Allergies    No Known Allergies    Intolerances    MEDICATIONS  (STANDING):  amoxicillin  875 milliGRAM(s)/clavulanate 1 Tablet(s) Oral two times a day  heparin   Injectable 5000 Unit(s) SubCutaneous every 12 hours  memantine 5 milliGRAM(s) Oral two times a day  pantoprazole    Tablet 40 milliGRAM(s) Oral two times a day  remdesivir  IVPB   IV Intermittent   remdesivir  IVPB 100 milliGRAM(s) IV Intermittent every 24 hours  sucralfate suspension 1 Gram(s) Oral four times a day    MEDICATIONS  (PRN):  acetaminophen     Tablet .. 650 milliGRAM(s) Oral every 6 hours PRN Temp greater or equal to 38C (100.4F), Mild Pain (1 - 3)  aluminum hydroxide/magnesium hydroxide/simethicone Suspension 30 milliLiter(s) Oral every 4 hours PRN Dyspepsia  melatonin 3 milliGRAM(s) Oral at bedtime PRN Insomnia  ondansetron Injectable 4 milliGRAM(s) IV Push every 8 hours PRN Nausea and/or Vomiting    PRESENT SYMPTOMS: [ ]Unable to obtain due to poor mentation   Source if other than patient:  [ ]Family   [ ]Team     Pain: [ ]yes [ x]no  QOL impact -   Location -                    Aggravating factors -  Quality -  Radiation -  Timing-  Severity (0-10 scale):  Minimal acceptable level (0-10 scale):     CPOT:    https://www.[facility].org/getattachment/[email or web address]/Critical-Care-Pain-Observation-Tool-(CPOT)    PAIN AD Score:   http://geriatrictoolkit.[facility]/cog/painad.pdf (press ctrl +  left click to view)    Dyspnea:                           [ x]None[ ]Mild [ ]Moderate [ ]Severe     Respiratory Distress Observation Scale (RDOS):   A score of 0 to 2 signifies little or no respiratory distress, 3 signifies mild distress, scores 4 to 6 indicate moderate distress, and scores greater than 7 signify severe distress  https://www.[facility].ca/sites/default/files/PDFS/745356-uxogtryshwe-cuirscis-uihfcphqnez-khenp.pdf    Anxiety:                             [ ]None[ ]Mild [ ]Moderate [ ]Severe   Fatigue:                             [ ]None[ ]Mild [ ]Moderate [ ]Severe   Nausea:                             [ ]None[ ]Mild [ ]Moderate [ ]Severe   Loss of appetite:              [ ]None[ ]Mild [ ]Moderate [ ]Severe   Constipation:                    [ ]None[ ]Mild [ ]Moderate [ ]Severe    Other Symptoms:  [ ]All other review of systems negative     Palliative Performance Status Version 2:         %    http://[facility].org/files/news/palliative_performance_scale_ppsv2.pdf    PHYSICAL EXAM:  Vital Signs Last 24 Hrs  T(C): 36.7 (21 Jul 2023 13:56), Max: 37.2 (20 Jul 2023 19:52)  T(F): 98.1 (21 Jul 2023 13:56), Max: 98.9 (20 Jul 2023 19:52)  HR: 66 (21 Jul 2023 13:56) (58 - 77)  BP: 93/56 (21 Jul 2023 13:56) (93/56 - 110/68)  BP(mean): 78 (21 Jul 2023 05:26) (78 - 78)  RR: 18 (21 Jul 2023 13:56) (18 - 18)  SpO2: 100% (21 Jul 2023 05:26) (100% - 100%)    Parameters below as of 21 Jul 2023 05:26  Patient On (Oxygen Delivery Method): room air     I&O's Summary    20 Jul 2023 07:01  -  21 Jul 2023 07:00  --------------------------------------------------------  IN: 0 mL / OUT: 750 mL / NET: -750 mL          GENERAL:  [X ] No acute distress [ ]Lethargic  [ ]Unarousable  [ ]Verbal  [ ]Non-Verbal [ ]Cachexia    BEHAVIORAL/PSYCH:  [X ]Alert and Oriented x 1 [ ] Anxiety [ ] Delirium [ ] Agitation [X ] Calm   EYES: [X ] No scleral icterus [ ] Scleral icterus [ ] Closed  ENMT:  [ ]Dry mouth  [ ]No external oral lesions [ X] No external ear or nose lesions  CARDIOVASCULAR:  [ ]Regular [ ]Irregular [ ]Tachy [ ]Not Tachy  [ ]Jas [ ] Edema [ ] No edema  PULMONARY:  [ ]Tachypnea  [ ]Audible excessive secretions [X ] No labored breathing [ ] labored breathing  GASTROINTESTINAL: [ ]Soft  [ ]Distended  [ X]Not distended [ ]Non tender [ ]Tender  MUSCULOSKELETAL: [ ]No clubbing [ ] clubbing  [X ] No cyanosis [ ] cyanosis  NEUROLOGIC: [ ]No focal deficits  [ ]Follows commands  [ ]Does not follow commands  [X ]Cognitive impairment  [ ]Dysphagia  [ ]Dysarthria  [ ]Paresis   SKIN: [ ] Jaundiced [X ] Non-jaundiced [ ]Rash [ ]No Rash [ ] Warm [ ] Dry  MISC/LINES: [ ] ET tube [ ] Trach [ ]NGT/OGT [ ]PEG [ ]Roman      LABS: reviewed by me                        10.3   4.65  )-----------( 152      ( 21 Jul 2023 12:47 )             36.2   07-21    141  |  105  |  26<H>  ----------------------------<  59<L>  4.3   |  22  |  0.9    Ca    8.4      21 Jul 2023 12:47  Mg     2.1     07-20    TPro  6.2  /  Alb  3.2<L>  /  TBili  0.4  /  DBili  x   /  AST  52<H>  /  ALT  15  /  AlkPhos  100  07-21  PT/INR - ( 21 Jul 2023 08:14 )   PT: 12.00 sec;   INR: 1.05 ratio             Urinalysis Basic - ( 21 Jul 2023 12:47 )    Color: x / Appearance: x / SG: x / pH: x  Gluc: 59 mg/dL / Ketone: x  / Bili: x / Urobili: x   Blood: x / Protein: x / Nitrite: x   Leuk Esterase: x / RBC: x / WBC x   Sq Epi: x / Non Sq Epi: x / Bacteria: x      RADIOLOGY & ADDITIONAL STUDIES: reviewed by me    EKG: reviewed by me        Patient discussed with primary medical team MD  Palliative care education provided to patient and/or family

## 2023-07-21 NOTE — PROGRESS NOTE ADULT - SUBJECTIVE AND OBJECTIVE BOX
MEDICINE ATTENDING PROGRESS NOTE  73 year old male DNR/DNI with a history of Dementia, HTN, HLD, Depression, Esophageal Cancer s/p resection and gastric pull,, Lung Ca s/p lobectomy, Prostate Cancer s/p Cyberknife presented to the ED with the complaint of dizziness, found to be anemia s/p 1 u pRBC now admitted for workup of his anemia. Admission was complicated by COVID+    Interval/Overnight Events  - No acute complaints  - Palliative: patient now DNR/DNI + daughter prefers inpatient EGD/Colonoscopy, as oppose to outpatient procedure as GI team recommends.    ROS  General: Denies fevers, chills, nightsweats, weight loss  HEENT: Denies headache, rhinorrhea, sore throat, eye pain  CV: Denies CP, palpitations  PULM: Denies SOB, cough  GI: Denies abdominal pain, diarrhea  : Denies dysuria, hematuria  MSK: Denies arthralgias  SKIN: Denies rash   NEURO: Denies paresthesias, weakness  PSYCH: Denies depression    MEDICATIONS  (STANDING):  amoxicillin  875 milliGRAM(s)/clavulanate 1 Tablet(s) Oral two times a day  heparin   Injectable 5000 Unit(s) SubCutaneous every 12 hours  memantine 5 milliGRAM(s) Oral two times a day  pantoprazole    Tablet 40 milliGRAM(s) Oral two times a day  remdesivir  IVPB   IV Intermittent   remdesivir  IVPB 100 milliGRAM(s) IV Intermittent every 24 hours    MEDICATIONS  (PRN):  acetaminophen     Tablet .. 650 milliGRAM(s) Oral every 6 hours PRN Temp greater or equal to 38C (100.4F), Mild Pain (1 - 3)  aluminum hydroxide/magnesium hydroxide/simethicone Suspension 30 milliLiter(s) Oral every 4 hours PRN Dyspepsia  melatonin 3 milliGRAM(s) Oral at bedtime PRN Insomnia  ondansetron Injectable 4 milliGRAM(s) IV Push every 8 hours PRN Nausea and/or Vomiting    ANTIBIOTICS:  amoxicillin  875 milliGRAM(s)/clavulanate 1 Tablet(s) Oral two times a day  remdesivir  IVPB 100 milliGRAM(s) IV Intermittent every 24 hours  remdesivir  IVPB   IV Intermittent     VITALS:  Vital Signs Last 24 Hrs  T(C): 36.1 (21 Jul 2023 05:26), Max: 37.2 (20 Jul 2023 12:48)  T(F): 96.9 (21 Jul 2023 05:26), Max: 99 (20 Jul 2023 12:48)  HR: 68 (21 Jul 2023 05:26) (54 - 77)  BP: 109/61 (21 Jul 2023 05:26) (96/59 - 110/68)  BP(mean): 78 (21 Jul 2023 05:26) (78 - 78)  RR: 18 (21 Jul 2023 05:26) (18 - 18)  SpO2: 100% (21 Jul 2023 05:26) (100% - 100%)    Parameters below as of 21 Jul 2023 05:26  Patient On (Oxygen Delivery Method): room air    PHYSICAL EXAM:  Gen: NAD, resting in bed  HEENT: Normocephalic, atraumatic  Neck: supple, no lymphadenopathy  CV: Regular rate & regular rhythm  Lungs: CTABL no wheeze  Abdomen: Soft, NTND+ BS present  Ext: Warm, well perfused no CCE  Neuro: non focal, awake, CN II-XII intact   Skin: no rash, no erythema  Psych: no SI, HI, Hallucination     LABS/MICROBIOLOGY:  07-20    134<L>  |  100  |  17  ----------------------------<  84  4.1   |  24  |  0.9    Ca    8.0<L>      20 Jul 2023 07:42  Mg     2.1     07-20    TPro  6.0  /  Alb  3.2<L>  /  TBili  0.5  /  DBili  0.2  /  AST  35  /  ALT  13  /  AlkPhos  106  07-20    LIVER FUNCTIONS - ( 20 Jul 2023 07:42 )  Alb: 3.2 g/dL / Pro: 6.0 g/dL / ALK PHOS: 106 U/L / ALT: 13 U/L / AST: 35 U/L / GGT: x           PT/INR - ( 20 Jul 2023 07:42 )   PT: 11.70 sec;   INR: 1.03 ratio         MICROBIOLOGY  Urinalysis Basic - ( 20 Jul 2023 07:42 )  Color: x / Appearance: x / SG: x / pH: x  Gluc: 84 mg/dL / Ketone: x  / Bili: x / Urobili: x   Blood: x / Protein: x / Nitrite: x   Leuk Esterase: x / RBC: x / WBC x   Sq Epi: x / Non Sq Epi: x / Bacteria: x    Culture - Blood (collected 07-18-23 @ 20:28)  Source: .Blood Blood  Preliminary Report (07-20-23 @ 02:02):    No growth at 24 hours    Culture - Urine (collected 07-16-23 @ 08:25)  Source: Clean Catch Clean Catch (Midstream)  Preliminary Report (07-18-23 @ 19:18):    >100,000 CFU/ml Enterococcus faecalis  Organism: Enterococcus faecalis (07-19-23 @ 19:58)  Organism: Enterococcus faecalis (07-19-23 @ 19:58)      Method Type: JOSH      -  Ampicillin: S <=2 Predicts results to ampicillin/sulbactam, amoxacillin-clavulanate and  piperacillin-tazobactam.      -  Ciprofloxacin: S <=1      -  Levofloxacin: S 1      -  Nitrofurantoin: S <=32 Should not be used to treat pyelonephritis.      -  Tetracycline: R >8      -  Vancomycin: S 4    COVID-19 PCR: Detected (18 Jul 2023 17:34)    IMAGING:  Xray Chest 1 View- PORTABLE-Routine (Xray Chest 1 View- PORTABLE-Routine .) (07.18.23 @ 19:46)   Biapical opacities/scarring without significant change. No significant change in left basilar opacity/effusion.    CTA NECK:  The visualized aortic arch and great vessel origins are patent. There is a direct origin of the left vertebral artery from the aortic arch, normal variant.  The common, internal and external carotid arteries are patent.  The vertebral arteries are patent. The right vertebral artery is dominant.    CTA HEAD:  The distal internal carotid arteries are patent. The anterior and middle cerebral arteries are patent. There is a hypoplastic A1 segment of the right [place], normal variant.  The distal vertebral arteries are patent.  The basilar artery is patent though diffusely diminutive in caliber felt to reflect a dominant anterior circulation. The posterior cerebral arteries are patent.    OTHER:  Retained secretions are seen within the esophagus.  There is emphysema with bullous changes of the lung apices.  There is a right upper lobe a nodule measuring 5 mm on series 4 image 8, new since the CTA chest dated 6/19/2021.    IMPRESSION:  1.  No evidence of major vascular stenosis or occlusion.  2.  Right upper lobe pulmonary nodule measuring 5 mm, new since CTA chest 6/19/2021. Recommend a dedicated chest CT on an outpatient/elective basis.    EGD (07.06.21 @ 10:00)  impressions:    Ulcers in the lower third of the esophagus. (Biopsy).    Post surgical anatomy with gastric pull-up-.    Erythema in the stomach compatible with non-erosive gastritis. (Biopsy).    Normal mucosa inthe whole examined duodenum.     CARDIOLOGY TESTING  12 Lead ECG:   Ventricular Rate 59 BPM  Atrial Rate 59 BPM  P-R Interval 164 ms  QRS Duration 88 ms  Q-T Interval 458 ms  QTC Calculation(Bazett) 453 ms  P Axis 76 degrees  R Axis 24 degrees  T Axis 36 degrees    Diagnosis Line Sinus bradycardia with sinus arrhythmia  Otherwise normal ECG    Confirmed by CLAUDIA PATRICK MD ([id number]) on 7/14/2023 7:02:19 AM (07-13-23 @ 15:12)    ASSESSMENT/PLAN:  73 year old male DNR/DNI with a history of Dementia, HTN, HLD, Depression, Esophageal Cancer s/p resection and gastric pull,, Lung Ca s/p lobectomy, Prostate Cancer s/p Cyberknife presented to the ED with the complaint of dizziness, found to be anemia s/p 1 u pRBC now admitted for workup of his anemia. Admission was complicated by COVID+    #Generalized Weakness/Diziness due to Acute Blood Loss Anemia in setting of Ulcers in the lower third of the esophagus  #Microcytic Anemia due to Esophageal Ulcers  - Hgb 7.1 (on admission) s/p 1 unit pRBCs, now 9.3 (baseline Hgb ~12)  - Iron studies (7/13/23): Iron 20, TIBC 409, % sat 5, Ferritin 11 c/w ELBERT  - EGD 7/6/21 for odynophagia: Ulcers in the lower third of the esophagus, Erythema in the stomach compatible with non-erosive gastritis, Normal mucosa in the whole examined duodenum. (bx negative for H pylori, ulcer bx : focal active esophagitis, fibropurulent exudate)  - s/p IV Venofer  - c/w Carafate Suspension 1g po qid  - c/w Protonix 40 mg po bid  - transfuse as needed, keep Hgb >7  - ANGIE negative   - GI planned to repeat EGD with possible Colonoscopy once patient is COVID negative and UTI free; outpatient vs inpatient    #COVID positive  - Patient is in clinically asymptomatic; On RA  - Unknown vaccination status  - CXR benign  - On contact and airborne iso  - ID: Pt is possibly in the early viral replicative phase based on the timeline/onset of symptoms:  mg iv on Day 1, then 100 mg iv D2 and D3 but no steroids    #Acutely worse mental status likely due to E.faecalis UTI  #Acute Metabolic Encephalopathy  - Electrolytes are stable, patient is afebrile, WBC normal, exam notable for course breath sounds and worsened mental status  - TSH 3.2 (7/14)  - E.faecalis on Ucx (7/16) sensitive to augmentin  - c/w augmentin 875 mg bid for 10 days for UTI  (7/19 - 7/28)  - Appreciate ID recs    # Dementia/Alzheimers  - a/w off balance while ambulating  - CTH, CTA head and neck: no acute findings  - c/w home Memantine 5MG po bid  - all precautions  - PT consult: Home PT  - Neurology follows    # Hypernatremia (resolved)   - Na 143 (7/17) -> 140 (7/18) -> 134 (7/19)  - s/p 0.45% NS 60 cc/hr  X 24 hrs    # Pulmonary nodule on imaging  - Right upper lobe pulmonary nodule measuring 5 mm, new since CTA chest 6/19/2021.  - OP f/u    # H/O Hypertension/H/O HLD  - Lipid profile WNL  - A1c 5.7%  - BP controlled, not on any meds at home    # H/O Anxiety/Depression  - Not on any home meds    # H/O Prostate Ca  - Not on any home meds    #Supportive Management:  Code Status: DNR/DNI  Dispo: Home with PT  DVT Ppx: heparin   Injectable 5000 Unit(s) SubCutaneous every 12 hours  GI Ppx: pantoprazole    Tablet 40 milliGRAM(s) Oral two times a day  Diet:  Diet, DASH/TLC:   Sodium & Cholesterol Restricted (07-13-23 @ 21:14) [Active]    Total time spent to complete patient's bedside assessment, review medical chart, discuss medical plan of care with covering medical team was more than 45 minutes with >50% of time spent face to face with patient, discussion with patient/family and/or coordination of care    Sukh Oconnor MD/Cipriano  Attending Physician

## 2023-07-22 LAB
ALBUMIN SERPL ELPH-MCNC: 3.4 G/DL — LOW (ref 3.5–5.2)
ALBUMIN SERPL ELPH-MCNC: 3.5 G/DL — SIGNIFICANT CHANGE UP (ref 3.5–5.2)
ALP SERPL-CCNC: 106 U/L — SIGNIFICANT CHANGE UP (ref 30–115)
ALP SERPL-CCNC: 108 U/L — SIGNIFICANT CHANGE UP (ref 30–115)
ALT FLD-CCNC: 14 U/L — SIGNIFICANT CHANGE UP (ref 0–41)
ALT FLD-CCNC: 14 U/L — SIGNIFICANT CHANGE UP (ref 0–41)
ANION GAP SERPL CALC-SCNC: 14 MMOL/L — SIGNIFICANT CHANGE UP (ref 7–14)
AST SERPL-CCNC: 38 U/L — SIGNIFICANT CHANGE UP (ref 0–41)
AST SERPL-CCNC: 41 U/L — SIGNIFICANT CHANGE UP (ref 0–41)
BASOPHILS # BLD AUTO: 0.02 K/UL — SIGNIFICANT CHANGE UP (ref 0–0.2)
BASOPHILS NFR BLD AUTO: 0.5 % — SIGNIFICANT CHANGE UP (ref 0–1)
BILIRUB DIRECT SERPL-MCNC: 0.2 MG/DL — SIGNIFICANT CHANGE UP (ref 0–0.3)
BILIRUB INDIRECT FLD-MCNC: 0.3 MG/DL — SIGNIFICANT CHANGE UP (ref 0.2–1.2)
BILIRUB SERPL-MCNC: 0.5 MG/DL — SIGNIFICANT CHANGE UP (ref 0.2–1.2)
BILIRUB SERPL-MCNC: 0.5 MG/DL — SIGNIFICANT CHANGE UP (ref 0.2–1.2)
BUN SERPL-MCNC: 24 MG/DL — HIGH (ref 10–20)
CALCIUM SERPL-MCNC: 8.3 MG/DL — LOW (ref 8.4–10.5)
CHLORIDE SERPL-SCNC: 104 MMOL/L — SIGNIFICANT CHANGE UP (ref 98–110)
CO2 SERPL-SCNC: 20 MMOL/L — SIGNIFICANT CHANGE UP (ref 17–32)
CREAT SERPL-MCNC: 1 MG/DL — SIGNIFICANT CHANGE UP (ref 0.7–1.5)
EGFR: 79 ML/MIN/1.73M2 — SIGNIFICANT CHANGE UP
EOSINOPHIL # BLD AUTO: 0.05 K/UL — SIGNIFICANT CHANGE UP (ref 0–0.7)
EOSINOPHIL NFR BLD AUTO: 1.2 % — SIGNIFICANT CHANGE UP (ref 0–8)
GLUCOSE SERPL-MCNC: 137 MG/DL — HIGH (ref 70–99)
HCT VFR BLD CALC: 36.4 % — LOW (ref 42–52)
HGB BLD-MCNC: 10.6 G/DL — LOW (ref 14–18)
IMM GRANULOCYTES NFR BLD AUTO: 0.5 % — HIGH (ref 0.1–0.3)
INR BLD: 1.07 RATIO — SIGNIFICANT CHANGE UP (ref 0.65–1.3)
LYMPHOCYTES # BLD AUTO: 0.95 K/UL — LOW (ref 1.2–3.4)
LYMPHOCYTES # BLD AUTO: 22.6 % — SIGNIFICANT CHANGE UP (ref 20.5–51.1)
MCHC RBC-ENTMCNC: 20.9 PG — LOW (ref 27–31)
MCHC RBC-ENTMCNC: 29.1 G/DL — LOW (ref 32–37)
MCV RBC AUTO: 71.7 FL — LOW (ref 80–94)
MONOCYTES # BLD AUTO: 0.38 K/UL — SIGNIFICANT CHANGE UP (ref 0.1–0.6)
MONOCYTES NFR BLD AUTO: 9 % — SIGNIFICANT CHANGE UP (ref 1.7–9.3)
NEUTROPHILS # BLD AUTO: 2.78 K/UL — SIGNIFICANT CHANGE UP (ref 1.4–6.5)
NEUTROPHILS NFR BLD AUTO: 66.2 % — SIGNIFICANT CHANGE UP (ref 42.2–75.2)
NRBC # BLD: 0 /100 WBCS — SIGNIFICANT CHANGE UP (ref 0–0)
PLATELET # BLD AUTO: 190 K/UL — SIGNIFICANT CHANGE UP (ref 130–400)
PMV BLD: 9.6 FL — SIGNIFICANT CHANGE UP (ref 7.4–10.4)
POTASSIUM SERPL-MCNC: 3.7 MMOL/L — SIGNIFICANT CHANGE UP (ref 3.5–5)
POTASSIUM SERPL-SCNC: 3.7 MMOL/L — SIGNIFICANT CHANGE UP (ref 3.5–5)
PROT SERPL-MCNC: 6.1 G/DL — SIGNIFICANT CHANGE UP (ref 6–8)
PROT SERPL-MCNC: 6.3 G/DL — SIGNIFICANT CHANGE UP (ref 6–8)
PROTHROM AB SERPL-ACNC: 12.2 SEC — SIGNIFICANT CHANGE UP (ref 9.95–12.87)
RBC # BLD: 5.08 M/UL — SIGNIFICANT CHANGE UP (ref 4.7–6.1)
RBC # FLD: 28.2 % — HIGH (ref 11.5–14.5)
SODIUM SERPL-SCNC: 138 MMOL/L — SIGNIFICANT CHANGE UP (ref 135–146)
WBC # BLD: 4.2 K/UL — LOW (ref 4.8–10.8)
WBC # FLD AUTO: 4.2 K/UL — LOW (ref 4.8–10.8)

## 2023-07-22 PROCEDURE — 99232 SBSQ HOSP IP/OBS MODERATE 35: CPT

## 2023-07-22 RX ADMIN — HEPARIN SODIUM 5000 UNIT(S): 5000 INJECTION INTRAVENOUS; SUBCUTANEOUS at 17:49

## 2023-07-22 RX ADMIN — Medication 1 GRAM(S): at 11:55

## 2023-07-22 RX ADMIN — Medication 1 GRAM(S): at 05:20

## 2023-07-22 RX ADMIN — Medication 1 TABLET(S): at 05:20

## 2023-07-22 RX ADMIN — HEPARIN SODIUM 5000 UNIT(S): 5000 INJECTION INTRAVENOUS; SUBCUTANEOUS at 05:20

## 2023-07-22 RX ADMIN — PANTOPRAZOLE SODIUM 40 MILLIGRAM(S): 20 TABLET, DELAYED RELEASE ORAL at 17:49

## 2023-07-22 RX ADMIN — MEMANTINE HYDROCHLORIDE 5 MILLIGRAM(S): 10 TABLET ORAL at 17:49

## 2023-07-22 RX ADMIN — MEMANTINE HYDROCHLORIDE 5 MILLIGRAM(S): 10 TABLET ORAL at 05:20

## 2023-07-22 RX ADMIN — Medication 1 TABLET(S): at 17:49

## 2023-07-22 RX ADMIN — Medication 1 GRAM(S): at 17:49

## 2023-07-22 RX ADMIN — PANTOPRAZOLE SODIUM 40 MILLIGRAM(S): 20 TABLET, DELAYED RELEASE ORAL at 05:20

## 2023-07-22 NOTE — PROGRESS NOTE ADULT - ASSESSMENT
72 y/o M with PMHx of HTN, HLD, Dementia, Depression, Esophageal Cancer s/p resection and gastric pull,, Lung Ca s/p lobectomy, Prostate Cancer s/p Cyberknife presented to the ED with the complaint of dizziness that began in the morning of presentation described as lightheadedness and off balance causing him to feel unstable ambulating. Admitted for dizziness secondary to acute on chronic anemia      #Generalized Weakness/Diziness due to Acute Blood Loss Anemia in setting of Ulcers in the lower third of the esophagus  #Microcytic Anemia due to Esophageal Ulcers  - Hgb 7.1 (on admission) s/p 1 unit pRBCs, now 9.3 (baseline Hgb ~12)  - Iron studies (7/13/23): Iron 20, TIBC 409, % sat 5, Ferritin 11 c/w ELBERT  - EGD 7/6/21 for odynophagia: Ulcers in the lower third of the esophagus, Erythema in the stomach compatible with non-erosive gastritis, Normal mucosa in the whole examined duodenum. (bx negative for H pylori, ulcer bx : focal active esophagitis, fibropurulent exudate)  - s/p IV Venofer  - c/w Carafate Suspension 1g po qid  - c/w Protonix 40 mg po bid  - transfuse as needed, keep Hgb >7  - ANGIE negative   - GI planned to repeat inpatient EGD with possible Colonoscopy once patient is COVID negative and UTI free  - Fu GI    #COVID positive  - Patient is in clinically asymptomatic; On RA  - Unknown vaccination status  - CXR benign  - On contact and airborne iso  - ID recs:  - mg iv on Day 1, then 100 mg iv D2 and D3.  -no steroids      #Acutely worse mental status likely due to E.faecalis UTI  #Acute Metabolic Encephalopathy  - Electrolytes are stable, patient is afebrile, WBC normal, exam notable for course breath sounds and worsened mental status  - TSH 3.2 (7/14)  - E.faecalis on Ucx (7/16) sensitive to augmentin  - ID recs: 7/18 BCx NG; no ABx      # Dementia/Alzheimers  - a/w off balance while ambulating  - CTH, CTA head and neck: no acute findings  - c/w home Memantine 5MG po bid  - all precautions  - PT consult: Home PT  - Neurology follows    # Hypernatremia   - resolved      # Pulmonary nodule on imaging  - Right upper lobe pulmonary nodule measuring 5 mm, new since CTA chest 6/19/2021.  - OP f/u    # Hx of HTN/HLD  - Lipid profile WNL  - A1c 5.7%  - BP controlled  - not on any meds at home    # H/O Anxiety/Depression  - Not on any home meds    # H/O Prostate Ca  - Not on any home meds    #Supportive Management:  Code Status: DNR/DNI  Dispo: Home with PT  DVT Ppx: heparin    GI Ppx: pantoprazole      Diet:  Diet, DASH/TLC

## 2023-07-22 NOTE — PROGRESS NOTE ADULT - SUBJECTIVE AND OBJECTIVE BOX
MEDICINE ATTENDING PROGRESS NOTE  73 year old male DNR/DNI with a history of Dementia, HTN, HLD, Depression, Esophageal Cancer s/p resection and gastric pull,, Lung Ca s/p lobectomy, Prostate Cancer s/p Cyberknife presented to the ED with the complaint of dizziness, found to be anemia s/p 1 u pRBC now admitted for workup of his anemia. Admission was complicated by COVID+    Interval/Overnight Events  - No acute complaints  - still confuse; on 1:1    ROS  General: Denies fevers, chills, nightsweats, weight loss  HEENT: Denies headache, rhinorrhea, sore throat, eye pain  CV: Denies CP, palpitations  PULM: Denies SOB, cough  GI: Denies abdominal pain, diarrhea  : Denies dysuria, hematuria  MSK: Denies arthralgias  SKIN: Denies rash   NEURO: Denies paresthesias, weakness  PSYCH: Denies depression    MEDICATIONS  (STANDING):  amoxicillin  875 milliGRAM(s)/clavulanate 1 Tablet(s) Oral two times a day  heparin   Injectable 5000 Unit(s) SubCutaneous every 12 hours  memantine 5 milliGRAM(s) Oral two times a day  pantoprazole    Tablet 40 milliGRAM(s) Oral two times a day  remdesivir  IVPB   IV Intermittent   remdesivir  IVPB 100 milliGRAM(s) IV Intermittent every 24 hours    MEDICATIONS  (PRN):  acetaminophen     Tablet .. 650 milliGRAM(s) Oral every 6 hours PRN Temp greater or equal to 38C (100.4F), Mild Pain (1 - 3)  aluminum hydroxide/magnesium hydroxide/simethicone Suspension 30 milliLiter(s) Oral every 4 hours PRN Dyspepsia  melatonin 3 milliGRAM(s) Oral at bedtime PRN Insomnia  ondansetron Injectable 4 milliGRAM(s) IV Push every 8 hours PRN Nausea and/or Vomiting    ANTIBIOTICS:  amoxicillin  875 milliGRAM(s)/clavulanate 1 Tablet(s) Oral two times a day  remdesivir  IVPB 100 milliGRAM(s) IV Intermittent every 24 hours  remdesivir  IVPB   IV Intermittent     VITALS:  Vital Signs Last 24 Hrs  T(C): 36.7 (22 Jul 2023 04:21), Max: 36.7 (21 Jul 2023 13:56)  T(F): 98 (22 Jul 2023 04:21), Max: 98.1 (21 Jul 2023 13:56)  HR: 88 (22 Jul 2023 04:21) (66 - 99)  BP: 107/58 (22 Jul 2023 04:21) (93/56 - 115/59)  BP(mean): --  RR: 18 (22 Jul 2023 04:21) (18 - 18)  SpO2: 98% (22 Jul 2023 04:21) (98% - 98%)    PHYSICAL EXAM:  Gen: NAD, resting in bed  HEENT: Normocephalic, atraumatic  Neck: supple, no lymphadenopathy  CV: Regular rate & regular rhythm  Lungs: CTABL no wheeze  Abdomen: Soft, NTND+ BS present  Ext: Warm, well perfused no CCE  Neuro: non focal, awake, CN II-XII intact   Skin: no rash, no erythema  Psych: no SI, HI, Hallucination     LABS/MICROBIOLOGY:  07-21    141  |  105  |  26<H>  ----------------------------<  59<L>  4.3   |  22  |  0.9    Ca    8.4      21 Jul 2023 12:47    TPro  6.2  /  Alb  3.2<L>  /  TBili  0.4  /  DBili  x   /  AST  52<H>  /  ALT  15  /  AlkPhos  100  07-21    LIVER FUNCTIONS - ( 21 Jul 2023 12:47 )  Alb: 3.2 g/dL / Pro: 6.2 g/dL / ALK PHOS: 100 U/L / ALT: 15 U/L / AST: 52 U/L / GGT: x                                 10.3   4.65  )-----------( 152      ( 21 Jul 2023 12:47 )             36.2       MICROBIOLOGY  Urinalysis Basic - ( 20 Jul 2023 07:42 )  Color: x / Appearance: x / SG: x / pH: x  Gluc: 84 mg/dL / Ketone: x  / Bili: x / Urobili: x   Blood: x / Protein: x / Nitrite: x   Leuk Esterase: x / RBC: x / WBC x   Sq Epi: x / Non Sq Epi: x / Bacteria: x    Culture - Blood (collected 07-18-23 @ 20:28)  Source: .Blood Blood  Preliminary Report (07-20-23 @ 02:02):    No growth at 24 hours    Culture - Urine (collected 07-16-23 @ 08:25)  Source: Clean Catch Clean Catch (Midstream)  Preliminary Report (07-18-23 @ 19:18):    >100,000 CFU/ml Enterococcus faecalis  Organism: Enterococcus faecalis (07-19-23 @ 19:58)  Organism: Enterococcus faecalis (07-19-23 @ 19:58)      Method Type: JOSH      -  Ampicillin: S <=2 Predicts results to ampicillin/sulbactam, amoxacillin-clavulanate and  piperacillin-tazobactam.      -  Ciprofloxacin: S <=1      -  Levofloxacin: S 1      -  Nitrofurantoin: S <=32 Should not be used to treat pyelonephritis.      -  Tetracycline: R >8      -  Vancomycin: S 4    COVID-19 PCR: Detected (18 Jul 2023 17:34)    IMAGING:  Xray Chest 1 View- PORTABLE-Routine (Xray Chest 1 View- PORTABLE-Routine .) (07.18.23 @ 19:46)   Biapical opacities/scarring without significant change. No significant change in left basilar opacity/effusion.    CTA NECK:  The visualized aortic arch and great vessel origins are patent. There is a direct origin of the left vertebral artery from the aortic arch, normal variant.  The common, internal and external carotid arteries are patent.  The vertebral arteries are patent. The right vertebral artery is dominant.    CTA HEAD:  The distal internal carotid arteries are patent. The anterior and middle cerebral arteries are patent. There is a hypoplastic A1 segment of the right [place], normal variant.  The distal vertebral arteries are patent.  The basilar artery is patent though diffusely diminutive in caliber felt to reflect a dominant anterior circulation. The posterior cerebral arteries are patent.    OTHER:  Retained secretions are seen within the esophagus.  There is emphysema with bullous changes of the lung apices.  There is a right upper lobe a nodule measuring 5 mm on series 4 image 8, new since the CTA chest dated 6/19/2021.    IMPRESSION:  1.  No evidence of major vascular stenosis or occlusion.  2.  Right upper lobe pulmonary nodule measuring 5 mm, new since CTA chest 6/19/2021. Recommend a dedicated chest CT on an outpatient/elective basis.    EGD (07.06.21 @ 10:00)  impressions:    Ulcers in the lower third of the esophagus. (Biopsy).    Post surgical anatomy with gastric pull-up-.    Erythema in the stomach compatible with non-erosive gastritis. (Biopsy).    Normal mucosa inthe whole examined duodenum.     CARDIOLOGY TESTING  12 Lead ECG:   Ventricular Rate 59 BPM  Atrial Rate 59 BPM  P-R Interval 164 ms  QRS Duration 88 ms  Q-T Interval 458 ms  QTC Calculation(Bazett) 453 ms  P Axis 76 degrees  R Axis 24 degrees  T Axis 36 degrees    Diagnosis Line Sinus bradycardia with sinus arrhythmia  Otherwise normal ECG    Confirmed by CLAUDIA PATRICK MD ([id number]) on 7/14/2023 7:02:19 AM (07-13-23 @ 15:12)    ASSESSMENT/PLAN:  73 year old male DNR/DNI with a history of Dementia, HTN, HLD, Depression, Esophageal Cancer s/p resection and gastric pull,, Lung Ca s/p lobectomy, Prostate Cancer s/p Cyberknife presented to the ED with the complaint of dizziness, found to be anemia s/p 1 u pRBC now admitted for workup of his anemia. Admission was complicated by COVID+    #Generalized Weakness/Diziness due to Acute Blood Loss Anemia in setting of Ulcers in the lower third of the esophagus  #Microcytic Anemia due to Esophageal Ulcers  - Hgb 7.1 (on admission) s/p 1 unit pRBCs, now 9.3 (baseline Hgb ~12)  - Iron studies (7/13/23): Iron 20, TIBC 409, % sat 5, Ferritin 11 c/w ELBERT  - EGD 7/6/21 for odynophagia: Ulcers in the lower third of the esophagus, Erythema in the stomach compatible with non-erosive gastritis, Normal mucosa in the whole examined duodenum. (bx negative for H pylori, ulcer bx : focal active esophagitis, fibropurulent exudate)  - s/p IV Venofer  - c/w Carafate Suspension 1g po qid  - c/w Protonix 40 mg po bid  - transfuse as needed, keep Hgb >7  - ANGIE negative   - GI planned to repeat EGD with possible Colonoscopy once patient is COVID negative and UTI free; outpatient vs inpatient    #COVID positive  - Patient is in clinically asymptomatic; On RA  - Unknown vaccination status  - CXR benign  - On contact and airborne iso  - ID: Pt is possibly in the early viral replicative phase based on the timeline/onset of symptoms:  mg iv on Day 1, then 100 mg iv D2 and D3 but no steroids    #Acutely worse mental status likely due to E.faecalis UTI  #Acute Metabolic Encephalopathy  - Electrolytes are stable, patient is afebrile, WBC normal, exam notable for course breath sounds and worsened mental status  - TSH 3.2 (7/14)  - E.faecalis on Ucx (7/16) sensitive to augmentin  - c/w augmentin 875 mg bid for 10 days for UTI  (7/19 - 7/28)  - Appreciate ID recs    # Dementia/Alzheimers  - a/w off balance while ambulating  - CTH, CTA head and neck: no acute findings  - c/w home Memantine 5MG po bid  - all precautions  - PT consult: Home PT  - Neurology follows    # Hypernatremia (resolved)   - Na 143 (7/17) -> 140 (7/18) -> 134 (7/19)  - s/p 0.45% NS 60 cc/hr  X 24 hrs    # Pulmonary nodule on imaging  - Right upper lobe pulmonary nodule measuring 5 mm, new since CTA chest 6/19/2021.  - OP f/u    # H/O Hypertension/H/O HLD  - Lipid profile WNL  - A1c 5.7%  - BP controlled, not on any meds at home    # H/O Anxiety/Depression  - Not on any home meds    # H/O Prostate Ca  - Not on any home meds    #Supportive Management:  Code Status: DNR/DNI  Dispo: Home with PT  DVT Ppx: heparin   Injectable 5000 Unit(s) SubCutaneous every 12 hours  GI Ppx: pantoprazole    Tablet 40 milliGRAM(s) Oral two times a day  Diet:  Diet, DASH/TLC:   Sodium & Cholesterol Restricted (07-13-23 @ 21:14) [Active]    Total time spent to complete patient's bedside assessment, review medical chart, discuss medical plan of care with covering medical team was more than 45 minutes with >50% of time spent face to face with patient, discussion with patient/family and/or coordination of care    Sukh Oconnor MD/Cipriano  Attending Physician

## 2023-07-22 NOTE — PROGRESS NOTE ADULT - SUBJECTIVE AND OBJECTIVE BOX
SUBJECTIVE:    Patient is a 73y old Male who presents with a chief complaint of Dizziness (22 Jul 2023 07:44)     Today is hospital day 9d.       INTERVAL EVENTS:   No acute overnight events  Patient hemodynamically stable  Patient still confused, appears comfortable  Palliative are on board: GOC discussed and Pt's daughter in agreement with DNR/DNI.  family prefers to do colonoscopy inpatient.    PAST MEDICAL & SURGICAL HISTORY  Hypertension, unspecified type    Cancer  ESOPHAGEAL CANCER 2017 RECEIVED CHEMO AND RADIATION and endoscopic resection partial oesophagus and stomach    Renal calculi  nephrostomy tube -6/2021    Cancer  left lung 2018, no chemo or rad rx and resection    Abnormal cholesterol test    Prostate cancer    [place] ([place] hearing)    Alzheimer disease    Cancer of esophagus  2017 RESECTION OF LOWER PORTION OF ESOPHAGUS    S/P lobectomy of lung  left ?JUNE 2018    S/P cystoscopy with ureteral stent placement  LEFT    Inguinal hernia  AS AN INFANT, RIGHT SIDE    History of esophagogastroduodenoscopy (EGD)  2019    H/O colonoscopy  2018    Prostate cancer  cyber knife intervention 2021        ALLERGIES:  No Known Allergies    MEDICATIONS:  STANDING MEDICATIONS  amoxicillin  875 milliGRAM(s)/clavulanate 1 Tablet(s) Oral two times a day  heparin   Injectable 5000 Unit(s) SubCutaneous every 12 hours  memantine 5 milliGRAM(s) Oral two times a day  pantoprazole    Tablet 40 milliGRAM(s) Oral two times a day  sucralfate suspension 1 Gram(s) Oral four times a day    PRN MEDICATIONS  acetaminophen     Tablet .. 650 milliGRAM(s) Oral every 6 hours PRN  aluminum hydroxide/magnesium hydroxide/simethicone Suspension 30 milliLiter(s) Oral every 4 hours PRN  melatonin 3 milliGRAM(s) Oral at bedtime PRN  ondansetron Injectable 4 milliGRAM(s) IV Push every 8 hours PRN    VITALS:   T(F): 97  HR: 66  BP: 99/64  RR: 18  SpO2: 98%    LABS:                        10.6   4.20  )-----------( 190      ( 22 Jul 2023 04:30 )             36.4     07-22    138  |  104  |  24<H>  ----------------------------<  137<H>  3.7   |  20  |  1.0    Ca    8.3<L>      22 Jul 2023 04:30    TPro  6.1  /  Alb  3.4<L>  /  TBili  0.5  /  DBili  0.2  /  AST  38  /  ALT  14  /  AlkPhos  108  07-22    PT/INR - ( 22 Jul 2023 04:30 )   PT: 12.20 sec;   INR: 1.07 ratio             PHYSICAL EXAM:  GEN: No acute distress  PULM/CHEST: Clear to auscultation bilaterally, no rales, rhonchi or wheezes   CVS: Regular rate and rhythm, S1-S2, no murmurs  ABD: Soft, non-tender, non-distended, +BS  EXT: No edema

## 2023-07-23 LAB
ALBUMIN SERPL ELPH-MCNC: 3.5 G/DL — SIGNIFICANT CHANGE UP (ref 3.5–5.2)
ALBUMIN SERPL ELPH-MCNC: 3.6 G/DL — SIGNIFICANT CHANGE UP (ref 3.5–5.2)
ALP SERPL-CCNC: 113 U/L — SIGNIFICANT CHANGE UP (ref 30–115)
ALP SERPL-CCNC: 118 U/L — HIGH (ref 30–115)
ALT FLD-CCNC: 15 U/L — SIGNIFICANT CHANGE UP (ref 0–41)
ALT FLD-CCNC: 16 U/L — SIGNIFICANT CHANGE UP (ref 0–41)
ANION GAP SERPL CALC-SCNC: 12 MMOL/L — SIGNIFICANT CHANGE UP (ref 7–14)
AST SERPL-CCNC: 40 U/L — SIGNIFICANT CHANGE UP (ref 0–41)
AST SERPL-CCNC: 42 U/L — HIGH (ref 0–41)
BASOPHILS # BLD AUTO: 0.02 K/UL — SIGNIFICANT CHANGE UP (ref 0–0.2)
BASOPHILS NFR BLD AUTO: 0.7 % — SIGNIFICANT CHANGE UP (ref 0–1)
BILIRUB DIRECT SERPL-MCNC: 0.2 MG/DL — SIGNIFICANT CHANGE UP (ref 0–0.3)
BILIRUB INDIRECT FLD-MCNC: 0.3 MG/DL — SIGNIFICANT CHANGE UP (ref 0.2–1.2)
BILIRUB SERPL-MCNC: 0.5 MG/DL — SIGNIFICANT CHANGE UP (ref 0.2–1.2)
BILIRUB SERPL-MCNC: 0.5 MG/DL — SIGNIFICANT CHANGE UP (ref 0.2–1.2)
BUN SERPL-MCNC: 26 MG/DL — HIGH (ref 10–20)
CALCIUM SERPL-MCNC: 8.8 MG/DL — SIGNIFICANT CHANGE UP (ref 8.4–10.5)
CHLORIDE SERPL-SCNC: 104 MMOL/L — SIGNIFICANT CHANGE UP (ref 98–110)
CO2 SERPL-SCNC: 22 MMOL/L — SIGNIFICANT CHANGE UP (ref 17–32)
CREAT SERPL-MCNC: 1 MG/DL — SIGNIFICANT CHANGE UP (ref 0.7–1.5)
EGFR: 79 ML/MIN/1.73M2 — SIGNIFICANT CHANGE UP
EOSINOPHIL # BLD AUTO: 0.08 K/UL — SIGNIFICANT CHANGE UP (ref 0–0.7)
EOSINOPHIL NFR BLD AUTO: 2.7 % — SIGNIFICANT CHANGE UP (ref 0–8)
GLUCOSE SERPL-MCNC: 89 MG/DL — SIGNIFICANT CHANGE UP (ref 70–99)
HCT VFR BLD CALC: 37.8 % — LOW (ref 42–52)
HGB BLD-MCNC: 10.5 G/DL — LOW (ref 14–18)
IMM GRANULOCYTES NFR BLD AUTO: 0.7 % — HIGH (ref 0.1–0.3)
INR BLD: 1.08 RATIO — SIGNIFICANT CHANGE UP (ref 0.65–1.3)
LYMPHOCYTES # BLD AUTO: 0.85 K/UL — LOW (ref 1.2–3.4)
LYMPHOCYTES # BLD AUTO: 29.1 % — SIGNIFICANT CHANGE UP (ref 20.5–51.1)
MCHC RBC-ENTMCNC: 20 PG — LOW (ref 27–31)
MCHC RBC-ENTMCNC: 27.8 G/DL — LOW (ref 32–37)
MCV RBC AUTO: 72.1 FL — LOW (ref 80–94)
MONOCYTES # BLD AUTO: 0.35 K/UL — SIGNIFICANT CHANGE UP (ref 0.1–0.6)
MONOCYTES NFR BLD AUTO: 12 % — HIGH (ref 1.7–9.3)
NEUTROPHILS # BLD AUTO: 1.6 K/UL — SIGNIFICANT CHANGE UP (ref 1.4–6.5)
NEUTROPHILS NFR BLD AUTO: 54.8 % — SIGNIFICANT CHANGE UP (ref 42.2–75.2)
NRBC # BLD: 0 /100 WBCS — SIGNIFICANT CHANGE UP (ref 0–0)
PLATELET # BLD AUTO: 181 K/UL — SIGNIFICANT CHANGE UP (ref 130–400)
PMV BLD: SIGNIFICANT CHANGE UP (ref 7.4–10.4)
POTASSIUM SERPL-MCNC: 3.7 MMOL/L — SIGNIFICANT CHANGE UP (ref 3.5–5)
POTASSIUM SERPL-SCNC: 3.7 MMOL/L — SIGNIFICANT CHANGE UP (ref 3.5–5)
PROT SERPL-MCNC: 6.7 G/DL — SIGNIFICANT CHANGE UP (ref 6–8)
PROT SERPL-MCNC: 6.9 G/DL — SIGNIFICANT CHANGE UP (ref 6–8)
PROTHROM AB SERPL-ACNC: 12.3 SEC — SIGNIFICANT CHANGE UP (ref 9.95–12.87)
RBC # BLD: 5.24 M/UL — SIGNIFICANT CHANGE UP (ref 4.7–6.1)
RBC # FLD: 28.8 % — HIGH (ref 11.5–14.5)
SODIUM SERPL-SCNC: 138 MMOL/L — SIGNIFICANT CHANGE UP (ref 135–146)
WBC # BLD: 2.92 K/UL — LOW (ref 4.8–10.8)
WBC # FLD AUTO: 2.92 K/UL — LOW (ref 4.8–10.8)

## 2023-07-23 PROCEDURE — 99232 SBSQ HOSP IP/OBS MODERATE 35: CPT

## 2023-07-23 RX ADMIN — Medication 1 GRAM(S): at 05:25

## 2023-07-23 RX ADMIN — HEPARIN SODIUM 5000 UNIT(S): 5000 INJECTION INTRAVENOUS; SUBCUTANEOUS at 17:11

## 2023-07-23 RX ADMIN — Medication 1 GRAM(S): at 11:52

## 2023-07-23 RX ADMIN — Medication 1 TABLET(S): at 17:11

## 2023-07-23 RX ADMIN — Medication 1 GRAM(S): at 17:11

## 2023-07-23 RX ADMIN — HEPARIN SODIUM 5000 UNIT(S): 5000 INJECTION INTRAVENOUS; SUBCUTANEOUS at 05:26

## 2023-07-23 RX ADMIN — PANTOPRAZOLE SODIUM 40 MILLIGRAM(S): 20 TABLET, DELAYED RELEASE ORAL at 17:11

## 2023-07-23 RX ADMIN — MEMANTINE HYDROCHLORIDE 5 MILLIGRAM(S): 10 TABLET ORAL at 05:26

## 2023-07-23 RX ADMIN — Medication 1 GRAM(S): at 23:54

## 2023-07-23 RX ADMIN — MEMANTINE HYDROCHLORIDE 5 MILLIGRAM(S): 10 TABLET ORAL at 17:11

## 2023-07-23 RX ADMIN — Medication 1 TABLET(S): at 05:26

## 2023-07-23 RX ADMIN — Medication 1 GRAM(S): at 00:32

## 2023-07-23 RX ADMIN — PANTOPRAZOLE SODIUM 40 MILLIGRAM(S): 20 TABLET, DELAYED RELEASE ORAL at 05:26

## 2023-07-23 NOTE — PROGRESS NOTE ADULT - SUBJECTIVE AND OBJECTIVE BOX
MEDICINE ATTENDING PROGRESS NOTE  73 year old male DNR/DNI with a history of Dementia, HTN, HLD, Depression, Esophageal Cancer s/p resection and gastric pull,, Lung Ca s/p lobectomy, Prostate Cancer s/p Cyberknife presented to the ED with the complaint of dizziness, found to be anemia s/p 1 u pRBC now admitted for workup of his anemia. Admission was complicated by COVID+    Interval/Overnight Events  - no acute complaints; no overnight issue; asymptomatic  - pt remains pleasantly confused; on 1:1 for safety  - to clarify with GI if EGD/Colonoscopy could be performed next week given patient condition is stable.    ROS  General: Denies fevers, chills, nightsweats, weight loss  HEENT: Denies headache, rhinorrhea, sore throat, eye pain  CV: Denies CP, palpitations  PULM: Denies SOB, cough  GI: Denies abdominal pain, diarrhea  : Denies dysuria, hematuria  MSK: Denies arthralgias  SKIN: Denies rash   NEURO: Denies paresthesias, weakness  PSYCH: Denies depression    MEDICATIONS  (STANDING):  amoxicillin  875 milliGRAM(s)/clavulanate 1 Tablet(s) Oral two times a day  heparin   Injectable 5000 Unit(s) SubCutaneous every 12 hours  memantine 5 milliGRAM(s) Oral two times a day  pantoprazole    Tablet 40 milliGRAM(s) Oral two times a day  sucralfate suspension 1 Gram(s) Oral four times a day    MEDICATIONS  (PRN):  acetaminophen     Tablet .. 650 milliGRAM(s) Oral every 6 hours PRN Temp greater or equal to 38C (100.4F), Mild Pain (1 - 3)  aluminum hydroxide/magnesium hydroxide/simethicone Suspension 30 milliLiter(s) Oral every 4 hours PRN Dyspepsia  melatonin 3 milliGRAM(s) Oral at bedtime PRN Insomnia  ondansetron Injectable 4 milliGRAM(s) IV Push every 8 hours PRN Nausea and/or Vomiting    ANTIBIOTICS:  amoxicillin  875 milliGRAM(s)/clavulanate 1 Tablet(s) Oral two times a day    VITALS:  T(F): 97.8, Max: 98.6 (07-22-23 @ 20:55)  HR: 58  BP: 118/67  RR: 19Vital Signs Last 24 Hrs  T(C): 36.6 (23 Jul 2023 05:08), Max: 37 (22 Jul 2023 20:55)  T(F): 97.8 (23 Jul 2023 05:08), Max: 98.6 (22 Jul 2023 20:55)  HR: 58 (23 Jul 2023 05:08) (58 - 88)  BP: 118/67 (23 Jul 2023 05:08) (99/64 - 118/67)  BP(mean): --  RR: 19 (23 Jul 2023 05:08) (18 - 19)  SpO2: --     I&O's Summary    PHYSICAL EXAM:  Gen: NAD, resting in bed  HEENT: Normocephalic, atraumatic  Neck: supple, no lymphadenopathy  CV: Regular rate & regular rhythm  Lungs: CTABL no wheeze  Abdomen: Soft, NTND+ BS present  Ext: Warm, well perfused no CCE  Neuro: non focal, awake, CN II-XII intact   Skin: no rash, no erythema  Psych: no SI, HI, Hallucination     LABS/MICROBIOLOGY:                        10.6   4.20  )-----------( 190      ( 22 Jul 2023 04:30 )             36.4     07-22    138  |  104  |  24<H>  ----------------------------<  137<H>  3.7   |  20  |  1.0    Ca    8.3<L>      22 Jul 2023 04:30    TPro  6.1  /  Alb  3.4<L>  /  TBili  0.5  /  DBili  0.2  /  AST  38  /  ALT  14  /  AlkPhos  108  07-22    LIVER FUNCTIONS - ( 22 Jul 2023 04:30 )  Alb: 3.4 g/dL / Pro: 6.1 g/dL / ALK PHOS: 108 U/L / ALT: 14 U/L / AST: 38 U/L / GGT: x           PT/INR - ( 22 Jul 2023 04:30 )   PT: 12.20 sec;   INR: 1.07 ratio         MICROBIOLOGY  Urinalysis Basic - ( 22 Jul 2023 04:30 )  Color: x / Appearance: x / SG: x / pH: x  Gluc: 137 mg/dL / Ketone: x  / Bili: x / Urobili: x   Blood: x / Protein: x / Nitrite: x   Leuk Esterase: x / RBC: x / WBC x   Sq Epi: x / Non Sq Epi: x / Bacteria: x    Culture - Blood (collected 07-18-23 @ 20:28)  Source: .Blood Blood  Preliminary Report (07-23-23 @ 02:01):    No growth at 4 days    COVID-19 PCR: Detected (18 Jul 2023 17:34)    IMAGING:  Xray Chest 1 View- PORTABLE-Routine (Xray Chest 1 View- PORTABLE-Routine .) (07.18.23 @ 19:46)   Biapical opacities/scarring without significant change. No significant change in left basilar opacity/effusion.    CTA NECK:  The visualized aortic arch and great vessel origins are patent. There is a direct origin of the left vertebral artery from the aortic arch, normal variant.  The common, internal and external carotid arteries are patent.  The vertebral arteries are patent. The right vertebral artery is dominant.    CTA HEAD:  The distal internal carotid arteries are patent. The anterior and middle cerebral arteries are patent. There is a hypoplastic A1 segment of the right [place], normal variant.  The distal vertebral arteries are patent.  The basilar artery is patent though diffusely diminutive in caliber felt to reflect a dominant anterior circulation. The posterior cerebral arteries are patent.    OTHER:  Retained secretions are seen within the esophagus.  There is emphysema with bullous changes of the lung apices.  There is a right upper lobe a nodule measuring 5 mm on series 4 image 8, new since the CTA chest dated 6/19/2021.    IMPRESSION:  1.  No evidence of major vascular stenosis or occlusion.  2.  Right upper lobe pulmonary nodule measuring 5 mm, new since CTA chest 6/19/2021. Recommend a dedicated chest CT on an outpatient/elective basis.    EGD (07.06.21 @ 10:00)  impressions:    Ulcers in the lower third of the esophagus. (Biopsy).    Post surgical anatomy with gastric pull-up-.    Erythema in the stomach compatible with non-erosive gastritis. (Biopsy).    Normal mucosa inthe whole examined duodenum.     CARDIOLOGY TESTING  12 Lead ECG:   Ventricular Rate 59 BPM  Atrial Rate 59 BPM  P-R Interval 164 ms  QRS Duration 88 ms  Q-T Interval 458 ms  QTC Calculation(Bazett) 453 ms  P Axis 76 degrees  R Axis 24 degrees  T Axis 36 degrees    Diagnosis Line Sinus bradycardia with sinus arrhythmia  Otherwise normal ECG    Confirmed by CLAUDIA PATRICK MD ([id number]) on 7/14/2023 7:02:19 AM (07-13-23 @ 15:12)    ASSESSMENT/PLAN:  73 year old male DNR/DNI with a history of Dementia, HTN, HLD, Depression, Esophageal Cancer s/p resection and gastric pull,, Lung Ca s/p lobectomy, Prostate Cancer s/p Cyberknife presented to the ED with the complaint of dizziness, found to be anemia s/p 1 u pRBC now admitted for workup of his anemia. Admission was complicated by COVID+    #Generalized Weakness/Diziness due to Acute Blood Loss Anemia in setting of Ulcers in the lower third of the esophagus  #Microcytic Anemia due to Esophageal Ulcers  - Hgb 7.1 (on admission) s/p 1 unit pRBCs, now 9.3 (baseline Hgb ~12)  - Iron studies (7/13/23): Iron 20, TIBC 409, % sat 5, Ferritin 11 c/w ELBERT  - EGD 7/6/21 for odynophagia: Ulcers in the lower third of the esophagus, Erythema in the stomach compatible with non-erosive gastritis, Normal mucosa in the whole examined duodenum. (bx negative for H pylori, ulcer bx : focal active esophagitis, fibropurulent exudate)  - s/p IV Venofer  - c/w Carafate Suspension 1g po qid  - c/w Protonix 40 mg po bid  - transfuse as needed, keep Hgb >7  - ANGIE negative   - GI planned to repeat EGD with possible Colonoscopy once patient is COVID negative and UTI free; outpatient vs inpatient    #COVID positive  - COVID-19 PCR: Detected (18 Jul 2023 17:34)  - Patient is in clinically asymptomatic; On RA  - Unknown vaccination status  - CXR benign  - On contact and airborne iso  - ID: Pt is possibly in the early viral replicative phase based on the timeline/onset of symptoms:  mg iv on Day 1, then 100 mg iv D2 and D3 but no steroids    #Acutely worse mental status likely due to E.faecalis UTI  #Acute Metabolic Encephalopathy  - Electrolytes are stable, patient is afebrile, WBC normal, exam notable for course breath sounds and worsened mental status  - TSH 3.2 (7/14)  - E.faecalis on Ucx (7/16) sensitive to augmentin  - c/w augmentin 875 mg bid for 10 days for UTI  (7/19 - 7/28)  - Appreciate ID recs    # Dementia/Alzheimers  - a/w off balance while ambulating  - CTH, CTA head and neck: no acute findings  - c/w home Memantine 5MG po bid  - all precautions  - PT consult: Home PT  - Neurology follows    # Hypernatremia (resolved)   - Na 143 (7/17) -> 140 (7/18) -> 134 (7/19) -> 138 (7/23)  - s/p 0.45% NS 60 cc/hr  X 24 hrs    # Pulmonary nodule on imaging  - Right upper lobe pulmonary nodule measuring 5 mm, new since CTA chest 6/19/2021.  - OP f/u    # H/O Hypertension/H/O HLD  - Lipid profile WNL  - A1c 5.7%  - BP controlled, not on any meds at home    # H/O Anxiety/Depression  - Not on any home meds    # H/O Prostate Ca  - Not on any home meds    #Supportive Management:  Dispo:   DVT Ppx: heparin   Injectable 5000 Unit(s) SubCutaneous every 12 hours  GI Ppx: pantoprazole    Tablet 40 milliGRAM(s) Oral two times a day  Diet:  Diet, DASH/TLC:   Sodium & Cholesterol Restricted  Supplement Feeding Modality:  Oral  Ensure Plus High Protein Cans or Servings Per Day:  3       Frequency:  Daily (07-20-23 @ 13:00) [Pending Verification By Attending]  Diet, DASH/TLC:   Sodium & Cholesterol Restricted (07-13-23 @ 21:14) [Active]      Total time spent to complete patient's bedside assessment, review medical chart, discuss medical plan of care with covering medical team was more than 45 minutes with >50% of time spent face to face with patient, discussion with patient/family and/or coordination of care    Sukh Oconnor MD/Cipriano  Attending Physician

## 2023-07-24 LAB
ALBUMIN SERPL ELPH-MCNC: 3.3 G/DL — LOW (ref 3.5–5.2)
ALBUMIN SERPL ELPH-MCNC: 3.4 G/DL — LOW (ref 3.5–5.2)
ALP SERPL-CCNC: 107 U/L — SIGNIFICANT CHANGE UP (ref 30–115)
ALP SERPL-CCNC: 108 U/L — SIGNIFICANT CHANGE UP (ref 30–115)
ALT FLD-CCNC: 15 U/L — SIGNIFICANT CHANGE UP (ref 0–41)
ALT FLD-CCNC: 15 U/L — SIGNIFICANT CHANGE UP (ref 0–41)
ANION GAP SERPL CALC-SCNC: 12 MMOL/L — SIGNIFICANT CHANGE UP (ref 7–14)
AST SERPL-CCNC: 40 U/L — SIGNIFICANT CHANGE UP (ref 0–41)
AST SERPL-CCNC: 42 U/L — HIGH (ref 0–41)
BASOPHILS # BLD AUTO: 0.02 K/UL — SIGNIFICANT CHANGE UP (ref 0–0.2)
BASOPHILS NFR BLD AUTO: 0.5 % — SIGNIFICANT CHANGE UP (ref 0–1)
BILIRUB DIRECT SERPL-MCNC: 0.2 MG/DL — SIGNIFICANT CHANGE UP (ref 0–0.3)
BILIRUB INDIRECT FLD-MCNC: 0.3 MG/DL — SIGNIFICANT CHANGE UP (ref 0.2–1.2)
BILIRUB SERPL-MCNC: 0.5 MG/DL — SIGNIFICANT CHANGE UP (ref 0.2–1.2)
BILIRUB SERPL-MCNC: 0.5 MG/DL — SIGNIFICANT CHANGE UP (ref 0.2–1.2)
BUN SERPL-MCNC: 28 MG/DL — HIGH (ref 10–20)
CALCIUM SERPL-MCNC: 8.9 MG/DL — SIGNIFICANT CHANGE UP (ref 8.4–10.5)
CHLORIDE SERPL-SCNC: 106 MMOL/L — SIGNIFICANT CHANGE UP (ref 98–110)
CO2 SERPL-SCNC: 23 MMOL/L — SIGNIFICANT CHANGE UP (ref 17–32)
CREAT SERPL-MCNC: 1 MG/DL — SIGNIFICANT CHANGE UP (ref 0.7–1.5)
CULTURE RESULTS: SIGNIFICANT CHANGE UP
EGFR: 79 ML/MIN/1.73M2 — SIGNIFICANT CHANGE UP
EOSINOPHIL # BLD AUTO: 0.1 K/UL — SIGNIFICANT CHANGE UP (ref 0–0.7)
EOSINOPHIL NFR BLD AUTO: 2.6 % — SIGNIFICANT CHANGE UP (ref 0–8)
GLUCOSE SERPL-MCNC: 87 MG/DL — SIGNIFICANT CHANGE UP (ref 70–99)
HCT VFR BLD CALC: 33.9 % — LOW (ref 42–52)
HGB BLD-MCNC: 9.6 G/DL — LOW (ref 14–18)
IMM GRANULOCYTES NFR BLD AUTO: 0.8 % — HIGH (ref 0.1–0.3)
INR BLD: 1.05 RATIO — SIGNIFICANT CHANGE UP (ref 0.65–1.3)
LYMPHOCYTES # BLD AUTO: 0.78 K/UL — LOW (ref 1.2–3.4)
LYMPHOCYTES # BLD AUTO: 20.1 % — LOW (ref 20.5–51.1)
MCHC RBC-ENTMCNC: 20.7 PG — LOW (ref 27–31)
MCHC RBC-ENTMCNC: 28.3 G/DL — LOW (ref 32–37)
MCV RBC AUTO: 73.2 FL — LOW (ref 80–94)
MONOCYTES # BLD AUTO: 0.45 K/UL — SIGNIFICANT CHANGE UP (ref 0.1–0.6)
MONOCYTES NFR BLD AUTO: 11.6 % — HIGH (ref 1.7–9.3)
NEUTROPHILS # BLD AUTO: 2.5 K/UL — SIGNIFICANT CHANGE UP (ref 1.4–6.5)
NEUTROPHILS NFR BLD AUTO: 64.4 % — SIGNIFICANT CHANGE UP (ref 42.2–75.2)
NRBC # BLD: 0 /100 WBCS — SIGNIFICANT CHANGE UP (ref 0–0)
PLATELET # BLD AUTO: 209 K/UL — SIGNIFICANT CHANGE UP (ref 130–400)
PMV BLD: 10.6 FL — HIGH (ref 7.4–10.4)
POTASSIUM SERPL-MCNC: 3.8 MMOL/L — SIGNIFICANT CHANGE UP (ref 3.5–5)
POTASSIUM SERPL-SCNC: 3.8 MMOL/L — SIGNIFICANT CHANGE UP (ref 3.5–5)
PROT SERPL-MCNC: 6.1 G/DL — SIGNIFICANT CHANGE UP (ref 6–8)
PROT SERPL-MCNC: 6.1 G/DL — SIGNIFICANT CHANGE UP (ref 6–8)
PROTHROM AB SERPL-ACNC: 12 SEC — SIGNIFICANT CHANGE UP (ref 9.95–12.87)
RBC # BLD: 4.63 M/UL — LOW (ref 4.7–6.1)
RBC # FLD: 29.2 % — HIGH (ref 11.5–14.5)
SODIUM SERPL-SCNC: 141 MMOL/L — SIGNIFICANT CHANGE UP (ref 135–146)
SPECIMEN SOURCE: SIGNIFICANT CHANGE UP
WBC # BLD: 3.88 K/UL — LOW (ref 4.8–10.8)
WBC # FLD AUTO: 3.88 K/UL — LOW (ref 4.8–10.8)

## 2023-07-24 PROCEDURE — 99233 SBSQ HOSP IP/OBS HIGH 50: CPT

## 2023-07-24 PROCEDURE — 99232 SBSQ HOSP IP/OBS MODERATE 35: CPT

## 2023-07-24 RX ADMIN — HEPARIN SODIUM 5000 UNIT(S): 5000 INJECTION INTRAVENOUS; SUBCUTANEOUS at 17:11

## 2023-07-24 RX ADMIN — Medication 1 TABLET(S): at 17:11

## 2023-07-24 RX ADMIN — Medication 1 GRAM(S): at 05:40

## 2023-07-24 RX ADMIN — Medication 1 GRAM(S): at 23:48

## 2023-07-24 RX ADMIN — HEPARIN SODIUM 5000 UNIT(S): 5000 INJECTION INTRAVENOUS; SUBCUTANEOUS at 05:40

## 2023-07-24 RX ADMIN — MEMANTINE HYDROCHLORIDE 5 MILLIGRAM(S): 10 TABLET ORAL at 05:40

## 2023-07-24 RX ADMIN — MEMANTINE HYDROCHLORIDE 5 MILLIGRAM(S): 10 TABLET ORAL at 17:11

## 2023-07-24 RX ADMIN — Medication 1 TABLET(S): at 05:40

## 2023-07-24 RX ADMIN — Medication 1 GRAM(S): at 11:11

## 2023-07-24 RX ADMIN — PANTOPRAZOLE SODIUM 40 MILLIGRAM(S): 20 TABLET, DELAYED RELEASE ORAL at 17:11

## 2023-07-24 RX ADMIN — Medication 1 GRAM(S): at 17:11

## 2023-07-24 RX ADMIN — PANTOPRAZOLE SODIUM 40 MILLIGRAM(S): 20 TABLET, DELAYED RELEASE ORAL at 05:40

## 2023-07-24 NOTE — PROGRESS NOTE ADULT - SUBJECTIVE AND OBJECTIVE BOX
MEDICINE ATTENDING PROGRESS NOTE  73 year old male DNR/DNI with a history of Dementia, HTN, HLD, Depression, Esophageal Cancer s/p resection and gastric pull,, Lung Ca s/p lobectomy, Prostate Cancer s/p Cyberknife presented to the ED with the complaint of dizziness, found to be anemia s/p 1 u pRBC now admitted for workup of his anemia. Admission was complicated by COVID+    Interval/Overnight Events  - no acute complaints  - RN reports no overnight issue  - pt remains pleasantly confused; on 1:1 for safety  - will recall GI for EGD/Colonoscopy given patient is now medically stable for the procedure.    ROS  General: Denies fevers, chills, nightsweats, weight loss  HEENT: Denies headache, rhinorrhea, sore throat, eye pain  CV: Denies CP, palpitations  PULM: Denies SOB, cough  GI: Denies abdominal pain, diarrhea  : Denies dysuria, hematuria  MSK: Denies arthralgias  SKIN: Denies rash   NEURO: Denies paresthesias, weakness  PSYCH: Denies depression    MEDICATIONS  (STANDING):  amoxicillin  875 milliGRAM(s)/clavulanate 1 Tablet(s) Oral two times a day  heparin   Injectable 5000 Unit(s) SubCutaneous every 12 hours  memantine 5 milliGRAM(s) Oral two times a day  pantoprazole    Tablet 40 milliGRAM(s) Oral two times a day  sucralfate suspension 1 Gram(s) Oral four times a day    MEDICATIONS  (PRN):  acetaminophen     Tablet .. 650 milliGRAM(s) Oral every 6 hours PRN Temp greater or equal to 38C (100.4F), Mild Pain (1 - 3)  aluminum hydroxide/magnesium hydroxide/simethicone Suspension 30 milliLiter(s) Oral every 4 hours PRN Dyspepsia  melatonin 3 milliGRAM(s) Oral at bedtime PRN Insomnia  ondansetron Injectable 4 milliGRAM(s) IV Push every 8 hours PRN Nausea and/or Vomiting    ANTIBIOTICS:  amoxicillin  875 milliGRAM(s)/clavulanate 1 Tablet(s) Oral two times a day    VITALS:  Vital Signs Last 24 Hrs  T(C): 36.1 (24 Jul 2023 05:02), Max: 36.9 (23 Jul 2023 20:53)  T(F): 96.9 (24 Jul 2023 05:02), Max: 98.4 (23 Jul 2023 20:53)  HR: 68 (24 Jul 2023 05:02) (68 - 92)  BP: 122/58 (24 Jul 2023 05:02) (121/73 - 136/91)  BP(mean): 81 (24 Jul 2023 05:02) (81 - 81)  RR: 18 (24 Jul 2023 05:02) (18 - 18)  SpO2: 97% (24 Jul 2023 05:02) (96% - 97%)    Parameters below as of 24 Jul 2023 05:02  Patient On (Oxygen Delivery Method): room air      PHYSICAL EXAM:  Gen: NAD, resting in bed  HEENT: Normocephalic, atraumatic  Neck: supple, no lymphadenopathy  CV: Regular rate & regular rhythm  Lungs: CTABL no wheeze  Abdomen: Soft, NTND+ BS present  Ext: Warm, well perfused no CCE  Neuro: non focal, awake, CN II-XII intact   Skin: no rash, no erythema  Psych: no SI, HI, Hallucination     LABS/MICROBIOLOGY:  07-24    141  |  106  |  28<H>  ----------------------------<  87  3.8   |  23  |  1.0    Ca    8.9      24 Jul 2023 07:20    TPro  6.1  /  Alb  3.3<L>  /  TBili  0.5  /  DBili  0.2  /  AST  42<H>  /  ALT  15  /  AlkPhos  108  07-24    LIVER FUNCTIONS - ( 24 Jul 2023 07:20 )  Alb: 3.3 g/dL / Pro: 6.1 g/dL / ALK PHOS: 108 U/L / ALT: 15 U/L / AST: 42 U/L / GGT: x                               9.6    3.88  )-----------( 209      ( 24 Jul 2023 07:20 )             33.9       MICROBIOLOGY  Urinalysis Basic - ( 22 Jul 2023 04:30 )  Color: x / Appearance: x / SG: x / pH: x  Gluc: 137 mg/dL / Ketone: x  / Bili: x / Urobili: x   Blood: x / Protein: x / Nitrite: x   Leuk Esterase: x / RBC: x / WBC x   Sq Epi: x / Non Sq Epi: x / Bacteria: x    Culture - Blood (collected 07-18-23 @ 20:28)  Source: .Blood Blood  Preliminary Report (07-23-23 @ 02:01):    No growth at 4 days    COVID-19 PCR: Detected (18 Jul 2023 17:34)    IMAGING:  Xray Chest 1 View- PORTABLE-Routine (Xray Chest 1 View- PORTABLE-Routine .) (07.18.23 @ 19:46)   Biapical opacities/scarring without significant change. No significant change in left basilar opacity/effusion.    CTA NECK:  The visualized aortic arch and great vessel origins are patent. There is a direct origin of the left vertebral artery from the aortic arch, normal variant.  The common, internal and external carotid arteries are patent.  The vertebral arteries are patent. The right vertebral artery is dominant.    CTA HEAD:  The distal internal carotid arteries are patent. The anterior and middle cerebral arteries are patent. There is a hypoplastic A1 segment of the right [place], normal variant.  The distal vertebral arteries are patent.  The basilar artery is patent though diffusely diminutive in caliber felt to reflect a dominant anterior circulation. The posterior cerebral arteries are patent.    OTHER:  Retained secretions are seen within the esophagus.  There is emphysema with bullous changes of the lung apices.  There is a right upper lobe a nodule measuring 5 mm on series 4 image 8, new since the CTA chest dated 6/19/2021.    IMPRESSION:  1.  No evidence of major vascular stenosis or occlusion.  2.  Right upper lobe pulmonary nodule measuring 5 mm, new since CTA chest 6/19/2021. Recommend a dedicated chest CT on an outpatient/elective basis.    EGD (07.06.21 @ 10:00)  impressions:    Ulcers in the lower third of the esophagus. (Biopsy).    Post surgical anatomy with gastric pull-up-.    Erythema in the stomach compatible with non-erosive gastritis. (Biopsy).    Normal mucosa inthe whole examined duodenum.     CARDIOLOGY TESTING  12 Lead ECG:   Ventricular Rate 59 BPM  Atrial Rate 59 BPM  P-R Interval 164 ms  QRS Duration 88 ms  Q-T Interval 458 ms  QTC Calculation(Bazett) 453 ms  P Axis 76 degrees  R Axis 24 degrees  T Axis 36 degrees    Diagnosis Line Sinus bradycardia with sinus arrhythmia  Otherwise normal ECG    Confirmed by CLAUDIA PATRICK MD ([id number]) on 7/14/2023 7:02:19 AM (07-13-23 @ 15:12)    ASSESSMENT/PLAN:  73 year old male DNR/DNI with a history of Dementia, HTN, HLD, Depression, Esophageal Cancer s/p resection and gastric pull,, Lung Ca s/p lobectomy, Prostate Cancer s/p Cyberknife presented to the ED with the complaint of dizziness, found to be anemia s/p 1 u pRBC now admitted for workup of his anemia. Admission was complicated by COVID+    #Generalized Weakness/Diziness due to Acute Blood Loss Anemia in setting of Ulcers in the lower third of the esophagus  #Microcytic Anemia due to Esophageal Ulcers  - Hgb 7.1 (on admission) s/p 1 unit pRBCs, now 9.3 (baseline Hgb ~12)  - Iron studies (7/13/23): Iron 20, TIBC 409, % sat 5, Ferritin 11 c/w ELBERT  - EGD 7/6/21 for odynophagia: Ulcers in the lower third of the esophagus, Erythema in the stomach compatible with non-erosive gastritis, Normal mucosa in the whole examined duodenum. (bx negative for H pylori, ulcer bx : focal active esophagitis, fibropurulent exudate)  - s/p IV Venofer  - c/w Carafate Suspension 1g po qid  - c/w Protonix 40 mg po bid  - transfuse as needed, keep Hgb >7  - ANGIE negative   - GI planned to repeat EGD with possible Colonoscopy once patient is COVID negative and UTI free; outpatient vs inpatient    #COVID positive  - COVID-19 PCR: Detected (18 Jul 2023 17:34)  - Patient is in clinically asymptomatic; On RA  - Unknown vaccination status  - CXR benign  - On contact and airborne iso  - ID: Pt is possibly in the early viral replicative phase based on the timeline/onset of symptoms:  mg iv on Day 1, then 100 mg iv D2 and D3 but no steroids    #Acutely worse mental status likely due to E.faecalis UTI  #Acute Metabolic Encephalopathy  - Electrolytes are stable, patient is afebrile, WBC normal, exam notable for course breath sounds and worsened mental status  - TSH 3.2 (7/14)  - E.faecalis on Ucx (7/16) sensitive to augmentin  - c/w augmentin 875 mg bid for 10 days for UTI  (7/19 - 7/28)  - Appreciate ID recs    # Dementia/Alzheimers  - a/w off balance while ambulating  - CTH, CTA head and neck: no acute findings  - c/w home Memantine 5MG po bid  - all precautions  - PT consult: Home PT  - Neurology follows    # Hypernatremia (resolved)   - Na 143 (7/17) -> 140 (7/18) -> 134 (7/19) -> 138 (7/23)  - s/p 0.45% NS 60 cc/hr  X 24 hrs    # Pulmonary nodule on imaging  - Right upper lobe pulmonary nodule measuring 5 mm, new since CTA chest 6/19/2021.  - OP f/u    # H/O Hypertension/H/O HLD  - Lipid profile WNL  - A1c 5.7%  - BP controlled, not on any meds at home    # H/O Anxiety/Depression  - Not on any home meds    # H/O Prostate Ca  - Not on any home meds    #Supportive Management:  Dispo:   DVT Ppx: heparin   Injectable 5000 Unit(s) SubCutaneous every 12 hours  GI Ppx: pantoprazole    Tablet 40 milliGRAM(s) Oral two times a day  Diet:  Diet, DASH/TLC:   Sodium & Cholesterol Restricted  Supplement Feeding Modality:  Oral  Ensure Plus High Protein Cans or Servings Per Day:  3       Frequency:  Daily (07-20-23 @ 13:00) [Pending Verification By Attending]  Diet, DASH/TLC:   Sodium & Cholesterol Restricted (07-13-23 @ 21:14) [Active]      Total time spent to complete patient's bedside assessment, review medical chart, discuss medical plan of care with covering medical team was more than 45 minutes with >50% of time spent face to face with patient, discussion with patient/family and/or coordination of care    Sukh Oconnor MD/Cipriano  Attending Physician

## 2023-07-24 NOTE — PROGRESS NOTE ADULT - PROBLEM SELECTOR PLAN 1
- history of esophageal ulcers in past  - awaiting EGD/colonoscopy once clinically stable, given UTI and COVID  - daughter would like colonoscopy to be done inpatient; states it would be difficult logistically with prep and transport to coordinate as outpatient  - will d/w medical attending.
- history of esophageal ulcers in past  - awaiting EGD/colonoscopy once clinically stable, given UTI and COVID  - daughter would like colonoscopy to be done inpatient; states it would be difficult logistically with prep and

## 2023-07-24 NOTE — PROGRESS NOTE ADULT - PROBLEM SELECTOR PLAN 6
supportive care, assist w all ADLs   - pleasantly confused but follow simple commands
supportive care  - daughter states that he is worsening here, doesn't recognize family anymore  - monitor mental status.

## 2023-07-24 NOTE — PROGRESS NOTE ADULT - SUBJECTIVE AND OBJECTIVE BOX
HPI: 73M with PMH of dementia, HTN, HLD, depression, esophageal CA s/p resection, lung CA s/p lobectomy, prostate CA s/p cyberknfe, here with lightheadedness, and found to have anemia from esophageal ulcers, s/p pRBC. GI also planning for repeat EGD and possible colonoscopy. Patient's course c/b COVID +, on remdesivir, clinically asymptomatic. Also found to have E. faecalis UTI, on augmentin. Patient is full code. Palliative care consulted for GOC.      Interval history:     -7/21- Pt alert and confused. Appears comfortable      ADVANCE DIRECTIVES:       [ ] Full Code [ X] DNR  MOLST  [X ]  Living Will  [ ]   DECISION MAKER(s):  [ ] Health Care Proxy(s)  [ ] Surrogate(s)  [ ] Guardian           Name(s): Phone Number(s):  jorge Larios ([phone])  BASELINE (I)ADL(s) (prior to admission):    BASELINE (I)ADL(s) (prior to admission):  [place]: [ ]Total  [ ] Moderate [ ]Dependent  Allergies    No Known Allergies    Intolerances  MEDICATIONS  (STANDING):  amoxicillin  875 milliGRAM(s)/clavulanate 1 Tablet(s) Oral two times a day  heparin   Injectable 5000 Unit(s) SubCutaneous every 12 hours  memantine 5 milliGRAM(s) Oral two times a day  pantoprazole    Tablet 40 milliGRAM(s) Oral two times a day  sucralfate suspension 1 Gram(s) Oral four times a day    MEDICATIONS  (PRN):  acetaminophen     Tablet .. 650 milliGRAM(s) Oral every 6 hours PRN Temp greater or equal to 38C (100.4F), Mild Pain (1 - 3)  aluminum hydroxide/magnesium hydroxide/simethicone Suspension 30 milliLiter(s) Oral every 4 hours PRN Dyspepsia  melatonin 3 milliGRAM(s) Oral at bedtime PRN Insomnia  ondansetron Injectable 4 milliGRAM(s) IV Push every 8 hours PRN Nausea and/or Vomiting         PRESENT SYMPTOMS: [ ]Unable to obtain due to poor mentation   Source if other than patient:  [ ]Family   [ ]Team     Pain: [ ]yes [ x]no  QOL impact -   Location -                    Aggravating factors -  Quality -  Radiation -  Timing-  Severity (0-10 scale):  Minimal acceptable level (0-10 scale):     CPOT:    https://[email or web address]/[email or web address]/Critical-Care-Pain-Observation-Tool-(CPOT)    PAIN AD Score:   [email or web address]/painad.pdf (press ctrl +  left click to view)    Dyspnea:                           [ x]None[ ]Mild [ ]Moderate [ ]Severe     Respiratory Distress Observation Scale (RDOS):   A score of 0 to 2 signifies little or no respiratory distress, 3 signifies mild distress, scores 4 to 6 indicate moderate distress, and scores greater than 7 signify severe distress  [email or web address]/sites/default/files/[email or web address]-wsnrvydfalj-lkwvqdob-lhpgfoedsst-rbneo.pdf    Anxiety:                             [ ]None[ ]Mild [ ]Moderate [ ]Severe   Fatigue:                             [ ]None[ ]Mild [ ]Moderate [ ]Severe   Nausea:                             [ ]None[ ]Mild [ ]Moderate [ ]Severe   Loss of appetite:              [ ]None[ ]Mild [ ]Moderate [ ]Severe   Constipation:                    [ ]None[ ]Mild [ ]Moderate [ ]Severe    Other Symptoms:  [ ]All other review of systems negative   PHYSICAL EXAM:  Vital Signs Last 24 Hrs  T(C): 36.1 (24 Jul 2023 05:02), Max: 36.9 (23 Jul 2023 20:53)  T(F): 96.9 (24 Jul 2023 05:02), Max: 98.4 (23 Jul 2023 20:53)  HR: 68 (24 Jul 2023 05:02) (68 - 72)  BP: 122/58 (24 Jul 2023 05:02) (121/73 - 134/72)  BP(mean): 81 (24 Jul 2023 05:02) (81 - 81)  RR: 18 (24 Jul 2023 05:02) (18 - 18)  SpO2: 97% (24 Jul 2023 05:02) (97% - 97%)    Parameters below as of 24 Jul 2023 05:02  Patient On (Oxygen Delivery Method): room air     I&O's Summary    23 Jul 2023 07:01  -  24 Jul 2023 07:00  --------------------------------------------------------  IN: 0 mL / OUT: 280 mL / NET: -280 mL        GENERAL:  [X ] No acute distress [ ]Lethargic  [ ]Unarousable  [ ]Verbal  [ ]Non-Verbal [ ]Cachexia    BEHAVIORAL/PSYCH:  [X ]Alert and Oriented x 1 [ ] Anxiety [ ] Delirium [ ] Agitation [X ] Calm   EYES: [X ] No scleral icterus [ ] Scleral icterus [ ] Closed  ENMT:  [ ]Dry mouth  [ ]No external oral lesions [ X] No external ear or nose lesions  CARDIOVASCULAR:  [ ]Regular [ ]Irregular [ ]Tachy [ ]Not Tachy  [ ]Jas [ ] Edema [ ] No edema  PULMONARY:  [ ]Tachypnea  [ ]Audible excessive secretions [X ] No labored breathing [ ] labored breathing  GASTROINTESTINAL: [ ]Soft  [ ]Distended  [ X]Not distended [ ]Non tender [ ]Tender  MUSCULOSKELETAL: [ ]No clubbing [ ] clubbing  [X ] No cyanosis [ ] cyanosis  NEUROLOGIC: [ ]No focal deficits  [ ]Follows commands  [ ]Does not follow commands  [X ]Cognitive impairment  [ ]Dysphagia  [ ]Dysarthria  [ ]Paresis   SKIN: [ ] Jaundiced [X ] Non-jaundiced [ ]Rash [ ]No Rash [ ] Warm [ ] Dry  MISC/LINES: [ ] ET tube [ ] Trach [ ]NGT/OGT [ ]PEG [ ]Roman    LABS: reviewed by me                        9.6    3.88  )-----------( 209      ( 24 Jul 2023 07:20 )             33.9   07-24    141  |  106  |  28<H>  ----------------------------<  87  3.8   |  23  |  1.0    Ca    8.9      24 Jul 2023 07:20    TPro  6.1  /  Alb  3.3<L>  /  TBili  0.5  /  DBili  0.2  /  AST  42<H>  /  ALT  15  /  AlkPhos  108  07-24  PT/INR - ( 24 Jul 2023 07:20 )   PT: 12.00 sec;   INR: 1.05 ratio             Urinalysis Basic - ( 24 Jul 2023 07:20 )    Color: x / Appearance: x / SG: x / pH: x  Gluc: 87 mg/dL / Ketone: x  / Bili: x / Urobili: x   Blood: x / Protein: x / Nitrite: x   Leuk Esterase: x / RBC: x / WBC x   Sq Epi: x / Non Sq Epi: x / Bacteria: x      RADIOLOGY & ADDITIONAL STUDIES: reviewed by me    EKG: reviewed by me      Patient discussed with primary medical team MD  Palliative care education provided to patient and/or family

## 2023-07-24 NOTE — PROGRESS NOTE ADULT - PROBLEM SELECTOR PLAN 5
- will follow
DNR/DNI  - ongoing medical management  - palliative care will sign off - reconsult PRN x [phone] or contact us via teams

## 2023-07-24 NOTE — PROGRESS NOTE ADULT - ASSESSMENT
73M with PMH of dementia, HTN, HLD< depression, esophageal CA s/p resection, lung CA s/p lobectomy, prostate CA s/p cyberknfe, here with lightheadedness, and found to have anemia from esophageal ulcers, s/p pRBC. GI also planning for repeat EGD and possible colonoscopy. Patient's course c/b COVID +, on remdesivir, clinically asymptomatic. Also found to have E. faecalis UTI, on augmentin. Patient is full code. Palliative care consulted for GO

## 2023-07-24 NOTE — PROGRESS NOTE ADULT - PROBLEM SELECTOR PLAN 4
- see LMSW note  - confirmed with daughter re: DNR/DNI  - MOLST in chart.
TIM completed  Daughter wants ongoing medical care

## 2023-07-25 LAB
ALBUMIN SERPL ELPH-MCNC: 3.4 G/DL — LOW (ref 3.5–5.2)
ALBUMIN SERPL ELPH-MCNC: 3.5 G/DL — SIGNIFICANT CHANGE UP (ref 3.5–5.2)
ALP SERPL-CCNC: 108 U/L — SIGNIFICANT CHANGE UP (ref 30–115)
ALP SERPL-CCNC: 111 U/L — SIGNIFICANT CHANGE UP (ref 30–115)
ALT FLD-CCNC: 15 U/L — SIGNIFICANT CHANGE UP (ref 0–41)
ALT FLD-CCNC: 15 U/L — SIGNIFICANT CHANGE UP (ref 0–41)
ANION GAP SERPL CALC-SCNC: 10 MMOL/L — SIGNIFICANT CHANGE UP (ref 7–14)
ANION GAP SERPL CALC-SCNC: 9 MMOL/L — SIGNIFICANT CHANGE UP (ref 7–14)
AST SERPL-CCNC: 34 U/L — SIGNIFICANT CHANGE UP (ref 0–41)
AST SERPL-CCNC: 37 U/L — SIGNIFICANT CHANGE UP (ref 0–41)
BASOPHILS # BLD AUTO: 0.02 K/UL — SIGNIFICANT CHANGE UP (ref 0–0.2)
BASOPHILS NFR BLD AUTO: 0.7 % — SIGNIFICANT CHANGE UP (ref 0–1)
BILIRUB DIRECT SERPL-MCNC: 0.2 MG/DL — SIGNIFICANT CHANGE UP (ref 0–0.3)
BILIRUB INDIRECT FLD-MCNC: 0.4 MG/DL — SIGNIFICANT CHANGE UP (ref 0.2–1.2)
BILIRUB SERPL-MCNC: 0.6 MG/DL — SIGNIFICANT CHANGE UP (ref 0.2–1.2)
BILIRUB SERPL-MCNC: 0.6 MG/DL — SIGNIFICANT CHANGE UP (ref 0.2–1.2)
BUN SERPL-MCNC: 16 MG/DL — SIGNIFICANT CHANGE UP (ref 10–20)
BUN SERPL-MCNC: 22 MG/DL — HIGH (ref 10–20)
CALCIUM SERPL-MCNC: 8.4 MG/DL — SIGNIFICANT CHANGE UP (ref 8.4–10.5)
CALCIUM SERPL-MCNC: 8.8 MG/DL — SIGNIFICANT CHANGE UP (ref 8.4–10.5)
CHLORIDE SERPL-SCNC: 107 MMOL/L — SIGNIFICANT CHANGE UP (ref 98–110)
CHLORIDE SERPL-SCNC: 108 MMOL/L — SIGNIFICANT CHANGE UP (ref 98–110)
CO2 SERPL-SCNC: 25 MMOL/L — SIGNIFICANT CHANGE UP (ref 17–32)
CO2 SERPL-SCNC: 27 MMOL/L — SIGNIFICANT CHANGE UP (ref 17–32)
CREAT SERPL-MCNC: 0.9 MG/DL — SIGNIFICANT CHANGE UP (ref 0.7–1.5)
CREAT SERPL-MCNC: 0.9 MG/DL — SIGNIFICANT CHANGE UP (ref 0.7–1.5)
EGFR: 90 ML/MIN/1.73M2 — SIGNIFICANT CHANGE UP
EGFR: 90 ML/MIN/1.73M2 — SIGNIFICANT CHANGE UP
EOSINOPHIL # BLD AUTO: 0.12 K/UL — SIGNIFICANT CHANGE UP (ref 0–0.7)
EOSINOPHIL NFR BLD AUTO: 4.2 % — SIGNIFICANT CHANGE UP (ref 0–8)
GLUCOSE SERPL-MCNC: 92 MG/DL — SIGNIFICANT CHANGE UP (ref 70–99)
GLUCOSE SERPL-MCNC: 95 MG/DL — SIGNIFICANT CHANGE UP (ref 70–99)
HCT VFR BLD CALC: 33.9 % — LOW (ref 42–52)
HCT VFR BLD CALC: 35.4 % — LOW (ref 42–52)
HGB BLD-MCNC: 9.7 G/DL — LOW (ref 14–18)
HGB BLD-MCNC: 9.9 G/DL — LOW (ref 14–18)
IMM GRANULOCYTES NFR BLD AUTO: 0.7 % — HIGH (ref 0.1–0.3)
INR BLD: 0.97 RATIO — SIGNIFICANT CHANGE UP (ref 0.65–1.3)
INR BLD: 1 RATIO — SIGNIFICANT CHANGE UP (ref 0.65–1.3)
LYMPHOCYTES # BLD AUTO: 0.73 K/UL — LOW (ref 1.2–3.4)
LYMPHOCYTES # BLD AUTO: 25.4 % — SIGNIFICANT CHANGE UP (ref 20.5–51.1)
MAGNESIUM SERPL-MCNC: 2.1 MG/DL — SIGNIFICANT CHANGE UP (ref 1.8–2.4)
MCHC RBC-ENTMCNC: 20.5 PG — LOW (ref 27–31)
MCHC RBC-ENTMCNC: 20.9 PG — LOW (ref 27–31)
MCHC RBC-ENTMCNC: 28 G/DL — LOW (ref 32–37)
MCHC RBC-ENTMCNC: 28.6 G/DL — LOW (ref 32–37)
MCV RBC AUTO: 72.9 FL — LOW (ref 80–94)
MCV RBC AUTO: 73.4 FL — LOW (ref 80–94)
MONOCYTES # BLD AUTO: 0.54 K/UL — SIGNIFICANT CHANGE UP (ref 0.1–0.6)
MONOCYTES NFR BLD AUTO: 18.8 % — HIGH (ref 1.7–9.3)
NEUTROPHILS # BLD AUTO: 1.44 K/UL — SIGNIFICANT CHANGE UP (ref 1.4–6.5)
NEUTROPHILS NFR BLD AUTO: 50.2 % — SIGNIFICANT CHANGE UP (ref 42.2–75.2)
NRBC # BLD: 0 /100 WBCS — SIGNIFICANT CHANGE UP (ref 0–0)
NRBC # BLD: 0 /100 WBCS — SIGNIFICANT CHANGE UP (ref 0–0)
PLATELET # BLD AUTO: 218 K/UL — SIGNIFICANT CHANGE UP (ref 130–400)
PLATELET # BLD AUTO: 249 K/UL — SIGNIFICANT CHANGE UP (ref 130–400)
PMV BLD: 10.3 FL — SIGNIFICANT CHANGE UP (ref 7.4–10.4)
PMV BLD: 10.4 FL — SIGNIFICANT CHANGE UP (ref 7.4–10.4)
POTASSIUM SERPL-MCNC: 3.6 MMOL/L — SIGNIFICANT CHANGE UP (ref 3.5–5)
POTASSIUM SERPL-MCNC: 3.7 MMOL/L — SIGNIFICANT CHANGE UP (ref 3.5–5)
POTASSIUM SERPL-SCNC: 3.6 MMOL/L — SIGNIFICANT CHANGE UP (ref 3.5–5)
POTASSIUM SERPL-SCNC: 3.7 MMOL/L — SIGNIFICANT CHANGE UP (ref 3.5–5)
PROT SERPL-MCNC: 6.2 G/DL — SIGNIFICANT CHANGE UP (ref 6–8)
PROT SERPL-MCNC: 6.3 G/DL — SIGNIFICANT CHANGE UP (ref 6–8)
PROTHROM AB SERPL-ACNC: 11 SEC — SIGNIFICANT CHANGE UP (ref 9.95–12.87)
PROTHROM AB SERPL-ACNC: 11.4 SEC — SIGNIFICANT CHANGE UP (ref 9.95–12.87)
RBC # BLD: 4.65 M/UL — LOW (ref 4.7–6.1)
RBC # BLD: 4.82 M/UL — SIGNIFICANT CHANGE UP (ref 4.7–6.1)
RBC # FLD: 30 % — HIGH (ref 11.5–14.5)
RBC # FLD: 30.5 % — HIGH (ref 11.5–14.5)
SODIUM SERPL-SCNC: 143 MMOL/L — SIGNIFICANT CHANGE UP (ref 135–146)
SODIUM SERPL-SCNC: 143 MMOL/L — SIGNIFICANT CHANGE UP (ref 135–146)
WBC # BLD: 2.87 K/UL — LOW (ref 4.8–10.8)
WBC # BLD: 3.17 K/UL — LOW (ref 4.8–10.8)
WBC # FLD AUTO: 2.87 K/UL — LOW (ref 4.8–10.8)
WBC # FLD AUTO: 3.17 K/UL — LOW (ref 4.8–10.8)

## 2023-07-25 PROCEDURE — 99232 SBSQ HOSP IP/OBS MODERATE 35: CPT

## 2023-07-25 RX ORDER — SOD SULF/SODIUM/NAHCO3/KCL/PEG
4000 SOLUTION, RECONSTITUTED, ORAL ORAL ONCE
Refills: 0 | Status: COMPLETED | OUTPATIENT
Start: 2023-07-25 | End: 2023-07-25

## 2023-07-25 RX ADMIN — Medication 1 GRAM(S): at 12:21

## 2023-07-25 RX ADMIN — Medication 20 MILLIGRAM(S): at 21:34

## 2023-07-25 RX ADMIN — Medication 1 GRAM(S): at 23:13

## 2023-07-25 RX ADMIN — Medication 1 GRAM(S): at 18:00

## 2023-07-25 RX ADMIN — HEPARIN SODIUM 5000 UNIT(S): 5000 INJECTION INTRAVENOUS; SUBCUTANEOUS at 18:00

## 2023-07-25 RX ADMIN — MEMANTINE HYDROCHLORIDE 5 MILLIGRAM(S): 10 TABLET ORAL at 18:00

## 2023-07-25 RX ADMIN — Medication 1 TABLET(S): at 18:01

## 2023-07-25 RX ADMIN — PANTOPRAZOLE SODIUM 40 MILLIGRAM(S): 20 TABLET, DELAYED RELEASE ORAL at 05:52

## 2023-07-25 RX ADMIN — MEMANTINE HYDROCHLORIDE 5 MILLIGRAM(S): 10 TABLET ORAL at 05:52

## 2023-07-25 RX ADMIN — HEPARIN SODIUM 5000 UNIT(S): 5000 INJECTION INTRAVENOUS; SUBCUTANEOUS at 05:52

## 2023-07-25 RX ADMIN — Medication 1 TABLET(S): at 05:52

## 2023-07-25 RX ADMIN — PANTOPRAZOLE SODIUM 40 MILLIGRAM(S): 20 TABLET, DELAYED RELEASE ORAL at 18:00

## 2023-07-25 RX ADMIN — Medication 1 GRAM(S): at 05:52

## 2023-07-25 RX ADMIN — Medication 4000 MILLILITER(S): at 18:02

## 2023-07-25 NOTE — PROGRESS NOTE ADULT - SUBJECTIVE AND OBJECTIVE BOX
MEDICINE ATTENDING PROGRESS NOTE  73 year old male DNR/DNI with a history of Dementia, HTN, HLD, Depression, Esophageal Cancer s/p resection and gastric pull,, Lung Ca s/p lobectomy, Prostate Cancer s/p Cyberknife presented to the ED with the complaint of dizziness, found to be anemia s/p 1 u pRBC now admitted for workup of his anemia. Admission was complicated by COVID+    HAND-OFF  [ ] EGD/Colonoscopy 7/25 -> dispo planning afterward  [ ]  augmentin 875 mg bid for 10 days for UTI  (7/19 - 7/28)    Interval/Overnight Events  - no acute complaints  - RN reports no overnight issue  - pt remains pleasantly confused; on 1:1 for safety  - GI: start clear liq diet today + scheduled for EGD with colonoscopy on 7/26/23    ROS  General: Denies fevers, chills, nightsweats, weight loss  HEENT: Denies headache, rhinorrhea, sore throat, eye pain  CV: Denies CP, palpitations  PULM: Denies SOB, cough  GI: Denies abdominal pain, diarrhea  : Denies dysuria, hematuria  MSK: Denies arthralgias  SKIN: Denies rash   NEURO: Denies paresthesias, weakness  PSYCH: Denies depression    MEDICATIONS  (STANDING):  amoxicillin  875 milliGRAM(s)/clavulanate 1 Tablet(s) Oral two times a day  heparin   Injectable 5000 Unit(s) SubCutaneous every 12 hours  memantine 5 milliGRAM(s) Oral two times a day  pantoprazole    Tablet 40 milliGRAM(s) Oral two times a day  sucralfate suspension 1 Gram(s) Oral four times a day    MEDICATIONS  (PRN):  acetaminophen     Tablet .. 650 milliGRAM(s) Oral every 6 hours PRN Temp greater or equal to 38C (100.4F), Mild Pain (1 - 3)  aluminum hydroxide/magnesium hydroxide/simethicone Suspension 30 milliLiter(s) Oral every 4 hours PRN Dyspepsia  melatonin 3 milliGRAM(s) Oral at bedtime PRN Insomnia  ondansetron Injectable 4 milliGRAM(s) IV Push every 8 hours PRN Nausea and/or Vomiting    ANTIBIOTICS:  amoxicillin  875 milliGRAM(s)/clavulanate 1 Tablet(s) Oral two times a day    VITALS:  Vital Signs Last 24 Hrs  T(C): 36.2 (25 Jul 2023 05:47), Max: 36.2 (25 Jul 2023 05:47)  T(F): 97.2 (25 Jul 2023 05:47), Max: 97.2 (25 Jul 2023 05:47)  HR: 54 (25 Jul 2023 05:47) (54 - 55)  BP: 114/64 (25 Jul 2023 05:47) (106/64 - 114/64)  BP(mean): 80 (24 Jul 2023 20:46) (80 - 80)  RR: 18 (25 Jul 2023 05:47) (18 - 18)  SpO2: --    PHYSICAL EXAM:  Gen: NAD, resting in bed  HEENT: Normocephalic, atraumatic  Neck: supple, no lymphadenopathy  CV: Regular rate & regular rhythm  Lungs: CTABL no wheeze  Abdomen: Soft, NTND+ BS present  Ext: Warm, well perfused no CCE  Neuro: non focal, awake, CN II-XII intact   Skin: no rash, no erythema  Psych: no SI, HI, Hallucination     LABS/MICROBIOLOGY:  07-24    141  |  106  |  28<H>  ----------------------------<  87  3.8   |  23  |  1.0    Ca    8.9      24 Jul 2023 07:20    TPro  6.1  /  Alb  3.3<L>  /  TBili  0.5  /  DBili  0.2  /  AST  42<H>  /  ALT  15  /  AlkPhos  108  07-24    LIVER FUNCTIONS - ( 24 Jul 2023 07:20 )  Alb: 3.3 g/dL / Pro: 6.1 g/dL / ALK PHOS: 108 U/L / ALT: 15 U/L / AST: 42 U/L / GGT: x                                 9.9    3.17  )-----------( 249      ( 25 Jul 2023 04:30 )             35.4     MICROBIOLOGY  Urinalysis Basic - ( 22 Jul 2023 04:30 )  Color: x / Appearance: x / SG: x / pH: x  Gluc: 137 mg/dL / Ketone: x  / Bili: x / Urobili: x   Blood: x / Protein: x / Nitrite: x   Leuk Esterase: x / RBC: x / WBC x   Sq Epi: x / Non Sq Epi: x / Bacteria: x    Culture - Blood (collected 07-18-23 @ 20:28)  Source: .Blood Blood  Preliminary Report (07-23-23 @ 02:01):    No growth at 4 days    COVID-19 PCR: Detected (18 Jul 2023 17:34)    IMAGING:  Xray Chest 1 View- PORTABLE-Routine (Xray Chest 1 View- PORTABLE-Routine .) (07.18.23 @ 19:46)   Biapical opacities/scarring without significant change. No significant change in left basilar opacity/effusion.    CTA NECK:  The visualized aortic arch and great vessel origins are patent. There is a direct origin of the left vertebral artery from the aortic arch, normal variant.  The common, internal and external carotid arteries are patent.  The vertebral arteries are patent. The right vertebral artery is dominant.    CTA HEAD:  The distal internal carotid arteries are patent. The anterior and middle cerebral arteries are patent. There is a hypoplastic A1 segment of the right [place], normal variant.  The distal vertebral arteries are patent.  The basilar artery is patent though diffusely diminutive in caliber felt to reflect a dominant anterior circulation. The posterior cerebral arteries are patent.    OTHER:  Retained secretions are seen within the esophagus.  There is emphysema with bullous changes of the lung apices.  There is a right upper lobe a nodule measuring 5 mm on series 4 image 8, new since the CTA chest dated 6/19/2021.    IMPRESSION:  1.  No evidence of major vascular stenosis or occlusion.  2.  Right upper lobe pulmonary nodule measuring 5 mm, new since CTA chest 6/19/2021. Recommend a dedicated chest CT on an outpatient/elective basis.    EGD (07.06.21 @ 10:00)  impressions:    Ulcers in the lower third of the esophagus. (Biopsy).    Post surgical anatomy with gastric pull-up-.    Erythema in the stomach compatible with non-erosive gastritis. (Biopsy).    Normal mucosa inthe whole examined duodenum.     CARDIOLOGY TESTING  12 Lead ECG:   Ventricular Rate 59 BPM  Atrial Rate 59 BPM  P-R Interval 164 ms  QRS Duration 88 ms  Q-T Interval 458 ms  QTC Calculation(Bazett) 453 ms  P Axis 76 degrees  R Axis 24 degrees  T Axis 36 degrees    Diagnosis Line Sinus bradycardia with sinus arrhythmia  Otherwise normal ECG    Confirmed by CLAUDIA PATRICK MD ([id number]) on 7/14/2023 7:02:19 AM (07-13-23 @ 15:12)    ASSESSMENT/PLAN:  73 year old male DNR/DNI with a history of Dementia, HTN, HLD, Depression, Esophageal Cancer s/p resection and gastric pull,, Lung Ca s/p lobectomy, Prostate Cancer s/p Cyberknife presented to the ED with the complaint of dizziness, found to be anemia s/p 1 u pRBC now admitted for workup of his anemia. Admission was complicated by COVID+    #Generalized Weakness/Diziness due to Acute Blood Loss Anemia in setting of Ulcers in the lower third of the esophagus  #Microcytic Anemia due to Esophageal Ulcers  - Hgb 7.1 (on admission) s/p 1 unit pRBCs, now 9.3 (baseline Hgb ~12)  - Iron studies (7/13/23): Iron 20, TIBC 409, % sat 5, Ferritin 11 c/w ELBERT  - EGD 7/6/21 for odynophagia: Ulcers in the lower third of the esophagus, Erythema in the stomach compatible with non-erosive gastritis, Normal mucosa in the whole examined duodenum. (bx negative for H pylori, ulcer bx : focal active esophagitis, fibropurulent exudate)  - s/p IV Venofer  - c/w Carafate Suspension 1g po qid  - c/w Protonix 40 mg po bid  - transfuse as needed, keep Hgb >7  - ANGIE negative   - GI planned to repeat EGD with possible Colonoscopy 7/26    #COVID positive, stable  - COVID-19 PCR: Detected (18 Jul 2023 17:34)  - Patient is in clinically asymptomatic; On RA  - Unknown vaccination status  - CXR benign  - On contact and airborne iso  - ID: Pt is possibly in the early viral replicative phase based on the timeline/onset of symptoms:  mg iv on Day 1, then 100 mg iv D2 and D3 but no steroids    #Acutely worse mental status likely due to E.faecalis UTI  #Acute Metabolic Encephalopathy  - Electrolytes are stable, patient is afebrile, WBC normal, exam notable for course breath sounds and worsened mental status  - TSH 3.2 (7/14)  - E.faecalis on Ucx (7/16) sensitive to augmentin  - c/w augmentin 875 mg bid for 10 days for UTI  (7/19 - 7/28)  - Appreciate ID recs    # Dementia/Alzheimers  - a/w off balance while ambulating  - CTH, CTA head and neck: no acute findings  - c/w home Memantine 5MG po bid  - all precautions  - PT consult: Home PT  - Neurology follows    # Hypernatremia (resolved)   - Na 143 (7/17) -> 140 (7/18) -> 134 (7/19) -> 138 (7/23)  - s/p 0.45% NS 60 cc/hr  X 24 hrs    # Pulmonary nodule on imaging  - Right upper lobe pulmonary nodule measuring 5 mm, new since CTA chest 6/19/2021.  - OP f/u    # H/O Hypertension/H/O HLD  - Lipid profile WNL  - A1c 5.7%  - BP controlled, not on any meds at home    # H/O Anxiety/Depression  - Not on any home meds    # H/O Prostate Ca  - Not on any home meds    #Supportive Management:  Dispo:   DVT Ppx: heparin   Injectable 5000 Unit(s) SubCutaneous every 12 hours  GI Ppx: pantoprazole    Tablet 40 milliGRAM(s) Oral two times a day  Diet:  Diet, DASH/TLC:   Sodium & Cholesterol Restricted  Supplement Feeding Modality:  Oral  Ensure Plus High Protein Cans or Servings Per Day:  3       Frequency:  Daily (07-20-23 @ 13:00) [Pending Verification By Attending]  Diet, DASH/TLC:   Sodium & Cholesterol Restricted (07-13-23 @ 21:14) [Active]    Total time spent to complete patient's bedside assessment, review medical chart, discuss medical plan of care with covering medical team was more than 45 minutes with >50% of time spent face to face with patient, discussion with patient/family and/or coordination of care    Sukh Oconnor MD/Cipriano  Attending Physician

## 2023-07-25 NOTE — PROGRESS NOTE ADULT - ASSESSMENT
74 y/o M with PMHx of HTN, HLD, Dementia, Depression, Esophageal Cancer s/p resection and gastric pull,, Lung Ca s/p lobectomy, Prostate Cancer s/p Cyberknife presented to the ED with the complaint of dizziness that began in the morning of presentation described as lightheadedness and off balance causing him to feel unstable ambulating. Admitted for acute on chronic anemia. GI consulted for anemia. Patient has 1:1 sit unsure , now COVID +,     #Acute on Chronic symptomatic Microcytic Anemia s/p 1u pRBC- stable  - hemodynamically stable, no overt signs of bleeding  - Hgb 7.3, MCV 66.5 on admission, Hb today: 8.9  - baseline Hgb ~12  - EGD 7/6/21 for odynophagia: Ulcers in the lower third of the esophagus, Erythema in the stomach compatible with non-erosive gastritis, Normal mucosa in the whole examined duodenum. (bx negative for H pylori, ulcer bx : focal active esophagitis, fibropurulent exudate)  - Iron studies (7/13/23): Iron 20, TIBC 409, % sat 5, Ferritin 11 c/w ELBERT  - s/p IV venofer  - on Protonix 40mg PO q24hrs  - patient is COVID +, AAox1, as per primary team change in mental status, patient has 1:1 sit, crawling out of bed    Plan:  - start clear liq diet today  - scheduled for EGD with colonoscopy on 7/26/23  - monitor H/H  - c.w PPI   - keep active T&S  - transfuse as needed, keep Hgb >7   - Please give the colonoscopy prep ( 1 whole Gallon of Golytely, dulcolax 20mg HS with clear liquids only) to achieve transparent watery stools on the procedure day.  - Check electrolytes, INR and Hg (Hg >8, NA, K, Mg, INR levels should be normal) the day before the procedure.  - Keep NPO after midnight for the day of procedure.    Communicated with Primary team member:  72 y/o M with PMHx of HTN, HLD, Dementia, Depression, Esophageal Cancer s/p resection and gastric pull,, Lung Ca s/p lobectomy, Prostate Cancer s/p Cyberknife presented to the ED with the complaint of dizziness that began in the morning of presentation described as lightheadedness and off balance causing him to feel unstable ambulating. Admitted for acute on chronic anemia. GI consulted for anemia. Patient has 1:1 sit unsure , now COVID +,     #Acute on Chronic symptomatic Microcytic, ELBERT s/p 1u pRBC- stable  - hemodynamically stable, no overt signs of bleeding  - Hgb 7.3, MCV 66.5 on admission, Hb today: 8.9  - baseline Hgb ~12  - EGD 7/6/21 for odynophagia: Ulcers in the lower third of the esophagus, Erythema in the stomach compatible with non-erosive gastritis, Normal mucosa in the whole examined duodenum. (bx negative for H pylori, ulcer bx : focal active esophagitis, fibropurulent exudate)  - Iron studies (7/13/23): Iron 20, TIBC 409, % sat 5, Ferritin 11 c/w ELBERT  - s/p IV venofer  - on Protonix 40mg PO q24hrs  - patient is COVID +, AAox1, as per primary team change in mental status, patient has 1:1 sit, crawling out of bed    Plan:  - start clear liq diet today  - scheduled for EGD with colonoscopy on 7/26/23  - transfuse as needed, keep Hgb >7   - Please give the colonoscopy prep ( 1 whole Gallon of Golytely, dulcolax 20mg HS with clear liquids only) to achieve transparent watery stools on the procedure day.  - Check electrolytes, INR and Hg (Hg >8, NA, K, Mg, INR levels should be normal) the day before the procedure.  - Keep NPO after midnight for the day of procedure.  - monitor H/H  - c.w PPI   - keep active T&S    Communicated with Primary team member 72 y/o M with PMHx of HTN, HLD, Dementia, Depression, Esophageal Cancer s/p resection and gastric pull, Lung Ca s/p lobectomy, Prostate Cancer s/p Cyberknife presented to the ED with the complaint of dizziness that began in the morning of presentation described as lightheadedness and off balance causing him to feel unstable ambulating. Admitted for acute on chronic anemia. GI consulted for anemia. Patient has 1:1 sit unsure , now COVID +,     #Acute on Chronic symptomatic Microcytic, ELBERT s/p 1u pRBC- stable  - hemodynamically stable, no overt signs of bleeding  - Hgb 7.3, MCV 66.5 on admission, Hb today: 8.9  - baseline Hgb ~12  - EGD 7/6/21 for odynophagia: Ulcers in the lower third of the esophagus, Erythema in the stomach compatible with non-erosive gastritis, Normal mucosa in the whole examined duodenum. (bx negative for H pylori, ulcer bx : focal active esophagitis, fibropurulent exudate)  - Iron studies (7/13/23): Iron 20, TIBC 409, % sat 5, Ferritin 11 c/w ELBERT  - s/p IV venofer  - on Protonix 40mg PO q24hrs  - patient is COVID +, AAox1, as per primary team change in mental status, patient has 1:1 sit, crawling out of bed    Plan:  - start clear liq diet today  - scheduled for EGD with colonoscopy on 7/26/23  - transfuse as needed, keep Hgb >7   - Please give the colonoscopy prep ( 1 whole Gallon of Golytely, dulcolax 20mg HS with clear liquids only) to achieve transparent watery stools on the procedure day.  - Check electrolytes, INR and Hg (Hg >8, NA, K, Mg, INR levels should be normal) the day before the procedure.  - Keep NPO after midnight for the day of procedure.  - monitor H/H  - c.w PPI   - keep active T&S    Communicated with Primary team member

## 2023-07-25 NOTE — PROGRESS NOTE ADULT - SUBJECTIVE AND OBJECTIVE BOX
Gastroenterology progress note:     Patient is a 73y old  Male who presents with a chief complaint of Dizziness (24 Jul 2023 15:58)       Admitted on: 07-13-23    We are following the patient for: ELBERT       Interval History:    No acute events overnight.   - Diet -   - last BM -   - Abdominal pain -       PAST MEDICAL & SURGICAL HISTORY:  Hypertension, unspecified type      Cancer  ESOPHAGEAL CANCER 2017 RECEIVED CHEMO AND RADIATION and endoscopic resection partial oesophagus and stomach      Renal calculi  nephrostomy tube -6/2021      Cancer  left lung 2018, no chemo or rad rx and resection      Abnormal cholesterol test      Prostate cancer      [place] (hard of hearing)      Alzheimer disease      Cancer of esophagus  2017 RESECTION OF LOWER PORTION OF ESOPHAGUS      S/P lobectomy of lung  left ?JUNE 2018      S/P cystoscopy with ureteral stent placement  LEFT      Inguinal hernia  AS AN INFANT, RIGHT SIDE      History of esophagogastroduodenoscopy (EGD)  2019      H/O colonoscopy  2018      Prostate cancer  cyber knife intervention 2021          MEDICATIONS  (STANDING):  amoxicillin  875 milliGRAM(s)/clavulanate 1 Tablet(s) Oral two times a day  heparin   Injectable 5000 Unit(s) SubCutaneous every 12 hours  memantine 5 milliGRAM(s) Oral two times a day  pantoprazole    Tablet 40 milliGRAM(s) Oral two times a day  sucralfate suspension 1 Gram(s) Oral four times a day    MEDICATIONS  (PRN):  acetaminophen     Tablet .. 650 milliGRAM(s) Oral every 6 hours PRN Temp greater or equal to 38C (100.4F), Mild Pain (1 - 3)  aluminum hydroxide/magnesium hydroxide/simethicone Suspension 30 milliLiter(s) Oral every 4 hours PRN Dyspepsia  melatonin 3 milliGRAM(s) Oral at bedtime PRN Insomnia  ondansetron Injectable 4 milliGRAM(s) IV Push every 8 hours PRN Nausea and/or Vomiting      Allergies  No Known Allergies      Review of Systems:   Cardiovascular:  No Chest Pain, No Palpitations  Respiratory:  No Cough, No Dyspnea  Gastrointestinal:  As described in HPI  Skin:  No Skin Lesions, No Jaundice  Neuro:  No Syncope, No Dizziness    Physical Examination:  T(C): 36.2 (07-25-23 @ 05:47), Max: 36.2 (07-25-23 @ 05:47)  HR: 54 (07-25-23 @ 05:47) (54 - 55)  BP: 114/64 (07-25-23 @ 05:47) (106/64 - 114/64)  RR: 18 (07-25-23 @ 05:47) (18 - 18)      GENERAL: AAOx3, no acute distress.  HEAD:  Atraumatic, Normocephalic  EYES: conjunctiva and sclera clear  NECK: Supple, no JVD or thyromegaly  CHEST/LUNG: Clear to auscultation bilaterally; No wheeze, rhonchi, or rales  HEART: Regular rate and rhythm; normal S1, S2, No murmurs.  ABDOMEN: Soft, nontender, nondistended; Bowel sounds present  NEUROLOGY: No asterixis or tremor.   SKIN: Intact, no jaundice     Data:                        9.6    3.88  )-----------( 209      ( 24 Jul 2023 07:20 )             33.9     Hgb trend:  9.6  07-24-23 @ 07:20  10.5  07-23-23 @ 07:23      07-24    141  |  106  |  28<H>  ----------------------------<  87  3.8   |  23  |  1.0    Ca    8.9      24 Jul 2023 07:20    TPro  6.1  /  Alb  3.3<L>  /  TBili  0.5  /  DBili  0.2  /  AST  42<H>  /  ALT  15  /  AlkPhos  108  07-24    Liver panel trend:  TBili 0.5   /   AST 42   /   ALT 15   /   AlkP 108   /   Tptn 6.1   /   Alb 3.3    /   DBili 0.2      07-24  TBili 0.5   /   AST 42   /   ALT 16   /   AlkP 118   /   Tptn 6.7   /   Alb 3.6    /   DBili 0.2      07-23  TBili 0.5   /   AST 38   /   ALT 14   /   AlkP 108   /   Tptn 6.1   /   Alb 3.4    /   DBili 0.2      07-22  TBili 0.4   /   AST 52   /   ALT 15   /   AlkP 100   /   Tptn 6.2   /   Alb 3.2    /   DBili --      07-21  TBili 0.5   /   AST 36   /   ALT 14   /   AlkP 98   /   Tptn 6.2   /   Alb 3.2    /   DBili 0.2      07-21  TBili 0.5   /   AST 35   /   ALT 13   /   AlkP 106   /   Tptn 6.0   /   Alb 3.2    /   DBili 0.2      07-20  TBili 0.5   /   AST 35   /   ALT 13   /   AlkP 111   /   Tptn 6.3   /   Alb 3.5    /   DBili 0.2      07-19  TBili 0.4   /   AST 30   /   ALT 12   /   AlkP 116   /   Tptn 6.5   /   Alb 3.7    /   DBili --      07-17  TBili 0.4   /   AST 35   /   ALT 11   /   AlkP 115   /   Tptn 6.5   /   Alb 3.5    /   DBili --      07-16      PT/INR - ( 24 Jul 2023 07:20 )   PT: 12.00 sec;   INR: 1.05 ratio                Radiology:       Gastroenterology progress note:     Patient is a 73y old  Male who presents with a chief complaint of Dizziness (24 Jul 2023 15:58)       Admitted on: 07-13-23    We are following the patient for: ELBERT       Interval History: patient being treated for UTI PO, still AAox0     No acute events overnight.   - Diet - tolerating diet      PAST MEDICAL & SURGICAL HISTORY:  Hypertension, unspecified type      Cancer  ESOPHAGEAL CANCER 2017 RECEIVED CHEMO AND RADIATION and endoscopic resection partial oesophagus and stomach      Renal calculi  nephrostomy tube -6/2021      Cancer  left lung 2018, no chemo or rad rx and resection    Abnormal cholesterol test    Prostate cancer    [place] (hard of hearing)    Alzheimer disease    Cancer of esophagus  2017 RESECTION OF LOWER PORTION OF ESOPHAGUS      S/P lobectomy of lung  left ?JUNE 2018      S/P cystoscopy with ureteral stent placement  LEFT      Inguinal hernia  AS AN INFANT, RIGHT SIDE      History of esophagogastroduodenoscopy (EGD)  2019      H/O colonoscopy  2018      Prostate cancer  cyber knife intervention 2021          MEDICATIONS  (STANDING):  amoxicillin  875 milliGRAM(s)/clavulanate 1 Tablet(s) Oral two times a day  heparin   Injectable 5000 Unit(s) SubCutaneous every 12 hours  memantine 5 milliGRAM(s) Oral two times a day  pantoprazole    Tablet 40 milliGRAM(s) Oral two times a day  sucralfate suspension 1 Gram(s) Oral four times a day    MEDICATIONS  (PRN):  acetaminophen     Tablet .. 650 milliGRAM(s) Oral every 6 hours PRN Temp greater or equal to 38C (100.4F), Mild Pain (1 - 3)  aluminum hydroxide/magnesium hydroxide/simethicone Suspension 30 milliLiter(s) Oral every 4 hours PRN Dyspepsia  melatonin 3 milliGRAM(s) Oral at bedtime PRN Insomnia  ondansetron Injectable 4 milliGRAM(s) IV Push every 8 hours PRN Nausea and/or Vomiting      Allergies  No Known Allergies      Review of Systems:   Cardiovascular:  No Chest Pain, No Palpitations  Respiratory:  No Cough, No Dyspnea  Gastrointestinal:  As described in HPI  Skin:  No Skin Lesions, No Jaundice  Neuro:  No Syncope, No Dizziness    Physical Examination:  T(C): 36.2 (07-25-23 @ 05:47), Max: 36.2 (07-25-23 @ 05:47)  HR: 54 (07-25-23 @ 05:47) (54 - 55)  BP: 114/64 (07-25-23 @ 05:47) (106/64 - 114/64)  RR: 18 (07-25-23 @ 05:47) (18 - 18)      GENERAL: AAOx3, no acute distress.  HEAD:  Atraumatic, Normocephalic  EYES: conjunctiva and sclera clear  NECK: Supple, no JVD or thyromegaly  CHEST/LUNG: Clear to auscultation bilaterally; No wheeze, rhonchi, or rales  HEART: Regular rate and rhythm; normal S1, S2, No murmurs.  ABDOMEN: Soft, nontender, nondistended; Bowel sounds present  NEUROLOGY: No asterixis or tremor.   SKIN: Intact, no jaundice     Data:                        9.6    3.88  )-----------( 209      ( 24 Jul 2023 07:20 )             33.9     Hgb trend:  9.6  07-24-23 @ 07:20  10.5  07-23-23 @ 07:23      07-24    141  |  106  |  28<H>  ----------------------------<  87  3.8   |  23  |  1.0    Ca    8.9      24 Jul 2023 07:20    TPro  6.1  /  Alb  3.3<L>  /  TBili  0.5  /  DBili  0.2  /  AST  42<H>  /  ALT  15  /  AlkPhos  108  07-24    Liver panel trend:  TBili 0.5   /   AST 42   /   ALT 15   /   AlkP 108   /   Tptn 6.1   /   Alb 3.3    /   DBili 0.2      07-24  TBili 0.5   /   AST 42   /   ALT 16   /   AlkP 118   /   Tptn 6.7   /   Alb 3.6    /   DBili 0.2      07-23  TBili 0.5   /   AST 38   /   ALT 14   /   AlkP 108   /   Tptn 6.1   /   Alb 3.4    /   DBili 0.2      07-22  TBili 0.4   /   AST 52   /   ALT 15   /   AlkP 100   /   Tptn 6.2   /   Alb 3.2    /   DBili --      07-21  TBili 0.5   /   AST 36   /   ALT 14   /   AlkP 98   /   Tptn 6.2   /   Alb 3.2    /   DBili 0.2      07-21  TBili 0.5   /   AST 35   /   ALT 13   /   AlkP 106   /   Tptn 6.0   /   Alb 3.2    /   DBili 0.2      07-20  TBili 0.5   /   AST 35   /   ALT 13   /   AlkP 111   /   Tptn 6.3   /   Alb 3.5    /   DBili 0.2      07-19  TBili 0.4   /   AST 30   /   ALT 12   /   AlkP 116   /   Tptn 6.5   /   Alb 3.7    /   DBili --      07-17  TBili 0.4   /   AST 35   /   ALT 11   /   AlkP 115   /   Tptn 6.5   /   Alb 3.5    /   DBili --      07-16      PT/INR - ( 24 Jul 2023 07:20 )   PT: 12.00 sec;   INR: 1.05 ratio                Radiology:

## 2023-07-26 LAB
ACANTHOCYTES BLD QL SMEAR: SLIGHT — SIGNIFICANT CHANGE UP
ALBUMIN SERPL ELPH-MCNC: 3.4 G/DL — LOW (ref 3.5–5.2)
ALP SERPL-CCNC: 113 U/L — SIGNIFICANT CHANGE UP (ref 30–115)
ALT FLD-CCNC: 14 U/L — SIGNIFICANT CHANGE UP (ref 0–41)
ANISOCYTOSIS BLD QL: SIGNIFICANT CHANGE UP
AST SERPL-CCNC: 32 U/L — SIGNIFICANT CHANGE UP (ref 0–41)
BASOPHILS # BLD AUTO: 0.05 K/UL — SIGNIFICANT CHANGE UP (ref 0–0.2)
BASOPHILS NFR BLD AUTO: 1.8 % — HIGH (ref 0–1)
BILIRUB DIRECT SERPL-MCNC: 0.2 MG/DL — SIGNIFICANT CHANGE UP (ref 0–0.3)
BILIRUB INDIRECT FLD-MCNC: 0.4 MG/DL — SIGNIFICANT CHANGE UP (ref 0.2–1.2)
BILIRUB SERPL-MCNC: 0.6 MG/DL — SIGNIFICANT CHANGE UP (ref 0.2–1.2)
CREAT SERPL-MCNC: 1 MG/DL — SIGNIFICANT CHANGE UP (ref 0.7–1.5)
EGFR: 79 ML/MIN/1.73M2 — SIGNIFICANT CHANGE UP
ELLIPTOCYTES BLD QL SMEAR: SLIGHT — SIGNIFICANT CHANGE UP
EOSINOPHIL # BLD AUTO: 0.07 K/UL — SIGNIFICANT CHANGE UP (ref 0–0.7)
EOSINOPHIL NFR BLD AUTO: 2.6 % — SIGNIFICANT CHANGE UP (ref 0–8)
GIANT PLATELETS BLD QL SMEAR: PRESENT — SIGNIFICANT CHANGE UP
GIANT PLATELETS BLD QL SMEAR: PRESENT — SIGNIFICANT CHANGE UP
HCT VFR BLD CALC: 32 % — LOW (ref 42–52)
HGB BLD-MCNC: 9.2 G/DL — LOW (ref 14–18)
HYPOCHROMIA BLD QL: SIGNIFICANT CHANGE UP
LYMPHOCYTES # BLD AUTO: 0.38 K/UL — LOW (ref 1.2–3.4)
LYMPHOCYTES # BLD AUTO: 14.9 % — LOW (ref 20.5–51.1)
MACROCYTES BLD QL: SLIGHT — SIGNIFICANT CHANGE UP
MANUAL SMEAR VERIFICATION: SIGNIFICANT CHANGE UP
MCHC RBC-ENTMCNC: 21.2 PG — LOW (ref 27–31)
MCHC RBC-ENTMCNC: 28.8 G/DL — LOW (ref 32–37)
MCV RBC AUTO: 73.9 FL — LOW (ref 80–94)
MICROCYTES BLD QL: SLIGHT — SIGNIFICANT CHANGE UP
MONOCYTES # BLD AUTO: 0.46 K/UL — SIGNIFICANT CHANGE UP (ref 0.1–0.6)
MONOCYTES NFR BLD AUTO: 18.4 % — HIGH (ref 1.7–9.3)
NEUTROPHILS # BLD AUTO: 1.57 K/UL — SIGNIFICANT CHANGE UP (ref 1.4–6.5)
NEUTROPHILS NFR BLD AUTO: 61.4 % — SIGNIFICANT CHANGE UP (ref 42.2–75.2)
NEUTS BAND # BLD: 0.9 % — SIGNIFICANT CHANGE UP (ref 0–6)
NRBC # BLD: 1 /100 — HIGH (ref 0–0)
NRBC # BLD: SIGNIFICANT CHANGE UP /100 WBCS (ref 0–0)
OVALOCYTES BLD QL SMEAR: SLIGHT — SIGNIFICANT CHANGE UP
PLAT MORPH BLD: NORMAL — SIGNIFICANT CHANGE UP
PLATELET # BLD AUTO: 261 K/UL — SIGNIFICANT CHANGE UP (ref 130–400)
PMV BLD: 9.9 FL — SIGNIFICANT CHANGE UP (ref 7.4–10.4)
POIKILOCYTOSIS BLD QL AUTO: SIGNIFICANT CHANGE UP
POLYCHROMASIA BLD QL SMEAR: SIGNIFICANT CHANGE UP
PROT SERPL-MCNC: 6.1 G/DL — SIGNIFICANT CHANGE UP (ref 6–8)
RBC # BLD: 4.33 M/UL — LOW (ref 4.7–6.1)
RBC # FLD: 30.7 % — HIGH (ref 11.5–14.5)
RBC BLD AUTO: ABNORMAL
SCHISTOCYTES BLD QL AUTO: SLIGHT — SIGNIFICANT CHANGE UP
SMUDGE CELLS # BLD: PRESENT — SIGNIFICANT CHANGE UP
SMUDGE CELLS # BLD: PRESENT — SIGNIFICANT CHANGE UP
TARGETS BLD QL SMEAR: SLIGHT — SIGNIFICANT CHANGE UP
WBC # BLD: 2.52 K/UL — LOW (ref 4.8–10.8)
WBC # FLD AUTO: 2.52 K/UL — LOW (ref 4.8–10.8)

## 2023-07-26 PROCEDURE — 99232 SBSQ HOSP IP/OBS MODERATE 35: CPT

## 2023-07-26 RX ORDER — SOD SULF/SODIUM/NAHCO3/KCL/PEG
4000 SOLUTION, RECONSTITUTED, ORAL ORAL ONCE
Refills: 0 | Status: DISCONTINUED | OUTPATIENT
Start: 2023-07-26 | End: 2023-07-26

## 2023-07-26 RX ADMIN — Medication 1 TABLET(S): at 06:07

## 2023-07-26 RX ADMIN — HEPARIN SODIUM 5000 UNIT(S): 5000 INJECTION INTRAVENOUS; SUBCUTANEOUS at 17:34

## 2023-07-26 RX ADMIN — Medication 1 GRAM(S): at 06:06

## 2023-07-26 RX ADMIN — Medication 1 GRAM(S): at 11:28

## 2023-07-26 RX ADMIN — PANTOPRAZOLE SODIUM 40 MILLIGRAM(S): 20 TABLET, DELAYED RELEASE ORAL at 17:35

## 2023-07-26 RX ADMIN — Medication 1 TABLET(S): at 17:35

## 2023-07-26 RX ADMIN — HEPARIN SODIUM 5000 UNIT(S): 5000 INJECTION INTRAVENOUS; SUBCUTANEOUS at 06:06

## 2023-07-26 RX ADMIN — PANTOPRAZOLE SODIUM 40 MILLIGRAM(S): 20 TABLET, DELAYED RELEASE ORAL at 06:06

## 2023-07-26 RX ADMIN — Medication 1 GRAM(S): at 17:35

## 2023-07-26 RX ADMIN — MEMANTINE HYDROCHLORIDE 5 MILLIGRAM(S): 10 TABLET ORAL at 17:35

## 2023-07-26 RX ADMIN — MEMANTINE HYDROCHLORIDE 5 MILLIGRAM(S): 10 TABLET ORAL at 06:06

## 2023-07-26 NOTE — PROGRESS NOTE ADULT - ASSESSMENT
73 year old male DNR/DNI with a history of Dementia, HTN, HLD, Depression, Esophageal Cancer s/p resection and gastric pull,, Lung Ca s/p lobectomy, Prostate Cancer s/p Cyberknife presented to the ED with the complaint of dizziness, found to be anemia s/p 1 u pRBC now admitted for workup of his anemia. Admission was complicated by COVID+    #Generalized Weakness/Diziness due to Acute Blood Loss Anemia in setting of Ulcers in the lower third of the esophagus  #Microcytic Anemia due to Esophageal Ulcers  - Hgb 7.1 (on admission) s/p 1 unit pRBCs, now 9.3 (baseline Hgb ~12)  - Iron studies (7/13/23): Iron 20, TIBC 409, % sat 5, Ferritin 11 c/w ELBERT  - EGD 7/6/21 for odynophagia: Ulcers in the lower third of the esophagus, Erythema in the stomach compatible with non-erosive gastritis, Normal mucosa in the whole examined duodenum. (bx negative for H pylori, ulcer bx : focal active esophagitis, fibropurulent exudate)  - s/p IV Venofer  - c/w Carafate Suspension 1g po qid  - c/w Protonix 40 mg po bid  - transfuse as needed, keep Hgb >7  - ANGIE negative   - per GI no intervention planned now since patient did not drink prep, can do outpatient     #COVID positive, stable  - COVID-19 PCR: Detected (18 Jul 2023 17:34)  - Patient is in clinically asymptomatic; On RA  - Unknown vaccination status  - CXR benign  - On contact and airborne iso  - ID: Pt is possibly in the early viral replicative phase based on the timeline/onset of symptoms:  mg iv on Day 1, then 100 mg iv D2 and D3 but no steroids (completed)     #Acutely worse mental status likely due to E.faecalis UTI  #Acute Metabolic Encephalopathy  - Electrolytes are stable, patient is afebrile, WBC normal, exam notable for course breath sounds and worsened mental status  - TSH 3.2 (7/14)  - E.faecalis on Ucx (7/16) sensitive to augmentin  - c/w augmentin 875 mg bid for 10 days for UTI  (7/19 - 7/28)  - Appreciate ID recs    # Dementia/Alzheimers  - a/w off balance while ambulating  - CTH, CTA head and neck: no acute findings  - c/w home Memantine 5MG po bid  - all precautions  - PT consult: Home PT  - Neurology follows    # Hypernatremia (resolved)   - Na 143 (7/17) -> 140 (7/18) -> 134 (7/19) -> 138 (7/23); today 143   - monitor BMP    # Pulmonary nodule on imaging  - Right upper lobe pulmonary nodule measuring 5 mm, new since CTA chest 6/19/2021.  - OP f/u     # H/O Hypertension/H/O HLD  - Lipid profile WNL  - A1c 5.7%  - BP controlled, not on any meds at home    # H/O Anxiety/Depression  - Not on any home meds    # H/O Prostate Ca  - Not on any home meds    #Supportive Management:  DVT Ppx: heparin   Injectable 5000 Unit(s) SubCutaneous every 12 hours  GI Ppx: pantoprazole    Tablet 40 milliGRAM(s) Oral two times a day  Diet:  Diet, DASH/TLC:   Sodium & Cholesterol Restricted  Supplement Feeding Modality:  Oral  Ensure Plus High Protein Cans or Servings Per Day:  3       Frequency:  Daily (07-20-23 @ 13:00) [Pending Verification By Attending]  Diet, DASH/TLC:   Sodium & Cholesterol Restricted (07-13-23 @ 21:14) [Active]    #Progress Note Handoff  Pending (specify):  d/c planning  Family discussion: Updated patient's daughter Ric at [phone]; she is upset that patient won't get inpatient EGD/colonoscopy; she says she wants to bring him home but can't yet because she needs to order things like a bed for an aide and she currently has covid too; need SW/CM assistance  Disposition: d/c home planning

## 2023-07-26 NOTE — PROGRESS NOTE ADULT - ASSESSMENT
72 y/o M with PMHx of HTN, HLD, Dementia, Depression, Esophageal Cancer s/p resection and gastric pull,, Lung Ca s/p lobectomy, Prostate Cancer s/p Cyberknife presented to the ED with the complaint of dizziness that began in the morning of presentation described as lightheadedness and off balance causing him to feel unstable ambulating. Admitted for acute on chronic anemia. GI consulted for anemia. Patient has 1:1 sit unsure , now COVID +,     #Acute on Chronic symptomatic Microcytic, ELBERT s/p 1u pRBC- stable  - hemodynamically stable, no overt signs of bleeding  - Hgb 7.3, MCV 66.5 on admission, Hb today: 9.7  - baseline Hgb ~12  - EGD 7/6/21 for odynophagia: Ulcers in the lower third of the esophagus, Erythema in the stomach compatible with non-erosive gastritis, Normal mucosa in the whole examined duodenum. (bx negative for H pylori, ulcer bx : focal active esophagitis, fibropurulent exudate)  - Iron studies (7/13/23): Iron 20, TIBC 409, % sat 5, Ferritin 11 c/w ELBERT  - s/p IV venofer  - on Protonix 40mg PO q24hrs  - patient is COVID +, AAox1, as per primary team change in mental status, patient has 1:1 sit, crawling out of bed  - was scheduled for EGD and colonoscopy today but did not drink the prep at all    Plan:  - start clear liq diet today  - doubt if patient will even consume the prep , will speak to the daughter Ric   - since the hemoglobin is stable and hemodynamically stable recommend outpatient EGD and colonoscopy   - monitor H/H  - c.w PPI   - keep active T&S    Communicated with Primary team member   74 y/o M with PMHx of HTN, HLD, Dementia, Depression, Esophageal Cancer s/p resection and gastric pull,, Lung Ca s/p lobectomy, Prostate Cancer s/p Cyberknife presented to the ED with the complaint of dizziness that began in the morning of presentation described as lightheadedness and off balance causing him to feel unstable ambulating. Admitted for acute on chronic anemia. GI consulted for anemia. Patient has 1:1 sit unsure , now COVID +,     #Acute on Chronic symptomatic Microcytic, ELBERT s/p 1u pRBC- stable  - hemodynamically stable, no overt signs of bleeding  - Hgb 7.3, MCV 66.5 on admission, Hb today: 9.7  - baseline Hgb ~12  - EGD 7/6/21 for odynophagia: Ulcers in the lower third of the esophagus, Erythema in the stomach compatible with non-erosive gastritis, Normal mucosa in the whole examined duodenum. (bx negative for H pylori, ulcer bx : focal active esophagitis, fibropurulent exudate)  - Iron studies (7/13/23): Iron 20, TIBC 409, % sat 5, Ferritin 11 c/w ELBERT  - s/p IV venofer  - on Protonix 40mg PO q24hrs  - patient is COVID +, AAox1 (only to his name), patient has 1:1 sit for safety  - was scheduled for EGD and colonoscopy today but did not drink the prep at all    Plan:  - start clear liq diet today  - doubt if patient will even consume the prep , spoke to daughter Ric she was upset that its not done in patient. I explained that patient will not be able to drink the prep as he is slightly confused. She still brings up the social issues for the transport from home. She understands but upset  - since the hemoglobin is stable and hemodynamically stable recommend outpatient EGD and colonoscopy   - recall us PRN    Communicated with Primary team member

## 2023-07-26 NOTE — PROGRESS NOTE ADULT - TIME BILLING
Total time spent to complete patient's bedside assessment, physical examination, review medical chart including labs & imaging, discuss medical plan of care with housestaff was more than 35 minutes
Coordination of care
Coordination of care
direct patient care and chart review  -coordinated current plan of care with medical staff on MDR
Coordination of care

## 2023-07-26 NOTE — PROGRESS NOTE ADULT - SUBJECTIVE AND OBJECTIVE BOX
SERINA CORREA 73y Male  MRN#: [id number]   CODE STATUS: FULL  Do Not Resuscitate      SUBJECTIVE  Patient is a 73y old Male who presents with a chief complaint of Dizziness (26 Jul 2023 13:18)    Currently admitted to medicine with the primary diagnosis of altered mental status    Today is hospital day 13d,   INTERVAL HPI/OVERNIGHT EVENTS: none    This morning he is laying in bed comfortably. HAS NO NEW COMPLAINTS. HE IS Urinating and stooling appropriately. Pt did not take his colon prep for EGD/colonoscopy, So Gi canceled the procedure. Pt to follow up as outpatient for the same. Discussed with the attending about the same.      Present Today:           Roman Catheter (x)No/ ()Yes?   Indication:             Central Line (x)No/ ()Yes?   Indication:          IV Fluids (x)No/ ()Yes? Type:  Rate:  Indication:    OBJECTIVE  PAST MEDICAL & SURGICAL HISTORY  Hypertension, unspecified type    Cancer  ESOPHAGEAL CANCER 2017 RECEIVED CHEMO AND RADIATION and endoscopic resection partial oesophagus and stomach    Renal calculi  nephrostomy tube -6/2021    Cancer  left lung 2018, no chemo or rad rx and resection    Abnormal cholesterol test    Prostate cancer    [place] (hard of hearing)    Alzheimer disease    Cancer of esophagus  2017 RESECTION OF LOWER PORTION OF ESOPHAGUS    S/P lobectomy of lung  left ?JUNE 2018    S/P cystoscopy with ureteral stent placement  LEFT    Inguinal hernia  AS AN INFANT, RIGHT SIDE    History of esophagogastroduodenoscopy (EGD)  2019    H/O colonoscopy  2018    Prostate cancer  cyber knife intervention 2021      ALLERGIES:  No Known Allergies    HOME MEDICATIONS:  Home Medications:  memantine 5 mg oral tablet: 1 tab(s) orally 2 times a day (13 Jul 2023 21:47)    MEDICATIONS:  STANDING MEDICATIONS  amoxicillin  875 milliGRAM(s)/clavulanate 1 Tablet(s) Oral two times a day  bisacodyl 20 milliGRAM(s) Oral at bedtime  bisacodyl 20 milliGRAM(s) Oral at bedtime  heparin   Injectable 5000 Unit(s) SubCutaneous every 12 hours  memantine 5 milliGRAM(s) Oral two times a day  pantoprazole    Tablet 40 milliGRAM(s) Oral two times a day  polyethylene glycol/electrolyte Solution. 4000 milliLiter(s) Oral once  sucralfate suspension 1 Gram(s) Oral four times a day    PRN MEDICATIONS  acetaminophen     Tablet .. 650 milliGRAM(s) Oral every 6 hours PRN  aluminum hydroxide/magnesium hydroxide/simethicone Suspension 30 milliLiter(s) Oral every 4 hours PRN  melatonin 3 milliGRAM(s) Oral at bedtime PRN  ondansetron Injectable 4 milliGRAM(s) IV Push every 8 hours PRN      VITAL SIGNS: Last 24 Hours  T(C): 36.3 (26 Jul 2023 05:13), Max: 36.6 (25 Jul 2023 20:49)  T(F): 97.3 (26 Jul 2023 05:13), Max: 97.9 (25 Jul 2023 20:49)  HR: 58 (26 Jul 2023 05:13) (58 - 61)  BP: 105/59 (26 Jul 2023 05:13) (105/59 - 117/69)  BP(mean): 76 (26 Jul 2023 05:13) (76 - 76)  RR: 18 (26 Jul 2023 05:13) (18 - 18)  SpO2: --    LABS:                        9.7    2.87  )-----------( 218      ( 25 Jul 2023 21:34 )             33.9     07-26    x   |  x   |  x   ----------------------------<  x   x    |  x   |  1.0    Ca    8.4      25 Jul 2023 21:34  Mg     2.1     07-25    TPro  6.1  /  Alb  3.4<L>  /  TBili  0.6  /  DBili  0.2  /  AST  32  /  ALT  14  /  AlkPhos  113  07-26    PT/INR - ( 25 Jul 2023 21:34 )   PT: 11.00 sec;   INR: 0.97 ratio           Urinalysis Basic - ( 25 Jul 2023 21:34 )    Color: x / Appearance: x / SG: x / pH: x  Gluc: 95 mg/dL / Ketone: x  / Bili: x / Urobili: x   Blood: x / Protein: x / Nitrite: x   Leuk Esterase: x / RBC: x / WBC x   Sq Epi: x / Non Sq Epi: x / Bacteria: x    PHYSICAL EXAM:    GENERAL: NAD, well-developed,  HEENT:  Atraumatic, Normocephalic. EOMI, PERRLA, conjunctiva and sclera clear, No JVD  PULMONARY: Clear to auscultation bilaterally; No wheeze  CARDIOVASCULAR: Regular rate and rhythm; No murmurs, rubs, or gallops  GASTROINTESTINAL: Soft, Nontender, Nondistended; Bowel sounds present  MUSCULOSKELETAL:  2+ Peripheral Pulses, No clubbing, cyanosis, or edema  NEUROLOGY: non-focal  SKIN: No rashes or lesions

## 2023-07-26 NOTE — PROGRESS NOTE ADULT - ASSESSMENT
73 year old male DNR/DNI with a history of Dementia, HTN, HLD, Depression, Esophageal Cancer s/p resection and gastric pull,, Lung Ca s/p lobectomy, Prostate Cancer s/p Cyberknife presented to the ED with the complaint of dizziness, found to be anemia s/p 1 u pRBC now admitted for workup of his anemia. Admission was complicated by COVID+    #Generalized Weakness/Diziness due to Acute Blood Loss Anemia in setting of Ulcers in the lower third of the esophagus  #Microcytic Anemia due to Esophageal Ulcers  - Hgb 7.1 (on admission) s/p 1 unit pRBCs, now 9.3 (baseline Hgb ~12)  - Iron studies (7/13/23): Iron 20, TIBC 409, % sat 5, Ferritin 11 c/w ELBERT  - EGD 7/6/21 for odynophagia: Ulcers in the lower third of the esophagus, Erythema in the stomach compatible with non-erosive gastritis, Normal mucosa in the whole examined duodenum. (bx negative for H pylori, ulcer bx : focal active esophagitis, fibropurulent exudate)  - s/p IV Venofer  - c/w Carafate Suspension 1g po qid  - c/w Protonix 40 mg po bid  - transfuse as needed, keep Hgb >7  - ANGIE negative   - per GI no intervention planned now since patient did not drink prep, can do outpatient     #COVID positive, stable  - COVID-19 PCR: Detected (18 Jul 2023 17:34)  - Patient is in clinically asymptomatic; On RA  - Unknown vaccination status  - CXR benign  - On contact and airborne iso  - ID: Pt is possibly in the early viral replicative phase based on the timeline/onset of symptoms:  mg iv on Day 1, then 100 mg iv D2 and D3 but no steroids (completed)     #Acutely worse mental status likely due to E.faecalis UTI  #Acute Metabolic Encephalopathy  - Electrolytes are stable, patient is afebrile, WBC normal, exam notable for course breath sounds and worsened mental status  - TSH 3.2 (7/14)  - E.faecalis on Ucx (7/16) sensitive to augmentin  - c/w augmentin 875 mg bid for 10 days for UTI  (7/19 - 7/28)  - Appreciate ID recs    # Dementia/Alzheimers  - a/w off balance while ambulating  - CTH, CTA head and neck: no acute findings  - c/w home Memantine 5MG po bid  - all precautions  - PT consult: Home PT  - Neurology follows    # Hypernatremia (resolved)   - Na 143 (7/17) -> 140 (7/18) -> 134 (7/19) -> 138 (7/23); today 143   - monitor BMP    # Pulmonary nodule on imaging  - Right upper lobe pulmonary nodule measuring 5 mm, new since CTA chest 6/19/2021.  - OP f/u     # H/O Hypertension/H/O HLD  - Lipid profile WNL  - A1c 5.7%  - BP controlled, not on any meds at home    # H/O Anxiety/Depression  - Not on any home meds    # H/O Prostate Ca  - Not on any home meds    #Supportive Management:  DVT Ppx: heparin   Injectable 5000 Unit(s) SubCutaneous every 12 hours  GI Ppx: pantoprazole    Tablet 40 milliGRAM(s) Oral two times a day  Diet:  Diet, DASH/TLC:   Sodium & Cholesterol Restricted  Supplement Feeding Modality:  Oral  Ensure Plus High Protein Cans or Servings Per Day:  3       Frequency:  Daily (07-20-23 @ 13:00) [Pending Verification By Attending]  Diet, DASH/TLC:   Sodium & Cholesterol Restricted (07-13-23 @ 21:14) [Active]

## 2023-07-26 NOTE — PROGRESS NOTE ADULT - REASON FOR ADMISSION
Dizziness

## 2023-07-26 NOTE — PROGRESS NOTE ADULT - SUBJECTIVE AND OBJECTIVE BOX
Gastroenterology progress note:     Patient is a 73y old  Male who presents with a chief complaint of Dizziness (25 Jul 2023 10:02)       Admitted on: 07-13-23    We are following the patient for: patient scheduled for EGD and colonoscopy but he did not drink the prep . Had only 1 BM as he had dulcolax       Interval History: for ELBERT    No acute events overnight.   - Diet - NP since last night  - last BM - patient had only 1 BM last night  - Abdominal pain - none      PAST MEDICAL & SURGICAL HISTORY:  Hypertension, unspecified type      Cancer  ESOPHAGEAL CANCER 2017 RECEIVED CHEMO AND RADIATION and endoscopic resection partial oesophagus and stomach      Renal calculi  nephrostomy tube -6/2021      Cancer  left lung 2018, no chemo or rad rx and resection      Abnormal cholesterol test      Prostate cancer      [place]      Alzheimer disease      Cancer of esophagus  2017 RESECTION OF LOWER PORTION OF ESOPHAGUS      S/P lobectomy of lung  left ?JUNE 2018      S/P cystoscopy with ureteral stent placement  LEFT      Inguinal hernia  AS AN INFANT, RIGHT SIDE      History of esophagogastroduodenoscopy (EGD)  2019      H/O colonoscopy  2018      Prostate cancer  cyber knife intervention 2021          MEDICATIONS  (STANDING):  amoxicillin  875 milliGRAM(s)/clavulanate 1 Tablet(s) Oral two times a day  bisacodyl 20 milliGRAM(s) Oral at bedtime  heparin   Injectable 5000 Unit(s) SubCutaneous every 12 hours  memantine 5 milliGRAM(s) Oral two times a day  pantoprazole    Tablet 40 milliGRAM(s) Oral two times a day  sucralfate suspension 1 Gram(s) Oral four times a day    MEDICATIONS  (PRN):  acetaminophen     Tablet .. 650 milliGRAM(s) Oral every 6 hours PRN Temp greater or equal to 38C (100.4F), Mild Pain (1 - 3)  aluminum hydroxide/magnesium hydroxide/simethicone Suspension 30 milliLiter(s) Oral every 4 hours PRN Dyspepsia  melatonin 3 milliGRAM(s) Oral at bedtime PRN Insomnia  ondansetron Injectable 4 milliGRAM(s) IV Push every 8 hours PRN Nausea and/or Vomiting      Allergies  No Known Allergies      Review of Systems:   Cardiovascular:  No Chest Pain, No Palpitations  Respiratory:  No Cough, No Dyspnea  Gastrointestinal:  As described in HPI  Skin:  No Skin Lesions, No Jaundice  Neuro:  No Syncope, No Dizziness    Physical Examination:  T(C): 36.3 (07-26-23 @ 05:13), Max: 36.6 (07-25-23 @ 20:49)  HR: 58 (07-26-23 @ 05:13) (58 - 82)  BP: 105/59 (07-26-23 @ 05:13) (105/59 - 123/80)  RR: 18 (07-26-23 @ 05:13) (18 - 18)  SpO2: --      07-25-23 @ 07:01  -  07-26-23 @ 07:00  --------------------------------------------------------  IN: 0 mL / OUT: 200 mL / NET: -200 mL        GENERAL: AAOx3, no acute distress.  HEAD:  Atraumatic, Normocephalic  EYES: conjunctiva and sclera clear  NECK: Supple, no JVD or thyromegaly  CHEST/LUNG: Clear to auscultation bilaterally; No wheeze, rhonchi, or rales  HEART: Regular rate and rhythm; normal S1, S2, No murmurs.  ABDOMEN: Soft, nontender, nondistended; Bowel sounds present  NEUROLOGY: No asterixis or tremor.   SKIN: Intact, no jaundice     Data:                        9.7    2.87  )-----------( 218      ( 25 Jul 2023 21:34 )             33.9     Hgb trend:  9.7  07-25-23 @ 21:34  9.9  07-25-23 @ 04:30  9.6  07-24-23 @ 07:20        07-25    143  |  108  |  16  ----------------------------<  95  3.7   |  25  |  0.9    Ca    8.4      25 Jul 2023 21:34  Mg     2.1     07-25    TPro  6.2  /  Alb  3.4<L>  /  TBili  0.6  /  DBili  0.2  /  AST  34  /  ALT  15  /  AlkPhos  111  07-25    Liver panel trend:  TBili 0.6   /   AST 34   /   ALT 15   /   AlkP 111   /   Tptn 6.2   /   Alb 3.4    /   DBili 0.2      07-25  TBili 0.5   /   AST 42   /   ALT 15   /   AlkP 108   /   Tptn 6.1   /   Alb 3.3    /   DBili 0.2      07-24  TBili 0.5   /   AST 42   /   ALT 16   /   AlkP 118   /   Tptn 6.7   /   Alb 3.6    /   DBili 0.2      07-23  TBili 0.5   /   AST 38   /   ALT 14   /   AlkP 108   /   Tptn 6.1   /   Alb 3.4    /   DBili 0.2      07-22  TBili 0.4   /   AST 52   /   ALT 15   /   AlkP 100   /   Tptn 6.2   /   Alb 3.2    /   DBili --      07-21  TBili 0.5   /   AST 36   /   ALT 14   /   AlkP 98   /   Tptn 6.2   /   Alb 3.2    /   DBili 0.2      07-21  TBili 0.5   /   AST 35   /   ALT 13   /   AlkP 106   /   Tptn 6.0   /   Alb 3.2    /   DBili 0.2      07-20  TBili 0.5   /   AST 35   /   ALT 13   /   AlkP 111   /   Tptn 6.3   /   Alb 3.5    /   DBili 0.2      07-19  TBili 0.4   /   AST 30   /   ALT 12   /   AlkP 116   /   Tptn 6.5   /   Alb 3.7    /   DBili --      07-17  TBili 0.4   /   AST 35   /   ALT 11   /   AlkP 115   /   Tptn 6.5   /   Alb 3.5    /   DBili --      07-16      PT/INR - ( 25 Jul 2023 21:34 )   PT: 11.00 sec;   INR: 0.97 ratio                Radiology:     CT Angio Neck w/ IV Cont (07.13.23 @ 16:11) >    1.  No evidence of major vascular stenosis or occlusion.    2.  Right upper lobe pulmonary nodule measuring 5 mm, new since CTA chest   6/19/2021. Recommend a dedicated chest CT on an outpatient/elective basis.         Gastroenterology progress note:     Patient is a 73y old  Male who presents with a chief complaint of Dizziness (25 Jul 2023 10:02)       Admitted on: 07-13-23    We are following the patient for: patient scheduled for EGD and colonoscopy but he did not drink the prep . Had only 1 BM as he had dulcolax       Interval History: for ELBERT    No acute events overnight.   - Diet - NP since last night  - last BM - patient had only 1 BM last night  - Abdominal pain - none      PAST MEDICAL & SURGICAL HISTORY:  Hypertension, unspecified type      Cancer  ESOPHAGEAL CANCER 2017 RECEIVED CHEMO AND RADIATION and endoscopic resection partial oesophagus and stomach      Renal calculi  nephrostomy tube -6/2021      Cancer  left lung 2018, no chemo or rad rx and resection      Abnormal cholesterol test      Prostate cancer      [place] (hard of hearing)      Alzheimer disease      Cancer of esophagus  2017 RESECTION OF LOWER PORTION OF ESOPHAGUS      S/P lobectomy of lung  left ?JUNE 2018      S/P cystoscopy with ureteral stent placement  LEFT      Inguinal hernia  AS AN INFANT, RIGHT SIDE      History of esophagogastroduodenoscopy (EGD)  2019      H/O colonoscopy  2018      Prostate cancer  cyber knife intervention 2021          MEDICATIONS  (STANDING):  amoxicillin  875 milliGRAM(s)/clavulanate 1 Tablet(s) Oral two times a day  bisacodyl 20 milliGRAM(s) Oral at bedtime  heparin   Injectable 5000 Unit(s) SubCutaneous every 12 hours  memantine 5 milliGRAM(s) Oral two times a day  pantoprazole    Tablet 40 milliGRAM(s) Oral two times a day  sucralfate suspension 1 Gram(s) Oral four times a day    MEDICATIONS  (PRN):  acetaminophen     Tablet .. 650 milliGRAM(s) Oral every 6 hours PRN Temp greater or equal to 38C (100.4F), Mild Pain (1 - 3)  aluminum hydroxide/magnesium hydroxide/simethicone Suspension 30 milliLiter(s) Oral every 4 hours PRN Dyspepsia  melatonin 3 milliGRAM(s) Oral at bedtime PRN Insomnia  ondansetron Injectable 4 milliGRAM(s) IV Push every 8 hours PRN Nausea and/or Vomiting      Allergies  No Known Allergies      Review of Systems:   Cardiovascular:  No Chest Pain, No Palpitations  Respiratory:  No Cough, No Dyspnea  Gastrointestinal:  As described in HPI  Skin:  No Skin Lesions, No Jaundice  Neuro:  No Syncope, No Dizziness    Physical Examination:  T(C): 36.3 (07-26-23 @ 05:13), Max: 36.6 (07-25-23 @ 20:49)  HR: 58 (07-26-23 @ 05:13) (58 - 82)  BP: 105/59 (07-26-23 @ 05:13) (105/59 - 123/80)  RR: 18 (07-26-23 @ 05:13) (18 - 18)  SpO2: --      07-25-23 @ 07:01  -  07-26-23 @ 07:00  --------------------------------------------------------  IN: 0 mL / OUT: 200 mL / NET: -200 mL        GENERAL: AAOx1, no acute distress.  HEAD:  Atraumatic, Normocephalic  EYES: conjunctiva and sclera clear  NECK: Supple, no JVD or thyromegaly  CHEST/LUNG: Clear to auscultation bilaterally; No wheeze, rhonchi, or rales  HEART: Regular rate and rhythm; normal S1, S2, No murmurs.  ABDOMEN: Soft, nontender, nondistended; Bowel sounds present  NEUROLOGY: No asterixis or tremor.   SKIN: Intact, no jaundice     Data:                        9.7    2.87  )-----------( 218      ( 25 Jul 2023 21:34 )             33.9     Hgb trend:  9.7  07-25-23 @ 21:34  9.9  07-25-23 @ 04:30  9.6  07-24-23 @ 07:20        07-25    143  |  108  |  16  ----------------------------<  95  3.7   |  25  |  0.9    Ca    8.4      25 Jul 2023 21:34  Mg     2.1     07-25    TPro  6.2  /  Alb  3.4<L>  /  TBili  0.6  /  DBili  0.2  /  AST  34  /  ALT  15  /  AlkPhos  111  07-25    Liver panel trend:  TBili 0.6   /   AST 34   /   ALT 15   /   AlkP 111   /   Tptn 6.2   /   Alb 3.4    /   DBili 0.2      07-25  TBili 0.5   /   AST 42   /   ALT 15   /   AlkP 108   /   Tptn 6.1   /   Alb 3.3    /   DBili 0.2      07-24  TBili 0.5   /   AST 42   /   ALT 16   /   AlkP 118   /   Tptn 6.7   /   Alb 3.6    /   DBili 0.2      07-23  TBili 0.5   /   AST 38   /   ALT 14   /   AlkP 108   /   Tptn 6.1   /   Alb 3.4    /   DBili 0.2      07-22  TBili 0.4   /   AST 52   /   ALT 15   /   AlkP 100   /   Tptn 6.2   /   Alb 3.2    /   DBili --      07-21  TBili 0.5   /   AST 36   /   ALT 14   /   AlkP 98   /   Tptn 6.2   /   Alb 3.2    /   DBili 0.2      07-21  TBili 0.5   /   AST 35   /   ALT 13   /   AlkP 106   /   Tptn 6.0   /   Alb 3.2    /   DBili 0.2      07-20  TBili 0.5   /   AST 35   /   ALT 13   /   AlkP 111   /   Tptn 6.3   /   Alb 3.5    /   DBili 0.2      07-19  TBili 0.4   /   AST 30   /   ALT 12   /   AlkP 116   /   Tptn 6.5   /   Alb 3.7    /   DBili --      07-17  TBili 0.4   /   AST 35   /   ALT 11   /   AlkP 115   /   Tptn 6.5   /   Alb 3.5    /   DBili --      07-16      PT/INR - ( 25 Jul 2023 21:34 )   PT: 11.00 sec;   INR: 0.97 ratio                Radiology:     CT Angio Neck w/ IV Cont (07.13.23 @ 16:11) >    1.  No evidence of major vascular stenosis or occlusion.    2.  Right upper lobe pulmonary nodule measuring 5 mm, new since CTA chest   6/19/2021. Recommend a dedicated chest CT on an outpatient/elective basis.

## 2023-07-26 NOTE — PROGRESS NOTE ADULT - SUBJECTIVE AND OBJECTIVE BOX
SERINA CORREA  [facility] ([facility] 027 A ([facility]      Patient is a 73y old  Male who presents with a chief complaint of Dizziness (26 Jul 2023 07:58)        Interval events:  Patient seen and examined at bedside. Patient did not drink prep and isn't going for EGD/colonoscopy. He has no complaints, pleasantly confused. Spoke with patient's daughter and updated.     -PMHx: Hypertension, unspecified type    Cancer    Renal calculi    Cancer    Abnormal cholesterol test    Prostate cancer    [place] ([place] of hearing)    Alzheimer disease      -PSHx:Cancer of esophagus    S/P lobectomy of lung    S/P cystoscopy with ureteral stent placement    Inguinal hernia    History of esophagogastroduodenoscopy (EGD)    H/O colonoscopy    Prostate cancer            REVIEW OF SYSTEMS:  Unable to assess due to patient's mental status.       T(C): , Max: 36.6 (07-25-23 @ 20:49)  HR: 58 (07-26-23 @ 05:13)  BP: 105/59 (07-26-23 @ 05:13)  RR: 18 (07-26-23 @ 05:13)  SpO2: --  CAPILLARY BLOOD GLUCOSE       PHYSICAL EXAM:  Gen: NAD, resting in bed  HEENT: Normocephalic, atraumatic  Neck: supple, no lymphadenopathy  CV: Regular rate & regular rhythm  Lungs: CTABL no wheeze  Abdomen: Soft, NTND+ BS present  Ext: Warm, well perfused no CCE  Neuro: non focal, awake, CN II-XII intact; aaox1    Skin: no rash, no erythema  Psych: no SI, HI, Hallucination     Consultant(s) Notes Reviewed:  [x ] YES  [ ] NO  Care Discussed with Consultants/Other Providers [ x] YES  [ ] NO    LABS:          9.7  2.87  )-------(218          33.9  N=50.2  L=25.4  MCV=72.9    --|--|--  ------------------<--  --|--|1.0  eGFR:--  Ca:--  143|108|16  ------------------<95  3.7|25|0.9  eGFR:--  Ca:8.4      PT/INR - ( 25 Jul 2023 21:34 )   PT: 11.00 sec;   INR: 0.97 ratio               Microbiology:    Culture - Blood (collected 07-18-23 @ 20:28)  Source: .Blood Blood  Final Report (07-24-23 @ 02:00):    No growth at 5 days        RADIOLOGY & ADDITIONAL TESTS:        Medications:  acetaminophen     Tablet .. 650 milliGRAM(s) Oral every 6 hours PRN  aluminum hydroxide/magnesium hydroxide/simethicone Suspension 30 milliLiter(s) Oral every 4 hours PRN  amoxicillin  875 milliGRAM(s)/clavulanate 1 Tablet(s) Oral two times a day  bisacodyl 20 milliGRAM(s) Oral at bedtime  bisacodyl 20 milliGRAM(s) Oral at bedtime  heparin   Injectable 5000 Unit(s) SubCutaneous every 12 hours  melatonin 3 milliGRAM(s) Oral at bedtime PRN  memantine 5 milliGRAM(s) Oral two times a day  ondansetron Injectable 4 milliGRAM(s) IV Push every 8 hours PRN  pantoprazole    Tablet 40 milliGRAM(s) Oral two times a day  polyethylene glycol/electrolyte Solution. 4000 milliLiter(s) Oral once  sucralfate suspension 1 Gram(s) Oral four times a day

## 2023-07-26 NOTE — PROGRESS NOTE ADULT - PROVIDER SPECIALTY LIST ADULT
Gastroenterology
Gastroenterology
Hospitalist
Hospitalist
Internal Medicine
Internal Medicine
Gastroenterology
Hospitalist
Internal Medicine
Internal Medicine
Gastroenterology
Hospitalist
Gastroenterology
Internal Medicine
Internal Medicine
Hospitalist
Palliative Care
Palliative Care

## 2023-07-27 ENCOUNTER — TRANSCRIPTION ENCOUNTER (OUTPATIENT)
Age: 73
End: 2023-07-27

## 2023-07-27 VITALS
TEMPERATURE: 97 F | DIASTOLIC BLOOD PRESSURE: 73 MMHG | SYSTOLIC BLOOD PRESSURE: 122 MMHG | HEART RATE: 53 BPM | RESPIRATION RATE: 18 BRPM

## 2023-07-27 LAB
ALBUMIN SERPL ELPH-MCNC: 3.2 G/DL — LOW (ref 3.5–5.2)
ALBUMIN SERPL ELPH-MCNC: 3.3 G/DL — LOW (ref 3.5–5.2)
ALP SERPL-CCNC: 109 U/L — SIGNIFICANT CHANGE UP (ref 30–115)
ALP SERPL-CCNC: 116 U/L — HIGH (ref 30–115)
ALT FLD-CCNC: 14 U/L — SIGNIFICANT CHANGE UP (ref 0–41)
ALT FLD-CCNC: 14 U/L — SIGNIFICANT CHANGE UP (ref 0–41)
ANION GAP SERPL CALC-SCNC: 10 MMOL/L — SIGNIFICANT CHANGE UP (ref 7–14)
AST SERPL-CCNC: 30 U/L — SIGNIFICANT CHANGE UP (ref 0–41)
AST SERPL-CCNC: 30 U/L — SIGNIFICANT CHANGE UP (ref 0–41)
BASOPHILS # BLD AUTO: 0.02 K/UL — SIGNIFICANT CHANGE UP (ref 0–0.2)
BASOPHILS NFR BLD AUTO: 0.8 % — SIGNIFICANT CHANGE UP (ref 0–1)
BILIRUB DIRECT SERPL-MCNC: 0.2 MG/DL — SIGNIFICANT CHANGE UP (ref 0–0.3)
BILIRUB INDIRECT FLD-MCNC: 0.4 MG/DL — SIGNIFICANT CHANGE UP (ref 0.2–1.2)
BILIRUB SERPL-MCNC: 0.5 MG/DL — SIGNIFICANT CHANGE UP (ref 0.2–1.2)
BILIRUB SERPL-MCNC: 0.6 MG/DL — SIGNIFICANT CHANGE UP (ref 0.2–1.2)
BUN SERPL-MCNC: 12 MG/DL — SIGNIFICANT CHANGE UP (ref 10–20)
CALCIUM SERPL-MCNC: 8.9 MG/DL — SIGNIFICANT CHANGE UP (ref 8.4–10.4)
CHLORIDE SERPL-SCNC: 107 MMOL/L — SIGNIFICANT CHANGE UP (ref 98–110)
CO2 SERPL-SCNC: 26 MMOL/L — SIGNIFICANT CHANGE UP (ref 17–32)
CREAT SERPL-MCNC: 1 MG/DL — SIGNIFICANT CHANGE UP (ref 0.7–1.5)
EGFR: 79 ML/MIN/1.73M2 — SIGNIFICANT CHANGE UP
EOSINOPHIL # BLD AUTO: 0.13 K/UL — SIGNIFICANT CHANGE UP (ref 0–0.7)
EOSINOPHIL NFR BLD AUTO: 5.4 % — SIGNIFICANT CHANGE UP (ref 0–8)
GLUCOSE SERPL-MCNC: 91 MG/DL — SIGNIFICANT CHANGE UP (ref 70–99)
HCT VFR BLD CALC: 37.9 % — LOW (ref 42–52)
HGB BLD-MCNC: 10.8 G/DL — LOW (ref 14–18)
IMM GRANULOCYTES NFR BLD AUTO: 0.8 % — HIGH (ref 0.1–0.3)
INR BLD: 1.01 RATIO — SIGNIFICANT CHANGE UP (ref 0.65–1.3)
LYMPHOCYTES # BLD AUTO: 0.63 K/UL — LOW (ref 1.2–3.4)
LYMPHOCYTES # BLD AUTO: 26.1 % — SIGNIFICANT CHANGE UP (ref 20.5–51.1)
MAGNESIUM SERPL-MCNC: 2 MG/DL — SIGNIFICANT CHANGE UP (ref 1.8–2.4)
MCHC RBC-ENTMCNC: 21.6 PG — LOW (ref 27–31)
MCHC RBC-ENTMCNC: 28.5 G/DL — LOW (ref 32–37)
MCV RBC AUTO: 75.6 FL — LOW (ref 80–94)
MONOCYTES # BLD AUTO: 0.41 K/UL — SIGNIFICANT CHANGE UP (ref 0.1–0.6)
MONOCYTES NFR BLD AUTO: 17 % — HIGH (ref 1.7–9.3)
NEUTROPHILS # BLD AUTO: 1.2 K/UL — LOW (ref 1.4–6.5)
NEUTROPHILS NFR BLD AUTO: 49.9 % — SIGNIFICANT CHANGE UP (ref 42.2–75.2)
NRBC # BLD: 0 /100 WBCS — SIGNIFICANT CHANGE UP (ref 0–0)
PLATELET # BLD AUTO: 240 K/UL — SIGNIFICANT CHANGE UP (ref 130–400)
PMV BLD: 9.9 FL — SIGNIFICANT CHANGE UP (ref 7.4–10.4)
POTASSIUM SERPL-MCNC: 4.3 MMOL/L — SIGNIFICANT CHANGE UP (ref 3.5–5)
POTASSIUM SERPL-SCNC: 4.3 MMOL/L — SIGNIFICANT CHANGE UP (ref 3.5–5)
PROT SERPL-MCNC: 6.3 G/DL — SIGNIFICANT CHANGE UP (ref 6–8)
PROT SERPL-MCNC: 6.5 G/DL — SIGNIFICANT CHANGE UP (ref 6–8)
PROTHROM AB SERPL-ACNC: 11.5 SEC — SIGNIFICANT CHANGE UP (ref 9.95–12.87)
RBC # BLD: 5.01 M/UL — SIGNIFICANT CHANGE UP (ref 4.7–6.1)
RBC # FLD: 31.6 % — HIGH (ref 11.5–14.5)
SODIUM SERPL-SCNC: 143 MMOL/L — SIGNIFICANT CHANGE UP (ref 135–146)
WBC # BLD: 2.41 K/UL — LOW (ref 4.8–10.8)
WBC # FLD AUTO: 2.41 K/UL — LOW (ref 4.8–10.8)

## 2023-07-27 PROCEDURE — 99239 HOSP IP/OBS DSCHRG MGMT >30: CPT

## 2023-07-27 RX ORDER — SUCRALFATE 1 G
10 TABLET ORAL
Qty: 0 | Refills: 0 | DISCHARGE
Start: 2023-07-27

## 2023-07-27 RX ORDER — PANTOPRAZOLE SODIUM 20 MG/1
1 TABLET, DELAYED RELEASE ORAL
Qty: 0 | Refills: 0 | DISCHARGE
Start: 2023-07-27

## 2023-07-27 RX ORDER — ACETAMINOPHEN 500 MG
2 TABLET ORAL
Qty: 0 | Refills: 0 | DISCHARGE
Start: 2023-07-27

## 2023-07-27 RX ORDER — LANOLIN ALCOHOL/MO/W.PET/CERES
1 CREAM (GRAM) TOPICAL
Qty: 0 | Refills: 0 | DISCHARGE
Start: 2023-07-27

## 2023-07-27 RX ADMIN — Medication 1 GRAM(S): at 06:26

## 2023-07-27 RX ADMIN — Medication 1 GRAM(S): at 12:30

## 2023-07-27 RX ADMIN — Medication 1 TABLET(S): at 06:26

## 2023-07-27 RX ADMIN — HEPARIN SODIUM 5000 UNIT(S): 5000 INJECTION INTRAVENOUS; SUBCUTANEOUS at 06:30

## 2023-07-27 RX ADMIN — Medication 1 GRAM(S): at 00:15

## 2023-07-27 RX ADMIN — PANTOPRAZOLE SODIUM 40 MILLIGRAM(S): 20 TABLET, DELAYED RELEASE ORAL at 06:26

## 2023-07-27 RX ADMIN — MEMANTINE HYDROCHLORIDE 5 MILLIGRAM(S): 10 TABLET ORAL at 06:26

## 2023-07-27 NOTE — DISCHARGE NOTE PROVIDER - NSDCCPCAREPLAN_GEN_ALL_CORE_FT
PRINCIPAL DISCHARGE DIAGNOSIS  Diagnosis: Acute blood loss anemia  Assessment and Plan of Treatment: You likely had a GI bleed, and received a blood transfusion for it. Now your hemoglobin is improved. Unfortunately, since you were unable to finish the bowel prep, EGD/colonoscopy were unable to be performed. However, since your hemoglobin is improved, and your vital signs are stable, there is no urgent need for these procedures at this time. Please follow up with GI doctor as outpatient to schedule non-emergently.      SECONDARY DISCHARGE DIAGNOSES  Diagnosis: Pancytopenia  Assessment and Plan of Treatment: Likely combination of blood loss anemia, covid, and possible sluggish bone marrow response to above. Please get repeat CBC in 1-2 weeks to trend blood counts.    Diagnosis: Urinary tract infection  Assessment and Plan of Treatment: Urinary Tract Infection  A urinary tract infection (UTI) is an infection of any part of the urinary tract, which includes the kidneys, ureters, bladder, and urethra. Risk factors include ignoring your need to urinate, wiping back to front if female, being an uncircumcised male, and having diabetes or a weak immune system. Symptoms include frequent urination, pain or burning with urination, foul smelling urine, cloudy urine, pain in the lower abdomen, blood in the urine, and fever. If you were prescribed an antibiotic medicine, take it as told by your health care provider. Do not stop taking the antibiotic even if you start to feel better.  SEEK IMMEDIATE MEDICAL CARE IF YOU HAVE THE FOLLOWING SYMPTOMS: severe back or abdominal pain, inability to keep fluids or medicine down, dizziness/lightheadedness, or a change in mental status.  Your Augmentin ends on 7/28.       Diagnosis: 2019 novel coronavirus disease (COVID-19)  Assessment and Plan of Treatment: You had asymptomatic COVID. However, due to your age and comorbidities, you were high risk for progression of the virus, so you were given 3 doses of Remdesivir. You never required oxygen. You finished isolation.    Diagnosis: Lung nodule  Assessment and Plan of Treatment: Right upper lobe pulmonary nodule measuring 5 mm, new since CTA chest 6/19/2021. Recommend a dedicated chest CT on an outpatient/elective basis.

## 2023-07-27 NOTE — DISCHARGE NOTE PROVIDER - ATTENDING DISCHARGE PHYSICAL EXAMINATION:
Patient seen and examined at bedside. Still confused, but has no complaints. No fevers. Hb improving. Had normal BM yesterday, no blood. Stable for d/c. Updated daughter Ric over phone.     PHYSICAL EXAM:  Gen: NAD, resting in bed  HEENT: Normocephalic, atraumatic  Neck: supple, no lymphadenopathy  CV: Regular rate & regular rhythm  Lungs: CTABL no wheeze  Abdomen: Soft, NTND+ BS present  Ext: Warm, well perfused no CCE  Neuro: non focal, awake, CN II-XII intact; aaox1    Skin: no rash, no erythema  Psych: no SI, HI, Hallucination     Consultant(s) Notes Reviewed:  [x ] YES  [ ] NO  Care Discussed with Consultants/Other Providers [ x] YES  [ ] NO    Vital Signs Last 24 Hrs  T(C): 36.1 (27 Jul 2023 05:00), Max: 36.1 (26 Jul 2023 20:00)  T(F): 97 (27 Jul 2023 05:00), Max: 97 (26 Jul 2023 20:00)  HR: 53 (27 Jul 2023 05:00) (53 - 59)  BP: 122/73 (27 Jul 2023 05:00) (102/58 - 122/73)  BP(mean): --  RR: 18 (27 Jul 2023 05:00) (18 - 18)  SpO2: --

## 2023-07-27 NOTE — DISCHARGE NOTE PROVIDER - HOSPITAL COURSE
73 year old male DNR/DNI with a history of Dementia, HTN, HLD, Depression, Esophageal Cancer s/p resection and gastric pull,, Lung Ca s/p lobectomy, Prostate Cancer s/p Cyberknife presented to the ED with the complaint of dizziness, found to be anemia s/p 1 u pRBC now admitted for workup of his anemia. Admission was complicated by COVID+, got 3 doses of RDV, never required oxygen. Also found to have UTI, now on Augmentin until 7/28. Patient was scheduled for EGD/colonoscopy but didn't finish bowel prep, so since he was hemodynamically stable and Hb improved, procedures were cancelled. Stable for d/c to [facility].

## 2023-07-27 NOTE — DISCHARGE NOTE PROVIDER - PROVIDER TOKENS
PROVIDER:[TOKEN:[[id number]],FOLLOWUP:[2 weeks]],PROVIDER:[TOKEN:[[id number]],FOLLOWUP:[Routine]]

## 2023-07-27 NOTE — DISCHARGE NOTE PROVIDER - CARE PROVIDER_API CALL
Julianne Eisenberg  Internal Medicine  [address] [place] [address]  Phone: [phone]  Fax: ()-  Follow Up Time: 2 weeks    Philip Solorzano  Gastroenterology  [address] [place] [address]  Phone: [phone]  Fax: [phone]  Follow Up Time: Routine

## 2023-07-27 NOTE — DISCHARGE NOTE PROVIDER - NSDCMRMEDTOKEN_GEN_ALL_CORE_FT
acetaminophen 325 mg oral tablet: 2 tab(s) orally every 6 hours As needed Temp greater or equal to 38C (100.4F), Mild Pain (1 - 3)  aluminum hydroxide-magnesium hydroxide 200 mg-200 mg/5 mL oral suspension: 30 milliliter(s) orally every 4 hours As needed Dyspepsia  amoxicillin-clavulanate 875 mg-125 mg oral tablet: 1 tab(s) orally 2 times a day ends on 7/28  melatonin 3 mg oral tablet: 1 tab(s) orally once a day (at bedtime) As needed Insomnia  memantine 5 mg oral tablet: 1 tab(s) orally 2 times a day  pantoprazole 40 mg oral delayed release tablet: 1 tab(s) orally 2 times a day  sucralfate 1 g/10 mL oral suspension: 10 milliliter(s) orally 4 times a day

## 2023-08-23 ENCOUNTER — APPOINTMENT (OUTPATIENT)
Dept: GASTROENTEROLOGY | Facility: CLINIC | Age: 73
End: 2023-08-23

## 2023-08-30 NOTE — DISCHARGE NOTE NURSING/CASE MANAGEMENT/SOCIAL WORK - NSTRANSFEREYEGLASSESPAIRS_GEN_A_NUR
1 pair [Appropriately responsive] : appropriately responsive [Alert] : alert [No Acute Distress] : no acute distress [No Lymphadenopathy] : no lymphadenopathy [Regular Rate Rhythm] : regular rate rhythm [No Murmurs] : no murmurs [Clear to Auscultation B/L] : clear to auscultation bilaterally [Soft] : soft [Non-tender] : non-tender [Non-distended] : non-distended [No HSM] : No HSM [No Lesions] : no lesions [No Mass] : no mass [Oriented x3] : oriented x3 [Examination Of The Breasts] : a normal appearance [No Masses] : no breast masses were palpable [Labia Majora] : normal [Labia Minora] : normal [Normal] : normal [Uterine Adnexae] : normal

## 2023-09-07 ENCOUNTER — INPATIENT (INPATIENT)
Facility: HOSPITAL | Age: 73
LOS: 7 days | Discharge: HOSPICE | DRG: 689 | End: 2023-09-15
Attending: INTERNAL MEDICINE | Admitting: INTERNAL MEDICINE
Payer: MEDICARE

## 2023-09-07 VITALS
HEIGHT: 72 IN | OXYGEN SATURATION: 100 % | SYSTOLIC BLOOD PRESSURE: 132 MMHG | TEMPERATURE: 98 F | HEART RATE: 65 BPM | RESPIRATION RATE: 19 BRPM | DIASTOLIC BLOOD PRESSURE: 78 MMHG

## 2023-09-07 DIAGNOSIS — Z98.890 OTHER SPECIFIED POSTPROCEDURAL STATES: Chronic | ICD-10-CM

## 2023-09-07 DIAGNOSIS — C15.9 MALIGNANT NEOPLASM OF ESOPHAGUS, UNSPECIFIED: Chronic | ICD-10-CM

## 2023-09-07 DIAGNOSIS — R41.82 ALTERED MENTAL STATUS, UNSPECIFIED: ICD-10-CM

## 2023-09-07 DIAGNOSIS — Z90.2 ACQUIRED ABSENCE OF LUNG [PART OF]: Chronic | ICD-10-CM

## 2023-09-07 DIAGNOSIS — C61 MALIGNANT NEOPLASM OF PROSTATE: Chronic | ICD-10-CM

## 2023-09-07 DIAGNOSIS — K40.90 UNILATERAL INGUINAL HERNIA, WITHOUT OBSTRUCTION OR GANGRENE, NOT SPECIFIED AS RECURRENT: Chronic | ICD-10-CM

## 2023-09-07 DIAGNOSIS — Z96.0 PRESENCE OF UROGENITAL IMPLANTS: Chronic | ICD-10-CM

## 2023-09-07 LAB
24R-OH-CALCIDIOL SERPL-MCNC: 32 NG/ML — SIGNIFICANT CHANGE UP (ref 30–80)
ANION GAP SERPL CALC-SCNC: 8 MMOL/L — SIGNIFICANT CHANGE UP (ref 7–14)
BASOPHILS # BLD AUTO: 0.06 K/UL — SIGNIFICANT CHANGE UP (ref 0–0.2)
BASOPHILS NFR BLD AUTO: 1.6 % — HIGH (ref 0–1)
BUN SERPL-MCNC: 16 MG/DL — SIGNIFICANT CHANGE UP (ref 10–20)
CALCIUM SERPL-MCNC: 9.2 MG/DL — SIGNIFICANT CHANGE UP (ref 8.4–10.5)
CHLORIDE SERPL-SCNC: 105 MMOL/L — SIGNIFICANT CHANGE UP (ref 98–110)
CO2 SERPL-SCNC: 28 MMOL/L — SIGNIFICANT CHANGE UP (ref 17–32)
CREAT SERPL-MCNC: 0.8 MG/DL — SIGNIFICANT CHANGE UP (ref 0.7–1.5)
EGFR: 93 ML/MIN/1.73M2 — SIGNIFICANT CHANGE UP
EOSINOPHIL # BLD AUTO: 0.21 K/UL — SIGNIFICANT CHANGE UP (ref 0–0.7)
EOSINOPHIL NFR BLD AUTO: 5.5 % — SIGNIFICANT CHANGE UP (ref 0–8)
GLUCOSE SERPL-MCNC: 95 MG/DL — SIGNIFICANT CHANGE UP (ref 70–99)
HCT VFR BLD CALC: 38.3 % — LOW (ref 42–52)
HGB BLD-MCNC: 11.3 G/DL — LOW (ref 14–18)
IMM GRANULOCYTES NFR BLD AUTO: 0.3 % — SIGNIFICANT CHANGE UP (ref 0.1–0.3)
LYMPHOCYTES # BLD AUTO: 0.82 K/UL — LOW (ref 1.2–3.4)
LYMPHOCYTES # BLD AUTO: 21.3 % — SIGNIFICANT CHANGE UP (ref 20.5–51.1)
MAGNESIUM SERPL-MCNC: 1.9 MG/DL — SIGNIFICANT CHANGE UP (ref 1.8–2.4)
MCHC RBC-ENTMCNC: 24.8 PG — LOW (ref 27–31)
MCHC RBC-ENTMCNC: 29.5 G/DL — LOW (ref 32–37)
MCV RBC AUTO: 84.2 FL — SIGNIFICANT CHANGE UP (ref 80–94)
MONOCYTES # BLD AUTO: 0.64 K/UL — HIGH (ref 0.1–0.6)
MONOCYTES NFR BLD AUTO: 16.6 % — HIGH (ref 1.7–9.3)
NEUTROPHILS # BLD AUTO: 2.11 K/UL — SIGNIFICANT CHANGE UP (ref 1.4–6.5)
NEUTROPHILS NFR BLD AUTO: 54.7 % — SIGNIFICANT CHANGE UP (ref 42.2–75.2)
NRBC # BLD: 0 /100 WBCS — SIGNIFICANT CHANGE UP (ref 0–0)
PLATELET # BLD AUTO: 170 K/UL — SIGNIFICANT CHANGE UP (ref 130–400)
PMV BLD: 10.4 FL — SIGNIFICANT CHANGE UP (ref 7.4–10.4)
POTASSIUM SERPL-MCNC: 5.7 MMOL/L — HIGH (ref 3.5–5)
POTASSIUM SERPL-SCNC: 5.7 MMOL/L — HIGH (ref 3.5–5)
RBC # BLD: 4.55 M/UL — LOW (ref 4.7–6.1)
RBC # FLD: SIGNIFICANT CHANGE UP % (ref 11.5–14.5)
SODIUM SERPL-SCNC: 141 MMOL/L — SIGNIFICANT CHANGE UP (ref 135–146)
WBC # BLD: 3.85 K/UL — LOW (ref 4.8–10.8)
WBC # FLD AUTO: 3.85 K/UL — LOW (ref 4.8–10.8)

## 2023-09-07 PROCEDURE — 76705 ECHO EXAM OF ABDOMEN: CPT

## 2023-09-07 PROCEDURE — 86901 BLOOD TYPING SEROLOGIC RH(D): CPT

## 2023-09-07 PROCEDURE — 86850 RBC ANTIBODY SCREEN: CPT

## 2023-09-07 PROCEDURE — 82248 BILIRUBIN DIRECT: CPT

## 2023-09-07 PROCEDURE — 93970 EXTREMITY STUDY: CPT

## 2023-09-07 PROCEDURE — 87077 CULTURE AEROBIC IDENTIFY: CPT

## 2023-09-07 PROCEDURE — 85027 COMPLETE CBC AUTOMATED: CPT

## 2023-09-07 PROCEDURE — 92610 EVALUATE SWALLOWING FUNCTION: CPT | Mod: GN

## 2023-09-07 PROCEDURE — 71045 X-RAY EXAM CHEST 1 VIEW: CPT | Mod: 26

## 2023-09-07 PROCEDURE — 81001 URINALYSIS AUTO W/SCOPE: CPT

## 2023-09-07 PROCEDURE — 87086 URINE CULTURE/COLONY COUNT: CPT

## 2023-09-07 PROCEDURE — 85025 COMPLETE CBC W/AUTO DIFF WBC: CPT

## 2023-09-07 PROCEDURE — 86900 BLOOD TYPING SEROLOGIC ABO: CPT

## 2023-09-07 PROCEDURE — 74018 RADEX ABDOMEN 1 VIEW: CPT

## 2023-09-07 PROCEDURE — 82247 BILIRUBIN TOTAL: CPT

## 2023-09-07 PROCEDURE — 84100 ASSAY OF PHOSPHORUS: CPT

## 2023-09-07 PROCEDURE — 82977 ASSAY OF GGT: CPT

## 2023-09-07 PROCEDURE — 87186 SC STD MICRODIL/AGAR DIL: CPT

## 2023-09-07 PROCEDURE — 99222 1ST HOSP IP/OBS MODERATE 55: CPT

## 2023-09-07 PROCEDURE — 70450 CT HEAD/BRAIN W/O DYE: CPT

## 2023-09-07 PROCEDURE — 71045 X-RAY EXAM CHEST 1 VIEW: CPT

## 2023-09-07 PROCEDURE — 82306 VITAMIN D 25 HYDROXY: CPT

## 2023-09-07 PROCEDURE — 82607 VITAMIN B-12: CPT

## 2023-09-07 PROCEDURE — 90791 PSYCH DIAGNOSTIC EVALUATION: CPT

## 2023-09-07 PROCEDURE — 97162 PT EVAL MOD COMPLEX 30 MIN: CPT | Mod: GP

## 2023-09-07 PROCEDURE — 76770 US EXAM ABDO BACK WALL COMP: CPT

## 2023-09-07 PROCEDURE — 82962 GLUCOSE BLOOD TEST: CPT

## 2023-09-07 PROCEDURE — 99285 EMERGENCY DEPT VISIT HI MDM: CPT | Mod: GC

## 2023-09-07 PROCEDURE — 0225U NFCT DS DNA&RNA 21 SARSCOV2: CPT

## 2023-09-07 PROCEDURE — 80048 BASIC METABOLIC PNL TOTAL CA: CPT

## 2023-09-07 PROCEDURE — 36415 COLL VENOUS BLD VENIPUNCTURE: CPT

## 2023-09-07 PROCEDURE — 80053 COMPREHEN METABOLIC PANEL: CPT

## 2023-09-07 PROCEDURE — 86780 TREPONEMA PALLIDUM: CPT

## 2023-09-07 PROCEDURE — 83735 ASSAY OF MAGNESIUM: CPT

## 2023-09-07 RX ORDER — PANTOPRAZOLE SODIUM 20 MG/1
40 TABLET, DELAYED RELEASE ORAL DAILY
Refills: 0 | Status: DISCONTINUED | OUTPATIENT
Start: 2023-09-07 | End: 2023-09-07

## 2023-09-07 RX ORDER — ENOXAPARIN SODIUM 100 MG/ML
40 INJECTION SUBCUTANEOUS EVERY 24 HOURS
Refills: 0 | Status: DISCONTINUED | OUTPATIENT
Start: 2023-09-07 | End: 2023-09-15

## 2023-09-07 RX ORDER — HALOPERIDOL DECANOATE 100 MG/ML
5 INJECTION INTRAMUSCULAR ONCE
Refills: 0 | Status: COMPLETED | OUTPATIENT
Start: 2023-09-07 | End: 2023-09-07

## 2023-09-07 RX ORDER — SUCRALFATE 1 G
1 TABLET ORAL
Refills: 0 | Status: DISCONTINUED | OUTPATIENT
Start: 2023-09-07 | End: 2023-09-09

## 2023-09-07 RX ORDER — LANOLIN ALCOHOL/MO/W.PET/CERES
3 CREAM (GRAM) TOPICAL AT BEDTIME
Refills: 0 | Status: DISCONTINUED | OUTPATIENT
Start: 2023-09-07 | End: 2023-09-09

## 2023-09-07 RX ORDER — QUETIAPINE FUMARATE 200 MG/1
12.5 TABLET, FILM COATED ORAL AT BEDTIME
Refills: 0 | Status: DISCONTINUED | OUTPATIENT
Start: 2023-09-07 | End: 2023-09-08

## 2023-09-07 RX ORDER — BREXPIPRAZOLE 0.25 MG/1
0.5 TABLET ORAL AT BEDTIME
Refills: 0 | Status: DISCONTINUED | OUTPATIENT
Start: 2023-09-07 | End: 2023-09-08

## 2023-09-07 RX ORDER — PANTOPRAZOLE SODIUM 20 MG/1
40 TABLET, DELAYED RELEASE ORAL DAILY
Refills: 0 | Status: DISCONTINUED | OUTPATIENT
Start: 2023-09-07 | End: 2023-09-15

## 2023-09-07 RX ORDER — ACETAMINOPHEN 500 MG
650 TABLET ORAL EVERY 6 HOURS
Refills: 0 | Status: DISCONTINUED | OUTPATIENT
Start: 2023-09-07 | End: 2023-09-09

## 2023-09-07 RX ORDER — MEMANTINE HYDROCHLORIDE 10 MG/1
5 TABLET ORAL
Refills: 0 | Status: DISCONTINUED | OUTPATIENT
Start: 2023-09-07 | End: 2023-09-15

## 2023-09-07 RX ADMIN — Medication 1 MILLIGRAM(S): at 23:57

## 2023-09-07 RX ADMIN — Medication 1 GRAM(S): at 18:35

## 2023-09-07 RX ADMIN — HALOPERIDOL DECANOATE 5 MILLIGRAM(S): 100 INJECTION INTRAMUSCULAR at 03:59

## 2023-09-07 RX ADMIN — Medication 1 GRAM(S): at 23:57

## 2023-09-07 RX ADMIN — Medication 1 GRAM(S): at 06:57

## 2023-09-07 RX ADMIN — Medication 2 MILLIGRAM(S): at 04:57

## 2023-09-07 RX ADMIN — MEMANTINE HYDROCHLORIDE 5 MILLIGRAM(S): 10 TABLET ORAL at 06:57

## 2023-09-07 RX ADMIN — MEMANTINE HYDROCHLORIDE 5 MILLIGRAM(S): 10 TABLET ORAL at 18:34

## 2023-09-07 RX ADMIN — PANTOPRAZOLE SODIUM 40 MILLIGRAM(S): 20 TABLET, DELAYED RELEASE ORAL at 13:56

## 2023-09-07 RX ADMIN — Medication 1 GRAM(S): at 12:05

## 2023-09-07 RX ADMIN — BREXPIPRAZOLE 0.5 MILLIGRAM(S): 0.25 TABLET ORAL at 23:57

## 2023-09-07 RX ADMIN — QUETIAPINE FUMARATE 12.5 MILLIGRAM(S): 200 TABLET, FILM COATED ORAL at 23:57

## 2023-09-07 RX ADMIN — Medication 0.5 MILLIGRAM(S): at 13:53

## 2023-09-07 NOTE — H&P ADULT - NSHPPHYSICALEXAM_GEN_ALL_CORE
LOS:     VITALS:   T(C): 36.4 (09-07-23 @ 00:29), Max: 36.4 (09-07-23 @ 00:29)  HR: 65 (09-07-23 @ 00:29) (65 - 65)  BP: 132/78 (09-07-23 @ 00:29) (132/78 - 132/78)  RR: 19 (09-07-23 @ 00:29) (19 - 19)  SpO2: 100% (09-07-23 @ 00:29) (100% - 100%)    GENERAL: NAD, lying in bed comfortably  HEAD:  Atraumatic, Normocephalic  EYES: EOMI, PERRLA, conjunctiva and sclera clear  ENT: Moist mucous membranes  NECK: Supple, No JVD  CHEST/LUNG: Clear to auscultation bilaterally; No rales, rhonchi, wheezing, or rubs. Unlabored respirations  HEART: Regular rate and rhythm; No murmurs, rubs, or gallops  ABDOMEN: BSx4; Soft, nontender, nondistended  EXTREMITIES:  2+ Peripheral Pulses, brisk capillary refill. No clubbing, cyanosis, or edema  NERVOUS SYSTEM:  A&Ox3, no focal deficits   SKIN: No rashes or lesions

## 2023-09-07 NOTE — ED PROVIDER NOTE - OBJECTIVE STATEMENT
73-year-old male with PMH of advanced dementia, A&O x0 at baseline, esophageal cancer status post chemo/radiation/surgery, prostate cancer status post CyberKnife, lung cancer status post surgical resection, now in full remission with no active therapies, brought in by daughter for evaluation of altered mental status and aggression at home over the last 2 weeks.  She states he was recently hospitalized 2 months ago and then sent to rehabilitation center, discharged home 2 weeks ago.  Patient lives upstairs from his daughter and her  alone but has 24-hour aide.  Ambulates with walker.  She states over the last 2 weeks he is becoming more increasingly difficult to handle, over the last 2 days has been aggressive and today was attempting to climb onto his TV stand.  He had to be physically restrained by the aide.  Had 2 episodes today where he was struggling to climb onto things and then fell onto his bilateral knees.  Events were witnessed by aide who reported to the daughter that he had no head trauma or LOC and was ambulatory after the falls.  Daughter reports patient has a dry cough over the last few days. No known fever, vomiting, diarrhea, rash.  Patient is unable to contribute further history or review of systems

## 2023-09-07 NOTE — H&P ADULT - HISTORY OF PRESENT ILLNESS
73-year-old male with PMH of advanced dementia, A&O x0 at baseline, esophageal cancer status post chemo/radiation/surgery 2017, prostate cancer status post CyberKnife 2020, lung cancer status post surgical resection 2019, now in full remission with no active therapies, brought in by daughter for evaluation of altered mental status and aggression at home over the last 2 weeks. She states he was recently hospitalized 2 months ago symptomatic anemia course complicated by uti and covid  and then sent to rehabilitation center, discharged home 2 weeks ago.  Patient lives upstairs from his daughter and her  alone but has 24-hour aide.  Ambulates with walker.  She states over the last 2 weeks he is becoming more increasingly difficult to handle, over the last 2 days has been aggressive and today was attempting to climb onto his TV stand.  He had to be physically restrained by the aide.  Had 2 episodes today where he was struggling to climb onto things and then fell onto his bilateral knees.  Events were witnessed by aide who reported to the daughter that he had no head trauma or LOC and was ambulatory after the falls.  Daughter reports patient has a dry cough over the last few days. No known fever, vomiting, diarrhea, rash.  Patient is unable to contribute further history or review of systems  73-year-old male with PMH of advanced dementia, A&O x0 at baseline, esophageal cancer status post chemo/radiation/surgery 2017, prostate cancer status post CyberKnife 2020, lung cancer status post surgical resection 2019, now in full remission with no active therapies, brought in by daughter for evaluation of altered mental status and aggression at home over the last 2 weeks. She states he was recently hospitalized 2 months ago symptomatic anemia course complicated by uti and covid  and then sent to rehabilitation center, discharged home 2 weeks ago.  Patient lives upstairs from his daughter and her  alone but has 24-hour aide.  Ambulates with walker.  She states over the last 2 weeks he is becoming more increasingly difficult to handle, over the last 2 days has been aggressive and today was attempting to climb onto his TV stand.  He had to be physically restrained by the aide.  Had 2 episodes yesterday where he was struggling to climb onto things and then fell onto his bilateral knees.  Events were witnessed by aide who reported to the daughter that he had no head trauma or LOC and was ambulatory after the falls.  Daughter reports patient has a dry cough over the last few days. No known fever, vomiting, diarrhea, rash.  Patient is unable to contribute further history or review of systems. Of note she believes that his behavior and mental status became worse  after starting this new medication.   In the ed chest x ray performed pending read     Admitted to medicine for agitated delirium

## 2023-09-07 NOTE — CONSULT NOTE ADULT - ASSESSMENT
This is a 73 year old male with PMHx of advanced dementia, AAOx0 at baseline, esophageal cancer status post chemo/radiation/surgery 2017, prostate cancer status post CyberKnife 2020, lung cancer status post surgical resection 2019, now in full remission with no active therapies, brought in by daughter for evaluation of altered mental status and aggression at home over the last 2 weeks. She states he was recently hospitalized 2 months ago symptomatic anemia course complicated by UTI and COVID and then sent to rehabilitation center, was discharged home 2 weeks ago. Ambulates with walker at baseline. Neurology was consulted for further evaluation of agitation, with increased falls as per the family.     Plan:  - Recommend CT Head, will f/u  - Admitted to medicine  - Consider Psych eval  - Check B12/Folate, RPR, TSH  - PT eval & treat  - Fall precautions  - Continue home medications  - Please call for any changes in patient's neurological or clinical status  - Continue rest of care as per primary team This is a 73 year old male with PMHx of advanced dementia, AAOx0 at baseline, esophageal cancer status post chemo/radiation/surgery 2017, prostate cancer status post CyberKnife 2020, lung cancer status post surgical resection 2019, now in full remission with no active therapies, brought in by daughter for evaluation of altered mental status and aggression at home over the last 2 weeks. She states he was recently hospitalized 2 months ago symptomatic anemia course complicated by UTI and COVID and then sent to rehabilitation center, was discharged home 2 weeks ago. Ambulates with walker at baseline. Neurology was consulted for further evaluation of agitation, with increased falls as per the family.     Plan:  - Recommend CT Head, will f/u  - Recommend to d/c Ativan AM and PM doses  - Recommend to d/c home Rexulti  - Recommend Seroquel 25 mg qhs  - Recommend Haldol PRN for agitation  - Continue home medication Memantine 5 mg BID  - Consider Psych eval  - Check B12/Folate, RPR, TSH  - PT eval & treat  - Fall precautions  - Please call for any changes in patient's neurological or clinical status  - Continue rest of care as per primary team This is a 73 year old male with PMHx of advanced dementia, AAOx0 at baseline, esophageal cancer status post chemo/radiation/surgery 2017, prostate cancer status post CyberKnife 2020, lung cancer status post surgical resection 2019, now in full remission with no active therapies, brought in by daughter for evaluation of altered mental status and aggression at home over the last 2 weeks. She states he was recently hospitalized 2 months ago symptomatic anemia course complicated by UTI and COVID and then sent to rehabilitation center, was discharged home 2 weeks ago. Ambulates with walker at baseline. Neurology was consulted for further evaluation of agitation, with increased falls as per the family. Possible worsening behavior secondary to combination of ativan and Rexulti (dopaminergic).     Plan:  - Recommend CT Head NC  - Recommend to d/c Ativan AM and PM doses  - Recommend to d/c home Rexulti  - Recommend Seroquel 25 mg qhs  - Recommend Haldol PRN for agitation  - hold Memantine for now  - Check B12/Folate, RPR, TSH  - PT eval & treat  - Fall precautions  - Please call for any changes in patient's neurological or clinical status  - Continue rest of care as per primary team

## 2023-09-07 NOTE — ED PROVIDER NOTE - CLINICAL SUMMARY MEDICAL DECISION MAKING FREE TEXT BOX
Patient presented for evaluation of worsening aggression.  We will plan labs.  Will admit for further evaluation of placement to SNF.

## 2023-09-07 NOTE — ED ADULT TRIAGE NOTE - CHIEF COMPLAINT QUOTE
BIBA with complaints of increasing confusion, agitation, fell 2X today. Just recently on new medication (Rexulti) for dementia

## 2023-09-07 NOTE — ED ADULT NURSE NOTE - NSICDXPASTMEDICALHX_GEN_ALL_CORE_FT
PAST MEDICAL HISTORY:  Abnormal cholesterol test     Alzheimer disease     Cancer ESOPHAGEAL CANCER 2017 RECEIVED CHEMO AND RADIATION and endoscopic resection partial oesophagus and stomach    Cancer left lung 2018, no chemo or rad rx and resection    [place] (hard of hearing)     Hypertension, unspecified type     Prostate cancer     Renal calculi nephrostomy tube -6/2021

## 2023-09-07 NOTE — ED PROVIDER NOTE - ATTENDING CONTRIBUTION TO CARE
I personally evaluated the patient. I reviewed the Resident’s or Physician Assistant’s note (as assigned above), and agree with the findings and plan except as documented in my note.  73-year-old male past medical history of HTN, HLD, depression, dementia, esophageal CA, lung CA status post lobectomy and prostate CA currently in remission presents for evaluation of progressing confusion and family is not able to take care of him.  Patient currently lives in the to family and needs with his daughter and has a 24-hour home health aide.  However as per family, it has been difficult to control in the past patient is planning furniture and doing things around harmful to him.  Family brought in patient because he wants him to be placed in a nursing home.  No active complaints.  VSS, non toxic appearing, NAD, Head NCAT, MMM, neck supple, normal ROM, normal s1s2, lungs ctab, abd s/nt/nd, no guarding or rebound, extremities wnl, AAO to name and , GCS 15, neuro grossly normal. No acute skin lesions. Plan is labs imaging, reassess.

## 2023-09-07 NOTE — H&P ADULT - NSHPLABSRESULTS_GEN_ALL_CORE
.  LABS:                         11.3   3.85  )-----------( 170      ( 07 Sep 2023 02:06 )             38.3     09-07    141  |  105  |  16  ----------------------------<  95  5.7<H>   |  28  |  0.8    Ca    9.2      07 Sep 2023 02:06  Mg     1.9     09-07        Urinalysis Basic - ( 07 Sep 2023 02:06 )    Color: x / Appearance: x / SG: x / pH: x  Gluc: 95 mg/dL / Ketone: x  / Bili: x / Urobili: x   Blood: x / Protein: x / Nitrite: x   Leuk Esterase: x / RBC: x / WBC x   Sq Epi: x / Non Sq Epi: x / Bacteria: x            RADIOLOGY, EKG & ADDITIONAL TESTS: Reviewed.

## 2023-09-07 NOTE — CHART NOTE - NSCHARTNOTEFT_GEN_A_CORE
The Drug Utilization Report below displays all of the controlled substance prescriptions, if any, that your patient has filled in the last twelve months. The information displayed on this report is compiled from pharmacy submissions to the Department, and accurately reflects the information as submitted by the pharmacies.    This report was requested by: Devin Rodriguez | Reference #: [id number]    Practitioner Count: 2  Pharmacy Count: 2  Current Opioid Prescriptions: 0  Current Benzodiazepine Prescriptions: 3  Current Stimulant Prescriptions: 0      Patient Demographic Information (PDI)       PDI	First Name	Last Name	Birth Date	Gender	Street [facility]  A	Vaughn Jansen	1950	Male	[address]	[address]  B	Vaughn Keaneconi	1950	Male	[address] [facility] [facility]	[address]    Prescription Information      PDI Filter:    PDI	Current Rx	Drug Type	Rx Written	Rx Dispensed	Drug	Quantity	Days Supply	Prescriber Name	Prescriber MARTINA #	Payment Method  A	N	B	08/15/2023	08/15/2023	lorazepam 0.5 mg tablet	30	15	Katelynn Ferrer	[id number]	[place]  [facility]  A	N	B	08/10/2023	08/10/2023	lorazepam 0.5 mg tablet	14	7	Katelynn Ferrer	[id number]	[place]  [facility]  B	Y	B	09/04/2023	09/05/2023	lorazepam 2 mg tablet	30	30	Al Scherer	[id number]	Insurance  [facility]  B	Y	B	08/31/2023	09/02/2023	lorazepam 0.5 mg tablet	120	30	Al Scherer	[id number]	Insurance  [facility]  B	Y	B	08/21/2023	08/21/2023	lorazepam 0.5 mg tablet	60	30	Katelynn Ferrer	[id number]	Insurance  [facility]

## 2023-09-07 NOTE — CONSULT NOTE ADULT - SUBJECTIVE AND OBJECTIVE BOX
Neurology Consult Note    HPI:  73-year-old male with PMH of advanced dementia, A&O x0 at baseline, esophageal cancer status post chemo/radiation/surgery 2017, prostate cancer status post CyberKnife 2020, lung cancer status post surgical resection 2019, now in full remission with no active therapies, brought in by daughter for evaluation of altered mental status and aggression at home over the last 2 weeks. She states he was recently hospitalized 2 months ago symptomatic anemia course complicated by uti and covid  and then sent to rehabilitation center, discharged home 2 weeks ago.  Patient lives upstairs from his daughter and her  alone but has 24-hour aide.  Ambulates with walker.  She states over the last 2 weeks he is becoming more increasingly difficult to handle, over the last 2 days has been aggressive and today was attempting to climb onto his TV stand.  He had to be physically restrained by the aide.  Had 2 episodes yesterday where he was struggling to climb onto things and then fell onto his bilateral knees.  Events were witnessed by aide who reported to the daughter that he had no head trauma or LOC and was ambulatory after the falls.  Daughter reports patient has a dry cough over the last few days. No known fever, vomiting, diarrhea, rash.  Patient is unable to contribute further history or review of systems. Of note she believes that his behavior and mental status became worse after starting this new medication.     Admitted to medicine for agitated delirium.      PAST MEDICAL & SURGICAL HISTORY:  Hypertension, unspecified type  Cancer  ESOPHAGEAL CANCER 2017 RECEIVED CHEMO AND RADIATION and endoscopic resection partial oesophagus and stomach  Renal calculi  nephrostomy tube -6/2021  Cancer  left lung 2018, no chemo or rad rx and resection  Abnormal cholesterol test  Prostate cancer  [place] (hard of hearing)  Alzheimer disease  Cancer of esophagus  2017 RESECTION OF LOWER PORTION OF ESOPHAGUS  S/P lobectomy of lung  left ?JUNE 2018  S/P cystoscopy with ureteral stent placement  LEFT  Inguinal hernia  AS AN INFANT, RIGHT SIDE  History of esophagogastroduodenoscopy (EGD)  2019  H/O colonoscopy  2018  Prostate cancer  cyber knife intervention 2021      Medications:  acetaminophen     Tablet .. 650 milliGRAM(s) Oral every 6 hours PRN  brexpiprazole 0.5 milliGRAM(s) Oral at bedtime  enoxaparin Injectable 40 milliGRAM(s) SubCutaneous every 24 hours  LORazepam     Tablet 1 milliGRAM(s) Oral at bedtime  LORazepam     Tablet 0.5 milliGRAM(s) Oral <User Schedule>  melatonin 3 milliGRAM(s) Oral at bedtime PRN  memantine 5 milliGRAM(s) Oral two times a day  pantoprazole    Tablet 40 milliGRAM(s) Oral daily  QUEtiapine 12.5 milliGRAM(s) Oral at bedtime  sucralfate suspension 1 Gram(s) Oral four times a day      Vital Signs Last 24 Hrs  T(C): 36.7 (07 Sep 2023 15:37), Max: 36.7 (07 Sep 2023 15:37)  T(F): 98 (07 Sep 2023 15:37), Max: 98 (07 Sep 2023 15:37)  HR: 60 (07 Sep 2023 15:37) (60 - 80)  BP: 123/76 (07 Sep 2023 15:37) (123/67 - 149/70)  RR: 18 (07 Sep 2023 15:37) (16 - 19)  SpO2: 99% (07 Sep 2023 15:37) (98% - 100%)    Parameters below as of 07 Sep 2023 15:37  Patient On (Oxygen Delivery Method): room air      Neurological Exam:   Mental status: Awake, alert and oriented x0. No dysarthria, no aphasia. Perseverating during exam, saying "Vaughn" repeatedly. Follows some simple commands.  Cranial nerves: Pupils equally round and reactive to light, no nystagmus, extraocular muscles intact, face symmetric, tongue was midline.  Motor: Moving all extremities symmetrically.  strength 5/5. Normal tone and bulk. No abnormal movements.    Sensation: Intact to noxious stimuli in all extremities.  Coordination: Unable to assess.  Reflexes: 2+ in bilateral UE/LE, mute toes bilaterally.  Gait: Deferred.      Labs:  CBC Full  -  ( 07 Sep 2023 02:06 )  WBC Count : 3.85 K/uL  RBC Count : 4.55 M/uL  Hemoglobin : 11.3 g/dL  Hematocrit : 38.3 %  Platelet Count - Automated : 170 K/uL  Mean Cell Volume : 84.2 fL  Mean Cell Hemoglobin : 24.8 pg  Mean Cell Hemoglobin Concentration : 29.5 g/dL  Auto Neutrophil # : 2.11 K/uL  Auto Lymphocyte # : 0.82 K/uL  Auto Monocyte # : 0.64 K/uL  Auto Eosinophil # : 0.21 K/uL  Auto Basophil # : 0.06 K/uL  Auto Neutrophil % : 54.7 %  Auto Lymphocyte % : 21.3 %  Auto Monocyte % : 16.6 %  Auto Eosinophil % : 5.5 %  Auto Basophil % : 1.6 %    09-07    141  |  105  |  16  ----------------------------<  95  5.7<H>   |  28  |  0.8    Ca    9.2      07 Sep 2023 02:06  Mg     1.9     09-07      Urinalysis Basic - ( 07 Sep 2023 02:06 )    Color: x / Appearance: x / SG: x / pH: x  Gluc: 95 mg/dL / Ketone: x  / Bili: x / Urobili: x   Blood: x / Protein: x / Nitrite: x   Leuk Esterase: x / RBC: x / WBC x   Sq Epi: x / Non Sq Epi: x / Bacteria: x     Neurology Consult Note    HPI:  73-year-old male with PMH of advanced dementia, A&O x0 at baseline, esophageal cancer status post chemo/radiation/surgery , prostate cancer status post CyberKnife , lung cancer status post surgical resection , now in full remission with no active therapies, brought in by daughter for evaluation of altered mental status and aggression at home over the last 2 weeks. She states he was recently hospitalized 2 months ago symptomatic anemia course complicated by uti and covid  and then sent to rehabilitation center, discharged home 2 weeks ago.  Patient lives upstairs from his daughter and her  alone but has 24-hour aide.  Ambulates with walker.  She states over the last 2 weeks he is becoming more increasingly difficult to handle, over the last 2 days has been aggressive and today was attempting to climb onto his TV stand.  He had to be physically restrained by the aide.  Had 2 episodes yesterday where he was struggling to climb onto things and then fell onto his bilateral knees.  Events were witnessed by aide who reported to the daughter that he had no head trauma or LOC and was ambulatory after the falls.  Daughter reports patient has a dry cough over the last few days. No known fever, vomiting, diarrhea, rash.  Patient is unable to contribute further history or review of systems. Of note she believes that his behavior and mental status became worse after starting this new medication.     Admitted to medicine for agitated delirium.      PAST MEDICAL & SURGICAL HISTORY:  Hypertension, unspecified type  Cancer  ESOPHAGEAL CANCER 2017 RECEIVED CHEMO AND RADIATION and endoscopic resection partial oesophagus and stomach  Renal calculi  nephrostomy tube -2021  Cancer  left lung 2018, no chemo or rad rx and resection  Abnormal cholesterol test  Prostate cancer  [place] (hard of hearing)  Alzheimer disease  Cancer of esophagus  2017 RESECTION OF LOWER PORTION OF ESOPHAGUS  S/P lobectomy of lung  left ?2018  S/P cystoscopy with ureteral stent placement  LEFT  Inguinal hernia  AS AN INFANT, RIGHT SIDE  History of esophagogastroduodenoscopy (EGD)  2019  H/O colonoscopy  2018  Prostate cancer  cyber knife intervention       Medications:  acetaminophen     Tablet .. 650 milliGRAM(s) Oral every 6 hours PRN  brexpiprazole 0.5 milliGRAM(s) Oral at bedtime  enoxaparin Injectable 40 milliGRAM(s) SubCutaneous every 24 hours  LORazepam     Tablet 1 milliGRAM(s) Oral at bedtime  LORazepam     Tablet 0.5 milliGRAM(s) Oral <User Schedule>  melatonin 3 milliGRAM(s) Oral at bedtime PRN  memantine 5 milliGRAM(s) Oral two times a day  pantoprazole    Tablet 40 milliGRAM(s) Oral daily  QUEtiapine 12.5 milliGRAM(s) Oral at bedtime  sucralfate suspension 1 Gram(s) Oral four times a day      Vital Signs Last 24 Hrs  T(C): 36.7 (07 Sep 2023 15:37), Max: 36.7 (07 Sep 2023 15:37)  T(F): 98 (07 Sep 2023 15:37), Max: 98 (07 Sep 2023 15:37)  HR: 60 (07 Sep 2023 15:37) (60 - 80)  BP: 123/76 (07 Sep 2023 15:37) (123/67 - 149/70)  RR: 18 (07 Sep 2023 15:37) (16 - 19)  SpO2: 99% (07 Sep 2023 15:37) (98% - 100%)    Parameters below as of 07 Sep 2023 15:37  Patient On (Oxygen Delivery Method): room air      Neurological Exam:   Mental status: Awake, alert and oriented x0. Able to state . No dysarthria, no aphasia. Perseverating during exam, saying "Vaughn" repeatedly. Follows some simple commands.  Cranial nerves: Pupils equally round and reactive to light, no nystagmus, extraocular muscles intact, face symmetric, tongue was midline.  Motor: Moving all extremities symmetrically.  strength 5/5. Normal tone and bulk. No abnormal movements.    Sensation: Intact to noxious stimuli in all extremities.  Coordination: Unable to assess.  Reflexes: 2+ in bilateral UE/LE, mute toes bilaterally.  Gait: Deferred.      Labs:  CBC Full  -  ( 07 Sep 2023 02:06 )  WBC Count : 3.85 K/uL  RBC Count : 4.55 M/uL  Hemoglobin : 11.3 g/dL  Hematocrit : 38.3 %  Platelet Count - Automated : 170 K/uL  Mean Cell Volume : 84.2 fL  Mean Cell Hemoglobin : 24.8 pg  Mean Cell Hemoglobin Concentration : 29.5 g/dL  Auto Neutrophil # : 2.11 K/uL  Auto Lymphocyte # : 0.82 K/uL  Auto Monocyte # : 0.64 K/uL  Auto Eosinophil # : 0.21 K/uL  Auto Basophil # : 0.06 K/uL  Auto Neutrophil % : 54.7 %  Auto Lymphocyte % : 21.3 %  Auto Monocyte % : 16.6 %  Auto Eosinophil % : 5.5 %  Auto Basophil % : 1.6 %        141  |  105  |  16  ----------------------------<  95  5.7<H>   |  28  |  0.8    Ca    9.2      07 Sep 2023 02:06  Mg     1.9           Urinalysis Basic - ( 07 Sep 2023 02:06 )    Color: x / Appearance: x / SG: x / pH: x  Gluc: 95 mg/dL / Ketone: x  / Bili: x / Urobili: x   Blood: x / Protein: x / Nitrite: x   Leuk Esterase: x / RBC: x / WBC x   Sq Epi: x / Non Sq Epi: x / Bacteria: x

## 2023-09-07 NOTE — ED PROVIDER NOTE - PHYSICAL EXAMINATION
VITAL SIGNS: I have reviewed nursing notes and confirm.  CONSTITUTIONAL: elderly male in NAD  SKIN: Skin exam is warm and dry, no acute rash.  HEAD: Normocephalic; atraumatic.  EYES: PERRL,  conjunctiva and sclera clear.  ENT: mmm  CARD: S1, S2 normal; no murmurs, gallops, or rubs. Regular rate and rhythm.  RESP: Normal respiratory effort, no tachypnea or distress. Lungs CTAB, no wheezes, rales or rhonchi.  ABD: soft, NT/ND.  EXT: Normal ROM. No clubbing, cyanosis or edema.  NEURO: Awake alert, oriented x0 (baseline per daughter) moving all extremities

## 2023-09-07 NOTE — ED PROVIDER NOTE - NSICDXPASTMEDICALHX_GEN_ALL_CORE_FT
PAST MEDICAL HISTORY:  Abnormal cholesterol test     Alzheimer disease     Cancer ESOPHAGEAL CANCER 2017 RECEIVED CHEMO AND RADIATION and endoscopic resection partial oesophagus and stomach    Cancer left lung 2018, no chemo or rad rx and resection    [place]     Hypertension, unspecified type     Prostate cancer     Renal calculi nephrostomy tube -6/2021

## 2023-09-07 NOTE — H&P ADULT - ATTENDING COMMENTS
73 year old with advanced dementia recently started on a new agent brought in for 3 day hx of agitated delirium with worsening cognition, disorganization and now  falls         1. Advanced dementia with behavioral disturbance complicated by Fall   * patient now elopes from house by himself   - Admit to medicine   - Cont ativan 0.5mg po and 2mg HS   - Cont rexulti   - Restrain for now   - Neurology consult:pending   - PT eval:pending       Code status: DNR/DNI     #Dispo admit to medicine  #activity iat   #diet npo s/s prior to feeds in setting of advanced dementia   #dvt ppxlovenox  #gi ppx protonix 73 year old with advanced dementia recently started on a new agent brought in for 3 day hx of agitated delirium with worsening cognition, disorganization and now  falls     1. Advanced dementia with behavioral disturbance complicated by Fall   * patient now elopes from house by himself   - Admit to medicine   - Cont ativan 0.5mg po and 2mg HS   - Cont rexulti   - Restrain for now   - Neurology consult:pending   - PT eval:pending       Code status: DNR/DNI     #Dispo admit to medicine  #activity iat   #diet npo s/s prior to feeds in setting of advanced dementia   #dvt ppxlovenox  #gi ppx protonix 73 year old with advanced dementia recently started on a new agent brought in for 3 day hx of agitated delirium with worsening cognition, disorganization and now  falls     1. Advanced dementia with behavioral disturbance complicated by Fall   * patient now elopes from house by himself   * pt was told that he has vascular dementia as per daughter   - Admit to medicine   - Cont ativan 0.5mg po and 2mg HS   - Cont rexulti   - Start seroquel low dose no contraindication and pt is experiencing hallucination   - Restrain for now   - Neurology consult:pending   - PT eval:pending       Code status: DNR/DNI     #Dispo admit to medicine  #activity iat   #diet npo s/s prior to feeds in setting of advanced dementia   #dvt ppxlovenox  #gi ppx protonix

## 2023-09-07 NOTE — SWALLOW BEDSIDE ASSESSMENT ADULT - SLP PERTINENT HISTORY OF CURRENT PROBLEM
73-year-old male with PMH of advanced dementia, A&O x0 at baseline, esophageal cancer status post chemo/radiation/surgery 2017, prostate cancer status post CyberKnife 2020, lung cancer status post surgical resection 2019, now in full remission with no active therapies, brought in by daughter for evaluation of altered mental status and aggression at home over the last 2 weeks. She states he was recently hospitalized 2 months ago symptomatic anemia course complicated by uti and covid  and then sent to rehabilitation center, discharged home 2 weeks ago.  Patient lives upstairs from his daughter and her  alone but has 24-hour aide.  She states over the last 2 weeks he is becoming more increasingly difficult to handle, over the last 2 days has been aggressive and today was attempting to climb onto his TV stand.  He had to be physically restrained by the aide.  Had 2 episodes where he was struggling to climb things and fell onto his knees.

## 2023-09-08 ENCOUNTER — TRANSCRIPTION ENCOUNTER (OUTPATIENT)
Age: 73
End: 2023-09-08

## 2023-09-08 LAB
ANION GAP SERPL CALC-SCNC: 13 MMOL/L — SIGNIFICANT CHANGE UP (ref 7–14)
BUN SERPL-MCNC: 15 MG/DL — SIGNIFICANT CHANGE UP (ref 10–20)
CALCIUM SERPL-MCNC: 9.4 MG/DL — SIGNIFICANT CHANGE UP (ref 8.4–10.5)
CHLORIDE SERPL-SCNC: 103 MMOL/L — SIGNIFICANT CHANGE UP (ref 98–110)
CO2 SERPL-SCNC: 25 MMOL/L — SIGNIFICANT CHANGE UP (ref 17–32)
CREAT SERPL-MCNC: 0.8 MG/DL — SIGNIFICANT CHANGE UP (ref 0.7–1.5)
EGFR: 93 ML/MIN/1.73M2 — SIGNIFICANT CHANGE UP
GLUCOSE SERPL-MCNC: 78 MG/DL — SIGNIFICANT CHANGE UP (ref 70–99)
HCT VFR BLD CALC: 41.1 % — LOW (ref 42–52)
HGB BLD-MCNC: 12.5 G/DL — LOW (ref 14–18)
MAGNESIUM SERPL-MCNC: 1.8 MG/DL — SIGNIFICANT CHANGE UP (ref 1.8–2.4)
MCHC RBC-ENTMCNC: 25.4 PG — LOW (ref 27–31)
MCHC RBC-ENTMCNC: 30.4 G/DL — LOW (ref 32–37)
MCV RBC AUTO: 83.4 FL — SIGNIFICANT CHANGE UP (ref 80–94)
NRBC # BLD: 0 /100 WBCS — SIGNIFICANT CHANGE UP (ref 0–0)
PLATELET # BLD AUTO: 145 K/UL — SIGNIFICANT CHANGE UP (ref 130–400)
PMV BLD: 10 FL — SIGNIFICANT CHANGE UP (ref 7.4–10.4)
POTASSIUM SERPL-MCNC: 4.2 MMOL/L — SIGNIFICANT CHANGE UP (ref 3.5–5)
POTASSIUM SERPL-SCNC: 4.2 MMOL/L — SIGNIFICANT CHANGE UP (ref 3.5–5)
RBC # BLD: 4.93 M/UL — SIGNIFICANT CHANGE UP (ref 4.7–6.1)
RBC # FLD: SIGNIFICANT CHANGE UP % (ref 11.5–14.5)
SODIUM SERPL-SCNC: 141 MMOL/L — SIGNIFICANT CHANGE UP (ref 135–146)
T PALLIDUM AB TITR SER: NEGATIVE — SIGNIFICANT CHANGE UP
VIT B12 SERPL-MCNC: 904 PG/ML — SIGNIFICANT CHANGE UP (ref 232–1245)
WBC # BLD: 6.13 K/UL — SIGNIFICANT CHANGE UP (ref 4.8–10.8)
WBC # FLD AUTO: 6.13 K/UL — SIGNIFICANT CHANGE UP (ref 4.8–10.8)

## 2023-09-08 PROCEDURE — 70450 CT HEAD/BRAIN W/O DYE: CPT | Mod: 26

## 2023-09-08 PROCEDURE — 99222 1ST HOSP IP/OBS MODERATE 55: CPT

## 2023-09-08 PROCEDURE — 99231 SBSQ HOSP IP/OBS SF/LOW 25: CPT

## 2023-09-08 RX ORDER — MAGNESIUM SULFATE 500 MG/ML
2 VIAL (ML) INJECTION ONCE
Refills: 0 | Status: COMPLETED | OUTPATIENT
Start: 2023-09-08 | End: 2023-09-08

## 2023-09-08 RX ORDER — HALOPERIDOL DECANOATE 100 MG/ML
5 INJECTION INTRAMUSCULAR ONCE
Refills: 0 | Status: COMPLETED | OUTPATIENT
Start: 2023-09-08 | End: 2023-09-08

## 2023-09-08 RX ORDER — QUETIAPINE FUMARATE 200 MG/1
25 TABLET, FILM COATED ORAL AT BEDTIME
Refills: 0 | Status: DISCONTINUED | OUTPATIENT
Start: 2023-09-08 | End: 2023-09-12

## 2023-09-08 RX ADMIN — MEMANTINE HYDROCHLORIDE 5 MILLIGRAM(S): 10 TABLET ORAL at 06:49

## 2023-09-08 RX ADMIN — ENOXAPARIN SODIUM 40 MILLIGRAM(S): 100 INJECTION SUBCUTANEOUS at 12:17

## 2023-09-08 RX ADMIN — Medication 1 GRAM(S): at 06:50

## 2023-09-08 RX ADMIN — Medication 1 GRAM(S): at 11:49

## 2023-09-08 RX ADMIN — Medication 0.5 MILLIGRAM(S): at 06:49

## 2023-09-08 RX ADMIN — HALOPERIDOL DECANOATE 5 MILLIGRAM(S): 100 INJECTION INTRAMUSCULAR at 21:04

## 2023-09-08 RX ADMIN — Medication 1 GRAM(S): at 17:40

## 2023-09-08 RX ADMIN — QUETIAPINE FUMARATE 25 MILLIGRAM(S): 200 TABLET, FILM COATED ORAL at 21:04

## 2023-09-08 RX ADMIN — Medication 25 GRAM(S): at 17:39

## 2023-09-08 RX ADMIN — Medication 3 MILLIGRAM(S): at 21:04

## 2023-09-08 RX ADMIN — MEMANTINE HYDROCHLORIDE 5 MILLIGRAM(S): 10 TABLET ORAL at 17:40

## 2023-09-08 RX ADMIN — Medication 1 GRAM(S): at 23:04

## 2023-09-08 RX ADMIN — PANTOPRAZOLE SODIUM 40 MILLIGRAM(S): 20 TABLET, DELAYED RELEASE ORAL at 11:49

## 2023-09-08 NOTE — PROGRESS NOTE ADULT - SUBJECTIVE AND OBJECTIVE BOX
MADELINE SERINA  73y  Male      Patient is a 73y old  Male who presents with a chief complaint of delirium (08 Sep 2023 10:54)      INTERVAL HPI/OVERNIGHT EVENTS:  He feels ok, quite, denies any compalitns.   Vital Signs Last 24 Hrs  T(C): 36.1 (08 Sep 2023 12:45), Max: 36.8 (07 Sep 2023 22:22)  T(F): 96.9 (08 Sep 2023 12:45), Max: 98.2 (07 Sep 2023 22:22)  HR: 71 (08 Sep 2023 12:45) (63 - 80)  BP: 129/96 (08 Sep 2023 12:45) (124/70 - 147/79)  BP(mean): --  RR: 18 (08 Sep 2023 12:45) (18 - 18)  SpO2: 98% (08 Sep 2023 12:45) (96% - 100%)    Parameters below as of 08 Sep 2023 12:45  Patient On (Oxygen Delivery Method): room air                Consultant(s) Notes Reviewed:  [x ] YES  [ ] NO          MEDICATIONS  (STANDING):  enoxaparin Injectable 40 milliGRAM(s) SubCutaneous every 24 hours  memantine 5 milliGRAM(s) Oral two times a day  pantoprazole    Tablet 40 milliGRAM(s) Oral daily  QUEtiapine 25 milliGRAM(s) Oral at bedtime  sucralfate suspension 1 Gram(s) Oral four times a day    MEDICATIONS  (PRN):  acetaminophen     Tablet .. 650 milliGRAM(s) Oral every 6 hours PRN Temp greater or equal to 38C (100.4F), Mild Pain (1 - 3)  melatonin 3 milliGRAM(s) Oral at bedtime PRN Insomnia      LABS                          12.5   6.13  )-----------( 145      ( 08 Sep 2023 08:17 )             41.1     09-08    141  |  103  |  15  ----------------------------<  78  4.2   |  25  |  0.8    Ca    9.4      08 Sep 2023 08:17  Mg     1.8     09-08        Urinalysis Basic - ( 08 Sep 2023 08:17 )    Color: x / Appearance: x / SG: x / pH: x  Gluc: 78 mg/dL / Ketone: x  / Bili: x / Urobili: x   Blood: x / Protein: x / Nitrite: x   Leuk Esterase: x / RBC: x / WBC x   Sq Epi: x / Non Sq Epi: x / Bacteria: x        Lactate Trend        CAPILLARY BLOOD GLUCOSE      POCT Blood Glucose.: 81 mg/dL (07 Sep 2023 02:14)        RADIOLOGY & ADDITIONAL TESTS:    Imaging Personally Reviewed:  [ ] YES  [ ] NO    HEALTH ISSUES - PROBLEM Dx:          PHYSICAL EXAM:  GENERAL: NAD, well-developed.  HEAD:  Atraumatic, Normocephalic.  EYES: EOMI, PERRLA, conjunctiva and sclera clear.  NECK: Supple, No JVD.  CHEST/LUNG: Clear to auscultation bilaterally; No wheeze.  HEART: Regular rate and rhythm; S1 S2.   ABDOMEN: Soft, Nontender, Nondistended; Bowel sounds present.  EXTREMITIES:  2+ Peripheral Pulses, No clubbing, cyanosis, or edema.  PSYCH: AAOx1  NEUROLOGY: non-focal.  SKIN: No rashes or lesions.

## 2023-09-08 NOTE — PROGRESS NOTE ADULT - SUBJECTIVE AND OBJECTIVE BOX
Hospital Day:  1d    Subjective:    P 73-year-old male with PMH of advanced dementia, A&O x0 at baseline, esophageal cancer status post chemo/radiation/surgery 2017, prostate cancer status post CyberKnife 2020, lung cancer status post surgical resection 2019, now in full remission with no active therapies, brought in by daughter for evaluation of altered mental status and aggression at home over the last 2 weeks. She states he was recently hospitalized 2 months ago symptomatic anemia course complicated by uti and covid  and then sent to rehabilitation center, discharged home 2 weeks ago.  Patient lives upstairs from his daughter and her  alone but has 24-hour aide.  Ambulates with walker.  She states over the last 2 weeks he is becoming more increasingly difficult to handle, over the last 2 days has been aggressive and today was attempting to climb onto his TV stand.  He had to be physically restrained by the aide.  Had 2 episodes yesterday where he was struggling to climb onto things and then fell onto his bilateral knees.  Events were witnessed by aide who reported to the daughter that he had no head trauma or LOC and was ambulatory after the falls.  Daughter reports patient has a dry cough over the last few days. No known fever, vomiting, diarrhea, rash.  Patient is unable to contribute further history or review of systems. Of note she believes that his behavior and mental status became worse  after starting this new medication.   In the ed chest x ray performed pending read     Admitted to medicine for agitated delirium      INTERVAL HPI AND OVERNIGHT EVENTS:  Patient was examined and seen at bedside. This morning he is resting comfortably in bed and reports no issues or overnight events.    Past Medical Hx:   Hypertension, unspecified type    Cancer    Renal calculi    Cancer    Abnormal cholesterol test    Prostate cancer    [place] (hard of hearing)    Alzheimer disease      Past Sx:  Cancer of esophagus    S/P lobectomy of lung    S/P cystoscopy with ureteral stent placement    Inguinal hernia    History of esophagogastroduodenoscopy (EGD)    H/O colonoscopy    Prostate cancer      Allergies:  No Known Allergies    Current Meds:   Standng Meds:  brexpiprazole 0.5 milliGRAM(s) Oral at bedtime  enoxaparin Injectable 40 milliGRAM(s) SubCutaneous every 24 hours  LORazepam     Tablet 0.5 milliGRAM(s) Oral <User Schedule>  LORazepam     Tablet 1 milliGRAM(s) Oral at bedtime  memantine 5 milliGRAM(s) Oral two times a day  pantoprazole    Tablet 40 milliGRAM(s) Oral daily  QUEtiapine 12.5 milliGRAM(s) Oral at bedtime  sucralfate suspension 1 Gram(s) Oral four times a day    PRN Meds:  acetaminophen     Tablet .. 650 milliGRAM(s) Oral every 6 hours PRN Temp greater or equal to 38C (100.4F), Mild Pain (1 - 3)  melatonin 3 milliGRAM(s) Oral at bedtime PRN Insomnia    HOME MEDICATIONS:  acetaminophen 325 mg oral tablet: 2 tab(s) orally every 6 hours As needed Temp greater or equal to 38C (100.4F), Mild Pain (1 - 3)  aluminum hydroxide-magnesium hydroxide 200 mg-200 mg/5 mL oral suspension: 30 milliliter(s) orally every 4 hours As needed Dyspepsia  Ativan 0.5 mg oral tablet: 1 orally 2 times a day at 6 am and 2pm  Ativan 2 mg oral tablet: 1 orally once a day (at bedtime)  melatonin 3 mg oral tablet: 1 tab(s) orally once a day (at bedtime) As needed Insomnia  memantine 5 mg oral tablet: 1 tab(s) orally 2 times a day  sucralfate 1 g/10 mL oral suspension: 10 milliliter(s) orally 4 times a day      Vital Signs:   T(F): 97.3 (09-08-23 @ 05:00), Max: 98.4 (09-07-23 @ 19:10)  HR: 63 (09-08-23 @ 05:00) (60 - 80)  BP: 147/79 (09-08-23 @ 05:00) (123/67 - 147/79)  RR: 18 (09-08-23 @ 05:00) (16 - 18)  SpO2: 99% (09-08-23 @ 07:55) (95% - 100%)        Physical Exam:   GENERAL: NAD  HEENT: NCAT  CHEST/LUNG: CTAB  HEART: Regular rate and rhythm; s1 s2 appreciated, No murmurs, rubs, or gallops  ABDOMEN: Soft, Nontender, Nondistended; Bowel sounds present  EXTREMITIES: No LE edema b/l  SKIN: no rashes, no new lesions  NERVOUS SYSTEM:  Alert & Oriented X3  LINES/CATHETERS:        Labs:                         12.5   6.13  )-----------( 145      ( 08 Sep 2023 08:17 )             41.1       08 Sep 2023 08:17    141    |  103    |  15     ----------------------------<  78     4.2     |  25     |  0.8      Ca    9.4        08 Sep 2023 08:17  Mg     1.8       08 Sep 2023 08:17                      Urinalysis Basic - ( 08 Sep 2023 08:17 )    Color: x / Appearance: x / SG: x / pH: x  Gluc: 78 mg/dL / Ketone: x  / Bili: x / Urobili: x   Blood: x / Protein: x / Nitrite: x   Leuk Esterase: x / RBC: x / WBC x   Sq Epi: x / Non Sq Epi: x / Bacteria: x

## 2023-09-08 NOTE — DISCHARGE NOTE NURSING/CASE MANAGEMENT/SOCIAL WORK - PATIENT PORTAL LINK FT
You can access the FollowMyHealth Patient Portal offered by [facility] by registering at the following website: [email or web address]. By joining [facility]’s FollowMyHealth portal, you will also be able to view your health information using other applications (apps) compatible with our system.

## 2023-09-08 NOTE — PROGRESS NOTE ADULT - ASSESSMENT
73 year old with advanced dementia recently started on a new agent brought in for 3 day hx of agitated delirium with worsening cognition, disorganization and now  falls     A/P:   Advanced dementia with behavioral disturbance complicated by Fall   Patient is quite, demented, oriented to his name, ,   Neuro exam: CN Ii-XII intact, moves all extremities, strength normal.   Head CT was negative.   B12 and folate level normal.   Neurology recommended to hold Rexulti and Ativan. Start on Seroquel 25mg bedtime   Fall precaution.      Code status: DNR/DNI   Likely discharge home in 24 hrs

## 2023-09-08 NOTE — PROGRESS NOTE ADULT - ASSESSMENT
73 year old with advanced dementia recently started on a new agent brought in for 3 day hx of agitated delirium with worsening cognition, disorganization and now  falls         #Advanced dementia   -patient is aggressive to family   -sundowning currently attempting to leave bed during my interview   -per daughter neurologist started ativan po 0.5 morning [id number] evening together with new medication rexulti   -patient now elopes from house by himself   - Per, neuro - pending CTH non-contrast, consider psych eval, consider PT eval  - TSH, B12, vitamin D WNL  - RPR negative  - F/U RVP    #Fall  -had mechanical falls   -likely related ofelia disorganization   -check b12 rpr vit d   -neuro consult (related to new med)?  -pt     #Dispo admit to medicine  #activity iat   #diet - soft and small feeding started  #dvt ppxlovenox  #gi ppx protonix  73 year old with advanced dementia recently started on a new agent brought in for 3 day hx of agitated delirium with worsening cognition, disorganization and now recent history of mechanical falls       #Advanced dementia   -patient is aggressive to family   -sundowning currently attempting to leave bed during my interview   - recent history of mechanical falls  -per daughter, neurologist started ativan po 0.5 morning [id number] evening together with new medication rexulti - stated concern that there is a connection between this medication and current symptoms  - patient now elopes from house by himself   - Patient has shown minimal signs of agitation this morning and was seen resting   - Per, neuro - pending CTH non-contrast, consider psych eval, consider PT eval  - TSH, B12, vitamin D WNL  - RPR negative  - F/U RVP    #Dispo admit to medicine  #activity iat   #diet - soft and small feeding started  #dvt ppxlovenox  #gi ppx protonix

## 2023-09-08 NOTE — PATIENT PROFILE ADULT - FUNCTIONAL SCREEN CURRENT LEVEL: SWALLOWING (IF SCORE 2 OR MORE FOR ANY ITEM, CONSULT REHAB SERVICES), MLM)
normal... obese, nontoxic, afebrile, awake, alert, oriented to person, place, time/situation and in no apparent distress. 0 = swallows foods/liquids without difficulty

## 2023-09-08 NOTE — PATIENT PROFILE ADULT - FUNCTIONAL SCREEN CURRENT LEVEL: COMMUNICATION, MLM
0 = understands/communicates without difficulty High Dose Vitamin A Pregnancy And Lactation Text: High dose vitamin A therapy is contraindicated during pregnancy and breast feeding.

## 2023-09-09 LAB
ALBUMIN SERPL ELPH-MCNC: 3.3 G/DL — LOW (ref 3.5–5.2)
ALP SERPL-CCNC: 126 U/L — HIGH (ref 30–115)
ALT FLD-CCNC: 14 U/L — SIGNIFICANT CHANGE UP (ref 0–41)
ANION GAP SERPL CALC-SCNC: 13 MMOL/L — SIGNIFICANT CHANGE UP (ref 7–14)
APPEARANCE UR: ABNORMAL
AST SERPL-CCNC: 44 U/L — HIGH (ref 0–41)
BACTERIA # UR AUTO: ABNORMAL /HPF
BASOPHILS # BLD AUTO: 0.04 K/UL — SIGNIFICANT CHANGE UP (ref 0–0.2)
BASOPHILS NFR BLD AUTO: 0.7 % — SIGNIFICANT CHANGE UP (ref 0–1)
BILIRUB SERPL-MCNC: 0.6 MG/DL — SIGNIFICANT CHANGE UP (ref 0.2–1.2)
BILIRUB UR-MCNC: NEGATIVE — SIGNIFICANT CHANGE UP
BUN SERPL-MCNC: 17 MG/DL — SIGNIFICANT CHANGE UP (ref 10–20)
CALCIUM SERPL-MCNC: 9 MG/DL — SIGNIFICANT CHANGE UP (ref 8.4–10.4)
CAST: 5 /LPF — HIGH (ref 0–4)
CHLORIDE SERPL-SCNC: 104 MMOL/L — SIGNIFICANT CHANGE UP (ref 98–110)
CO2 SERPL-SCNC: 24 MMOL/L — SIGNIFICANT CHANGE UP (ref 17–32)
COLOR SPEC: YELLOW — SIGNIFICANT CHANGE UP
CREAT SERPL-MCNC: 0.9 MG/DL — SIGNIFICANT CHANGE UP (ref 0.7–1.5)
DIFF PNL FLD: ABNORMAL
EGFR: 90 ML/MIN/1.73M2 — SIGNIFICANT CHANGE UP
EOSINOPHIL # BLD AUTO: 0.22 K/UL — SIGNIFICANT CHANGE UP (ref 0–0.7)
EOSINOPHIL NFR BLD AUTO: 4.1 % — SIGNIFICANT CHANGE UP (ref 0–8)
GLUCOSE BLDC GLUCOMTR-MCNC: 132 MG/DL — HIGH (ref 70–99)
GLUCOSE BLDC GLUCOMTR-MCNC: 79 MG/DL — SIGNIFICANT CHANGE UP (ref 70–99)
GLUCOSE SERPL-MCNC: 83 MG/DL — SIGNIFICANT CHANGE UP (ref 70–99)
GLUCOSE UR QL: NEGATIVE MG/DL — SIGNIFICANT CHANGE UP
HCT VFR BLD CALC: 40.4 % — LOW (ref 42–52)
HGB BLD-MCNC: 12 G/DL — LOW (ref 14–18)
IMM GRANULOCYTES NFR BLD AUTO: 0.4 % — HIGH (ref 0.1–0.3)
KETONES UR-MCNC: 15 MG/DL
LEUKOCYTE ESTERASE UR-ACNC: ABNORMAL
LYMPHOCYTES # BLD AUTO: 0.55 K/UL — LOW (ref 1.2–3.4)
LYMPHOCYTES # BLD AUTO: 10.2 % — LOW (ref 20.5–51.1)
MAGNESIUM SERPL-MCNC: 2.1 MG/DL — SIGNIFICANT CHANGE UP (ref 1.8–2.4)
MCHC RBC-ENTMCNC: 25.3 PG — LOW (ref 27–31)
MCHC RBC-ENTMCNC: 29.7 G/DL — LOW (ref 32–37)
MCV RBC AUTO: 85.1 FL — SIGNIFICANT CHANGE UP (ref 80–94)
MONOCYTES # BLD AUTO: 0.87 K/UL — HIGH (ref 0.1–0.6)
MONOCYTES NFR BLD AUTO: 16.1 % — HIGH (ref 1.7–9.3)
NEUTROPHILS # BLD AUTO: 3.71 K/UL — SIGNIFICANT CHANGE UP (ref 1.4–6.5)
NEUTROPHILS NFR BLD AUTO: 68.5 % — SIGNIFICANT CHANGE UP (ref 42.2–75.2)
NITRITE UR-MCNC: NEGATIVE — SIGNIFICANT CHANGE UP
NRBC # BLD: 0 /100 WBCS — SIGNIFICANT CHANGE UP (ref 0–0)
PH UR: 5.5 — SIGNIFICANT CHANGE UP (ref 5–8)
PLATELET # BLD AUTO: 131 K/UL — SIGNIFICANT CHANGE UP (ref 130–400)
PMV BLD: SIGNIFICANT CHANGE UP (ref 7.4–10.4)
POTASSIUM SERPL-MCNC: 3.9 MMOL/L — SIGNIFICANT CHANGE UP (ref 3.5–5)
POTASSIUM SERPL-SCNC: 3.9 MMOL/L — SIGNIFICANT CHANGE UP (ref 3.5–5)
PROT SERPL-MCNC: 6.5 G/DL — SIGNIFICANT CHANGE UP (ref 6–8)
PROT UR-MCNC: SIGNIFICANT CHANGE UP MG/DL
RBC # BLD: 4.75 M/UL — SIGNIFICANT CHANGE UP (ref 4.7–6.1)
RBC # FLD: SIGNIFICANT CHANGE UP % (ref 11.5–14.5)
RBC CASTS # UR COMP ASSIST: 25 /HPF — HIGH (ref 0–4)
SODIUM SERPL-SCNC: 141 MMOL/L — SIGNIFICANT CHANGE UP (ref 135–146)
SP GR SPEC: 1.02 — SIGNIFICANT CHANGE UP (ref 1–1.03)
SQUAMOUS # UR AUTO: 20 /HPF — HIGH (ref 0–5)
URATE CRY FLD QL MICRO: PRESENT
UROBILINOGEN FLD QL: 1 MG/DL — SIGNIFICANT CHANGE UP (ref 0.2–1)
WBC # BLD: 5.41 K/UL — SIGNIFICANT CHANGE UP (ref 4.8–10.8)
WBC # FLD AUTO: 5.41 K/UL — SIGNIFICANT CHANGE UP (ref 4.8–10.8)
WBC UR QL: 200 /HPF — HIGH (ref 0–5)

## 2023-09-09 PROCEDURE — 99232 SBSQ HOSP IP/OBS MODERATE 35: CPT

## 2023-09-09 RX ORDER — HALOPERIDOL DECANOATE 100 MG/ML
2.5 INJECTION INTRAMUSCULAR ONCE
Refills: 0 | Status: COMPLETED | OUTPATIENT
Start: 2023-09-09 | End: 2023-09-09

## 2023-09-09 RX ADMIN — Medication 1 GRAM(S): at 12:31

## 2023-09-09 RX ADMIN — HALOPERIDOL DECANOATE 2.5 MILLIGRAM(S): 100 INJECTION INTRAMUSCULAR at 02:22

## 2023-09-09 RX ADMIN — ENOXAPARIN SODIUM 40 MILLIGRAM(S): 100 INJECTION SUBCUTANEOUS at 12:30

## 2023-09-09 RX ADMIN — Medication 1 GRAM(S): at 05:34

## 2023-09-09 RX ADMIN — PANTOPRAZOLE SODIUM 40 MILLIGRAM(S): 20 TABLET, DELAYED RELEASE ORAL at 12:31

## 2023-09-09 RX ADMIN — MEMANTINE HYDROCHLORIDE 5 MILLIGRAM(S): 10 TABLET ORAL at 05:35

## 2023-09-09 RX ADMIN — Medication 2 MILLIGRAM(S): at 20:25

## 2023-09-09 RX ADMIN — QUETIAPINE FUMARATE 25 MILLIGRAM(S): 200 TABLET, FILM COATED ORAL at 21:49

## 2023-09-09 NOTE — PROGRESS NOTE ADULT - SUBJECTIVE AND OBJECTIVE BOX
pt seen and examined.     Resident's notes reviewed, My notes supersede resident's notes in case of discrepancy       ROS: no cp, no sob, no n/v, no fever    Vital Signs Last 24 Hrs  T(C): 37.8 (09 Sep 2023 14:08), Max: 37.8 (09 Sep 2023 14:08)  T(F): 100.1 (09 Sep 2023 14:08), Max: 100.1 (09 Sep 2023 14:08)  HR: 78 (09 Sep 2023 14:08) (66 - 78)  BP: 142/70 (09 Sep 2023 14:08) (126/70 - 142/70)  BP(mean): --  RR: 18 (09 Sep 2023 05:11) (18 - 18)  SpO2: 100% (09 Sep 2023 14:08) (98% - 100%)    Parameters below as of 09 Sep 2023 05:11  Patient On (Oxygen Delivery Method): room air        physical exam  constitutional NAD, AAOX3, Respiratory  lungs CTA, CVS heart RRR, GI: abdomen Soft NT, ND, BS+, skin: intact  neuro exam Motor, sensory and CN normal, no deficit     MEDICATIONS  (STANDING):  enoxaparin Injectable 40 milliGRAM(s) SubCutaneous every 24 hours  LORazepam     Tablet 2 milliGRAM(s) Oral <User Schedule>  LORazepam     Tablet 0.5 milliGRAM(s) Oral <User Schedule>  memantine 5 milliGRAM(s) Oral two times a day  pantoprazole    Tablet 40 milliGRAM(s) Oral daily  QUEtiapine 25 milliGRAM(s) Oral at bedtime  sucralfate suspension 1 Gram(s) Oral four times a day    MEDICATIONS  (PRN):  acetaminophen     Tablet .. 650 milliGRAM(s) Oral every 6 hours PRN Temp greater or equal to 38C (100.4F), Mild Pain (1 - 3)  melatonin 3 milliGRAM(s) Oral at bedtime PRN Insomnia                        12.0   5.41  )-----------( 131      ( 09 Sep 2023 06:56 )             40.4     09-09    141  |  104  |  17  ----------------------------<  83  3.9   |  24  |  0.9    Ca    9.0      09 Sep 2023 06:56  Mg     2.1     09-09    TPro  6.5  /  Alb  3.3<L>  /  TBili  0.6  /  DBili  x   /  AST  44<H>  /  ALT  14  /  AlkPhos  126<H>  09-09    Ferritin: 11 ng/mL [30 - 400] (07-13-23 @ 18:36)    Iron Total: 20 ug/dL (07.13.23 @ 18:36)    a/p  # dementia with behavioral problem   cont home dose of ativan ( 0.5 am and afternoon, and 2mg at night)   added serequel   off rexulti ( was recently started but he had more agitation )   neurologist Dr Scherer [phone]    # recent UTI and covid , low grade fever   COVID-19 PCR: Detected (18 Jul 2023 17:34)  not hypoxic now, comfortable , recheck covid , and RVP   SpO2: 100% (09-09-23 @ 14:08) (98% - 100%)  send bc  uc  cxr     # hx of esophageal cancer status post chemo/radiation/surgery 2017, prostate cancer status post CyberKnife 2020, lung cancer status post surgical resection 2019  outpt fu       #Progress Note Handoff  Pending :  uc, ua, bc, cxr, covid , rvp   Family discussion: dw daughter Ric   Disposition: home with private HHA  time spent : 35 min

## 2023-09-10 LAB
ALBUMIN SERPL ELPH-MCNC: 3.5 G/DL — SIGNIFICANT CHANGE UP (ref 3.5–5.2)
ALP SERPL-CCNC: 128 U/L — HIGH (ref 30–115)
ALT FLD-CCNC: 14 U/L — SIGNIFICANT CHANGE UP (ref 0–41)
ANION GAP SERPL CALC-SCNC: 13 MMOL/L — SIGNIFICANT CHANGE UP (ref 7–14)
AST SERPL-CCNC: 37 U/L — SIGNIFICANT CHANGE UP (ref 0–41)
BASOPHILS # BLD AUTO: 0.01 K/UL — SIGNIFICANT CHANGE UP (ref 0–0.2)
BASOPHILS NFR BLD AUTO: 0.1 % — SIGNIFICANT CHANGE UP (ref 0–1)
BILIRUB SERPL-MCNC: 0.8 MG/DL — SIGNIFICANT CHANGE UP (ref 0.2–1.2)
BLD GP AB SCN SERPL QL: SIGNIFICANT CHANGE UP
BUN SERPL-MCNC: 17 MG/DL — SIGNIFICANT CHANGE UP (ref 10–20)
CALCIUM SERPL-MCNC: 8.7 MG/DL — SIGNIFICANT CHANGE UP (ref 8.4–10.5)
CHLORIDE SERPL-SCNC: 102 MMOL/L — SIGNIFICANT CHANGE UP (ref 98–110)
CO2 SERPL-SCNC: 24 MMOL/L — SIGNIFICANT CHANGE UP (ref 17–32)
CREAT SERPL-MCNC: 0.8 MG/DL — SIGNIFICANT CHANGE UP (ref 0.7–1.5)
EGFR: 93 ML/MIN/1.73M2 — SIGNIFICANT CHANGE UP
EOSINOPHIL # BLD AUTO: 0 K/UL — SIGNIFICANT CHANGE UP (ref 0–0.7)
EOSINOPHIL NFR BLD AUTO: 0 % — SIGNIFICANT CHANGE UP (ref 0–8)
GLUCOSE SERPL-MCNC: 121 MG/DL — HIGH (ref 70–99)
HCT VFR BLD CALC: 40.9 % — LOW (ref 42–52)
HGB BLD-MCNC: 12.7 G/DL — LOW (ref 14–18)
IMM GRANULOCYTES NFR BLD AUTO: 0.4 % — HIGH (ref 0.1–0.3)
LYMPHOCYTES # BLD AUTO: 0.42 K/UL — LOW (ref 1.2–3.4)
LYMPHOCYTES # BLD AUTO: 5.5 % — LOW (ref 20.5–51.1)
MAGNESIUM SERPL-MCNC: 2 MG/DL — SIGNIFICANT CHANGE UP (ref 1.8–2.4)
MCHC RBC-ENTMCNC: 25.9 PG — LOW (ref 27–31)
MCHC RBC-ENTMCNC: 31.1 G/DL — LOW (ref 32–37)
MCV RBC AUTO: 83.3 FL — SIGNIFICANT CHANGE UP (ref 80–94)
MONOCYTES # BLD AUTO: 1.27 K/UL — HIGH (ref 0.1–0.6)
MONOCYTES NFR BLD AUTO: 16.6 % — HIGH (ref 1.7–9.3)
NEUTROPHILS # BLD AUTO: 5.91 K/UL — SIGNIFICANT CHANGE UP (ref 1.4–6.5)
NEUTROPHILS NFR BLD AUTO: 77.4 % — HIGH (ref 42.2–75.2)
NRBC # BLD: 0 /100 WBCS — SIGNIFICANT CHANGE UP (ref 0–0)
PLATELET # BLD AUTO: 145 K/UL — SIGNIFICANT CHANGE UP (ref 130–400)
PMV BLD: SIGNIFICANT CHANGE UP (ref 7.4–10.4)
POTASSIUM SERPL-MCNC: 3.6 MMOL/L — SIGNIFICANT CHANGE UP (ref 3.5–5)
POTASSIUM SERPL-SCNC: 3.6 MMOL/L — SIGNIFICANT CHANGE UP (ref 3.5–5)
PROT SERPL-MCNC: 6.7 G/DL — SIGNIFICANT CHANGE UP (ref 6–8)
RAPID RVP RESULT: SIGNIFICANT CHANGE UP
RBC # BLD: 4.91 M/UL — SIGNIFICANT CHANGE UP (ref 4.7–6.1)
RBC # FLD: SIGNIFICANT CHANGE UP % (ref 11.5–14.5)
SARS-COV-2 RNA SPEC QL NAA+PROBE: SIGNIFICANT CHANGE UP
SODIUM SERPL-SCNC: 139 MMOL/L — SIGNIFICANT CHANGE UP (ref 135–146)
WBC # BLD: 7.64 K/UL — SIGNIFICANT CHANGE UP (ref 4.8–10.8)
WBC # FLD AUTO: 7.64 K/UL — SIGNIFICANT CHANGE UP (ref 4.8–10.8)

## 2023-09-10 PROCEDURE — 99232 SBSQ HOSP IP/OBS MODERATE 35: CPT

## 2023-09-10 PROCEDURE — 74018 RADEX ABDOMEN 1 VIEW: CPT | Mod: 26

## 2023-09-10 RX ORDER — PIPERACILLIN AND TAZOBACTAM 4; .5 G/20ML; G/20ML
3.38 INJECTION, POWDER, LYOPHILIZED, FOR SOLUTION INTRAVENOUS ONCE
Refills: 0 | Status: COMPLETED | OUTPATIENT
Start: 2023-09-10 | End: 2023-09-10

## 2023-09-10 RX ORDER — PIPERACILLIN AND TAZOBACTAM 4; .5 G/20ML; G/20ML
3.38 INJECTION, POWDER, LYOPHILIZED, FOR SOLUTION INTRAVENOUS EVERY 8 HOURS
Refills: 0 | Status: COMPLETED | OUTPATIENT
Start: 2023-09-10 | End: 2023-09-13

## 2023-09-10 RX ORDER — ACETAMINOPHEN 500 MG
650 TABLET ORAL EVERY 6 HOURS
Refills: 0 | Status: DISCONTINUED | OUTPATIENT
Start: 2023-09-10 | End: 2023-09-15

## 2023-09-10 RX ADMIN — PIPERACILLIN AND TAZOBACTAM 25 GRAM(S): 4; .5 INJECTION, POWDER, LYOPHILIZED, FOR SOLUTION INTRAVENOUS at 21:54

## 2023-09-10 RX ADMIN — MEMANTINE HYDROCHLORIDE 5 MILLIGRAM(S): 10 TABLET ORAL at 17:20

## 2023-09-10 RX ADMIN — PIPERACILLIN AND TAZOBACTAM 200 GRAM(S): 4; .5 INJECTION, POWDER, LYOPHILIZED, FOR SOLUTION INTRAVENOUS at 04:12

## 2023-09-10 RX ADMIN — Medication 650 MILLIGRAM(S): at 19:39

## 2023-09-10 RX ADMIN — PIPERACILLIN AND TAZOBACTAM 25 GRAM(S): 4; .5 INJECTION, POWDER, LYOPHILIZED, FOR SOLUTION INTRAVENOUS at 08:49

## 2023-09-10 RX ADMIN — QUETIAPINE FUMARATE 25 MILLIGRAM(S): 200 TABLET, FILM COATED ORAL at 21:54

## 2023-09-10 RX ADMIN — Medication 0.5 MILLIGRAM(S): at 13:31

## 2023-09-10 RX ADMIN — Medication 650 MILLIGRAM(S): at 20:09

## 2023-09-10 RX ADMIN — PANTOPRAZOLE SODIUM 40 MILLIGRAM(S): 20 TABLET, DELAYED RELEASE ORAL at 11:47

## 2023-09-10 RX ADMIN — ENOXAPARIN SODIUM 40 MILLIGRAM(S): 100 INJECTION SUBCUTANEOUS at 11:47

## 2023-09-10 RX ADMIN — MEMANTINE HYDROCHLORIDE 5 MILLIGRAM(S): 10 TABLET ORAL at 05:05

## 2023-09-10 RX ADMIN — PIPERACILLIN AND TAZOBACTAM 25 GRAM(S): 4; .5 INJECTION, POWDER, LYOPHILIZED, FOR SOLUTION INTRAVENOUS at 13:30

## 2023-09-10 NOTE — DIETITIAN INITIAL EVALUATION ADULT - NSFNSNUTRCHEWSWALLOWFT_GEN_A_CORE
S/p swallow evaluation (9/7), recs include provide a soft and bite sized diet with thin liquids, which was taken.

## 2023-09-10 NOTE — PROGRESS NOTE ADULT - ASSESSMENT
73 year old with advanced dementia recently started on a new agent brought in for 3 day hx of agitated delirium with worsening cognition, disorganization and now recent history of mechanical falls     #Advanced dementia   -patient was aggressive to family   -sundowning currently attempting to leave bed during my interview   cont home dose of ativan ( 0.5 am and afternoon, and 2mg at night)   added serequel   off rexulti ( was recently started but he had more agitation )   neurologist Dr Scherer [phone]    # recent UTI and covid (18 july)  - not hypoxic now, comfortable , recheck covid , and RVP   F up uc, bc, and RVP  Patient was febrile 101.7 on 9/10-> started on tazocin to cover Enterococcus faecalis found in urine (7/16/23)- back then it was asymptomatic so was not treated  F up ID consult     # hx of esophageal cancer status post chemo/radiation/surgery 2017, prostate cancer status post CyberKnife 2020, lung cancer status post surgical resection 2019  outpt fu

## 2023-09-10 NOTE — PROGRESS NOTE ADULT - SUBJECTIVE AND OBJECTIVE BOX
24H events:    Patient is a 73y old Male who presents with a chief complaint of Fall (09 Sep 2023 14:09)    Primary diagnosis of Altered mental status       Today is hospital day 3d. This morning patient was seen and examined at bedside, resting comfortably in bed.    No acute or major events overnight.    PAST MEDICAL & SURGICAL HISTORY  Hypertension, unspecified type    Cancer  ESOPHAGEAL CANCER 2017 RECEIVED CHEMO AND RADIATION and endoscopic resection partial oesophagus and stomach    Renal calculi  nephrostomy tube -2021    Cancer  left lung 2018, no chemo or rad rx and resection    Abnormal cholesterol test    Prostate cancer    [place] (hard of hearing)    Alzheimer disease    Cancer of esophagus  2017 RESECTION OF LOWER PORTION OF ESOPHAGUS    S/P lobectomy of lung  left ?2018    S/P cystoscopy with ureteral stent placement  LEFT    Inguinal hernia  AS AN INFANT, RIGHT SIDE    History of esophagogastroduodenoscopy (EGD)  2019    H/O colonoscopy  2018    Prostate cancer  cyber knife intervention       SOCIAL HISTORY:  Social History:      ALLERGIES:  No Known Allergies    MEDICATIONS:  STANDING MEDICATIONS  enoxaparin Injectable 40 milliGRAM(s) SubCutaneous every 24 hours  LORazepam     Tablet 2 milliGRAM(s) Oral <User Schedule>  LORazepam     Tablet 0.5 milliGRAM(s) Oral <User Schedule>  memantine 5 milliGRAM(s) Oral two times a day  pantoprazole    Tablet 40 milliGRAM(s) Oral daily  piperacillin/tazobactam IVPB.- 3.375 Gram(s) IV Intermittent once  piperacillin/tazobactam IVPB.. 3.375 Gram(s) IV Intermittent every 8 hours  QUEtiapine 25 milliGRAM(s) Oral at bedtime    PRN MEDICATIONS    VITALS:   T(F): 97.1  HR: 81  BP: 137/77  RR: 18  SpO2: 98%    PHYSICAL EXAM:      LABS:                        12.0   5.41  )-----------( 131      ( 09 Sep 2023 06:56 )             40.4         141  |  104  |  17  ----------------------------<  83  3.9   |  24  |  0.9    Ca    9.0      09 Sep 2023 06:56  Mg     2.1         TPro  6.5  /  Alb  3.3<L>  /  TBili  0.6  /  DBili  x   /  AST  44<H>  /  ALT  14  /  AlkPhos  126<H>        Urinalysis Basic - ( 09 Sep 2023 17:02 )    Color: Yellow / Appearance: Turbid / S.019 / pH: x  Gluc: x / Ketone: 15 mg/dL  / Bili: Negative / Urobili: 1.0 mg/dL   Blood: x / Protein: Trace mg/dL / Nitrite: Negative   Leuk Esterase: Large / RBC: 25 /HPF /  /HPF   Sq Epi: x / Non Sq Epi: 20 /HPF / Bacteria: Moderate /HPF

## 2023-09-10 NOTE — DIETITIAN INITIAL EVALUATION ADULT - COLLABORATION WITH OTHER PROVIDERS
I tried to contact the patient's physician name Dr. Rachel but was unsuccessful.  I tried to contact the patient's physician but was unsuccessful.

## 2023-09-10 NOTE — DIETITIAN INITIAL EVALUATION ADULT - ORAL INTAKE PTA/DIET HISTORY
An interview with the patient could not be completed at this time since the patient is asleep. I spoke to the patient's daughter name Ric via telephone [phone]. Per daughter, the patient received a soft texture diet at home; consumed three meals at >75%; consumed one bottle of strawberry ensure daily. NKFA. Per daughter, the patient appears frail although they consumed >75% of meals at home.

## 2023-09-10 NOTE — DIETITIAN INITIAL EVALUATION ADULT - WEIGHT USED FOR CALCULATIONS
current weight Split-Thickness Skin Graft Text: The defect edges were debeveled with a #15 scalpel blade.  Given the location of the defect, shape of the defect and the proximity to free margins a split thickness skin graft was deemed most appropriate.  Using a sterile surgical marker, the primary defect shape was transferred to the donor site. The split thickness graft was then harvested.  The skin graft was then placed in the primary defect and oriented appropriately.

## 2023-09-10 NOTE — DIETITIAN INITIAL EVALUATION ADULT - ADD RECOMMEND
1.If poor PO intake persist, recommend provide an appetite stimulant   2.Recommed order a swallow re-evaluation

## 2023-09-10 NOTE — PROGRESS NOTE ADULT - ATTENDING COMMENTS
# dementia with behavioral problem   cont home dose of ativan ( 0.5 am and afternoon, and 2mg at night)   added serequel   off rexulti ( was recently started but he had more agitation )   neurologist Dr Scherer [phone]    # recent UTI and covid , low grade fever , pt has a hx of kidney stone  check kub and kidney and bladder us     Urine Microscopic-Add On (NC) (09.09.23 @ 17:02)    Uric Acid Crystals: Present    Red Blood Cell - Urine: 25 /HPF    White Blood Cell - Urine: 200 /HPF    Bacteria: Moderate /HPF    Epithelial Cells: 20 /HPF    Cast: 5 /LPF    send bc, so far ng   uc, so far neg   cxr   < from: Xray Chest 1 View- PORTABLE-Urgent (09.07.23 @ 04:19) >  Unchanged chronic left lung findings. No definite acute cardiopulmonary   disease.  < end of copied text >    # hx of esophageal cancer status post chemo/radiation/surgery 2017, prostate cancer status post CyberKnife 2020, lung cancer status post surgical resection 2019  outpt fu       #Progress Note Handoff  Pending :  uc, ua, bc, cxr, covid , rvp   Family discussion: dw daughter Ric on the phone   Disposition: home with private HHA  time spent : 35 min

## 2023-09-10 NOTE — DIETITIAN INITIAL EVALUATION ADULT - OTHER CALCULATIONS
Estimated Calorie Needs: [id number] x AF 1.3-1.7=[id number] -Due to possible frail appearance  Estimated Protein Needs: 79-97grams/day (1.3-1.6grams/kg of admit weight) -Due to possible frail appearance  Estimated Fluid Needs: 1701-2101mL/day (1mL/kcal)

## 2023-09-10 NOTE — DIETITIAN INITIAL EVALUATION ADULT - NAME AND PHONE
Intervention: 1.Meals and Snacks 2.Medical Food Supplement 3.Nutrition Related Medication 4.Coordination of Care   Monitor/Evaluate: Diet order, energy intake, nutrition focused physical findings, anemia profile

## 2023-09-10 NOTE — DIETITIAN INITIAL EVALUATION ADULT - NSICDXPASTMEDICALHX_GEN_ALL_CORE_FT
PAST MEDICAL HISTORY:  Abnormal cholesterol test     Alzheimer disease     Cancer ESOPHAGEAL CANCER 2017 RECEIVED CHEMO AND RADIATION and endoscopic resection partial oesophagus and stomach    Cancer left lung 2018, no chemo or rad rx and resection    [place] (hard of hearing)     Hypertension, unspecified type     Prostate cancer     Renal calculi nephrostomy tube -6/2021

## 2023-09-10 NOTE — DIETITIAN INITIAL EVALUATION ADULT - PERTINENT MEDS FT
MEDICATIONS  (STANDING):  enoxaparin Injectable 40 milliGRAM(s) SubCutaneous every 24 hours  LORazepam     Tablet 2 milliGRAM(s) Oral <User Schedule>  LORazepam     Tablet 0.5 milliGRAM(s) Oral <User Schedule>  memantine 5 milliGRAM(s) Oral two times a day  pantoprazole    Tablet 40 milliGRAM(s) Oral daily  piperacillin/tazobactam IVPB.. 3.375 Gram(s) IV Intermittent every 8 hours  QUEtiapine 25 milliGRAM(s) Oral at bedtime    MEDICATIONS  (PRN):

## 2023-09-10 NOTE — DIETITIAN INITIAL EVALUATION ADULT - NSFNSGIIOFT_GEN_A_CORE
Dx: 72y/o male with h/o UTI, COVID-19, esophageal cancer, status post chemo/radiation/surgery 2017, prostate cancer status post CyberKnife 2020, lung cancer status post surgical resection 2019 and advanced dementia recently started on a new agent, brought in for 3 day hx of agitated delirium with worsening cognition, disorganization and now recent history of mechanical falls.

## 2023-09-10 NOTE — DIETITIAN INITIAL EVALUATION ADULT - PERTINENT LABORATORY DATA
09-10    139  |  102  |  17  ----------------------------<  121<H>  3.6   |  24  |  0.8    Ca    8.7      10 Sep 2023 07:14  Mg     2.0     09-10    TPro  6.7  /  Alb  3.5  /  TBili  0.8  /  DBili  x   /  AST  37  /  ALT  14  /  AlkPhos  128<H>  09-10  A1C with Estimated Average Glucose Result: 5.7 % (07-14-23 @ 06:20)

## 2023-09-11 LAB
ALBUMIN SERPL ELPH-MCNC: 3.4 G/DL — LOW (ref 3.5–5.2)
ALP SERPL-CCNC: 128 U/L — HIGH (ref 30–115)
ALT FLD-CCNC: 15 U/L — SIGNIFICANT CHANGE UP (ref 0–41)
ANION GAP SERPL CALC-SCNC: 13 MMOL/L — SIGNIFICANT CHANGE UP (ref 7–14)
AST SERPL-CCNC: 34 U/L — SIGNIFICANT CHANGE UP (ref 0–41)
BASOPHILS # BLD AUTO: 0.02 K/UL — SIGNIFICANT CHANGE UP (ref 0–0.2)
BASOPHILS NFR BLD AUTO: 0.3 % — SIGNIFICANT CHANGE UP (ref 0–1)
BILIRUB DIRECT SERPL-MCNC: 0.2 MG/DL — SIGNIFICANT CHANGE UP (ref 0–0.3)
BILIRUB INDIRECT FLD-MCNC: 0.6 MG/DL — SIGNIFICANT CHANGE UP (ref 0.2–1.2)
BILIRUB SERPL-MCNC: 0.8 MG/DL — SIGNIFICANT CHANGE UP (ref 0.2–1.2)
BILIRUB SERPL-MCNC: 1.5 MG/DL — HIGH (ref 0.2–1.2)
BUN SERPL-MCNC: 22 MG/DL — HIGH (ref 10–20)
CALCIUM SERPL-MCNC: 9.1 MG/DL — SIGNIFICANT CHANGE UP (ref 8.4–10.5)
CHLORIDE SERPL-SCNC: 104 MMOL/L — SIGNIFICANT CHANGE UP (ref 98–110)
CO2 SERPL-SCNC: 25 MMOL/L — SIGNIFICANT CHANGE UP (ref 17–32)
CREAT SERPL-MCNC: 1.1 MG/DL — SIGNIFICANT CHANGE UP (ref 0.7–1.5)
EGFR: 71 ML/MIN/1.73M2 — SIGNIFICANT CHANGE UP
EOSINOPHIL # BLD AUTO: 0.06 K/UL — SIGNIFICANT CHANGE UP (ref 0–0.7)
EOSINOPHIL NFR BLD AUTO: 0.9 % — SIGNIFICANT CHANGE UP (ref 0–8)
GGT SERPL-CCNC: 30 U/L — SIGNIFICANT CHANGE UP (ref 1–40)
GLUCOSE SERPL-MCNC: 104 MG/DL — HIGH (ref 70–99)
HCT VFR BLD CALC: 40.9 % — LOW (ref 42–52)
HGB BLD-MCNC: 12.6 G/DL — LOW (ref 14–18)
IMM GRANULOCYTES NFR BLD AUTO: 0.5 % — HIGH (ref 0.1–0.3)
LYMPHOCYTES # BLD AUTO: 0.71 K/UL — LOW (ref 1.2–3.4)
LYMPHOCYTES # BLD AUTO: 10.8 % — LOW (ref 20.5–51.1)
MAGNESIUM SERPL-MCNC: 2.2 MG/DL — SIGNIFICANT CHANGE UP (ref 1.8–2.4)
MCHC RBC-ENTMCNC: 25.7 PG — LOW (ref 27–31)
MCHC RBC-ENTMCNC: 30.8 G/DL — LOW (ref 32–37)
MCV RBC AUTO: 83.5 FL — SIGNIFICANT CHANGE UP (ref 80–94)
MONOCYTES # BLD AUTO: 1.12 K/UL — HIGH (ref 0.1–0.6)
MONOCYTES NFR BLD AUTO: 17 % — HIGH (ref 1.7–9.3)
NEUTROPHILS # BLD AUTO: 4.64 K/UL — SIGNIFICANT CHANGE UP (ref 1.4–6.5)
NEUTROPHILS NFR BLD AUTO: 70.5 % — SIGNIFICANT CHANGE UP (ref 42.2–75.2)
NRBC # BLD: 0 /100 WBCS — SIGNIFICANT CHANGE UP (ref 0–0)
PLATELET # BLD AUTO: 172 K/UL — SIGNIFICANT CHANGE UP (ref 130–400)
PMV BLD: 10.8 FL — HIGH (ref 7.4–10.4)
POTASSIUM SERPL-MCNC: 3.8 MMOL/L — SIGNIFICANT CHANGE UP (ref 3.5–5)
POTASSIUM SERPL-SCNC: 3.8 MMOL/L — SIGNIFICANT CHANGE UP (ref 3.5–5)
PROT SERPL-MCNC: 6.9 G/DL — SIGNIFICANT CHANGE UP (ref 6–8)
RBC # BLD: 4.9 M/UL — SIGNIFICANT CHANGE UP (ref 4.7–6.1)
RBC # FLD: SIGNIFICANT CHANGE UP % (ref 11.5–14.5)
SODIUM SERPL-SCNC: 142 MMOL/L — SIGNIFICANT CHANGE UP (ref 135–146)
WBC # BLD: 6.58 K/UL — SIGNIFICANT CHANGE UP (ref 4.8–10.8)
WBC # FLD AUTO: 6.58 K/UL — SIGNIFICANT CHANGE UP (ref 4.8–10.8)

## 2023-09-11 PROCEDURE — 76705 ECHO EXAM OF ABDOMEN: CPT | Mod: 26

## 2023-09-11 PROCEDURE — 99232 SBSQ HOSP IP/OBS MODERATE 35: CPT

## 2023-09-11 PROCEDURE — 76770 US EXAM ABDO BACK WALL COMP: CPT | Mod: 26

## 2023-09-11 RX ORDER — SODIUM CHLORIDE 9 MG/ML
1000 INJECTION, SOLUTION INTRAVENOUS
Refills: 0 | Status: DISCONTINUED | OUTPATIENT
Start: 2023-09-11 | End: 2023-09-12

## 2023-09-11 RX ADMIN — QUETIAPINE FUMARATE 25 MILLIGRAM(S): 200 TABLET, FILM COATED ORAL at 22:06

## 2023-09-11 RX ADMIN — PANTOPRAZOLE SODIUM 40 MILLIGRAM(S): 20 TABLET, DELAYED RELEASE ORAL at 11:35

## 2023-09-11 RX ADMIN — MEMANTINE HYDROCHLORIDE 5 MILLIGRAM(S): 10 TABLET ORAL at 06:19

## 2023-09-11 RX ADMIN — PIPERACILLIN AND TAZOBACTAM 25 GRAM(S): 4; .5 INJECTION, POWDER, LYOPHILIZED, FOR SOLUTION INTRAVENOUS at 22:08

## 2023-09-11 RX ADMIN — MEMANTINE HYDROCHLORIDE 5 MILLIGRAM(S): 10 TABLET ORAL at 17:29

## 2023-09-11 RX ADMIN — Medication 0.5 MILLIGRAM(S): at 14:19

## 2023-09-11 RX ADMIN — Medication 2 MILLIGRAM(S): at 21:02

## 2023-09-11 RX ADMIN — PIPERACILLIN AND TAZOBACTAM 25 GRAM(S): 4; .5 INJECTION, POWDER, LYOPHILIZED, FOR SOLUTION INTRAVENOUS at 14:19

## 2023-09-11 RX ADMIN — Medication 0.5 MILLIGRAM(S): at 08:45

## 2023-09-11 RX ADMIN — PIPERACILLIN AND TAZOBACTAM 25 GRAM(S): 4; .5 INJECTION, POWDER, LYOPHILIZED, FOR SOLUTION INTRAVENOUS at 06:19

## 2023-09-11 RX ADMIN — ENOXAPARIN SODIUM 40 MILLIGRAM(S): 100 INJECTION SUBCUTANEOUS at 11:35

## 2023-09-11 RX ADMIN — SODIUM CHLORIDE 60 MILLILITER(S): 9 INJECTION, SOLUTION INTRAVENOUS at 10:21

## 2023-09-11 NOTE — PHYSICAL THERAPY INITIAL EVALUATION ADULT - IMPAIRMENTS FOUND, PT EVAL
decreased cognitiion/gait, locomotion, and balance/gross motor/muscle strength/poor safety awareness

## 2023-09-11 NOTE — PHYSICAL THERAPY INITIAL EVALUATION ADULT - GENERAL OBSERVATIONS, REHAB EVAL
10:22-10:52. Pt encountered semifowler in bed in NAD, + IV completed and locked by Deuce MATTA, + B/L wrist restraints. Pt alert, confused no complaint,s did not oppose PT and OOB activity.

## 2023-09-11 NOTE — PHYSICAL THERAPY INITIAL EVALUATION ADULT - GAIT DEVIATIONS NOTED, PT EVAL
very narrow SUSANA almost placing one foot on top of other,/decreased allan/decreased step length/decreased stride length

## 2023-09-11 NOTE — CONSULT NOTE ADULT - ASSESSMENT
Neuropsychology Consult      Neuropsychology service was consulted to assess cognitive status of 73-year-old male patient given altered mental status and episodes of agitation noted in patient’s chart.  Per chart review, patient experienced a mechanical fall and presented to the hospital on 2023, and was admitted to Medicine for agitated delirium.  Chart also notes worsening cognitive status and increased agitation/aggression toward family members over the past 2-week period.     Review of chart indicates among other notable conditions, history of severe dementia of probable Alzheimer’s disease etiology (w/ aggression, sundowning, episodes of eloping from home), and several cancer diagnoses in remission including esophageal (s/p chemo/radiation/surgery ), prostate (s/p cyberknife ), and lung (s/p resection ).  Chart notes among other medications, use of memantine 5mg BID.  Several medications are noted in chart related to present hospitalization including ativan, seroquel, haldol, and rexulti (d/c’ed).      Patient was interviewed regarding current hospitalization but was a poor historian.  He appeared calm and was quiet and in no apparent distress.  When asked why he was at the hospital, the patient stated, “I was trying to get a job.”  He was noted to be confused and described himself as 43-years-old and being in [place] at present.  He reported his mood as “accurate...calm” and affect was congruent. The patient was noted to speak softly and with speech that was generally non-sequitous and/or tangential.  At times, he seemed aware of soft restraints, but did not attempt to remove them or appear distressed by them.  At one point he asked if they could be removed, but was easily redirected and did not become agitated.  Patient denied experiencing any pain on direct query.  His processing speed was notably slowed.  He appeared to endorse experiencing visual hallucinations, but was unable to provide detail regarding his experience and it was unclear if zoie hallucinations were experienced.  He described some level of paranoid ideation and referred to hospital staff as “sneaky” and “trying to hide,” but he did not elaborate on these statements when asked to do so.     Patient was unable to provide information regarding current hospitalization, or answer questions regarding his past medical history.  The patient adamantly and convincingly denied any suicidal ideation, plan, intent, act, gesture or NSSI.  C-SSRS screen was negative.     Attempts were made to contact the patient’s daughter, Ric, by phone, with explicit verbal consent of the patient, to collect information related to relevant social, medical, and psychiatric history, but attempts were unsuccessful up to the time of this writing.     After initial interview with the patient, the undersigned conducted a neuropsychological evaluation of the patient at bedside using standardized instruments.  Test administered included the Mini-Mental State Exam (MMSE) and the Clock Drawing Test (CDT).     Results:   MMSE (Total score: 2/30):      Orientation (0/10):  The patient was oriented to self (correctly producing his name and ).  However, he stated that he was 42yo.  He was disoriented to time (0/5), although he did identify the correct season, year, and day of the week when provided with multiple choice options.  He was disoriented to place (0/5), but was able to recognize the correct borough when given multiple choice options.        Registration (0/3): The patient was unable to register a list of 3 common objects.  When given multiple prompts across multiple trials, the patient did not repeat any of the target words.  After 5 registration trials, the patient was noted to state “[place]” as opposed to any of the target words (apple, scot, table).      Working memory (0/5):  The patient was unable to complete serial subtractions.  He spelled the word “WORLD” forward correctly when asked to do so, but was unable to spell “WORLD” backwards.      Recall (0/3): Patient was unable to spontaneously recall any of the 3 target words from registration trials.  When given semantic cues, he was able to recall 1/3 words.  He was able to recognize the remaining 2/2 target words when given multiple choice options.      Naming (1 / 2): Patient correctly named a wristwatch to confrontation, but when shown a pen, he stated that it was a “pencil.”      Repetition (0/1):  Patient was unable to repeat a short, common phrase after multiple trials.      Receptive Language (3-Step command: 1/3; Readin/1): Patient was unable to follow a 3-step command.  He accurately completed the first step of the 3 steps, but no additional steps.  He completed additional 1-step commands administered at bedside, but he was unable to complete other 2- and 3-step commands.  The patient was given a paper with the instructions “CLOSE YOUR EYES” printed on it.  He accurately read the command aloud, but he did not close his eyes, despite being instructed to follow the printed instruction.      Written Expression (0/1): Patient was instructed to write a sentence on a piece of paper.  However, he was noted to make several small marks on the paper that did not resemble letters.  When asked what he printed, he stated, “I brought a...” but did not finish the sentence.  When asked to repeat what he said and finish the sentence, he remained silent.      Visuoconstruction/Constructional Praxis (0/1): Patient was unable to reproduce a drawing of intersecting polygons.  Although the image included two overlapping pentagons, the patient stated “triangle” aloud, and kristina 1 horizontal line on the page.  When asked again to reproduce the entire image of the intersecting polygons, the patient stated “triangle” a second time and did not make any additional drawings.     Clock Drawing Test (Total Score: 1/10)      Contour (1/ 2) for incompleteness and some spatial distortion; numbers (0/4) and hands (0/4) for not being present in the     drawing despite instruction.     Summary:  Patient was observed to be rather confused consistent with his historical advanced dementia diagnosis.  He was noted to be a poor historian who was generally confused and disoriented.  He was observed to appear calm, and stated his mood as such, and appeared in no apparent distress.  His confusion and testing results are consistent with a diagnosis of Major Neurocognitive Disorder given deficits across multiple cognitive domains and functional impairment as a result of his cognitive status.  He should be followed by his regular neurologist to track progression and for medication management.  Current medications used to reduce agitation/aggression may be contributory to current cognitive profile, but not to the extent of deficits observed on formal testing.  These include diffuse deficits that are both temporoparietal (language/naming/semantic access, encoding/storage/retrieval, visuoconstruction), and to a lesser extent, frontal/subcortical (attentional capacity and processing speed), consistent with probable Alzheimer’s disease.  Attempts were made to collect additional from the patient’s daughter with the patient’s explicit consent, but were unsuccessful by the time of this writing.  Given patient’s Major Neurocognitive Disorder, he should be supervised and receive assistance in all activities of daily living/instrumental activities of daily living, and should not be left alone.  He will benefit from redirection, assistance from 24-hr aide noted to be present in the patient’s home, and reduced environmental distractions.  Patient should be encouraged to consider continued use of medications with input from his primary care physician to reduce aggression/agitation and minimize the likelihood of untoward side effects.     Neuropsychology may be reconsulted as needed for evaluation of the patient’s emotional and neurocognitive status.

## 2023-09-11 NOTE — CONSULT NOTE ADULT - ATTENDING COMMENTS
PT w/ advanced dementia with behavioral disturbance followed by outside neurologist on combination of Ativan/Rexulti now with worsening behavioral changes.  Currently pleasantly demented but has bouts of agitation suspect secondary to ativan and possibly Rexulti w/ dopaminergic effects.  Recommendations as above.  Call back as needed.
73-year-old male with PMH of advanced dementia, A&O x0 at baseline, esophageal cancer status post chemo/radiation/surgery 2017, prostate cancer status post CyberKnife 2020, lung cancer status post surgical resection 2019, now in full remission with no active therapies, brought in by daughter for evaluation of altered mental status and aggression at home over the last 2 weeks.     Impressions:   #Fever - rule out UTI   #Metabolic Encephalopathy  #Dementia  #Esophageal Cancer s/p chemo/radiation/surgery  #Prostate cancer s/p cyberknife  #Lung cancer s/p resection 2019     Recommendations:  - No other clear alternative source for fever  - continue zosyn 3.375 mg q 8 hours   - follow-up urine Cx   - trend fever curve     Please call or message on [employer] Teams if with any questions.  Spectra [phone]

## 2023-09-11 NOTE — PHYSICAL THERAPY INITIAL EVALUATION ADULT - ADDITIONAL COMMENTS
Pt is a poor historian, has hx of Alzheimers dementia. As per chart reviews pt lives alone with 24 hour HHA, Daughter and son-in-law live upstairs. As per chart review pt was in rehabilitation facility and amb with RW.

## 2023-09-11 NOTE — PHYSICAL THERAPY INITIAL EVALUATION ADULT - PERTINENT HX OF CURRENT PROBLEM, REHAB EVAL
73-year-old male with PMH of advanced dementia, A&O x0 at baseline, esophageal cancer status post chemo/radiation/surgery 2017, prostate cancer status post CyberKnife 2020, lung cancer status post surgical resection 2019, now in full remission with no active therapies, brought in by daughter for evaluation of altered mental status and aggression at home over the last 2 weeks. She states he was recently hospitalized 2 months ago symptomatic anemia course complicated by uti and covid  and then sent to rehabilitation center, discharged home 2 weeks ago.  Patient lives upstairs from his daughter and her  alone but has 24-hour aide.  Ambulates with walker.  She states over the last 2 weeks he is becoming more increasingly difficult to handle, over the last 2 days has been aggressive and today was attempting to climb onto his TV stand.  He had to be physically restrained by the aide.  Had 2 episodes yesterday where he was struggling to climb onto things and then fell onto his bilateral knees.  Events were witnessed by aide who reported to the daughter that he had no head trauma or LOC and was ambulatory after the falls.  Daughter reports patient has a dry cough over the last few days. No known fever, vomiting, diarrhea, rash.  Patient is unable to contribute further history or review of systems. Of note she believes that his behavior and mental status became worse  after starting this new medication.

## 2023-09-11 NOTE — PROGRESS NOTE ADULT - SUBJECTIVE AND OBJECTIVE BOX
24H events:    Patient is a 73y old Male who presents with a chief complaint of Fall (11 Sep 2023 14:07)    Primary diagnosis of Altered mental status       Today is hospital day 4d. This morning patient was seen and examined at bedside, resting comfortably in bed.    No acute or major events overnight.    PAST MEDICAL & SURGICAL HISTORY  Hypertension, unspecified type    Cancer  ESOPHAGEAL CANCER 2017 RECEIVED CHEMO AND RADIATION and endoscopic resection partial oesophagus and stomach    Renal calculi  nephrostomy tube -6/2021    Cancer  left lung 2018, no chemo or rad rx and resection    Abnormal cholesterol test    Prostate cancer    [place] (hard of hearing)    Alzheimer disease    Cancer of esophagus  2017 RESECTION OF LOWER PORTION OF ESOPHAGUS    S/P lobectomy of lung  left ?JUNE 2018    S/P cystoscopy with ureteral stent placement  LEFT    Inguinal hernia  AS AN INFANT, RIGHT SIDE    History of esophagogastroduodenoscopy (EGD)  2019    H/O colonoscopy  2018    Prostate cancer  cyber knife intervention 2021      SOCIAL HISTORY:  Social History:      ALLERGIES:  No Known Allergies    MEDICATIONS:  STANDING MEDICATIONS  enoxaparin Injectable 40 milliGRAM(s) SubCutaneous every 24 hours  lactated ringers. 1000 milliLiter(s) IV Continuous <Continuous>  LORazepam     Tablet 2 milliGRAM(s) Oral <User Schedule>  LORazepam     Tablet 0.5 milliGRAM(s) Oral <User Schedule>  memantine 5 milliGRAM(s) Oral two times a day  pantoprazole    Tablet 40 milliGRAM(s) Oral daily  piperacillin/tazobactam IVPB.. 3.375 Gram(s) IV Intermittent every 8 hours  QUEtiapine 25 milliGRAM(s) Oral at bedtime    PRN MEDICATIONS  acetaminophen     Tablet .. 650 milliGRAM(s) Oral every 6 hours PRN    VITALS:   T(F): 96.1  HR: 75  BP: 115/65  RR: 18  SpO2: 99%    PHYSICAL EXAM:  GENERAL: NAD, well-groomed, well-developed  NERVOUS SYSTEM:  Alert, cooperative but only oriented to person, non focal   CHEST/LUNG: Clear to auscultation bilaterally; No rales, rhonchi, wheezing, or rubs  HEART: Regular rate and rhythm; No murmurs, rubs, or gallops  ABDOMEN: Soft, Nontender, Nondistended; Bowel sounds present  EXTREMITIES:  2+ Peripheral Pulses, No clubbing, cyanosis, or edema      LABS:                        12.6   6.58  )-----------( 172      ( 11 Sep 2023 06:35 )             40.9     09-11    142  |  104  |  22<H>  ----------------------------<  104<H>  3.8   |  25  |  1.1    Ca    9.1      11 Sep 2023 06:35  Mg     2.2     09-11    TPro  x   /  Alb  x   /  TBili  0.8  /  DBili  0.2  /  AST  x   /  ALT  x   /  AlkPhos  x   09-11      Urinalysis Basic - ( 11 Sep 2023 06:35 )    Color: x / Appearance: x / SG: x / pH: x  Gluc: 104 mg/dL / Ketone: x  / Bili: x / Urobili: x   Blood: x / Protein: x / Nitrite: x   Leuk Esterase: x / RBC: x / WBC x   Sq Epi: x / Non Sq Epi: x / Bacteria: x

## 2023-09-11 NOTE — PROGRESS NOTE ADULT - SUBJECTIVE AND OBJECTIVE BOX
pt seen and examined.     Resident's notes reviewed, My notes supersede resident's notes in case of discrepancy       ROS: no cp, no sob, no n/v, no fever    Vital Signs Last 24 Hrs  T(C): 35.6 (11 Sep 2023 13:44), Max: 37.9 (10 Sep 2023 19:30)  T(F): 96.1 (11 Sep 2023 13:44), Max: 100.3 (10 Sep 2023 19:30)  HR: 75 (11 Sep 2023 13:44) (75 - 98)  BP: 115/65 (11 Sep 2023 13:44) (111/63 - 126/83)  BP(mean): --  RR: 18 (11 Sep 2023 13:44) (18 - 18)  SpO2: 99% (11 Sep 2023 13:44) (97% - 99%)    Parameters below as of 11 Sep 2023 04:48  Patient On (Oxygen Delivery Method): room air        physical exam  constitutional NAD, AAOX3, Respiratory  lungs CTA, CVS heart RRR, GI: abdomen Soft NT, ND, BS+, skin: intact  neuro exam Motor, sensory and CN normal, no deficit     MEDICATIONS  (STANDING):  enoxaparin Injectable 40 milliGRAM(s) SubCutaneous every 24 hours  lactated ringers. 1000 milliLiter(s) (60 mL/Hr) IV Continuous <Continuous>  LORazepam     Tablet 2 milliGRAM(s) Oral <User Schedule>  LORazepam     Tablet 0.5 milliGRAM(s) Oral <User Schedule>  memantine 5 milliGRAM(s) Oral two times a day  pantoprazole    Tablet 40 milliGRAM(s) Oral daily  piperacillin/tazobactam IVPB.. 3.375 Gram(s) IV Intermittent every 8 hours  QUEtiapine 25 milliGRAM(s) Oral at bedtime    MEDICATIONS  (PRN):  acetaminophen     Tablet .. 650 milliGRAM(s) Oral every 6 hours PRN Temp greater or equal to 38C (100.4F), Mild Pain (1 - 3), Moderate Pain (4 - 6)                        12.6   6.58  )-----------( 172      ( 11 Sep 2023 06:35 )             40.9     09-11    142  |  104  |  22<H>  ----------------------------<  104<H>  3.8   |  25  |  1.1    Ca    9.1      11 Sep 2023 06:35  Mg     2.2     09-11    TPro  x   /  Alb  x   /  TBili  0.8  /  DBili  0.2  /  AST  x   /  ALT  x   /  AlkPhos  x   09-11    Ferritin: 11 ng/mL [30 - 400] (07-13-23 @ 18:36)    a/p      # dementia with behavioral problem   cont home dose of ativan ( 0.5 am and afternoon, and 2mg at night)   added serequel   off rexulti ( was recently started but he had more agitation )   neurologist Dr Scherer [phone]    # recent UTI and covid , low grade fever , pt has a hx of kidney stone  check kub and kidney and bladder us     Urine Microscopic-Add On (NC) (09.09.23 @ 17:02)    Uric Acid Crystals: Present    Red Blood Cell - Urine: 25 /HPF    White Blood Cell - Urine: 200 /HPF    Bacteria: Moderate /HPF    Epithelial Cells: 20 /HPF    Cast: 5 /LPF    Culture - Urine (07.16.23 @ 08:25)    -  Ampicillin: S <=2 Predicts results to ampicillin/sulbactam, amoxacillin-clavulanate and  piperacillin-tazobactam.   -  Ciprofloxacin: S <=1   -  Levofloxacin: S 1   -  Nitrofurantoin: S <=32 Should not be used to treat pyelonephritis.   -  Tetracycline: R >8   -  Vancomycin: S 4   Specimen Source: Clean Catch Clean Catch (Midstream)   Culture Results:   >100,000 CFU/ml Enterococcus faecalis   Organism Identification: Enterococcus faecalis   Organism: Enterococcus faecalis   Method Type: JOSH    send bc, so far ng   uc, so far neg   cxr   < from: Xray Chest 1 View- PORTABLE-Urgent (09.07.23 @ 04:19) >  Unchanged chronic left lung findings. No definite acute cardiopulmonary   disease.  < end of copied text >    # hx of esophageal cancer status post chemo/radiation/surgery 2017, prostate cancer status post CyberKnife 2020, lung cancer status post surgical resection 2019  outpt fu     #Progress Note Handoff  Pending :  uc, ua, bc, cxr, covid , rvp   Family discussion: dw daughter Ric on the phone   Disposition: home with private HHA  time spent : 35 min

## 2023-09-11 NOTE — CONSULT NOTE ADULT - TIME BILLING
Patient seen at bedside
I have personally seen and examined this patient.    I have reviewed all pertinent clinical information and reviewed all relevant imaging and diagnostic studies personally.   I counseled the patient about diagnostic testing and treatment plan. All questions were answered.   I discussed recommendations with the primary team.

## 2023-09-11 NOTE — PHYSICAL THERAPY INITIAL EVALUATION ADULT - PLANNED THERAPY INTERVENTIONS, PT EVAL
Provider pre-printed instructions given balance training/bed mobility training/gait training/strengthening/transfer training

## 2023-09-11 NOTE — PHYSICAL THERAPY INITIAL EVALUATION ADULT - MANUAL MUSCLE TESTING RESULTS, REHAB EVAL
B UE's at least 3/5. B LE's at least 3/5.  (was not able to assess further 2* to pt not following commands necessary for muscle strength assessment).

## 2023-09-11 NOTE — PROGRESS NOTE ADULT - ASSESSMENT
73 year old with advanced dementia recently started on a new agent brought in for 3 day hx of agitated delirium with worsening cognition, disorganization and now recent history of mechanical falls     #Advanced dementia   -patient was aggressive to family -> calm and cooperative on my exam today  - cont home dose of ativan ( 0.5 am and afternoon, and 2mg at night)   - serequol was added on presentation,   - off rexulti ( was recently started but he had more agitation)- neurologist Dr Scherer [phone]    # recent UTI and covid (18 july)  - not hypoxic now, comfortable , recheck covid , and RVP   - UA positive -> F up.  - RVP: negative  - Patient was febrile 101.7 on 9/10-> tazocin Day2 to cover Enterococcus faecalis found in urine (7/16/23)- back then it was asymptomatic so was not treated  F up ID consult     # hx of esophageal cancer status post chemo/radiation/surgery 2017, prostate cancer status post CyberKnife 2020, lung cancer status post surgical resection 2019  outpt fu 73 year old with advanced dementia recently started on a new agent brought in for 3 day hx of agitated delirium with worsening cognition, disorganization and now recent history of mechanical falls     #Advanced dementia   -patient was aggressive to family -> calm and cooperative on my exam today  - cont home dose of ativan ( 0.5 am and afternoon, and 2mg at night)   - serequol was added on presentation,   - off rexulti ( was recently started but he had more agitation)- neurologist Dr Scherer [phone]  - Neuropsych eval:  Given patient’s Major Neurocognitive Disorder, he should be supervised and receive assistance in all activities of daily living/instrumental activities of daily living, and should not be left alone.  He will benefit from redirection, assistance from 24-hr aide noted to be present in the patient’s home, and reduced environmental distractions.    # recent UTI and covid (18 july)  - not hypoxic now, comfortable , recheck covid , and RVP   - UA positive -> F up cx.  - RVP: negative  - Patient was febrile 101.7 on 9/10-> tazocin Day2 to cover Enterococcus faecalis found in urine (7/16/23)- back then it was asymptomatic so was not treated  - ID team on board.     # hx of esophageal cancer status post chemo/radiation/surgery 2017, prostate cancer status post CyberKnife 2020, lung cancer status post surgical resection 2019  outpt fu    # Elevated Alk phos  - Bilirubin and GGT normal   - Us gallblader    # MARITZA  - Increase in creatinine from 0.8 to 1.1  - Hydration started for 10 hours

## 2023-09-11 NOTE — CONSULT NOTE ADULT - CONSULT REASON
Fall, AMS
Fever + positive UA (Hx of asymptomatic Enterococcus faecalis in urine was not treated)- started on tazocin
dementia with agitation

## 2023-09-11 NOTE — CONSULT NOTE ADULT - ASSESSMENT
73-year-old male with PMH of advanced dementia, A&O x0 at baseline, esophageal cancer status post chemo/radiation/surgery 2017, prostate cancer status post CyberKnife 2020, lung cancer status post surgical resection 2019, now in full remission with no active therapies, brought in by daughter for evaluation of altered mental status and aggression at home over the last 2 weeks. Course complicated by fever of 101.7 on 9/9/23 and positive UA with history of recent asymptomatic Enterococcus that was not treated with abx.    Impressions:   - Febrile to 101.7 at 9/9/23 at 3:00  - Currently afebrile  - Last receive Tylenol yesterday at 19:30  - UA (+)   - No urinary retention. Bladder scan showed 95cc  - Recent history of asymptomatic enterococcus not treated with abx  - PMH of renal caliculi nephrostomy tube in 06/2021    Recommendations: 73-year-old male with PMH of advanced dementia, A&O x0 at baseline, esophageal cancer status post chemo/radiation/surgery 2017, prostate cancer status post CyberKnife 2020, lung cancer status post surgical resection 2019, now in full remission with no active therapies, brought in by daughter for evaluation of altered mental status and aggression at home over the last 2 weeks. Course complicated by fever of 101.7 on 9/9/23 and positive UA with history of recent asymptomatic Enterococcus that was not treated with abx.    Impressions:   - Febrile to 101.7 at 9/9/23 at 3:00  - Currently afebrile  - Last receive Tylenol yesterday at 19:30  - Currently on Zosyn  - UA (+)   - No urinary retention. Bladder scan showed 95cc  - Recent history of asymptomatic enterococcus with (-) BCx not treated with abx (07/18/23)  - PMH of renal caliculi nephrostomy tube in 06/2021    Recommendations:  - No Abx 73-year-old male with PMH of advanced dementia, A&O x0 at baseline, esophageal cancer status post chemo/radiation/surgery 2017, prostate cancer status post CyberKnife 2020, lung cancer status post surgical resection 2019, now in full remission with no active therapies, brought in by daughter for evaluation of altered mental status and aggression at home over the last 2 weeks. Course complicated by fever of 101.7 on 9/9/23 and positive UA with history of recent asymptomatic Enterococcus that was not treated with abx.    Impressions:   - Febrile to 101.7 at 9/9/23 at 3:00  - Currently afebrile  - Last receive Tylenol yesterday at 19:30  - Currently on Zosyn  - UA (+)   - No urinary retention. Bladder scan showed 95cc  - Recent history of asymptomatic enterococcus with (-) BCx not treated with abx (07/18/23)  - PMH of renal caliculi nephrostomy tube in 06/2021  - PMH of ureteral stent placement (Lt)    Recommendations:  - No Abx 73-year-old male with PMH of advanced dementia, A&O x0 at baseline, esophageal cancer status post chemo/radiation/surgery 2017, prostate cancer status post CyberKnife 2020, lung cancer status post surgical resection 2019, now in full remission with no active therapies, brought in by daughter for evaluation of altered mental status and aggression at home over the last 2 weeks. Course complicated by fever of 101.7 on 9/9/23 and positive UA with history of recent asymptomatic Enterococcus that was not treated with abx.    Impressions:   - Febrile with Tmax 101.7 on 9/9/23 at 3:00  - Now afebrile  - Last received Tylenol yesterday, 9/10/23 at 19:30  - Currently on Zosyn  - UA (+)   - No urinary retention. Bladder scan showed 95cc  - Recent history of asymptomatic enterococcus with (-) BCx not treated with abx (07/18/23)  - PMH of renal caliculi nephrostomy tube in 06/2021  - PMH of ureteral stent placement (Lt)    Recommendations:  - No Abx 73-year-old male with PMH of advanced dementia, A&O x0 at baseline, esophageal cancer status post chemo/radiation/surgery 2017, prostate cancer status post CyberKnife 2020, lung cancer status post surgical resection 2019, now in full remission with no active therapies, brought in by daughter for evaluation of altered mental status and aggression at home over the last 2 weeks. Course complicated by fever of 101.7 on 9/9/23 and positive UA with history of recent asymptomatic Enterococcus that was not treated with abx.    Impressions:   - Febrile with Tmax 101.7 on 9/9/23 at 3:00  - Now afebrile  - Last received Tylenol yesterday, 9/10/23 at 19:30  - UA (+) FOR RBC 25, , Epithelial cells, Uric acid crystals, Casts 5  - No urinary retention. Bladder scan showed 95cc  - Recent history of asymptomatic enterococcus with (-) BCx not treated with abx (07/18/23)  - PMH of renal caliculi nephrostomy tube in 06/2021  - PMH of ureteral stent placement (Lt)  - Currently on Zosyn    Recommendations:  - Abx? 73-year-old male with PMH of advanced dementia, A&O x0 at baseline, esophageal cancer status post chemo/radiation/surgery 2017, prostate cancer status post CyberKnife 2020, lung cancer status post surgical resection 2019, now in full remission with no active therapies, brought in by daughter for evaluation of altered mental status and aggression at home over the last 2 weeks. Course complicated by fever of 101.7 on 9/9/23 and positive UA with history of recent asymptomatic Enterococcus that was not treated with abx.    Impressions:   - Febrile with Tmax 101.7 on 9/9/23 at 3:00  - Now afebrile  - No leukocytosis  - Last received Tylenol yesterday, 9/10/23 at 19:30  - UA (+) FOR RBC 25, , Epithelial cells, Uric acid crystals, Casts 5  - No urinary retention. Bladder scan showed 95cc  - Recent history of asymptomatic enterococcus with (-) BCx not treated with abx (07/18/23)  - PMH of renal caliculi nephrostomy tube in 06/2021  - PMH of ureteral stent placement (Lt)  - Currently on Zosyn    Recommendations:  - Continue Zosyn  - F/u urine cultures 73-year-old male with PMH of advanced dementia, A&O x0 at baseline, esophageal cancer status post chemo/radiation/surgery 2017, prostate cancer status post CyberKnife 2020, lung cancer status post surgical resection 2019, now in full remission with no active therapies, brought in by daughter for evaluation of altered mental status and aggression at home over the last 2 weeks. Course complicated by fever of 101.7 on 9/9/23 and positive UA with history of recent asymptomatic Enterococcus that was not treated with abx.    Impressions:   - Febrile with Tmax 101.7 on 9/9/23 at 3:00  - Now afebrile  - No leukocytosis  - Last received Tylenol yesterday, 9/10/23 at 19:30  - UA (+) FOR RBC 25, , Epithelial cells, Uric acid crystals, Casts 5  - No urinary retention. Bladder scan showed 95cc  - Recent history of asymptomatic enterococcus with (-) BCx not treated with abx (07/18/23)  - PMH of renal caliculi nephrostomy tube in 06/2021  - PMH of ureteral stent placement (Lt)  - Currently on Zosyn    Recommendations:  - Continue Zosyn  - F/u urine cultures    Please call or message on [employer] Teams if with any questions.  Spectra [phone]

## 2023-09-12 LAB
ALBUMIN SERPL ELPH-MCNC: 3.1 G/DL — LOW (ref 3.5–5.2)
ALP SERPL-CCNC: 107 U/L — SIGNIFICANT CHANGE UP (ref 30–115)
ALT FLD-CCNC: 17 U/L — SIGNIFICANT CHANGE UP (ref 0–41)
ANION GAP SERPL CALC-SCNC: 13 MMOL/L — SIGNIFICANT CHANGE UP (ref 7–14)
AST SERPL-CCNC: 27 U/L — SIGNIFICANT CHANGE UP (ref 0–41)
BASOPHILS # BLD AUTO: 0.04 K/UL — SIGNIFICANT CHANGE UP (ref 0–0.2)
BASOPHILS NFR BLD AUTO: 1 % — SIGNIFICANT CHANGE UP (ref 0–1)
BILIRUB SERPL-MCNC: 0.5 MG/DL — SIGNIFICANT CHANGE UP (ref 0.2–1.2)
BUN SERPL-MCNC: 24 MG/DL — HIGH (ref 10–20)
CALCIUM SERPL-MCNC: 9.5 MG/DL — SIGNIFICANT CHANGE UP (ref 8.4–10.5)
CHLORIDE SERPL-SCNC: 107 MMOL/L — SIGNIFICANT CHANGE UP (ref 98–110)
CO2 SERPL-SCNC: 25 MMOL/L — SIGNIFICANT CHANGE UP (ref 17–32)
CREAT SERPL-MCNC: 1 MG/DL — SIGNIFICANT CHANGE UP (ref 0.7–1.5)
EGFR: 79 ML/MIN/1.73M2 — SIGNIFICANT CHANGE UP
EOSINOPHIL # BLD AUTO: 0.25 K/UL — SIGNIFICANT CHANGE UP (ref 0–0.7)
EOSINOPHIL NFR BLD AUTO: 6.4 % — SIGNIFICANT CHANGE UP (ref 0–8)
GLUCOSE BLDC GLUCOMTR-MCNC: 92 MG/DL — SIGNIFICANT CHANGE UP (ref 70–99)
GLUCOSE SERPL-MCNC: 90 MG/DL — SIGNIFICANT CHANGE UP (ref 70–99)
HCT VFR BLD CALC: 39.5 % — LOW (ref 42–52)
HGB BLD-MCNC: 12 G/DL — LOW (ref 14–18)
IMM GRANULOCYTES NFR BLD AUTO: 0.3 % — SIGNIFICANT CHANGE UP (ref 0.1–0.3)
LYMPHOCYTES # BLD AUTO: 0.73 K/UL — LOW (ref 1.2–3.4)
LYMPHOCYTES # BLD AUTO: 18.7 % — LOW (ref 20.5–51.1)
MCHC RBC-ENTMCNC: 25.6 PG — LOW (ref 27–31)
MCHC RBC-ENTMCNC: 30.4 G/DL — LOW (ref 32–37)
MCV RBC AUTO: 84.4 FL — SIGNIFICANT CHANGE UP (ref 80–94)
MONOCYTES # BLD AUTO: 0.66 K/UL — HIGH (ref 0.1–0.6)
MONOCYTES NFR BLD AUTO: 16.9 % — HIGH (ref 1.7–9.3)
NEUTROPHILS # BLD AUTO: 2.21 K/UL — SIGNIFICANT CHANGE UP (ref 1.4–6.5)
NEUTROPHILS NFR BLD AUTO: 56.7 % — SIGNIFICANT CHANGE UP (ref 42.2–75.2)
NRBC # BLD: 0 /100 WBCS — SIGNIFICANT CHANGE UP (ref 0–0)
PHOSPHATE SERPL-MCNC: 3.2 MG/DL — SIGNIFICANT CHANGE UP (ref 2.1–4.9)
PLATELET # BLD AUTO: 200 K/UL — SIGNIFICANT CHANGE UP (ref 130–400)
PMV BLD: 10.1 FL — SIGNIFICANT CHANGE UP (ref 7.4–10.4)
POTASSIUM SERPL-MCNC: 3.5 MMOL/L — SIGNIFICANT CHANGE UP (ref 3.5–5)
POTASSIUM SERPL-SCNC: 3.5 MMOL/L — SIGNIFICANT CHANGE UP (ref 3.5–5)
PROT SERPL-MCNC: 6.6 G/DL — SIGNIFICANT CHANGE UP (ref 6–8)
RBC # BLD: 4.68 M/UL — LOW (ref 4.7–6.1)
RBC # FLD: SIGNIFICANT CHANGE UP % (ref 11.5–14.5)
SODIUM SERPL-SCNC: 145 MMOL/L — SIGNIFICANT CHANGE UP (ref 135–146)
WBC # BLD: 3.9 K/UL — LOW (ref 4.8–10.8)
WBC # FLD AUTO: 3.9 K/UL — LOW (ref 4.8–10.8)

## 2023-09-12 PROCEDURE — 99232 SBSQ HOSP IP/OBS MODERATE 35: CPT

## 2023-09-12 PROCEDURE — 93970 EXTREMITY STUDY: CPT | Mod: 26

## 2023-09-12 RX ORDER — SODIUM CHLORIDE 9 MG/ML
1000 INJECTION, SOLUTION INTRAVENOUS
Refills: 0 | Status: DISCONTINUED | OUTPATIENT
Start: 2023-09-12 | End: 2023-09-13

## 2023-09-12 RX ORDER — TAMSULOSIN HYDROCHLORIDE 0.4 MG/1
0.4 CAPSULE ORAL AT BEDTIME
Refills: 0 | Status: DISCONTINUED | OUTPATIENT
Start: 2023-09-12 | End: 2023-09-15

## 2023-09-12 RX ORDER — QUETIAPINE FUMARATE 200 MG/1
12.5 TABLET, FILM COATED ORAL AT BEDTIME
Refills: 0 | Status: DISCONTINUED | OUTPATIENT
Start: 2023-09-12 | End: 2023-09-15

## 2023-09-12 RX ADMIN — MEMANTINE HYDROCHLORIDE 5 MILLIGRAM(S): 10 TABLET ORAL at 05:59

## 2023-09-12 RX ADMIN — PIPERACILLIN AND TAZOBACTAM 25 GRAM(S): 4; .5 INJECTION, POWDER, LYOPHILIZED, FOR SOLUTION INTRAVENOUS at 05:58

## 2023-09-12 RX ADMIN — TAMSULOSIN HYDROCHLORIDE 0.4 MILLIGRAM(S): 0.4 CAPSULE ORAL at 21:51

## 2023-09-12 RX ADMIN — PANTOPRAZOLE SODIUM 40 MILLIGRAM(S): 20 TABLET, DELAYED RELEASE ORAL at 11:36

## 2023-09-12 RX ADMIN — MEMANTINE HYDROCHLORIDE 5 MILLIGRAM(S): 10 TABLET ORAL at 19:21

## 2023-09-12 RX ADMIN — PIPERACILLIN AND TAZOBACTAM 25 GRAM(S): 4; .5 INJECTION, POWDER, LYOPHILIZED, FOR SOLUTION INTRAVENOUS at 21:52

## 2023-09-12 RX ADMIN — SODIUM CHLORIDE 50 MILLILITER(S): 9 INJECTION, SOLUTION INTRAVENOUS at 11:31

## 2023-09-12 RX ADMIN — PIPERACILLIN AND TAZOBACTAM 25 GRAM(S): 4; .5 INJECTION, POWDER, LYOPHILIZED, FOR SOLUTION INTRAVENOUS at 13:10

## 2023-09-12 RX ADMIN — QUETIAPINE FUMARATE 12.5 MILLIGRAM(S): 200 TABLET, FILM COATED ORAL at 21:51

## 2023-09-12 RX ADMIN — ENOXAPARIN SODIUM 40 MILLIGRAM(S): 100 INJECTION SUBCUTANEOUS at 11:33

## 2023-09-12 RX ADMIN — Medication 0.5 MILLIGRAM(S): at 13:58

## 2023-09-12 NOTE — PROGRESS NOTE ADULT - SUBJECTIVE AND OBJECTIVE BOX
pt seen and examined.     Resident's notes reviewed, My notes supersede resident's notes in case of discrepancy       ROS: no cp, no sob, no n/v, no fever    Vital Signs Last 24 Hrs  T(C): 36.1 (12 Sep 2023 05:41), Max: 36.5 (11 Sep 2023 20:45)  T(F): 96.9 (12 Sep 2023 05:41), Max: 97.7 (11 Sep 2023 20:45)  HR: 71 (12 Sep 2023 05:41) (71 - 76)  BP: 124/79 (12 Sep 2023 05:41) (124/79 - 126/68)  BP(mean): --  RR: 18 (12 Sep 2023 05:41) (18 - 18)  SpO2: 95% (12 Sep 2023 05:41) (95% - 98%)    Parameters below as of 12 Sep 2023 05:41  Patient On (Oxygen Delivery Method): room air        physical exam  constitutional NAD, AAO, minimal verbal communication, lungs CTA, CVS RRR, abd soft non tender , Ext No edema, no tenderness      MEDICATIONS  (STANDING):  dextrose 5% + lactated ringers. 1000 milliLiter(s) (50 mL/Hr) IV Continuous <Continuous>  enoxaparin Injectable 40 milliGRAM(s) SubCutaneous every 24 hours  LORazepam     Tablet 2 milliGRAM(s) Oral <User Schedule>  LORazepam     Tablet 0.5 milliGRAM(s) Oral <User Schedule>  memantine 5 milliGRAM(s) Oral two times a day  pantoprazole    Tablet 40 milliGRAM(s) Oral daily  piperacillin/tazobactam IVPB.. 3.375 Gram(s) IV Intermittent every 8 hours  tamsulosin 0.4 milliGRAM(s) Oral at bedtime    MEDICATIONS  (PRN):  acetaminophen     Tablet .. 650 milliGRAM(s) Oral every 6 hours PRN Temp greater or equal to 38C (100.4F), Mild Pain (1 - 3), Moderate Pain (4 - 6)                            12.0   3.90  )-----------( 200      ( 12 Sep 2023 05:47 )             39.5     09-12    145  |  107  |  24<H>  ----------------------------<  90  3.5   |  25  |  1.0    Ca    9.5      12 Sep 2023 05:47  Phos  3.2     09-12  Mg     2.2     09-11    TPro  6.6  /  Alb  3.1<L>  /  TBili  0.5  /  DBili  x   /  AST  27  /  ALT  17  /  AlkPhos  107  09-12    Culture - Urine (07.16.23 @ 08:25)  >100,000 CFU/ml Enterococcus faecalis    < from: US Kidney and Bladder (09.11.23 @ 19:08) >  Nonobstructing right renal calculus.    Bladder calculus.    < end of copied text >    Respiratory Viral Panel with COVID-19 by JESSICA (09.09.23 @ 17:02)    Rapid RVP Result: [facility]   SARS-CoV-2: [facility]: This Respiratory Panel uses polymerase chain reaction (PCR) to detect for  adenovirus; coronavirus (HKU1, NL63, 229E, OC43); human metapneumovirus  (hMPV); human enterovirus/rhinovirus (Entero/RV); influenza A; influenza  A/H1; influenza A/H3; influenza A/H1-2009; influenza B; parainfluenza  viruses 1, 2, 3, 4; respiratory syncytial virus; Mycoplasma pneumoniae;  Chlamydophila pneumoniae; and SARS-CoV-2.  This test was validated by [employer] and is in use under the FDA  Emergency Use Authorization (EUA) for clinical labs CLIA-certified to  perform high complexity testing. Test results should be correlated with  clinical presentation, patient history, and epidemiology.    a/p  HPI:   73-year-old male with PMH of advanced dementia, A&O x0 at baseline, esophageal cancer status post chemo/radiation/surgery 2017, prostate cancer status post CyberKnife 2020, lung cancer status post surgical resection 2019, now in full remission with no active therapies, brought in by daughter for evaluation of altered mental status and aggression at home over the last 2 weeks. She states he was recently hospitalized 2 months ago symptomatic anemia course complicated by uti and covid  and then sent to rehabilitation center, discharged home 2 weeks ago.  Patient lives upstairs from his daughter and her  alone but has 24-hour aide.  Ambulates with walker.  She states over the last 2 weeks he is becoming more increasingly difficult to handle, over the last 2 days has been aggressive and today was attempting to climb onto his TV stand.  He had to be physically restrained by the aide.  Had 2 episodes yesterday where he was struggling to climb onto things and then fell onto his bilateral knees.  Events were witnessed by aide who reported to the daughter that he had no head trauma or LOC and was ambulatory after the falls.  Daughter reports patient has a dry cough over the last few days. No known fever, vomiting, diarrhea, rash.  Patient is unable to contribute further history or review of systems. Of note she believes that his behavior and mental status became worse  after starting this new medication.   In the ed chest x ray performed pending read     Admitted to medicine for agitated delirium     # infiltrated IV, doppler of the arm, check doppler,     # dementia with behavioral problem   cont home dose of ativan ( 0.5 am and afternoon, and 2mg at night)   cont home meds   dw Dr Lutz [phone], he is agreeable with the current meds, Seroquel and ativan     # recent UTI, low grade fever , right non obstructing kidney stone and bladder stone.   cont Zoysn,   fu urine cultures     # recent covid, now neg    # hx of esophageal cancer status post chemo/radiation/surgery 2017, prostate cancer status post CyberKnife 2020, lung cancer status post surgical resection 2019  outpt fu   hospice referral , dw daughter , she wants to know their input.     #Progress Note Handoff  Pending :  uc, hospice consult   Family discussion: dw daughter Ric on the phone , DNR, DNI, considering hospice. , pt has 24 hr private HHA  Disposition: home with private HHA,   time spent : 50 min                        pt seen and examined.     Resident's notes reviewed, My notes supersede resident's notes in case of discrepancy       ROS: no cp, no sob, no n/v, no fever    Vital Signs Last 24 Hrs  T(C): 36.1 (12 Sep 2023 05:41), Max: 36.5 (11 Sep 2023 20:45)  T(F): 96.9 (12 Sep 2023 05:41), Max: 97.7 (11 Sep 2023 20:45)  HR: 71 (12 Sep 2023 05:41) (71 - 76)  BP: 124/79 (12 Sep 2023 05:41) (124/79 - 126/68)  BP(mean): --  RR: 18 (12 Sep 2023 05:41) (18 - 18)  SpO2: 95% (12 Sep 2023 05:41) (95% - 98%)    Parameters below as of 12 Sep 2023 05:41  Patient On (Oxygen Delivery Method): room air        physical exam  constitutional NAD, AAO, minimal verbal communication, lungs CTA, CVS RRR, abd soft non tender , Ext No edema, no tenderness      MEDICATIONS  (STANDING):  dextrose 5% + lactated ringers. 1000 milliLiter(s) (50 mL/Hr) IV Continuous <Continuous>  enoxaparin Injectable 40 milliGRAM(s) SubCutaneous every 24 hours  LORazepam     Tablet 2 milliGRAM(s) Oral <User Schedule>  LORazepam     Tablet 0.5 milliGRAM(s) Oral <User Schedule>  memantine 5 milliGRAM(s) Oral two times a day  pantoprazole    Tablet 40 milliGRAM(s) Oral daily  piperacillin/tazobactam IVPB.. 3.375 Gram(s) IV Intermittent every 8 hours  tamsulosin 0.4 milliGRAM(s) Oral at bedtime    MEDICATIONS  (PRN):  acetaminophen     Tablet .. 650 milliGRAM(s) Oral every 6 hours PRN Temp greater or equal to 38C (100.4F), Mild Pain (1 - 3), Moderate Pain (4 - 6)                            12.0   3.90  )-----------( 200      ( 12 Sep 2023 05:47 )             39.5     09-12    145  |  107  |  24<H>  ----------------------------<  90  3.5   |  25  |  1.0    Ca    9.5      12 Sep 2023 05:47  Phos  3.2     09-12  Mg     2.2     09-11    TPro  6.6  /  Alb  3.1<L>  /  TBili  0.5  /  DBili  x   /  AST  27  /  ALT  17  /  AlkPhos  107  09-12    Culture - Urine (07.16.23 @ 08:25)  >100,000 CFU/ml Enterococcus faecalis    < from: US Kidney and Bladder (09.11.23 @ 19:08) >  Nonobstructing right renal calculus.    Bladder calculus.    < end of copied text >    Respiratory Viral Panel with COVID-19 by JESSICA (09.09.23 @ 17:02)    Rapid RVP Result: [facility]   SARS-CoV-2: [facility]: This Respiratory Panel uses polymerase chain reaction (PCR) to detect for  adenovirus; coronavirus (HKU1, NL63, 229E, OC43); human metapneumovirus  (hMPV); human enterovirus/rhinovirus (Entero/RV); influenza A; influenza  A/H1; influenza A/H3; influenza A/H1-2009; influenza B; parainfluenza  viruses 1, 2, 3, 4; respiratory syncytial virus; Mycoplasma pneumoniae;  Chlamydophila pneumoniae; and SARS-CoV-2.  This test was validated by [employer] and is in use under the FDA  Emergency Use Authorization (EUA) for clinical labs CLIA-certified to  perform high complexity testing. Test results should be correlated with  clinical presentation, patient history, and epidemiology.    a/p  HPI:   73-year-old male with PMH of advanced dementia, A&O x0 at baseline, esophageal cancer status post chemo/radiation/surgery 2017, prostate cancer status post CyberKnife 2020, lung cancer status post surgical resection 2019, now in full remission with no active therapies, brought in by daughter for evaluation of altered mental status and aggression at home over the last 2 weeks. She states he was recently hospitalized 2 months ago symptomatic anemia course complicated by uti and covid  and then sent to rehabilitation center, discharged home 2 weeks ago.  Patient lives upstairs from his daughter and her  alone but has 24-hour aide.  Ambulates with walker.  She states over the last 2 weeks he is becoming more increasingly difficult to handle, over the last 2 days has been aggressive and today was attempting to climb onto his TV stand.  He had to be physically restrained by the aide.  Had 2 episodes yesterday where he was struggling to climb onto things and then fell onto his bilateral knees.  Events were witnessed by aide who reported to the daughter that he had no head trauma or LOC and was ambulatory after the falls.  Daughter reports patient has a dry cough over the last few days. No known fever, vomiting, diarrhea, rash.  Patient is unable to contribute further history or review of systems. Of note she believes that his behavior and mental status became worse  after starting this new medication.   In the ed chest x ray performed pending read     Admitted to medicine for agitated delirium     # infiltrated IV, doppler of the arm, check doppler,     # dementia with behavioral problem   cont home dose of ativan ( 0.5 am and afternoon, and 2mg at night)   cont home meds   dw Dr Lutz [phone], he is agreeable with the current meds, Seroquel and ativan     # recent UTI, low grade fever , right non obstructing kidney stone and bladder stone.   cont Zoysn,   fu urine cultures     # recent covid, now neg    # hx of esophageal cancer status post chemo/radiation/surgery 2017, prostate cancer status post CyberKnife 2020, lung cancer status post surgical resection 2019  outpt fu   hospice referral , dw daughter , she wants to know their input.     #Progress Note Handoff  Pending :  uc, hospice consult   Family discussion: dw daughter Ric on the phone , DNR, DNI, considering hospice. , pt has 24 hr private HHA  Disposition: home with private HHA,   time spent : 35 min

## 2023-09-12 NOTE — PROGRESS NOTE ADULT - ASSESSMENT
73-year-old male with PMH of advanced dementia, A&O x0 at baseline, esophageal cancer status post chemo/radiation/surgery 2017, prostate cancer status post CyberKnife 2020, lung cancer status post surgical resection 2019, now in full remission with no active therapies, brought in by daughter for evaluation of altered mental status and aggression at home over the last 2 weeks.     Impressions:   #Fever - rule out UTI   #Metabolic Encephalopathy  #Dementia  #Esophageal Cancer s/p chemo/radiation/surgery  #Prostate cancer s/p cyberknife  #Lung cancer s/p resection 2019     Recommendations:  - continue zosyn 3.375 mg q 8 hours   - follow-up urine Cx   - trend fever curve     Please call or message on [employer] Teams if with any questions.  Spectra [phone].

## 2023-09-12 NOTE — PROGRESS NOTE ADULT - SUBJECTIVE AND OBJECTIVE BOX
DAVYSERINA PHAN  73y, Male  Allergy: No Known Allergies      LOS  5d    CHIEF COMPLAINT: Fall (12 Sep 2023 10:23)      INTERVAL EVENTS/HPI  - No acute events overnight  - T(F): , Max: 97.7 (09-11-23 @ 20:45)  - Denies any worsening symptoms  - Tolerating medication  - WBC Count: 3.90 (09-12-23 @ 05:47)  WBC Count: 6.58 (09-11-23 @ 06:35)     - Creatinine: 1.0 (09-12-23 @ 05:47)  Creatinine: 1.1 (09-11-23 @ 06:35)       ROS  General: Denies rigors, nightsweats  HEENT: Denies headache, rhinorrhea, sore throat, eye pain  CV: Denies CP, palpitations  PULM: Denies wheezing, hemoptysis  GI: Denies hematemesis, hematochezia, melena  : Denies discharge, hematuria  MSK: Denies arthralgias, myalgias  SKIN: Denies rash, lesions  NEURO: Denies paresthesias, weakness  PSYCH: Denies depression, anxiety    VITALS:  T(F): 96.9, Max: 97.7 (09-11-23 @ 20:45)  HR: 71  BP: 124/79  RR: 18Vital Signs Last 24 Hrs  T(C): 36.1 (12 Sep 2023 05:41), Max: 36.5 (11 Sep 2023 20:45)  T(F): 96.9 (12 Sep 2023 05:41), Max: 97.7 (11 Sep 2023 20:45)  HR: 71 (12 Sep 2023 05:41) (71 - 76)  BP: 124/79 (12 Sep 2023 05:41) (115/65 - 126/68)  BP(mean): --  RR: 18 (12 Sep 2023 05:41) (18 - 18)  SpO2: 95% (12 Sep 2023 05:41) (95% - 99%)    Parameters below as of 12 Sep 2023 05:41  Patient On (Oxygen Delivery Method): room air        PHYSICAL EXAM:  Gen: NAD, resting in bed  HEENT: Normocephalic, atraumatic  Neck: supple, no lymphadenopathy  CV: Regular rate & regular rhythm  Lungs: decreased BS at bases, no fremitus  Abdomen: Soft, BS present  Ext: Warm, well perfused  Neuro: non focal, awake  Skin: no rash, no erythema  Lines: no phlebitis    FH: Non-contributory  Social Hx: Non-contributory    TESTS & MEASUREMENTS:                        12.0   3.90  )-----------( 200      ( 12 Sep 2023 05:47 )             39.5     09-12    145  |  107  |  24<H>  ----------------------------<  90  3.5   |  25  |  1.0    Ca    9.5      12 Sep 2023 05:47  Phos  3.2     09-12  Mg     2.2     09-11    TPro  6.6  /  Alb  3.1<L>  /  TBili  0.5  /  DBili  x   /  AST  27  /  ALT  17  /  AlkPhos  107  09-12      LIVER FUNCTIONS - ( 12 Sep 2023 05:47 )  Alb: 3.1 g/dL / Pro: 6.6 g/dL / ALK PHOS: 107 U/L / ALT: 17 U/L / AST: 27 U/L / GGT: x           Urinalysis Basic - ( 12 Sep 2023 05:47 )    Color: x / Appearance: x / SG: x / pH: x  Gluc: 90 mg/dL / Ketone: x  / Bili: x / Urobili: x   Blood: x / Protein: x / Nitrite: x   Leuk Esterase: x / RBC: x / WBC x   Sq Epi: x / Non Sq Epi: x / Bacteria: x              INFECTIOUS DISEASES TESTING  Rapid RVP Result: NotDetec (09-09-23 @ 17:02)  COVID-19 PCR: Detected (07-18-23 @ 17:34)      INFLAMMATORY MARKERS      RADIOLOGY & ADDITIONAL TESTS:  I have personally reviewed the last available Chest xray  CXR      CT      CARDIOLOGY TESTING      MEDICATIONS  dextrose 5% + lactated ringers. 1000 IV Continuous <Continuous>  enoxaparin Injectable 40 SubCutaneous every 24 hours  LORazepam     Tablet 0.5 Oral <User Schedule>  LORazepam     Tablet 2 Oral <User Schedule>  memantine 5 Oral two times a day  pantoprazole    Tablet 40 Oral daily  piperacillin/tazobactam IVPB.. 3.375 IV Intermittent every 8 hours  tamsulosin 0.4 Oral at bedtime      WEIGHT  Weight (kg): 60.4 (09-07-23 @ 23:46)  Creatinine: 1.0 mg/dL (09-12-23 @ 05:47)      ANTIBIOTICS:  piperacillin/tazobactam IVPB.. 3.375 Gram(s) IV Intermittent every 8 hours      All available historical records have been reviewed

## 2023-09-12 NOTE — PROGRESS NOTE ADULT - SUBJECTIVE AND OBJECTIVE BOX
24H events:    Patient is a 73y old Male who presents with a chief complaint of Fall (11 Sep 2023 14:43)    Primary diagnosis of Altered mental status    Today is hospital day 5d. This morning patient was seen and examined at bedside, resting comfortably in bed.    No acute or major events overnight.    PAST MEDICAL & SURGICAL HISTORY  Hypertension, unspecified type    Cancer  ESOPHAGEAL CANCER 2017 RECEIVED CHEMO AND RADIATION and endoscopic resection partial oesophagus and stomach    Renal calculi  nephrostomy tube -6/2021    Cancer  left lung 2018, no chemo or rad rx and resection    Abnormal cholesterol test    Prostate cancer    [place]    Alzheimer disease    Cancer of esophagus  2017 RESECTION OF LOWER PORTION OF ESOPHAGUS    S/P lobectomy of lung  left ?JUNE 2018    S/P cystoscopy with ureteral stent placement  LEFT    Inguinal hernia  AS AN INFANT, RIGHT SIDE    History of esophagogastroduodenoscopy (EGD)  2019    H/O colonoscopy  2018    Prostate cancer  cyber knife intervention 2021      SOCIAL HISTORY:  Social History:      ALLERGIES:  No Known Allergies    MEDICATIONS:  STANDING MEDICATIONS  enoxaparin Injectable 40 milliGRAM(s) SubCutaneous every 24 hours  lactated ringers. 1000 milliLiter(s) IV Continuous <Continuous>  LORazepam     Tablet 2 milliGRAM(s) Oral <User Schedule>  LORazepam     Tablet 0.5 milliGRAM(s) Oral <User Schedule>  memantine 5 milliGRAM(s) Oral two times a day  pantoprazole    Tablet 40 milliGRAM(s) Oral daily  piperacillin/tazobactam IVPB.. 3.375 Gram(s) IV Intermittent every 8 hours  QUEtiapine 25 milliGRAM(s) Oral at bedtime    PRN MEDICATIONS  acetaminophen     Tablet .. 650 milliGRAM(s) Oral every 6 hours PRN    VITALS:   T(F): 96.9  HR: 71  BP: 124/79  RR: 18  SpO2: 95%    PHYSICAL EXAM:  GENERAL:   ( X ) NAD, lying in bed comfortably     (  ) obtunded     (  ) lethargic     (  ) somnolent    HEAD:   ( X ) Atraumatic     (  ) hematoma     (  ) laceration (specify location:       )     NECK:  ( X ) Supple     (  ) neck stiffness     (  ) nuchal rigidity     (  )  no JVD     (  ) JVD present ( -- cm)    HEART:  Rate -->     ( X ) normal rate     (  ) bradycardic     (  ) tachycardic  Rhythm -->     ( X ) regular     (  ) regularly irregular     (  ) irregularly irregular  Murmurs -->     ( X ) normal s1s2     (  ) systolic murmur     (  ) diastolic murmur     (  ) continuous murmur      (  ) S3 present     (  ) S4 present    LUNGS:   ( X )Unlabored respirations     (  ) tachypnea  ( X ) B/L air entry     (  ) decreased breath sounds in:  (location     )    (  ) no adventitious sound     (  ) crackles     (  ) wheezing      (  ) rhonchi      (specify location:       )  (  ) chest wall tenderness (specify location:       )    ABDOMEN:   ( X ) Soft     (  ) tense   |   ( X ) nondistended     (  ) distended   |   (  ) +BS     (  ) hypoactive bowel sounds     (  ) hyperactive bowel sounds  ( X ) nontender     (  ) RUQ tenderness     (  ) RLQ tenderness     (  ) LLQ tenderness     (  ) epigastric tenderness     (  ) diffuse tenderness  (  ) Splenomegaly      (  ) Hepatomegaly      (  ) Jaundice     (  ) ecchymosis     EXTREMITIES:  (  ) Normal     (  ) Rash     (  ) ecchymosis     (  ) varicose veins      (  ) pitting edema     (  ) non-pitting edema   (  ) ulceration     (  ) gangrene:     (location:     )    NERVOUS SYSTEM:    (  ) A&Ox3     (  ) confused     (  ) lethargic  CN II-XII:     (  ) Intact     (  ) deficits found     (Specify:     )   Upper extremities:     (  ) no sensorimotor deficits     (  ) weakness     (  ) loss of proprioception/vibration     (  ) loss of touch/temperature (specify:    )  Lower extremities:     (  ) no sensorimotor deficits     (  ) weakness     (  ) loss of proprioception/vibration     (  ) loss of touch/temperature (specify:    )    SKIN:   (  ) No rashes or lesions     (  ) maculopapular rash     (  ) pustules     (  ) vesicles     (  ) ulcer     (  ) ecchymosis     (specify location:     )    AMPAC score:    (  ) Indwelling Roman Catheter:   Date insterted:    Reason (  ) Critical illness     (  ) urinary retention    (  ) Accurate Ins/Outs Monitoring     (  ) CMO patient    (  ) Central Line:   Date inserted:  Location: (  ) Right IJ     (  ) Left IJ     (  ) Right Fem     (  ) Left Fem    (  ) SPC        (  ) pigtail       (  ) PEG tube       (  ) colostomy       (  ) jejunostomy  (  ) U-Dall    LABS:                        12.0   3.90  )-----------( 200      ( 12 Sep 2023 05:47 )             39.5     09-12    145  |  107  |  24<H>  ----------------------------<  90  3.5   |  25  |  1.0    Ca    9.5      12 Sep 2023 05:47  Phos  3.2     09-12  Mg     2.2     09-11    TPro  6.6  /  Alb  3.1<L>  /  TBili  0.5  /  DBili  x   /  AST  27  /  ALT  17  /  AlkPhos  107  09-12      Urinalysis Basic - ( 12 Sep 2023 05:47 )    Color: x / Appearance: x / SG: x / pH: x  Gluc: 90 mg/dL / Ketone: x  / Bili: x / Urobili: x   Blood: x / Protein: x / Nitrite: x   Leuk Esterase: x / RBC: x / WBC x   Sq Epi: x / Non Sq Epi: x / Bacteria: x 24H events:    Patient is a 73y old Male who presents with a chief complaint of Fall    Primary diagnosis of Altered mental status    Today is hospital day 5d. This morning patient was seen and examined at bedside, resting comfortably in bed.    No acute or major events overnight.    PAST MEDICAL & SURGICAL HISTORY  Hypertension, unspecified type    Cancer  ESOPHAGEAL CANCER 2017 RECEIVED CHEMO AND RADIATION and endoscopic resection partial oesophagus and stomach    Renal calculi  nephrostomy tube -6/2021    Cancer  left lung 2018, no chemo or rad rx and resection    Abnormal cholesterol test    Prostate cancer    [place])    Alzheimer disease    Cancer of esophagus  2017 RESECTION OF LOWER PORTION OF ESOPHAGUS    S/P lobectomy of lung  left ?JUNE 2018    S/P cystoscopy with ureteral stent placement  LEFT    Inguinal hernia  AS AN INFANT, RIGHT SIDE    History of esophagogastroduodenoscopy (EGD)  2019    H/O colonoscopy  2018    Prostate cancer  cyber knife intervention 2021    ALLERGIES:  No Known Allergies    MEDICATIONS:  STANDING MEDICATIONS  enoxaparin Injectable 40 milliGRAM(s) SubCutaneous every 24 hours  lactated ringers. 1000 milliLiter(s) IV Continuous <Continuous>  LORazepam     Tablet 2 milliGRAM(s) Oral <User Schedule>  LORazepam     Tablet 0.5 milliGRAM(s) Oral <User Schedule>  memantine 5 milliGRAM(s) Oral two times a day  pantoprazole    Tablet 40 milliGRAM(s) Oral daily  piperacillin/tazobactam IVPB.. 3.375 Gram(s) IV Intermittent every 8 hours  QUEtiapine 25 milliGRAM(s) Oral at bedtime    PRN MEDICATIONS  acetaminophen     Tablet .. 650 milliGRAM(s) Oral every 6 hours PRN    VITALS:   T(F): 96.9  HR: 71  BP: 124/79  RR: 18  SpO2: 95%    PHYSICAL EXAM:  GENERAL:   ( X ) NAD, lying in bed comfortably     (  ) obtunded     (  ) lethargic     (  ) somnolent    HEAD:   ( X ) Atraumatic     (  ) hematoma     (  ) laceration (specify location:       )     NECK:  ( X ) Supple     (  ) neck stiffness     (  ) nuchal rigidity     (  )  no JVD     (  ) JVD present ( -- cm)    HEART:  Rate -->     ( X ) normal rate     (  ) bradycardic     (  ) tachycardic  Rhythm -->     ( X ) regular     (  ) regularly irregular     (  ) irregularly irregular  Murmurs -->     ( X ) normal s1s2     (  ) systolic murmur     (  ) diastolic murmur     (  ) continuous murmur      (  ) S3 present     (  ) S4 present    LUNGS:   ( X )Unlabored respirations     (  ) tachypnea  ( X ) B/L air entry     (  ) decreased breath sounds in:  (location     )    (  ) no adventitious sound     (  ) crackles     (  ) wheezing      (  ) rhonchi      (specify location:       )  (  ) chest wall tenderness (specify location:       )    ABDOMEN:   ( X ) Soft     (  ) tense   |   ( X ) nondistended     (  ) distended   |   (  ) +BS     (  ) hypoactive bowel sounds     (  ) hyperactive bowel sounds  ( X ) nontender     (  ) RUQ tenderness     (  ) RLQ tenderness     (  ) LLQ tenderness     (  ) epigastric tenderness     (  ) diffuse tenderness  (  ) Splenomegaly      (  ) Hepatomegaly      (  ) Jaundice     (  ) ecchymosis     EXTREMITIES:  ( X ) Normal     (  ) Rash     (  ) ecchymosis     (  ) varicose veins      (  ) pitting edema     (  ) non-pitting edema   (  ) ulceration     (  ) gangrene:     (location:     )    NERVOUS SYSTEM:    (  ) A&Ox3     (  ) confused     (  ) lethargic  CN II-XII:     (  ) Intact     (  ) deficits found     (Specify:     )   Upper extremities:     (  ) no sensorimotor deficits     (  ) weakness     (  ) loss of proprioception/vibration     (  ) loss of touch/temperature (specify:    )  Lower extremities:     (  ) no sensorimotor deficits     (  ) weakness     (  ) loss of proprioception/vibration     (  ) loss of touch/temperature (specify:    )    LABS:                        12.0   3.90  )-----------( 200      ( 12 Sep 2023 05:47 )             39.5     09-12    145  |  107  |  24<H>  ----------------------------<  90  3.5   |  25  |  1.0    Ca    9.5      12 Sep 2023 05:47  Phos  3.2     09-12  Mg     2.2     09-11    TPro  6.6  /  Alb  3.1<L>  /  TBili  0.5  /  DBili  x   /  AST  27  /  ALT  17  /  AlkPhos  107  09-12      Urinalysis Basic - ( 12 Sep 2023 05:47 )    Color: x / Appearance: x / SG: x / pH: x  Gluc: 90 mg/dL / Ketone: x  / Bili: x / Urobili: x   Blood: x / Protein: x / Nitrite: x   Leuk Esterase: x / RBC: x / WBC x   Sq Epi: x / Non Sq Epi: x / Bacteria: x 24H events:    Patient is a 73y old Male who presents with a chief complaint of Fall    Primary diagnosis of Altered mental status    Today is hospital day 5d. This morning patient was seen and examined at bedside, resting comfortably in bed.    No acute or major events overnight.    PAST MEDICAL & SURGICAL HISTORY  Hypertension, unspecified type    Cancer  ESOPHAGEAL CANCER 2017 RECEIVED CHEMO AND RADIATION and endoscopic resection partial oesophagus and stomach    Renal calculi  nephrostomy tube -6/2021    Cancer  left lung 2018, no chemo or rad rx and resection    Abnormal cholesterol test    Prostate cancer    [place] (hard of hearing)    Alzheimer disease    Cancer of esophagus  2017 RESECTION OF LOWER PORTION OF ESOPHAGUS    S/P lobectomy of lung  left ?JUNE 2018    S/P cystoscopy with ureteral stent placement  LEFT    Inguinal hernia  AS AN INFANT, RIGHT SIDE    History of esophagogastroduodenoscopy (EGD)  2019    H/O colonoscopy  2018    Prostate cancer  cyber knife intervention 2021    ALLERGIES:  No Known Allergies    MEDICATIONS:  STANDING MEDICATIONS  enoxaparin Injectable 40 milliGRAM(s) SubCutaneous every 24 hours  lactated ringers. 1000 milliLiter(s) IV Continuous <Continuous>  LORazepam     Tablet 2 milliGRAM(s) Oral <User Schedule>  LORazepam     Tablet 0.5 milliGRAM(s) Oral <User Schedule>  memantine 5 milliGRAM(s) Oral two times a day  pantoprazole    Tablet 40 milliGRAM(s) Oral daily  piperacillin/tazobactam IVPB.. 3.375 Gram(s) IV Intermittent every 8 hours  QUEtiapine 25 milliGRAM(s) Oral at bedtime    PRN MEDICATIONS  acetaminophen     Tablet .. 650 milliGRAM(s) Oral every 6 hours PRN    VITALS:   T(F): 96.9  HR: 71  BP: 124/79  RR: 18  SpO2: 95%    PHYSICAL EXAM:  GENERAL:   (  ) NAD, lying in bed comfortably     (  ) obtunded     (  ) lethargic     ( x ) somnolent    HEAD:   ( X ) Atraumatic     (  ) hematoma     (  ) laceration (specify location:       )     NECK:  ( X ) Supple     (  ) neck stiffness     (  ) nuchal rigidity     (  )  no JVD     (  ) JVD present ( -- cm)    HEART:  Rate -->     ( X ) normal rate     (  ) bradycardic     (  ) tachycardic  Rhythm -->     ( X ) regular     (  ) regularly irregular     (  ) irregularly irregular  Murmurs -->     ( X ) normal s1s2     (  ) systolic murmur     (  ) diastolic murmur     (  ) continuous murmur      (  ) S3 present     (  ) S4 present    LUNGS:   ( X )Unlabored respirations     (  ) tachypnea  ( X ) B/L air entry     (  ) decreased breath sounds in:  (location     )    (  ) no adventitious sound     (  ) crackles     (  ) wheezing      (  ) rhonchi      (specify location:       )  (  ) chest wall tenderness (specify location:       )    ABDOMEN:   ( X ) Soft     (  ) tense   |   ( X ) nondistended     (  ) distended   |   (  ) +BS     (  ) hypoactive bowel sounds     (  ) hyperactive bowel sounds  ( X ) nontender     (  ) RUQ tenderness     (  ) RLQ tenderness     (  ) LLQ tenderness     (  ) epigastric tenderness     (  ) diffuse tenderness  (  ) Splenomegaly      (  ) Hepatomegaly      (  ) Jaundice     (  ) ecchymosis     EXTREMITIES:  ( X ) Normal     (  ) Rash     (  ) ecchymosis     (  ) varicose veins      (  ) pitting edema     (  ) non-pitting edema   (  ) ulceration     (  ) gangrene:     (location:     )    NERVOUS SYSTEM: non focal, opens eyes to verbal stimuli, No Babinski, not oriented   (  ) A&Ox3     (  ) confused     (  ) lethargic  CN II-XII:     (  ) Intact     (  ) deficits found     (Specify:     )   Upper extremities:     (  ) no sensorimotor deficits     (  ) weakness     (  ) loss of proprioception/vibration     (  ) loss of touch/temperature (specify:    )  Lower extremities:     (  ) no sensorimotor deficits     (  ) weakness     (  ) loss of proprioception/vibration     (  ) loss of touch/temperature (specify:    )    LABS:                        12.0   3.90  )-----------( 200      ( 12 Sep 2023 05:47 )             39.5     09-12    145  |  107  |  24<H>  ----------------------------<  90  3.5   |  25  |  1.0    Ca    9.5      12 Sep 2023 05:47  Phos  3.2     09-12  Mg     2.2     09-11    TPro  6.6  /  Alb  3.1<L>  /  TBili  0.5  /  DBili  x   /  AST  27  /  ALT  17  /  AlkPhos  107  09-12

## 2023-09-13 LAB
-  AMPICILLIN: SIGNIFICANT CHANGE UP
-  CIPROFLOXACIN: SIGNIFICANT CHANGE UP
-  LEVOFLOXACIN: SIGNIFICANT CHANGE UP
-  NITROFURANTOIN: SIGNIFICANT CHANGE UP
-  TETRACYCLINE: SIGNIFICANT CHANGE UP
-  VANCOMYCIN: SIGNIFICANT CHANGE UP
ALBUMIN SERPL ELPH-MCNC: 3.2 G/DL — LOW (ref 3.5–5.2)
ALP SERPL-CCNC: 96 U/L — SIGNIFICANT CHANGE UP (ref 30–115)
ALT FLD-CCNC: 17 U/L — SIGNIFICANT CHANGE UP (ref 0–41)
ANION GAP SERPL CALC-SCNC: 14 MMOL/L — SIGNIFICANT CHANGE UP (ref 7–14)
AST SERPL-CCNC: 34 U/L — SIGNIFICANT CHANGE UP (ref 0–41)
BASOPHILS # BLD AUTO: 0.06 K/UL — SIGNIFICANT CHANGE UP (ref 0–0.2)
BASOPHILS NFR BLD AUTO: 1.5 % — HIGH (ref 0–1)
BILIRUB SERPL-MCNC: 0.4 MG/DL — SIGNIFICANT CHANGE UP (ref 0.2–1.2)
BUN SERPL-MCNC: 19 MG/DL — SIGNIFICANT CHANGE UP (ref 10–20)
CALCIUM SERPL-MCNC: 9.1 MG/DL — SIGNIFICANT CHANGE UP (ref 8.4–10.5)
CHLORIDE SERPL-SCNC: 107 MMOL/L — SIGNIFICANT CHANGE UP (ref 98–110)
CO2 SERPL-SCNC: 24 MMOL/L — SIGNIFICANT CHANGE UP (ref 17–32)
CREAT SERPL-MCNC: 0.9 MG/DL — SIGNIFICANT CHANGE UP (ref 0.7–1.5)
CULTURE RESULTS: SIGNIFICANT CHANGE UP
EGFR: 90 ML/MIN/1.73M2 — SIGNIFICANT CHANGE UP
EOSINOPHIL # BLD AUTO: 0.37 K/UL — SIGNIFICANT CHANGE UP (ref 0–0.7)
EOSINOPHIL NFR BLD AUTO: 9.5 % — HIGH (ref 0–8)
GLUCOSE SERPL-MCNC: 101 MG/DL — HIGH (ref 70–99)
HCT VFR BLD CALC: 39.2 % — LOW (ref 42–52)
HGB BLD-MCNC: 12 G/DL — LOW (ref 14–18)
IMM GRANULOCYTES NFR BLD AUTO: 0.3 % — SIGNIFICANT CHANGE UP (ref 0.1–0.3)
LYMPHOCYTES # BLD AUTO: 0.78 K/UL — LOW (ref 1.2–3.4)
LYMPHOCYTES # BLD AUTO: 20.1 % — LOW (ref 20.5–51.1)
MCHC RBC-ENTMCNC: 26.5 PG — LOW (ref 27–31)
MCHC RBC-ENTMCNC: 30.6 G/DL — LOW (ref 32–37)
MCV RBC AUTO: 86.5 FL — SIGNIFICANT CHANGE UP (ref 80–94)
METHOD TYPE: SIGNIFICANT CHANGE UP
MONOCYTES # BLD AUTO: 0.52 K/UL — SIGNIFICANT CHANGE UP (ref 0.1–0.6)
MONOCYTES NFR BLD AUTO: 13.4 % — HIGH (ref 1.7–9.3)
NEUTROPHILS # BLD AUTO: 2.14 K/UL — SIGNIFICANT CHANGE UP (ref 1.4–6.5)
NEUTROPHILS NFR BLD AUTO: 55.2 % — SIGNIFICANT CHANGE UP (ref 42.2–75.2)
NRBC # BLD: 0 /100 WBCS — SIGNIFICANT CHANGE UP (ref 0–0)
ORGANISM # SPEC MICROSCOPIC CNT: SIGNIFICANT CHANGE UP
ORGANISM # SPEC MICROSCOPIC CNT: SIGNIFICANT CHANGE UP
PLATELET # BLD AUTO: 209 K/UL — SIGNIFICANT CHANGE UP (ref 130–400)
PMV BLD: 9.3 FL — SIGNIFICANT CHANGE UP (ref 7.4–10.4)
POTASSIUM SERPL-MCNC: 3.6 MMOL/L — SIGNIFICANT CHANGE UP (ref 3.5–5)
POTASSIUM SERPL-SCNC: 3.6 MMOL/L — SIGNIFICANT CHANGE UP (ref 3.5–5)
PROT SERPL-MCNC: 6.5 G/DL — SIGNIFICANT CHANGE UP (ref 6–8)
RBC # BLD: 4.53 M/UL — LOW (ref 4.7–6.1)
RBC # FLD: SIGNIFICANT CHANGE UP % (ref 11.5–14.5)
SODIUM SERPL-SCNC: 145 MMOL/L — SIGNIFICANT CHANGE UP (ref 135–146)
SPECIMEN SOURCE: SIGNIFICANT CHANGE UP
WBC # BLD: 3.88 K/UL — LOW (ref 4.8–10.8)
WBC # FLD AUTO: 3.88 K/UL — LOW (ref 4.8–10.8)

## 2023-09-13 PROCEDURE — 99232 SBSQ HOSP IP/OBS MODERATE 35: CPT

## 2023-09-13 RX ORDER — AMOXICILLIN 250 MG/5ML
500 SUSPENSION, RECONSTITUTED, ORAL (ML) ORAL EVERY 8 HOURS
Refills: 0 | Status: DISCONTINUED | OUTPATIENT
Start: 2023-09-13 | End: 2023-09-15

## 2023-09-13 RX ORDER — PIPERACILLIN AND TAZOBACTAM 4; .5 G/20ML; G/20ML
3.38 INJECTION, POWDER, LYOPHILIZED, FOR SOLUTION INTRAVENOUS EVERY 8 HOURS
Refills: 0 | Status: DISCONTINUED | OUTPATIENT
Start: 2023-09-13 | End: 2023-09-13

## 2023-09-13 RX ADMIN — PIPERACILLIN AND TAZOBACTAM 25 GRAM(S): 4; .5 INJECTION, POWDER, LYOPHILIZED, FOR SOLUTION INTRAVENOUS at 06:08

## 2023-09-13 RX ADMIN — QUETIAPINE FUMARATE 12.5 MILLIGRAM(S): 200 TABLET, FILM COATED ORAL at 21:01

## 2023-09-13 RX ADMIN — Medication 500 MILLIGRAM(S): at 21:01

## 2023-09-13 RX ADMIN — TAMSULOSIN HYDROCHLORIDE 0.4 MILLIGRAM(S): 0.4 CAPSULE ORAL at 21:00

## 2023-09-13 RX ADMIN — Medication 2 MILLIGRAM(S): at 20:58

## 2023-09-13 RX ADMIN — MEMANTINE HYDROCHLORIDE 5 MILLIGRAM(S): 10 TABLET ORAL at 06:08

## 2023-09-13 RX ADMIN — Medication 0.5 MILLIGRAM(S): at 18:49

## 2023-09-13 RX ADMIN — ENOXAPARIN SODIUM 40 MILLIGRAM(S): 100 INJECTION SUBCUTANEOUS at 11:31

## 2023-09-13 RX ADMIN — PANTOPRAZOLE SODIUM 40 MILLIGRAM(S): 20 TABLET, DELAYED RELEASE ORAL at 11:30

## 2023-09-13 NOTE — PROGRESS NOTE ADULT - ASSESSMENT
73-year-old male with PMH of advanced dementia, A&O x0 at baseline, esophageal cancer status post chemo/radiation/surgery 2017, prostate cancer status post CyberKnife 2020, lung cancer status post surgical resection 2019, now in full remission with no active therapies, brought in by daughter for evaluation of altered mental status and aggression at home over the last 2 weeks.     Impressions:   #Fever - rule out UTI - Urine Cx E faecalis  #Metabolic Encephalopathy  #Dementia  #Esophageal Cancer s/p chemo/radiation/surgery  #Prostate cancer s/p cyberknife  #Lung cancer s/p resection 2019     Recommendations:  - switch zosyn to amoxicillin 500 mg q 8 hours for E faecalis UTI   - end date 9/17    Please call or message on [employer] Teams if with any questions.  Spectra [phone].

## 2023-09-13 NOTE — PROGRESS NOTE ADULT - ASSESSMENT
73 year old with advanced dementia recently started on a new agent brought in for 3 day hx of agitated delirium with worsening cognition, disorganization and now recent history of mechanical falls     #Advanced dementia   - off rexulti (was recently started but he had more agitation)- neurologist Dr Scherer [phone]  - Neuropsych eval (9/11):  Given patient’s Major Neurocognitive Disorder, he should be supervised and receive assistance in all activities of daily living/instrumental activities of daily living, and should not be left alone.  He will benefit from redirection, assistance from 24-hr aide noted to be present in the patient’s home, and reduced environmental distractions.  -9/11: PT recommend subacute rehab v home PT 2-3x/week  - patient somnolent today -possible medication effect -will skip serequol today (9/12)  - usually receives ativan (0.5 am and afternoon, and 2mg at night) but withheld today (9/12) due to somnolence     # recent UTI and covid (18 july)  - not hypoxic now, comfortable  - UA positive -> F up cx  - RVP: negative  - Patient was febrile 101.7 on 9/10-> tazocin Day 3 to cover Enterococcus faecalis found in urine (7/16/23)- back then it was asymptomatic so was not treated  - ID cnsult (9/11):  - No other clear alternative source for fever  - continue zosyn 3.375 mg q 8 hours   - follow-up urine Cx   - trend fever curve     # hx of esophageal cancer status post chemo/radiation/surgery 2017, prostate cancer status post CyberKnife 2020, lung cancer status post surgical resection 2019  outpt fu    # Elevated Alk phos  - Bilirubin and GGT normal   -US gallblader (9/11): Cholelithiasis. No obvious cholecystitis.  -US Kidney and bladder (9/11): Non-obstructing right renal calculus. Bladder calculus.    # MARITZA  - Increase in creatinine from 0.8 to 1.1  -9/12: Cr improving (1.0) but still not at baseline, will continue with gentle hydration   73 year old with advanced dementia recently started on a new agent brought in for 3 day hx of agitated delirium with worsening cognition, disorganization and now recent history of mechanical falls     #Advanced dementia   - off rexulti (was recently started but he had more agitation)- neurologist Dr Scherer [phone]  - Neuropsych eval (9/11):  Given patient’s Major Neurocognitive Disorder, he should be supervised and receive assistance in all activities of daily living/instrumental activities of daily living, and should not be left alone.  He will benefit from redirection, assistance from 24-hr aide noted to be present in the patient’s home, and reduced environmental distractions.  -9/11: PT recommend subacute rehab v home PT 2-3x/week  -9/12 Seroquel dose reduced to 12.5mg bc patient appeared somnolent   -9/12 Ativan changed to PRN    # recent UTI and covid (18 july)  - not hypoxic now, comfortable  - UA positive  - RVP: negative  - Patient was febrile 101.7 on 9/10-> tazocin Day 4 to cover Enterococcus faecalis found in urine (7/16/23)- back then it was asymptomatic so was not treated  - ID cnsult (9/11):  - No other clear alternative source for fever  - continue zosyn 3.375 mg q 8 hours   - follow-up urine Cx   - trend fever curve   -UCX from 9/9 shows >100,000 CFU/ml Enterococcus faecalis, f/u susceptibility --> will f/u with ID after susceptibility results available     # hx of esophageal cancer status post chemo/radiation/surgery 2017, prostate cancer status post CyberKnife 2020, lung cancer status post surgical resection 2019  outpt fu    # Elevated Alk phos  - Bilirubin and GGT normal   -US gallblader (9/11): Cholelithiasis. No obvious cholecystitis.  -US Kidney and bladder (9/11): Non-obstructing right renal calculus. Bladder calculus.  -9/12 started Tamsulosin    # MARITZA  - Increase in creatinine from 0.8 to 1.1, given fluids on 9/12  -9/13 Cr improving (0.9) -d/c fluids, will resume if patient is not eating

## 2023-09-13 NOTE — PROGRESS NOTE ADULT - SUBJECTIVE AND OBJECTIVE BOX
24H events:    Patient is a 73y old Male who presents with a chief complaint of Fall    Primary diagnosis of Altered mental status    Today is hospital day 6d. This morning patient was seen and examined at bedside, resting comfortably in bed.    No acute or major events overnight.    PAST MEDICAL & SURGICAL HISTORY  Hypertension, unspecified type    Cancer  ESOPHAGEAL CANCER 2017 RECEIVED CHEMO AND RADIATION and endoscopic resection partial oesophagus and stomach    Renal calculi  nephrostomy tube -6/2021    Cancer  left lung 2018, no chemo or rad rx and resection    Abnormal cholesterol test    Prostate cancer    [place] (hard of hearing)    Alzheimer disease    Cancer of esophagus  2017 RESECTION OF LOWER PORTION OF ESOPHAGUS    S/P lobectomy of lung  left ?JUNE 2018    S/P cystoscopy with ureteral stent placement  LEFT    Inguinal hernia  AS AN INFANT, RIGHT SIDE    History of esophagogastroduodenoscopy (EGD)  2019    H/O colonoscopy  2018    Prostate cancer  cyber knife intervention 2021    ALLERGIES:  No Known Allergies    MEDICATIONS:  STANDING MEDICATIONS  dextrose 5% + lactated ringers. 1000 milliLiter(s) IV Continuous <Continuous>  enoxaparin Injectable 40 milliGRAM(s) SubCutaneous every 24 hours  memantine 5 milliGRAM(s) Oral two times a day  pantoprazole    Tablet 40 milliGRAM(s) Oral daily  QUEtiapine 12.5 milliGRAM(s) Oral at bedtime  tamsulosin 0.4 milliGRAM(s) Oral at bedtime    PRN MEDICATIONS  acetaminophen     Tablet .. 650 milliGRAM(s) Oral every 6 hours PRN  LORazepam     Tablet 0.5 milliGRAM(s) Oral <User Schedule> PRN  LORazepam     Tablet 2 milliGRAM(s) Oral <User Schedule> PRN    VITALS:   T(F): 96.7  HR: 64  BP: 137/67  RR: 18  SpO2: 99%    PHYSICAL EXAM:  GENERAL:   (  ) NAD, lying in bed comfortably     (  ) obtunded     (  ) lethargic     (  ) somnolent    HEAD:   (  ) Atraumatic     (  ) hematoma     (  ) laceration (specify location:       )     NECK:  (  ) Supple     (  ) neck stiffness     (  ) nuchal rigidity     (  )  no JVD     (  ) JVD present ( -- cm)    HEART:  Rate -->     (  ) normal rate     (  ) bradycardic     (  ) tachycardic  Rhythm -->     (  ) regular     (  ) regularly irregular     (  ) irregularly irregular  Murmurs -->     (  ) normal s1s2     (  ) systolic murmur     (  ) diastolic murmur     (  ) continuous murmur      (  ) S3 present     (  ) S4 present    LUNGS:   (  )Unlabored respirations     (  ) tachypnea  (  ) B/L air entry     (  ) decreased breath sounds in:  (location     )    (  ) no adventitious sound     (  ) crackles     (  ) wheezing      (  ) rhonchi      (specify location:       )  (  ) chest wall tenderness (specify location:       )    ABDOMEN:   (  ) Soft     (  ) tense   |   (  ) nondistended     (  ) distended   |   (  ) +BS     (  ) hypoactive bowel sounds     (  ) hyperactive bowel sounds  (  ) nontender     (  ) RUQ tenderness     (  ) RLQ tenderness     (  ) LLQ tenderness     (  ) epigastric tenderness     (  ) diffuse tenderness  (  ) Splenomegaly      (  ) Hepatomegaly      (  ) Jaundice     (  ) ecchymosis     EXTREMITIES:  (  ) Normal     (  ) Rash     (  ) ecchymosis     (  ) varicose veins      (  ) pitting edema     (  ) non-pitting edema   (  ) ulceration     (  ) gangrene:     (location:     )    NERVOUS SYSTEM:    (  ) A&Ox3     (  ) confused     (  ) lethargic  CN II-XII:     (  ) Intact     (  ) deficits found     (Specify:     )   Upper extremities:     (  ) no sensorimotor deficits     (  ) weakness     (  ) loss of proprioception/vibration     (  ) loss of touch/temperature (specify:    )  Lower extremities:     (  ) no sensorimotor deficits     (  ) weakness     (  ) loss of proprioception/vibration     (  ) loss of touch/temperature (specify:    )    SKIN:   (  ) No rashes or lesions     (  ) maculopapular rash     (  ) pustules     (  ) vesicles     (  ) ulcer     (  ) ecchymosis     (specify location:     )    LABS:                        12.0   3.90  )-----------( 200      ( 12 Sep 2023 05:47 )             39.5     09-12    145  |  107  |  24<H>  ----------------------------<  90  3.5   |  25  |  1.0    Ca    9.5      12 Sep 2023 05:47  Phos  3.2     09-12    TPro  6.6  /  Alb  3.1<L>  /  TBili  0.5  /  DBili  x   /  AST  27  /  ALT  17  /  AlkPhos  107  09-12 24H events:    Patient is a 73y old Male who presents with a chief complaint of Fall    Primary diagnosis of Altered mental status    Today is hospital day 6d. This morning patient was seen and examined at bedside, resting comfortably in bed. He is more awake than yesterday.     PAST MEDICAL & SURGICAL HISTORY  Hypertension, unspecified type    Cancer  ESOPHAGEAL CANCER 2017 RECEIVED CHEMO AND RADIATION and endoscopic resection partial oesophagus and stomach    Renal calculi  nephrostomy tube -6/2021    Cancer  left lung 2018, no chemo or rad rx and resection    Abnormal cholesterol test    Prostate cancer    [place] (hard of hearing)    Alzheimer disease    Cancer of esophagus  2017 RESECTION OF LOWER PORTION OF ESOPHAGUS    S/P lobectomy of lung  left ?JUNE 2018    S/P cystoscopy with ureteral stent placement  LEFT    Inguinal hernia  AS AN INFANT, RIGHT SIDE    History of esophagogastroduodenoscopy (EGD)  2019    H/O colonoscopy  2018    Prostate cancer  cyber knife intervention 2021    ALLERGIES:  No Known Allergies    MEDICATIONS:  STANDING MEDICATIONS  dextrose 5% + lactated ringers. 1000 milliLiter(s) IV Continuous <Continuous>  enoxaparin Injectable 40 milliGRAM(s) SubCutaneous every 24 hours  memantine 5 milliGRAM(s) Oral two times a day  pantoprazole    Tablet 40 milliGRAM(s) Oral daily  QUEtiapine 12.5 milliGRAM(s) Oral at bedtime  tamsulosin 0.4 milliGRAM(s) Oral at bedtime    PRN MEDICATIONS  acetaminophen     Tablet .. 650 milliGRAM(s) Oral every 6 hours PRN  LORazepam     Tablet 0.5 milliGRAM(s) Oral <User Schedule> PRN  LORazepam     Tablet 2 milliGRAM(s) Oral <User Schedule> PRN    VITALS:   T(F): 96.7  HR: 64  BP: 137/67  RR: 18  SpO2: 99%    PHYSICAL EXAM:  GENERAL: more awake than yesterday  ( X ) NAD, lying in bed comfortably     (  ) obtunded     (  ) lethargic     (  ) somnolent    HEAD:   ( X ) Atraumatic     (  ) hematoma     (  ) laceration (specify location:       )     NECK:  ( X ) Supple     (  ) neck stiffness     (  ) nuchal rigidity     (  )  no JVD     (  ) JVD present ( -- cm)    HEART:  Rate -->     ( X ) normal rate     (  ) bradycardic     (  ) tachycardic  Rhythm -->     ( X ) regular     (  ) regularly irregular     (  ) irregularly irregular  Murmurs -->     ( X ) normal s1s2     (  ) systolic murmur     (  ) diastolic murmur     (  ) continuous murmur      (  ) S3 present     (  ) S4 present    LUNGS:   (X )Unlabored respirations     (  ) tachypnea  ( X ) B/L air entry     (  ) decreased breath sounds in:  (location     )    (  ) no adventitious sound     (  ) crackles     (  ) wheezing      (  ) rhonchi      (specify location:       )  (  ) chest wall tenderness (specify location:       )    ABDOMEN:   (X  ) Soft     (  ) tense   |   ( X ) nondistended     (  ) distended   |   (  ) +BS     (  ) hypoactive bowel sounds     (  ) hyperactive bowel sounds  ( X ) nontender     (  ) RUQ tenderness     (  ) RLQ tenderness     (  ) LLQ tenderness     (  ) epigastric tenderness     (  ) diffuse tenderness  (  ) Splenomegaly      (  ) Hepatomegaly      (  ) Jaundice     (  ) ecchymosis     EXTREMITIES:  (X  ) Normal     (  ) Rash     (  ) ecchymosis     (  ) varicose veins      (  ) pitting edema     (  ) non-pitting edema   (  ) ulceration     (  ) gangrene:     (location:     )    NERVOUS SYSTEM: patient is confused but more awake than yesterday (A&O x0 at baseline)  (  ) A&Ox3     (  ) confused     (  ) lethargic  CN II-XII:     (  ) Intact     (  ) deficits found     (Specify:     )   Upper extremities:     (  ) no sensorimotor deficits     (  ) weakness     (  ) loss of proprioception/vibration     (  ) loss of touch/temperature (specify:    )  Lower extremities:     (  ) no sensorimotor deficits     (  ) weakness     (  ) loss of proprioception/vibration     (  ) loss of touch/temperature (specify:    )    LABS:                        12.0   3.90  )-----------( 200      ( 12 Sep 2023 05:47 )             39.5     09-12    145  |  107  |  24<H>  ----------------------------<  90  3.5   |  25  |  1.0    Ca    9.5      12 Sep 2023 05:47  Phos  3.2     09-12    TPro  6.6  /  Alb  3.1<L>  /  TBili  0.5  /  DBili  x   /  AST  27  /  ALT  17  /  AlkPhos  107  09-12

## 2023-09-13 NOTE — PROGRESS NOTE ADULT - SUBJECTIVE AND OBJECTIVE BOX
DAVYSERINA PHAN  73y, Male  Allergy: No Known Allergies      LOS  6d    CHIEF COMPLAINT: Fall (13 Sep 2023 06:47)      INTERVAL EVENTS/HPI  - No acute events overnight  - T(F): , Max: 96.8 (09-12-23 @ 14:18)  - WBC Count: 3.88 (09-13-23 @ 04:30)  WBC Count: 3.90 (09-12-23 @ 05:47)     - Creatinine: 0.9 (09-13-23 @ 04:30)  Creatinine: 1.0 (09-12-23 @ 05:47)       ROS  unable to obtain history secondary to patient's mental status and/or sedation      VITALS:  T(F): 96.7, Max: 96.8 (09-12-23 @ 14:18)  HR: 64  BP: 137/67  RR: 18Vital Signs Last 24 Hrs  T(C): 35.9 (13 Sep 2023 05:00), Max: 36 (12 Sep 2023 14:18)  T(F): 96.7 (13 Sep 2023 05:00), Max: 96.8 (12 Sep 2023 14:18)  HR: 64 (13 Sep 2023 05:00) (60 - 68)  BP: 137/67 (13 Sep 2023 05:00) (119/80 - 137/67)  BP(mean): --  RR: 18 (13 Sep 2023 05:00) (18 - 18)  SpO2: 99% (13 Sep 2023 05:00) (98% - 99%)    Parameters below as of 12 Sep 2023 20:30  Patient On (Oxygen Delivery Method): room air        PHYSICAL EXAM:  Gen: NAD, resting in bed  HEENT: Normocephalic, atraumatic  Neck: supple, no lymphadenopathy  CV: Regular rate & regular rhythm  Lungs: decreased BS at bases, no fremitus  Abdomen: Soft, BS present  Ext: Warm, well perfused  Neuro: non focal, awake  Skin: no rash, no erythema  Lines: no phlebitis    FH: Non-contributory  Social Hx: Non-contributory    TESTS & MEASUREMENTS:                        12.0   3.88  )-----------( 209      ( 13 Sep 2023 04:30 )             39.2     09-13    145  |  107  |  19  ----------------------------<  101<H>  3.6   |  24  |  0.9    Ca    9.1      13 Sep 2023 04:30  Phos  3.2     09-12    TPro  6.5  /  Alb  3.2<L>  /  TBili  0.4  /  DBili  x   /  AST  34  /  ALT  17  /  AlkPhos  96  09-13      LIVER FUNCTIONS - ( 13 Sep 2023 04:30 )  Alb: 3.2 g/dL / Pro: 6.5 g/dL / ALK PHOS: 96 U/L / ALT: 17 U/L / AST: 34 U/L / GGT: x           Urinalysis Basic - ( 13 Sep 2023 04:30 )    Color: x / Appearance: x / SG: x / pH: x  Gluc: 101 mg/dL / Ketone: x  / Bili: x / Urobili: x   Blood: x / Protein: x / Nitrite: x   Leuk Esterase: x / RBC: x / WBC x   Sq Epi: x / Non Sq Epi: x / Bacteria: x        Culture - Urine (collected 09-09-23 @ 17:02)  Source: Clean Catch Clean Catch (Midstream)  Preliminary Report (09-12-23 @ 22:11):    >100,000 CFU/ml Enterococcus faecalis            INFECTIOUS DISEASES TESTING  Rapid RVP Result: NotDetec (09-09-23 @ 17:02)  COVID-19 PCR: Detected (07-18-23 @ 17:34)      INFLAMMATORY MARKERS      RADIOLOGY & ADDITIONAL TESTS:  I have personally reviewed the last available Chest xray  CXR      CT      CARDIOLOGY TESTING      MEDICATIONS  enoxaparin Injectable 40 SubCutaneous every 24 hours  memantine 5 Oral two times a day  pantoprazole    Tablet 40 Oral daily  piperacillin/tazobactam IVPB.. 3.375 IV Intermittent every 8 hours  QUEtiapine 12.5 Oral at bedtime  tamsulosin 0.4 Oral at bedtime      WEIGHT  Weight (kg): 60.4 (09-07-23 @ 23:46)  Creatinine: 0.9 mg/dL (09-13-23 @ 04:30)      ANTIBIOTICS:  piperacillin/tazobactam IVPB.. 3.375 Gram(s) IV Intermittent every 8 hours      All available historical records have been reviewed

## 2023-09-13 NOTE — PROGRESS NOTE ADULT - ASSESSMENT
73-year-old male with PMH of advanced dementia, A&O x0 at baseline, esophageal cancer status post chemo/radiation/surgery 2017, prostate cancer status post CyberKnife 2020, lung cancer status post surgical resection 2019, now in full remission with no active therapies, brought in by daughter for evaluation of altered mental status and aggression at home over the last 2 weeks. She states he was recently hospitalized 2 months ago symptomatic anemia course complicated by uti and covid  and then sent to rehabilitation center, discharged home 2 weeks ago.  Patient lives upstairs from his daughter and her  alone but has 24-hour aide.  Ambulates with walker.  She states over the last 2 weeks he is becoming more increasingly difficult to handle, over the last 2 days has been aggressive and today was attempting to climb onto his TV stand.  He had to be physically restrained by the aide.  Had 2 episodes yesterday where he was struggling to climb onto things and then fell onto his bilateral knees.  Events were witnessed by aide who reported to the daughter that he had no head trauma or LOC and was ambulatory after the falls.  Daughter reports patient has a dry cough over the last few days. No known fever, vomiting, diarrhea, rash.  Patient is unable to contribute further history or review of systems. Of note she believes that his behavior and mental status became worse  after starting this new medication.   In the ed chest x ray performed pending read     #Advanced dementia   - off rexulti (was recently started but he had more agitation)- neurologist Dr Scherer [phone]  - Neuropsych eval (9/11):  Given patient’s Major Neurocognitive Disorder, he should be supervised and receive assistance in all activities of daily living/instrumental activities of daily living, and should not be left alone.  He will benefit from redirection, assistance from 24-hr aide noted to be present in the patient’s home, and reduced environmental distractions.  -9/11: PT recommend subacute rehab v home PT 2-3x/week  -9/12 Seroquel dose reduced to 12.5mg bc patient appeared somnolent   -9/12 Ativan changed to PRN    # recent UTI and covid   - not hypoxic now, comfortable  - UA positive  - RVP: negative  - Patient was febrile 101.7 on 9/10-> tazocin Day 4 to cover Enterococcus faecalis found in urine (7/16/23)- back then it was asymptomatic so was not treated  - currently on  zosyn 3.375 mg q 8 hours  >> can be switched to Po Amoxicillin till 09/17  as per ID   -UCX from 9/9 shows >100,000 CFU/ml Enterococcus faecalis    # hx of esophageal cancer status post chemo/radiation/surgery 2017, prostate cancer status post CyberKnife 2020, lung cancer status post surgical resection 2019  outpt fu    # Elevated Alk phos  - Bilirubin and GGT normal   -US gallblader (9/11): Cholelithiasis. No obvious cholecystitis.  -US Kidney and bladder (9/11): Non-obstructing right renal calculus. Bladder calculus.  -9/12 started Tamsulosin    # Acute Kidney Injury likely pre renal   - Increase in creatinine from 0.8 to 1.1, given fluids on 9/12  -9/13 Cr improving (0.9) -d/c fluids, will resume if patient is not eating     Awaiting Hospice input.

## 2023-09-13 NOTE — HOSPICE CARE NOTE - CONVESATION DETAILS
Hospice referral completed. Phone call to patient's daughter Ric, reviewed hospice services. Ric is receptive to home hospice once for her dad once his IV antibiotics are finished. Hospice to remain available upon discharge. Patient will need DME prior to discharge.

## 2023-09-13 NOTE — PROGRESS NOTE ADULT - SUBJECTIVE AND OBJECTIVE BOX
MADELINE SERINA  73y  Male      Patient is a 73y old  Male who presents with a chief complaint of Fall     INTERVAL HPI/OVERNIGHT EVENTS:      ******************************* REVIEW OF SYSTEMS:**********************************************    All other review of systems negative    *********************** VITALS ******************************************    T(F): 98.7 (09-13-23 @ 13:30)  HR: 80 (09-13-23 @ 13:30) (60 - 80)  BP: 130/70 (09-13-23 @ 13:30) (127/80 - 137/67)  RR: 18 (09-13-23 @ 05:00) (18 - 18)  SpO2: 99% (09-13-23 @ 13:30) (99% - 99%)            ******************************** PHYSICAL EXAM:**************************************************  GENERAL: NAD    PSYCH: no agitation, baseline mentation  HEENT:     NERVOUS SYSTEM:  Alert & Oriented X0-1    PULMONARY: ANDI, CTA    CARDIOVASCULAR: S1S2 RRR    GI: Soft, NT, ND; BS present.    EXTREMITIES:  2+ Peripheral Pulses, No clubbing, cyanosis, or edema    LYMPH: No lymphadenopathy noted    SKIN: No rashes or lesions      **************************** LABS *******************************************************                          12.0   3.88  )-----------( 209      ( 13 Sep 2023 04:30 )             39.2     09-13    145  |  107  |  19  ----------------------------<  101<H>  3.6   |  24  |  0.9    Ca    9.1      13 Sep 2023 04:30  Phos  3.2     09-12    TPro  6.5  /  Alb  3.2<L>  /  TBili  0.4  /  DBili  x   /  AST  34  /  ALT  17  /  AlkPhos  96  09-13      Urinalysis Basic - ( 13 Sep 2023 04:30 )    Color: x / Appearance: x / SG: x / pH: x  Gluc: 101 mg/dL / Ketone: x  / Bili: x / Urobili: x   Blood: x / Protein: x / Nitrite: x   Leuk Esterase: x / RBC: x / WBC x   Sq Epi: x / Non Sq Epi: x / Bacteria: x        Lactate Trend        CAPILLARY BLOOD GLUCOSE      POCT Blood Glucose.: 92 mg/dL (12 Sep 2023 11:21)          **************************Active Medications *******************************************  No Known Allergies      acetaminophen     Tablet .. 650 milliGRAM(s) Oral every 6 hours PRN  amoxicillin 500 milliGRAM(s) Oral every 8 hours  enoxaparin Injectable 40 milliGRAM(s) SubCutaneous every 24 hours  LORazepam     Tablet 0.5 milliGRAM(s) Oral <User Schedule> PRN  LORazepam     Tablet 2 milliGRAM(s) Oral <User Schedule> PRN  memantine 5 milliGRAM(s) Oral two times a day  pantoprazole    Tablet 40 milliGRAM(s) Oral daily  QUEtiapine 12.5 milliGRAM(s) Oral at bedtime  tamsulosin 0.4 milliGRAM(s) Oral at bedtime      ***************************************************  RADIOLOGY & ADDITIONAL TESTS:    Imaging Personally Reviewed:  [ ] YES  [ ] NO    HEALTH ISSUES - PROBLEM Dx:

## 2023-09-14 ENCOUNTER — TRANSCRIPTION ENCOUNTER (OUTPATIENT)
Age: 73
End: 2023-09-14

## 2023-09-14 LAB
ALBUMIN SERPL ELPH-MCNC: 3.5 G/DL — SIGNIFICANT CHANGE UP (ref 3.5–5.2)
ALP SERPL-CCNC: 98 U/L — SIGNIFICANT CHANGE UP (ref 30–115)
ALT FLD-CCNC: 18 U/L — SIGNIFICANT CHANGE UP (ref 0–41)
ANION GAP SERPL CALC-SCNC: 12 MMOL/L — SIGNIFICANT CHANGE UP (ref 7–14)
AST SERPL-CCNC: 30 U/L — SIGNIFICANT CHANGE UP (ref 0–41)
BASOPHILS # BLD AUTO: 0.06 K/UL — SIGNIFICANT CHANGE UP (ref 0–0.2)
BASOPHILS NFR BLD AUTO: 1.7 % — HIGH (ref 0–1)
BILIRUB SERPL-MCNC: 0.3 MG/DL — SIGNIFICANT CHANGE UP (ref 0.2–1.2)
BUN SERPL-MCNC: 20 MG/DL — SIGNIFICANT CHANGE UP (ref 10–20)
CALCIUM SERPL-MCNC: 9.6 MG/DL — SIGNIFICANT CHANGE UP (ref 8.4–10.5)
CHLORIDE SERPL-SCNC: 110 MMOL/L — SIGNIFICANT CHANGE UP (ref 98–110)
CO2 SERPL-SCNC: 26 MMOL/L — SIGNIFICANT CHANGE UP (ref 17–32)
CREAT SERPL-MCNC: 0.8 MG/DL — SIGNIFICANT CHANGE UP (ref 0.7–1.5)
EGFR: 93 ML/MIN/1.73M2 — SIGNIFICANT CHANGE UP
EOSINOPHIL # BLD AUTO: 0.27 K/UL — SIGNIFICANT CHANGE UP (ref 0–0.7)
EOSINOPHIL NFR BLD AUTO: 7.6 % — SIGNIFICANT CHANGE UP (ref 0–8)
GLUCOSE SERPL-MCNC: 94 MG/DL — SIGNIFICANT CHANGE UP (ref 70–99)
HCT VFR BLD CALC: 40.9 % — LOW (ref 42–52)
HGB BLD-MCNC: 12.2 G/DL — LOW (ref 14–18)
IMM GRANULOCYTES NFR BLD AUTO: 0.3 % — SIGNIFICANT CHANGE UP (ref 0.1–0.3)
LYMPHOCYTES # BLD AUTO: 0.76 K/UL — LOW (ref 1.2–3.4)
LYMPHOCYTES # BLD AUTO: 21.3 % — SIGNIFICANT CHANGE UP (ref 20.5–51.1)
MAGNESIUM SERPL-MCNC: 2.1 MG/DL — SIGNIFICANT CHANGE UP (ref 1.8–2.4)
MCHC RBC-ENTMCNC: 25.3 PG — LOW (ref 27–31)
MCHC RBC-ENTMCNC: 29.8 G/DL — LOW (ref 32–37)
MCV RBC AUTO: 84.9 FL — SIGNIFICANT CHANGE UP (ref 80–94)
MONOCYTES # BLD AUTO: 0.45 K/UL — SIGNIFICANT CHANGE UP (ref 0.1–0.6)
MONOCYTES NFR BLD AUTO: 12.6 % — HIGH (ref 1.7–9.3)
NEUTROPHILS # BLD AUTO: 2.02 K/UL — SIGNIFICANT CHANGE UP (ref 1.4–6.5)
NEUTROPHILS NFR BLD AUTO: 56.5 % — SIGNIFICANT CHANGE UP (ref 42.2–75.2)
NRBC # BLD: 0 /100 WBCS — SIGNIFICANT CHANGE UP (ref 0–0)
PLATELET # BLD AUTO: 179 K/UL — SIGNIFICANT CHANGE UP (ref 130–400)
PMV BLD: SIGNIFICANT CHANGE UP (ref 7.4–10.4)
POTASSIUM SERPL-MCNC: 4.2 MMOL/L — SIGNIFICANT CHANGE UP (ref 3.5–5)
POTASSIUM SERPL-SCNC: 4.2 MMOL/L — SIGNIFICANT CHANGE UP (ref 3.5–5)
PROT SERPL-MCNC: 6.8 G/DL — SIGNIFICANT CHANGE UP (ref 6–8)
RBC # BLD: 4.82 M/UL — SIGNIFICANT CHANGE UP (ref 4.7–6.1)
RBC # FLD: 25.4 % — HIGH (ref 11.5–14.5)
SODIUM SERPL-SCNC: 148 MMOL/L — HIGH (ref 135–146)
WBC # BLD: 3.57 K/UL — LOW (ref 4.8–10.8)
WBC # FLD AUTO: 3.57 K/UL — LOW (ref 4.8–10.8)

## 2023-09-14 PROCEDURE — 99232 SBSQ HOSP IP/OBS MODERATE 35: CPT

## 2023-09-14 RX ORDER — AMOXICILLIN 250 MG/5ML
1 SUSPENSION, RECONSTITUTED, ORAL (ML) ORAL
Qty: 9 | Refills: 0
Start: 2023-09-14 | End: 2023-09-16

## 2023-09-14 RX ORDER — TAMSULOSIN HYDROCHLORIDE 0.4 MG/1
1 CAPSULE ORAL
Qty: 30 | Refills: 0
Start: 2023-09-14 | End: 2023-10-13

## 2023-09-14 RX ORDER — QUETIAPINE FUMARATE 200 MG/1
0.5 TABLET, FILM COATED ORAL
Qty: 15 | Refills: 0
Start: 2023-09-14 | End: 2023-10-13

## 2023-09-14 RX ORDER — PANTOPRAZOLE SODIUM 20 MG/1
1 TABLET, DELAYED RELEASE ORAL
Qty: 30 | Refills: 0
Start: 2023-09-14 | End: 2023-10-13

## 2023-09-14 RX ADMIN — QUETIAPINE FUMARATE 12.5 MILLIGRAM(S): 200 TABLET, FILM COATED ORAL at 21:03

## 2023-09-14 RX ADMIN — TAMSULOSIN HYDROCHLORIDE 0.4 MILLIGRAM(S): 0.4 CAPSULE ORAL at 21:03

## 2023-09-14 RX ADMIN — Medication 2 MILLIGRAM(S): at 21:04

## 2023-09-14 RX ADMIN — Medication 500 MILLIGRAM(S): at 13:41

## 2023-09-14 RX ADMIN — PANTOPRAZOLE SODIUM 40 MILLIGRAM(S): 20 TABLET, DELAYED RELEASE ORAL at 13:40

## 2023-09-14 RX ADMIN — Medication 1 MILLIGRAM(S): at 02:53

## 2023-09-14 RX ADMIN — Medication 500 MILLIGRAM(S): at 21:02

## 2023-09-14 RX ADMIN — Medication 500 MILLIGRAM(S): at 05:18

## 2023-09-14 RX ADMIN — MEMANTINE HYDROCHLORIDE 5 MILLIGRAM(S): 10 TABLET ORAL at 18:06

## 2023-09-14 RX ADMIN — ENOXAPARIN SODIUM 40 MILLIGRAM(S): 100 INJECTION SUBCUTANEOUS at 13:41

## 2023-09-14 RX ADMIN — MEMANTINE HYDROCHLORIDE 5 MILLIGRAM(S): 10 TABLET ORAL at 05:18

## 2023-09-14 NOTE — PROGRESS NOTE ADULT - SUBJECTIVE AND OBJECTIVE BOX
SERINA CORREA  73y  Male      Patient is a 73y old  Male who presents with a chief complaint of Fall       INTERVAL HPI/OVERNIGHT EVENTS:      ******************************* REVIEW OF SYSTEMS:**********************************************    All other review of systems negative    *********************** VITALS ******************************************    T(F): 96.7 (09-14-23 @ 13:12)  HR: 69 (09-14-23 @ 13:12) (69 - 101)  BP: 129/88 (09-14-23 @ 13:12) (129/88 - 136/103)  RR: 18 (09-14-23 @ 13:12) (18 - 19)  SpO2: 98% (09-14-23 @ 13:12) (98% - 99%)            ******************************** PHYSICAL EXAM:**************************************************  GENERAL: NAD    PSYCH: no agitation, baseline mentation  HEENT:     NERVOUS SYSTEM:  Alert & Oriented X0-1,   PULMONARY: ANDI, CTA    CARDIOVASCULAR: S1S2 RRR    GI: Soft, NT, ND; BS present.    EXTREMITIES:  2+ Peripheral Pulses, No clubbing, cyanosis, or edema    LYMPH: No lymphadenopathy noted    SKIN: No rashes or lesions      **************************** LABS *******************************************************                          12.2   3.57  )-----------( 179      ( 14 Sep 2023 07:36 )             40.9     09-14    148<H>  |  110  |  20  ----------------------------<  94  4.2   |  26  |  0.8    Ca    9.6      14 Sep 2023 07:36  Mg     2.1     09-14    TPro  6.8  /  Alb  3.5  /  TBili  0.3  /  DBili  x   /  AST  30  /  ALT  18  /  AlkPhos  98  09-14      Urinalysis Basic - ( 14 Sep 2023 07:36 )    Color: x / Appearance: x / SG: x / pH: x  Gluc: 94 mg/dL / Ketone: x  / Bili: x / Urobili: x   Blood: x / Protein: x / Nitrite: x   Leuk Esterase: x / RBC: x / WBC x   Sq Epi: x / Non Sq Epi: x / Bacteria: x        Lactate Trend        CAPILLARY BLOOD GLUCOSE              **************************Active Medications *******************************************  No Known Allergies      acetaminophen     Tablet .. 650 milliGRAM(s) Oral every 6 hours PRN  amoxicillin 500 milliGRAM(s) Oral every 8 hours  enoxaparin Injectable 40 milliGRAM(s) SubCutaneous every 24 hours  LORazepam     Tablet 2 milliGRAM(s) Oral <User Schedule> PRN  LORazepam     Tablet 0.5 milliGRAM(s) Oral <User Schedule> PRN  memantine 5 milliGRAM(s) Oral two times a day  pantoprazole    Tablet 40 milliGRAM(s) Oral daily  QUEtiapine 12.5 milliGRAM(s) Oral at bedtime  tamsulosin 0.4 milliGRAM(s) Oral at bedtime      ***************************************************  RADIOLOGY & ADDITIONAL TESTS:    Imaging Personally Reviewed:  [ ] YES  [ ] NO    HEALTH ISSUES - PROBLEM Dx:

## 2023-09-14 NOTE — PROGRESS NOTE ADULT - ASSESSMENT
73-year-old male with PMH of advanced dementia, A&O x0 at baseline, esophageal cancer status post chemo/radiation/surgery 2017, prostate cancer status post CyberKnife 2020, lung cancer status post surgical resection 2019, now in full remission with no active therapies, brought in by daughter for evaluation of altered mental status and aggression at home over the last 2 weeks. She states he was recently hospitalized 2 months ago symptomatic anemia course complicated by uti and covid  and then sent to rehabilitation center, discharged home 2 weeks ago.  Patient lives upstairs from his daughter and her  alone but has 24-hour aide.  Ambulates with walker.  She states over the last 2 weeks he is becoming more increasingly difficult to handle, over the last 2 days has been aggressive and today was attempting to climb onto his TV stand.  He had to be physically restrained by the aide.  Had 2 episodes yesterday where he was struggling to climb onto things and then fell onto his bilateral knees.  Events were witnessed by aide who reported to the daughter that he had no head trauma or LOC and was ambulatory after the falls.  Daughter reports patient has a dry cough over the last few days. No known fever, vomiting, diarrhea, rash.  Patient is unable to contribute further history or review of systems. Of note she believes that his behavior and mental status became worse  after starting this new medication.   In the ed chest x ray performed pending read     #Advanced dementia   - off rexulti (was recently started but he had more agitation)- neurologist Dr Scherer [phone]  - Neuropsych eval (9/11):  Given patient’s Major Neurocognitive Disorder, he should be supervised and receive assistance in all activities of daily living/instrumental activities of daily living, and should not be left alone.  He will benefit from redirection, assistance from 24-hr aide noted to be present in the patient’s home, and reduced environmental distractions.  -9/11: PT recommend subacute rehab v home PT 2-3x/week  -9/12 Seroquel dose reduced to 12.5mg bc patient appeared somnolent   -9/12 Ativan changed to PRN    # recent UTI and covid   - not hypoxic now, comfortable  - UA positive  - RVP: negative  - Patient was febrile 101.7 on 9/10-> tazocin Day 4 to cover Enterococcus faecalis found in urine (7/16/23)- back then it was asymptomatic so was not treated  - currently on  zosyn 3.375 mg q 8 hours  >>  switched to Po Amoxicillin till 09/17  as per ID   -UCX from 9/9 shows >100,000 CFU/ml Enterococcus faecalis    # hx of esophageal cancer status post chemo/radiation/surgery 2017, prostate cancer status post CyberKnife 2020, lung cancer status post surgical resection 2019  outpt fu    # Elevated Alk phos  - Bilirubin and GGT normal   -US gallblader (9/11): Cholelithiasis. No obvious cholecystitis.  -US Kidney and bladder (9/11): Non-obstructing right renal calculus. Bladder calculus.  -9/12 started Tamsulosin    # Acute Kidney Injury likely pre renal   - Increase in creatinine from 0.8 to 1.1, given fluids on 9/12  -9/13 Cr improving (0.9) -d/c fluids, will resume if patient is not eating     Awaiting home Hospice .  DC planning for tomorrow.

## 2023-09-14 NOTE — DISCHARGE NOTE PROVIDER - NSDCCPCAREPLAN_GEN_ALL_CORE_FT
PRINCIPAL DISCHARGE DIAGNOSIS  Diagnosis: Altered mental status  Assessment and Plan of Treatment: Patient came for agitation.   FOund to have urinary tract infection.   Intravenous Antibiotics were givien initially then switched to oral based on the urine culture results.   Recommendations at home:  -Modify the home environment to reduce potential triggers for agitation (minimizing noise, clutter, or other distractions)  - Ensure the home is well-lit during the day and dimly lit in the evening to maintain a regular sleep-wake cycle.  -Create a daily routine with consistent meal times, hygiene routines, and activities.   -Include enjoyable activities that the patient can participate in comfortably, such as listening to soothing music or looking at photo albums.  -Encourage regular physical activity appropriate for the patient's condition, such as gentle walks or chair exercises.  -Physical activity can help reduce restlessness and promote better sleep.  -Ensure the patient receives a balanced diet and stays well-hydrated. Proper nutrition can impact behavior and overall well-being.  -Offer emotional support and reassurance to the patient. Maintain a calm and patient demeanor when interacting with them.  -Consider involving other family members or hiring professional caregivers to share the caregiving responsibilities.  -Assess the home for safety hazards and implement appropriate measures to prevent accidents, such as securing doors and windows and removing tripping hazards.  Patient has to continue antibiotics as prescribed by the doctor.   Patient should be supervised and receive assistance in all activities of daily living/instrumental activities of daily living, and should not be left alone.  He will benefit from redirection, assistance from 24-hr aide noted to be present in the patient’s home, and reduced environmental distractions.  Physical therapy are recommending home Physical therapy 2-3 times per week.  For agitation Ativan as prescribed when needed.   At bedtime, standing low dose seroquel as prescribed.

## 2023-09-14 NOTE — DISCHARGE NOTE PROVIDER - NSDCMRMEDTOKEN_GEN_ALL_CORE_FT
aluminum hydroxide-magnesium hydroxide 200 mg-200 mg/5 mL oral suspension: 30 milliliter(s) orally every 4 hours As needed Dyspepsia  amoxicillin 500 mg oral capsule: 1 cap(s) orally every 8 hours  Ativan 0.5 mg oral tablet: 1 orally 2 times a day at 6 am and 2pm  Ativan 2 mg oral tablet: 1 orally once a day (at bedtime)  melatonin 3 mg oral tablet: 1 tab(s) orally once a day (at bedtime) As needed Insomnia  memantine 5 mg oral tablet: 1 tab(s) orally 2 times a day  pantoprazole 40 mg oral delayed release tablet: 1 tab(s) orally once a day  Seroquel 25 mg oral tablet: 0.5 tab(s) orally once a day (at bedtime)  tamsulosin 0.4 mg oral capsule: 1 cap(s) orally once a day (at bedtime)   aluminum hydroxide-magnesium hydroxide 200 mg-200 mg/5 mL oral suspension: 30 milliliter(s) orally every 4 hours As needed Dyspepsia  amoxicillin 500 mg oral capsule: 1 cap(s) orally every 8 hours  melatonin 3 mg oral tablet: 1 tab(s) orally once a day (at bedtime) As needed Insomnia  memantine 5 mg oral tablet: 1 tab(s) orally 2 times a day  pantoprazole 40 mg oral delayed release tablet: 1 tab(s) orally once a day  Seroquel 25 mg oral tablet: 0.5 tab(s) orally once a day (at bedtime)  tamsulosin 0.4 mg oral capsule: 1 cap(s) orally once a day (at bedtime)

## 2023-09-14 NOTE — PROGRESS NOTE ADULT - ASSESSMENT
73 year old with advanced dementia recently started on a new agent brought in for 3 day hx of agitated delirium with worsening cognition, disorganization and now recent history of mechanical falls     #Advanced dementia   - off rexulti (was recently started but he had more agitation)- neurologist Dr Scherer [phone]  - Neuropsych eval (9/11):  Given patient’s Major Neurocognitive Disorder, he should be supervised and receive assistance in all activities of daily living/instrumental activities of daily living, and should not be left alone.  He will benefit from redirection, assistance from 24-hr aide noted to be present in the patient’s home, and reduced environmental distractions.  -9/11: PT recommend subacute rehab v home PT 2-3x/week  -9/12 Seroquel dose reduced to 12.5mg bc patient appeared somnolent   -9/12 Ativan changed to PRN    # recent UTI and covid (18 july)  - not hypoxic now, comfortable  - UA positive  - RVP: negative  - Patient was febrile 101.7 on 9/10-> tazocin Day 4 to cover Enterococcus faecalis found in urine (7/16/23)- back then it was asymptomatic so was not treated --> Zosyn d/c on 9/13  - ID cnsult (9/11):  - No other clear alternative source for fever  - continue zosyn 3.375 mg q 8 hours   - follow-up urine Cx   - trend fever curve   -UCX from 9/9 shows >100,000 CFU/ml Enterococcus faecalis  -9/13 Switch from Zosyn to Amoxicillin PO until 9/17     # hx of esophageal cancer status post chemo/radiation/surgery 2017, prostate cancer status post CyberKnife 2020, lung cancer status post surgical resection 2019  outpt fu    # Elevated Alk phos  - Bilirubin and GGT normal   -US gallblader (9/11): Cholelithiasis. No obvious cholecystitis.  -US Kidney and bladder (9/11): Non-obstructing right renal calculus. Bladder calculus.  -9/12 started Tamsulosin    # MARITZA  - Increase in creatinine from 0.8 to 1.1, given fluids on 9/12  -9/13 Cr improving (0.9) -d/c fluids, will resume if patient is not eating

## 2023-09-14 NOTE — HOSPICE CARE NOTE - CONVESATION DETAILS
As per FARNAZ Thomson patient is now on oral antibiotics and is ready for discharge. Spoke with patient's son in law Ba to discuss discharge plan. DME to be delivered this afternoon, patient to be discharged late tomorrow afternoon between 5pm and 6pm. Hospice admission set for Saturday morning 9/16/23.

## 2023-09-14 NOTE — DISCHARGE NOTE PROVIDER - DISCHARGE DIET
Patient states she was treated last month for a UTI with Macrobid. Patient states she took the antibiotic as ordered and began to feel better shortly after taking it. She states she is now having the same symptoms, however, it is more intense. DASH Diet

## 2023-09-14 NOTE — DISCHARGE NOTE PROVIDER - HOSPITAL COURSE
73 year old with advanced dementia recently started on a new agent brought in for 3 day hx of agitated delirium with worsening cognition, disorganization and now recent history of mechanical falls     #Advanced dementia   - off rexulti (was recently started but he had more agitation)- neurologist Dr Scherer [phone]  - Neuropsych eval (9/11):  Given patient’s Major Neurocognitive Disorder, he should be supervised and receive assistance in all activities of daily living/instrumental activities of daily living, and should not be left alone.  He will benefit from redirection, assistance from 24-hr aide noted to be present in the patient’s home, and reduced environmental distractions.  -9/11: PT recommend subacute rehab v home PT 2-3x/week  - Patient started on Seroquel low dose 12.5mg- at bedtime  -9/12 Ativan changed to PRN    # UTI  - UA positive  - Enterococcus faecalis in urine: Infectious diseases on board -> amoxicillin 500 mg every 8 hours till 9/17/23.    # hx of esophageal cancer status post chemo/radiation/surgery 2017, prostate cancer status post CyberKnife 2020, lung cancer status post surgical resection 2019  outpatient fu    # US Kidney and bladder (9/11): Non-obstructing right renal calculus. Bladder calculus -> Tamsulosin started.      Patient is medically cleared to be discharged.   Patient has to continue antibiotics as prescribed by the doctor.   Patient should be supervised and receive assistance in all activities of daily living/instrumental activities of daily living, and should not be left alone.  He will benefit from redirection, assistance from 24-hr aide noted to be present in the patient’s home, and reduced environmental distractions.  Physical therapy are recommending home Physical therapy 2-3 times per week.  For agitation Ativan as prescribed when needed.   At bedtime, standing low dose seroquel as prescribed. 73 year old with advanced dementia recently started on a new agent brought in for 3 day hx of agitated delirium with worsening cognition, disorganization and now recent history of mechanical falls     #Advanced dementia   - off rexulti (was recently started but he had more agitation)- neurologist Dr Scherer [phone]  - Neuropsych eval (9/11):  Given patient’s Major Neurocognitive Disorder, he should be supervised and receive assistance in all activities of daily living/instrumental activities of daily living, and should not be left alone.  He will benefit from redirection, assistance from 24-hr aide noted to be present in the patient’s home, and reduced environmental distractions.  -9/11: PT recommend subacute rehab v home PT 2-3x/week  - Patient started on Seroquel low dose 12.5mg- at bedtime  -9/12 Ativan changed to PRN    # UTI  - UA positive  - Enterococcus faecalis in urine: Infectious diseases on board -> amoxicillin 500 mg every 8 hours till 9/17/23.    # hx of esophageal cancer status post chemo/radiation/surgery 2017, prostate cancer status post CyberKnife 2020, lung cancer status post surgical resection 2019  outpatient fu    # US Kidney and bladder (9/11): Non-obstructing right renal calculus. Bladder calculus -> Tamsulosin started.      Patient is medically cleared to be discharged.   Patient has to continue antibiotics as prescribed by the doctor.   Patient should be supervised and receive assistance in all activities of daily living/instrumental activities of daily living, and should not be left alone.  He will benefit from redirection, assistance from 24-hr aide noted to be present in the patient’s home, and reduced environmental distractions.  Physical therapy are recommending home Physical therapy 2-3 times per week.  For agitation Ativan as prescribed when needed.   At bedtime, low dose seroquel as prescribed.

## 2023-09-14 NOTE — DISCHARGE NOTE PROVIDER - NSDCFUADDINST_GEN_ALL_CORE_FT
Patient has to continue antibiotics as prescribed by the doctor.   Patient should be supervised and receive assistance in all activities of daily living/instrumental activities of daily living, and should not be left alone.  He will benefit from redirection, assistance from 24-hr aide noted to be present in the patient’s home, and reduced environmental distractions.  Physical therapy are recommending home Physical therapy 2-3 times per week.  For agitation Ativan as prescribed when needed.   At bedtime, standing low dose seroquel as prescribed.

## 2023-09-14 NOTE — PROGRESS NOTE ADULT - SUBJECTIVE AND OBJECTIVE BOX
24H events:    Patient is a 73y old Male who presents with a chief complaint of Fall and altered mental status    Primary diagnosis of Altered mental status    Today is hospital day 7d. This morning patient was seen and examined at bedside, resting comfortably in bed.    No acute or major events overnight.    PAST MEDICAL & SURGICAL HISTORY  Hypertension, unspecified type    Cancer  ESOPHAGEAL CANCER 2017 RECEIVED CHEMO AND RADIATION and endoscopic resection partial oesophagus and stomach    Renal calculi  nephrostomy tube -6/2021    Cancer  left lung 2018, no chemo or rad rx and resection    Abnormal cholesterol test    Prostate cancer    [place] (hard of hearing)    Alzheimer disease    Cancer of esophagus  2017 RESECTION OF LOWER PORTION OF ESOPHAGUS    S/P lobectomy of lung  left ?JUNE 2018    S/P cystoscopy with ureteral stent placement  LEFT    Inguinal hernia  AS AN INFANT, RIGHT SIDE    History of esophagogastroduodenoscopy (EGD)  2019    H/O colonoscopy  2018    Prostate cancer  cyber knife intervention 2021    ALLERGIES:  No Known Allergies    MEDICATIONS:  STANDING MEDICATIONS  amoxicillin 500 milliGRAM(s) Oral every 8 hours  enoxaparin Injectable 40 milliGRAM(s) SubCutaneous every 24 hours  memantine 5 milliGRAM(s) Oral two times a day  pantoprazole    Tablet 40 milliGRAM(s) Oral daily  QUEtiapine 12.5 milliGRAM(s) Oral at bedtime  tamsulosin 0.4 milliGRAM(s) Oral at bedtime    PRN MEDICATIONS  acetaminophen     Tablet .. 650 milliGRAM(s) Oral every 6 hours PRN  LORazepam     Tablet 2 milliGRAM(s) Oral <User Schedule> PRN  LORazepam     Tablet 0.5 milliGRAM(s) Oral <User Schedule> PRN    VITALS:   T(F): 97.6  HR: 72  BP: 136/79  RR: 19  SpO2: 99%    PHYSICAL EXAM:  GENERAL:   ( X ) NAD, lying in bed comfortably     (  ) obtunded     (  ) lethargic     (  ) somnolent    HEAD:   ( X ) Atraumatic     (  ) hematoma     (  ) laceration (specify location:       )     NECK:  ( X ) Supple     (  ) neck stiffness     (  ) nuchal rigidity     (  )  no JVD     (  ) JVD present ( -- cm)    HEART:  Rate -->     ( X ) normal rate     (  ) bradycardic     (  ) tachycardic  Rhythm -->     ( X ) regular     (  ) regularly irregular     (  ) irregularly irregular  Murmurs -->     ( X ) normal s1s2     (  ) systolic murmur     (  ) diastolic murmur     (  ) continuous murmur      (  ) S3 present     (  ) S4 present    LUNGS:   ( X )Unlabored respirations     (  ) tachypnea  ( X ) B/L air entry     (  ) decreased breath sounds in:  (location     )    (  ) no adventitious sound     (  ) crackles     (  ) wheezing      (  ) rhonchi      (specify location:       )  (  ) chest wall tenderness (specify location:       )    ABDOMEN:   ( X ) Soft     (  ) tense   |   ( X ) nondistended     (  ) distended   |   (  ) +BS     (  ) hypoactive bowel sounds     (  ) hyperactive bowel sounds  ( X ) nontender     (  ) RUQ tenderness     (  ) RLQ tenderness     (  ) LLQ tenderness     (  ) epigastric tenderness     (  ) diffuse tenderness  (  ) Splenomegaly      (  ) Hepatomegaly      (  ) Jaundice     (  ) ecchymosis     EXTREMITIES:  ( X ) Normal     (  ) Rash     (  ) ecchymosis     (  ) varicose veins      (  ) pitting edema     (  ) non-pitting edema   (  ) ulceration     (  ) gangrene:     (location:     )    NERVOUS SYSTEM: Patient is awake and responsive but confused, A&O x0 at baseline   (  ) A&Ox3     (  ) confused     (  ) lethargic  CN II-XII:     (  ) Intact     (  ) deficits found     (Specify:     )   Upper extremities:     (  ) no sensorimotor deficits     (  ) weakness     (  ) loss of proprioception/vibration     (  ) loss of touch/temperature (specify:    )  Lower extremities:     (  ) no sensorimotor deficits     (  ) weakness     (  ) loss of proprioception/vibration     (  ) loss of touch/temperature (specify:    )      LABS:                        12.2   3.57  )-----------( 179      ( 14 Sep 2023 07:36 )             40.9     09-14    148<H>  |  110  |  20  ----------------------------<  94  4.2   |  26  |  0.8    Ca    9.6      14 Sep 2023 07:36  Mg     2.1     09-14    TPro  6.8  /  Alb  3.5  /  TBili  0.3  /  DBili  x   /  AST  30  /  ALT  18  /  AlkPhos  98  09-14      Urinalysis Basic - ( 14 Sep 2023 07:36 )    Color: x / Appearance: x / SG: x / pH: x  Gluc: 94 mg/dL / Ketone: x  / Bili: x / Urobili: x   Blood: x / Protein: x / Nitrite: x   Leuk Esterase: x / RBC: x / WBC x   Sq Epi: x / Non Sq Epi: x / Bacteria: x

## 2023-09-15 VITALS
DIASTOLIC BLOOD PRESSURE: 83 MMHG | SYSTOLIC BLOOD PRESSURE: 138 MMHG | OXYGEN SATURATION: 100 % | RESPIRATION RATE: 18 BRPM | TEMPERATURE: 97 F | HEART RATE: 74 BPM

## 2023-09-15 LAB
ALBUMIN SERPL ELPH-MCNC: 3.4 G/DL — LOW (ref 3.5–5.2)
ALP SERPL-CCNC: 94 U/L — SIGNIFICANT CHANGE UP (ref 30–115)
ALT FLD-CCNC: 17 U/L — SIGNIFICANT CHANGE UP (ref 0–41)
ANION GAP SERPL CALC-SCNC: 16 MMOL/L — HIGH (ref 7–14)
AST SERPL-CCNC: 33 U/L — SIGNIFICANT CHANGE UP (ref 0–41)
BASOPHILS # BLD AUTO: 0.05 K/UL — SIGNIFICANT CHANGE UP (ref 0–0.2)
BASOPHILS NFR BLD AUTO: 1.2 % — HIGH (ref 0–1)
BILIRUB SERPL-MCNC: 0.3 MG/DL — SIGNIFICANT CHANGE UP (ref 0.2–1.2)
BUN SERPL-MCNC: 21 MG/DL — HIGH (ref 10–20)
CALCIUM SERPL-MCNC: 9.2 MG/DL — SIGNIFICANT CHANGE UP (ref 8.4–10.5)
CHLORIDE SERPL-SCNC: 113 MMOL/L — HIGH (ref 98–110)
CO2 SERPL-SCNC: 22 MMOL/L — SIGNIFICANT CHANGE UP (ref 17–32)
CREAT SERPL-MCNC: 0.9 MG/DL — SIGNIFICANT CHANGE UP (ref 0.7–1.5)
EGFR: 90 ML/MIN/1.73M2 — SIGNIFICANT CHANGE UP
EOSINOPHIL # BLD AUTO: 0.3 K/UL — SIGNIFICANT CHANGE UP (ref 0–0.7)
EOSINOPHIL NFR BLD AUTO: 7 % — SIGNIFICANT CHANGE UP (ref 0–8)
GLUCOSE SERPL-MCNC: 90 MG/DL — SIGNIFICANT CHANGE UP (ref 70–99)
HCT VFR BLD CALC: 39.5 % — LOW (ref 42–52)
HGB BLD-MCNC: 11.7 G/DL — LOW (ref 14–18)
IMM GRANULOCYTES NFR BLD AUTO: 0.5 % — HIGH (ref 0.1–0.3)
LYMPHOCYTES # BLD AUTO: 0.59 K/UL — LOW (ref 1.2–3.4)
LYMPHOCYTES # BLD AUTO: 13.7 % — LOW (ref 20.5–51.1)
MCHC RBC-ENTMCNC: 25.6 PG — LOW (ref 27–31)
MCHC RBC-ENTMCNC: 29.6 G/DL — LOW (ref 32–37)
MCV RBC AUTO: 86.4 FL — SIGNIFICANT CHANGE UP (ref 80–94)
MONOCYTES # BLD AUTO: 0.47 K/UL — SIGNIFICANT CHANGE UP (ref 0.1–0.6)
MONOCYTES NFR BLD AUTO: 10.9 % — HIGH (ref 1.7–9.3)
NEUTROPHILS # BLD AUTO: 2.87 K/UL — SIGNIFICANT CHANGE UP (ref 1.4–6.5)
NEUTROPHILS NFR BLD AUTO: 66.7 % — SIGNIFICANT CHANGE UP (ref 42.2–75.2)
NRBC # BLD: 0 /100 WBCS — SIGNIFICANT CHANGE UP (ref 0–0)
PLATELET # BLD AUTO: 172 K/UL — SIGNIFICANT CHANGE UP (ref 130–400)
PMV BLD: 10 FL — SIGNIFICANT CHANGE UP (ref 7.4–10.4)
POTASSIUM SERPL-MCNC: 4 MMOL/L — SIGNIFICANT CHANGE UP (ref 3.5–5)
POTASSIUM SERPL-SCNC: 4 MMOL/L — SIGNIFICANT CHANGE UP (ref 3.5–5)
PROT SERPL-MCNC: 6.5 G/DL — SIGNIFICANT CHANGE UP (ref 6–8)
RBC # BLD: 4.57 M/UL — LOW (ref 4.7–6.1)
RBC # FLD: SIGNIFICANT CHANGE UP % (ref 11.5–14.5)
SODIUM SERPL-SCNC: 151 MMOL/L — HIGH (ref 135–146)
WBC # BLD: 4.3 K/UL — LOW (ref 4.8–10.8)
WBC # FLD AUTO: 4.3 K/UL — LOW (ref 4.8–10.8)

## 2023-09-15 PROCEDURE — 99231 SBSQ HOSP IP/OBS SF/LOW 25: CPT

## 2023-09-15 RX ORDER — SODIUM CHLORIDE 9 MG/ML
1000 INJECTION, SOLUTION INTRAVENOUS
Refills: 0 | Status: DISCONTINUED | OUTPATIENT
Start: 2023-09-15 | End: 2023-09-15

## 2023-09-15 RX ADMIN — SODIUM CHLORIDE 100 MILLILITER(S): 9 INJECTION, SOLUTION INTRAVENOUS at 10:06

## 2023-09-15 RX ADMIN — Medication 500 MILLIGRAM(S): at 13:06

## 2023-09-15 RX ADMIN — Medication 500 MILLIGRAM(S): at 05:22

## 2023-09-15 RX ADMIN — MEMANTINE HYDROCHLORIDE 5 MILLIGRAM(S): 10 TABLET ORAL at 17:09

## 2023-09-15 RX ADMIN — ENOXAPARIN SODIUM 40 MILLIGRAM(S): 100 INJECTION SUBCUTANEOUS at 13:48

## 2023-09-15 RX ADMIN — MEMANTINE HYDROCHLORIDE 5 MILLIGRAM(S): 10 TABLET ORAL at 05:21

## 2023-09-15 RX ADMIN — PANTOPRAZOLE SODIUM 40 MILLIGRAM(S): 20 TABLET, DELAYED RELEASE ORAL at 13:06

## 2023-09-15 NOTE — PROGRESS NOTE ADULT - SUBJECTIVE AND OBJECTIVE BOX
MADELINE SERINA  73y  Male      Patient is a 73y old  Male who presents with a chief complaint of Fall     INTERVAL HPI/OVERNIGHT EVENTS:      ******************************* REVIEW OF SYSTEMS:**********************************************    All other review of systems negative    *********************** VITALS ******************************************    T(F): 96.7 (09-15-23 @ 14:01)  HR: 74 (09-15-23 @ 14:01) (68 - 81)  BP: 138/83 (09-15-23 @ 14:01) (136/88 - 144/82)  RR: 18 (09-15-23 @ 14:01) (18 - 18)  SpO2: 100% (09-15-23 @ 14:01) (97% - 100%)            ******************************** PHYSICAL EXAM:**************************************************  GENERAL: NAD    PSYCH: no agitation, baseline mentation  HEENT:     NERVOUS SYSTEM:  Alert & Oriented X3, MS  5/5 B/L  UE and LE ; Sensory intact    PULMONARY: ANDI, CTA    CARDIOVASCULAR: S1S2 RRR    GI: Soft, NT, ND; BS present.    EXTREMITIES:  2+ Peripheral Pulses, No clubbing, cyanosis, or edema    LYMPH: No lymphadenopathy noted    SKIN: No rashes or lesions      **************************** LABS *******************************************************                          11.7   4.30  )-----------( 172      ( 15 Sep 2023 07:20 )             39.5     09-15    151<H>  |  113<H>  |  21<H>  ----------------------------<  90  4.0   |  22  |  0.9    Ca    9.2      15 Sep 2023 07:20  Mg     2.1     09-14    TPro  6.5  /  Alb  3.4<L>  /  TBili  0.3  /  DBili  x   /  AST  33  /  ALT  17  /  AlkPhos  94  09-15      Urinalysis Basic - ( 15 Sep 2023 07:20 )    Color: x / Appearance: x / SG: x / pH: x  Gluc: 90 mg/dL / Ketone: x  / Bili: x / Urobili: x   Blood: x / Protein: x / Nitrite: x   Leuk Esterase: x / RBC: x / WBC x   Sq Epi: x / Non Sq Epi: x / Bacteria: x        Lactate Trend        CAPILLARY BLOOD GLUCOSE              **************************Active Medications *******************************************  No Known Allergies      acetaminophen     Tablet .. 650 milliGRAM(s) Oral every 6 hours PRN  amoxicillin 500 milliGRAM(s) Oral every 8 hours  dextrose 5%. 1000 milliLiter(s) IV Continuous <Continuous>  enoxaparin Injectable 40 milliGRAM(s) SubCutaneous every 24 hours  LORazepam     Tablet 0.5 milliGRAM(s) Oral <User Schedule> PRN  LORazepam     Tablet 2 milliGRAM(s) Oral <User Schedule> PRN  memantine 5 milliGRAM(s) Oral two times a day  pantoprazole    Tablet 40 milliGRAM(s) Oral daily  QUEtiapine 12.5 milliGRAM(s) Oral at bedtime  tamsulosin 0.4 milliGRAM(s) Oral at bedtime      ***************************************************  RADIOLOGY & ADDITIONAL TESTS:    Imaging Personally Reviewed:  [ ] YES  [ ] NO    HEALTH ISSUES - PROBLEM Dx:

## 2023-09-15 NOTE — PROGRESS NOTE ADULT - ASSESSMENT
73-year-old male with PMH of advanced dementia, A&O x0 at baseline, esophageal cancer status post chemo/radiation/surgery 2017, prostate cancer status post CyberKnife 2020, lung cancer status post surgical resection 2019, now in full remission with no active therapies, brought in by daughter for evaluation of altered mental status and aggression at home over the last 2 weeks. She states he was recently hospitalized 2 months ago symptomatic anemia course complicated by uti and covid  and then sent to rehabilitation center, discharged home 2 weeks ago.  Patient lives upstairs from his daughter and her  alone but has 24-hour aide.  Ambulates with walker.  She states over the last 2 weeks he is becoming more increasingly difficult to handle, over the last 2 days has been aggressive and today was attempting to climb onto his TV stand.  He had to be physically restrained by the aide.  Had 2 episodes yesterday where he was struggling to climb onto things and then fell onto his bilateral knees.  Events were witnessed by aide who reported to the daughter that he had no head trauma or LOC and was ambulatory after the falls.  Daughter reports patient has a dry cough over the last few days. No known fever, vomiting, diarrhea, rash.  Patient is unable to contribute further history or review of systems. Of note she believes that his behavior and mental status became worse  after starting this new medication.   In the ed chest x ray performed pending read     #Advanced dementia   - off rexulti (was recently started but he had more agitation)- neurologist Dr Scherer [phone]  - Neuropsych eval (9/11):  Given patient’s Major Neurocognitive Disorder, he should be supervised and receive assistance in all activities of daily living/instrumental activities of daily living, and should not be left alone.  He will benefit from redirection, assistance from 24-hr aide noted to be present in the patient’s home, and reduced environmental distractions.  -9/11: PT recommend subacute rehab v home PT 2-3x/week  -9/12 Seroquel dose reduced to 12.5mg bc patient appeared somnolent   -9/12 Ativan changed to PRN    # recent UTI and covid   - not hypoxic now, comfortable  - UA positive  - RVP: negative  - Patient was febrile 101.7 on 9/10-> tazocin Day 4 to cover Enterococcus faecalis found in urine (7/16/23)- back then it was asymptomatic so was not treated  - currently on  zosyn 3.375 mg q 8 hours  >>  switched to Po Amoxicillin till 09/17  as per ID   -UCX from 9/9 shows >100,000 CFU/ml Enterococcus faecalis    # hx of esophageal cancer status post chemo/radiation/surgery 2017, prostate cancer status post CyberKnife 2020, lung cancer status post surgical resection 2019  outpt fu    # Elevated Alk phos  - Bilirubin and GGT normal   -US gallblader (9/11): Cholelithiasis. No obvious cholecystitis.  -US Kidney and bladder (9/11): Non-obstructing right renal calculus. Bladder calculus.  -9/12 started Tamsulosin    # Acute Kidney Injury likely pre renal   - Increase in creatinine from 0.8 to 1.1, given fluids on 9/12  -9/13 Cr improving (0.9) -d/c fluids, will resume if patient is not eating     DC planning to home hospice.    Ac Cholecystitis with Cholelithiasis   -- RUQ U/S Echogenicity in the region of the gallbladder neck suggests small stones versus gravel. Gallbladder wall thickening identified measuring 7-8 mm. Trace pericholecystic fluid. Correlate for cholecystitis.  - Plan for Lap virgil after stabilization  -IV Zosyn q 8 switched to  IV meropenem 1 gm q 8 hrs  as patient has had >8 BMs in 24 hours   -  MRCP + AC Cholecystitis   Surgery (Dr xavier)  d/w -OR on Friday   GI(Dr Mandujano D/W -ERCP for CBD stone on Friday   Pain meds  Will be NPO after midnight for ERCP and robotic virgil 8/25 Ac Cholecystitis with Cholelithiasis   -- RUQ U/S Echogenicity in the region of the gallbladder neck suggests small stones versus gravel. Gallbladder wall thickening identified measuring 7-8 mm. Trace pericholecystic fluid. Correlate for cholecystitis.  - Plan cholecystectomy today 8/25  -Switched back to IV Zosyn from IV meropenem given positive gi pcr  -  MRCP was + AC Cholecystitis   Surgery (Dr xavier)  d/w -OR today 8/25  GI(Dr Mandujano D/W -ERCP for CBD stone today 8/25  Pain meds prn    Patient is medically optimized for procedure - S/P  abdominal pain, n/v,/ d + lipase - 2483 l  -  acute pancreatitis.- -Gall stone Pancreatitis   - CT A/P : Mild, acute interstitial edematous pancreatitis in the pancreatic tail. .  - RUQ U/S Echogenicity in the region of the gallbladder neck suggests small stones versus gravel. Gallbladder wall thickening identified measuring 7-8 mm. Trace pericholecystic fluid. Correlate for cholecystitis.  - MRCP as above + Distal CBD stone   - IVF NS @100 cc/h--> 75 ml/hr   - f/up lipid panel   - Lipase improved  - GI (Dr Mandujano d/w - Pt will need ERCP and robotic virgil 8/25  -On IV Abx as per ID- IV Zosyn, IV azithromycin    Patient currently has no active cardiac conditions. No signs of ischemia, ADHF, clinical exam not consistent with stenotic valvular disease, no unstable arrhythmias noted. Baseline functional capacity is > 4 METS. RCRI score is 0. Patient is currently hemodynamically stable. Patient is medically optimized at this time and understands the inherent risks of surgery. Routine hemodynamic monitoring is suggested.

## 2023-09-15 NOTE — PROGRESS NOTE ADULT - PROVIDER SPECIALTY LIST ADULT
Hospitalist
Internal Medicine
Hospitalist
Internal Medicine
Internal Medicine
Hospitalist
Infectious Disease
Internal Medicine
Infectious Disease

## 2023-09-28 NOTE — ED ADULT NURSE NOTE - NSFALLRSKPASTHIST_ED_ALL_ED
9/28/2023      Tino Watson  [address] [place] [address]        Dear Tino,    Thank you for choosing [facility] for your CT Calcium Heart scan. Below is an explanation of the results as well as follow up recommendations from your scan.     Findings:    Your calcium score is 0. There is no identifiable coronary plaque, however a heart healthy lifestyle is recommended to prevent coronary artery disease.     If you have any questions, please call our Heart  at,   [phone].       Sincerely,    [facility]   no

## 2023-09-29 NOTE — PATIENT PROFILE ADULT - HEALTH LITERACY
no 52M PMHx of GERD who presented to [facility] ED (started 9.23) reporting RLQ abdominal pain, nausea, vomiting, chills and fever. Patient stated he had been experiencing a bit of constipation earlier in during the day of 9/23 for which he trialeda fleet enema and was successful in having a BM. Overnight he became constipated and felt bloated again and around 10 pm felt severe sharp stabbing pain in the RLQ. He has had 4 episodes of NBNB emesis during the day of 9/24 and fever of 100.9 at home. In the ED patients vitals were stable, abdomen was tender to palpation in right lower quadrant. Labs were significant for a wbc 23.75, hgb 14.7. CTAP w/ IV contrast revealed a dilated appendix up to 2.1 cm (2:98), containing fluid and fecal material, and demonstrated pronounced surrounding inflammatory changes consistent with acute appendicitis. On 9/25 the patient underwent a laparoscopic appendectomy without complications. On 9.26 patient had 4-5 episodes of dark brown emesis, a nasogastric tube was placed with 400 cc of bilious output. An abdominal x-ray on 9.27 showed high grade SBO.     His postoperative course was unremarkable with advancement of diet, passing trial of void, and pain control. On day of discharge patient was stable to be d/c'd home.

## 2023-10-02 NOTE — ED ADULT TRIAGE NOTE - BEFAST EYES
Patient's HM shows they are overdue for Colorectal Screening. Care Everywhere and  files searched. No results to attach to order nor HM updated.
No

## 2023-10-20 NOTE — H&P ADULT - NSCORESITESY/N_GEN_A_CORE_RD
Called and spoke to pt's mom regarding making appt on 11/30 with Dr. Eddy at 8:30am to coordinate with this 6-week infusion. Appt not made yet as mom was concerned about getting to appt on-time for Dr. Eddy since she nor pt's dad will be off of work in time to bring him at 8:30am. I told mom I would call her back with alternate solution. Mom erinn.   No

## 2023-10-21 NOTE — ED ADULT NURSE NOTE - NSFALLRSKUNASSIST_ED_ALL_ED
I performed the initial face to face bedside interview with this patient regarding history of present illness, review of symptoms and relevant past medical, social and family history.  I completed an independent physical examination.  I was the initial provider who evaluated this patient. I have signed out the follow up of any pending tests (i.e. labs, radiological studies) to the resident.  I have communicated the patient’s plan of care and disposition with the resident.
no

## 2024-01-11 NOTE — PROCEDURE NOTE - SEDATION
Detail Level: Zone 2 mg midazolam, 75 mcg fentanyl/Moderate sedation provided by Attending Radiologist

## 2024-04-03 NOTE — ED ADULT TRIAGE NOTE - TEMPERATURE IN CELSIUS (DEGREES C)
Pt has1 year supply sent in aug    Current refill request refused due to refill is either a duplicate request or has active refills at the pharmacy.  Check previous templates.    Requested Prescriptions     Refused Prescriptions Disp Refills    MIDODRINE 5 MG Oral Tab [Pharmacy Med Name: Midodrine HCl 5 MG Tablet] 21 tablet 11     Sig: ONE TABLET BY MOUTH THREE TIMES DAILY FOR HYPOTENSION (HOLD IF BP GREATER THAN 150)     Refused By: NEHEMIAH HUNTER     Reason for Refusal: Refill not appropriate       
36.8

## 2024-06-07 NOTE — ED PROVIDER NOTE - CARE PROVIDER_API CALL
1
Emil Knight)  Urology  [address] [place] [address]  Phone: [phone]  Fax: [phone]  Follow Up Time: 1-3 Days

## 2024-10-08 NOTE — CONSULT NOTE ADULT - PROVIDER SPECIALTY LIST ADULT
Pt presents to ED from home c/o chest pain and RIGHT arm pain after having a cath on Monday. Pt rates pain as 3/10 currently, has hx of carpal tunnel. Pt denies n/v/d, LOC, SOB, fever, chills, injury/trauma, change in bowel/bladder function, loss of appetite, or any other sx.     Pt is A&OX4, placed on full monitor, EKG done, IV established, changed into gown and given warm blankets. Labs drawn, labeled, and sent to lab. Pt vitals show HTN but otherwise WNL.White board updated, and patient is updated on plan of care. Family at bedside  
Neurology
Infectious Disease
Neuropsychology

## 2024-12-17 NOTE — ED PROVIDER NOTE - DATA REVIEWED, MDM
----- Message from MIGUELINA Mackay sent at 12/17/2024  6:54 AM CST -----  Normal urine   vital signs

## 2025-02-24 NOTE — H&P PST ADULT - NS PRO ABUSE SCREEN SUSPICION NEGLECT YN
ENDOSCOPY - EGD/COLONOSCOPY       ADULT CARE DISCHARGE  INSTRUCTIONS     Symptoms you may temporarily experience:      Sore Throat     Hoarseness     Bloating/Cramping     Dizziness     IV Irritation/tenderness     Gas or Belching     Slight fever     Small amount of blood in vomit or stool       Call Your Doctor for the following Problems: ______________________     ________________     Fever of 101 degrees or higher       Sharp abdominal  pain     Red streak up the arm from the IV site     Severe cramping        Large amount of blood in stool or vomit       Instructions for the next 24 hours after your Procedure:     Adult supervision     Do NOT drink any alcohol      Do not work today     NO important decisions     DO NOT sign any legal documents     You may shower/ bathe       DO NOT  DRIVE or operate machinery     Resume normal activity tomorrow       Discharge  Diet:     Avoid spicy/ greasy foods     Avoid any food that will cause more gas or bloating       *** Seek IMMEDIATE medical attention and call 911 if you develop symptoms such as:     Chest pain     Shortness of breath     Severe bleeding    no

## 2025-06-19 NOTE — ED ADULT NURSE NOTE - EXTENSIONS OF SELF_ADULT
A (DEVICE CLSR L24 MM CIDCOMAN FLX PRO LT ATR APPENDAGE - [id number]) delivery system was inserted. Eyeglasses